# Patient Record
Sex: FEMALE | Race: WHITE | Employment: PART TIME | URBAN - METROPOLITAN AREA
[De-identification: names, ages, dates, MRNs, and addresses within clinical notes are randomized per-mention and may not be internally consistent; named-entity substitution may affect disease eponyms.]

---

## 2021-08-01 ENCOUNTER — ANESTHESIA EVENT (OUTPATIENT)
Dept: OPERATING ROOM | Age: 71
DRG: 908 | End: 2021-08-01
Payer: MEDICARE

## 2021-08-01 ENCOUNTER — HOSPITAL ENCOUNTER (INPATIENT)
Age: 71
LOS: 1 days | Discharge: HOME | DRG: 908 | End: 2021-08-02
Attending: EMERGENCY MEDICINE | Admitting: UROLOGY
Payer: MEDICARE

## 2021-08-01 ENCOUNTER — APPOINTMENT (OUTPATIENT)
Dept: CT IMAGING | Age: 71
DRG: 908 | End: 2021-08-01
Payer: MEDICARE

## 2021-08-01 ENCOUNTER — ANESTHESIA (OUTPATIENT)
Dept: OPERATING ROOM | Age: 71
DRG: 908 | End: 2021-08-01
Payer: MEDICARE

## 2021-08-01 VITALS
SYSTOLIC BLOOD PRESSURE: 117 MMHG | OXYGEN SATURATION: 100 % | DIASTOLIC BLOOD PRESSURE: 58 MMHG | RESPIRATION RATE: 31 BRPM

## 2021-08-01 DIAGNOSIS — R33.9 RETENTION OF URINE: Primary | ICD-10-CM

## 2021-08-01 DIAGNOSIS — R31.9 HEMATURIA, UNSPECIFIED TYPE: ICD-10-CM

## 2021-08-01 DIAGNOSIS — R10.30 LOWER ABDOMINAL PAIN: ICD-10-CM

## 2021-08-01 PROBLEM — R33.8 CLOT RETENTION OF URINE: Status: ACTIVE | Noted: 2021-08-01

## 2021-08-01 LAB
A/G RATIO: 1.6 (ref 1.1–2.2)
ABO/RH: NORMAL
ALBUMIN SERPL-MCNC: 4.1 G/DL (ref 3.4–5)
ALP BLD-CCNC: 89 U/L (ref 40–129)
ALT SERPL-CCNC: 40 U/L (ref 10–40)
ANION GAP SERPL CALCULATED.3IONS-SCNC: 15 MMOL/L (ref 3–16)
ANTIBODY SCREEN: NORMAL
AST SERPL-CCNC: 38 U/L (ref 15–37)
BASOPHILS ABSOLUTE: 0.1 K/UL (ref 0–0.2)
BASOPHILS RELATIVE PERCENT: 0.9 %
BILIRUB SERPL-MCNC: 0.4 MG/DL (ref 0–1)
BILIRUBIN URINE: ABNORMAL
BLOOD, URINE: ABNORMAL
BUN BLDV-MCNC: 8 MG/DL (ref 7–20)
CALCIUM SERPL-MCNC: 8.9 MG/DL (ref 8.3–10.6)
CHLORIDE BLD-SCNC: 95 MMOL/L (ref 99–110)
CLARITY: ABNORMAL
CO2: 24 MMOL/L (ref 21–32)
COLOR: ABNORMAL
COMMENT UA: ABNORMAL
CREAT SERPL-MCNC: <0.5 MG/DL (ref 0.6–1.2)
EOSINOPHILS ABSOLUTE: 0.1 K/UL (ref 0–0.6)
EOSINOPHILS RELATIVE PERCENT: 0.7 %
GFR AFRICAN AMERICAN: >60
GFR NON-AFRICAN AMERICAN: >60
GLOBULIN: 2.6 G/DL
GLUCOSE BLD-MCNC: 137 MG/DL (ref 70–99)
GLUCOSE URINE: ABNORMAL MG/DL
HCT VFR BLD CALC: 35.9 % (ref 36–48)
HEMOGLOBIN: 11.9 G/DL (ref 12–16)
KETONES, URINE: ABNORMAL MG/DL
LEUKOCYTE ESTERASE, URINE: ABNORMAL
LYMPHOCYTES ABSOLUTE: 1.7 K/UL (ref 1–5.1)
LYMPHOCYTES RELATIVE PERCENT: 20.3 %
MCH RBC QN AUTO: 30 PG (ref 26–34)
MCHC RBC AUTO-ENTMCNC: 33.1 G/DL (ref 31–36)
MCV RBC AUTO: 90.7 FL (ref 80–100)
MICROSCOPIC EXAMINATION: YES
MONOCYTES ABSOLUTE: 0.6 K/UL (ref 0–1.3)
MONOCYTES RELATIVE PERCENT: 7.6 %
NEUTROPHILS ABSOLUTE: 5.8 K/UL (ref 1.7–7.7)
NEUTROPHILS RELATIVE PERCENT: 70.5 %
NITRITE, URINE: ABNORMAL
PDW BLD-RTO: 13.4 % (ref 12.4–15.4)
PH UA: ABNORMAL (ref 5–8)
PLATELET # BLD: 253 K/UL (ref 135–450)
PMV BLD AUTO: 9 FL (ref 5–10.5)
POTASSIUM SERPL-SCNC: 4.2 MMOL/L (ref 3.5–5.1)
PROTEIN UA: ABNORMAL MG/DL
RBC # BLD: 3.96 M/UL (ref 4–5.2)
RBC UA: >100 /HPF (ref 0–4)
SARS-COV-2, NAAT: NOT DETECTED
SODIUM BLD-SCNC: 134 MMOL/L (ref 136–145)
SPECIFIC GRAVITY UA: 1.02 (ref 1–1.03)
TOTAL PROTEIN: 6.7 G/DL (ref 6.4–8.2)
URINE REFLEX TO CULTURE: YES
URINE TYPE: ABNORMAL
UROBILINOGEN, URINE: ABNORMAL E.U./DL
WBC # BLD: 8.3 K/UL (ref 4–11)

## 2021-08-01 PROCEDURE — 0W3R8ZZ CONTROL BLEEDING IN GENITOURINARY TRACT, VIA NATURAL OR ARTIFICIAL OPENING ENDOSCOPIC: ICD-10-PCS | Performed by: UROLOGY

## 2021-08-01 PROCEDURE — 6370000000 HC RX 637 (ALT 250 FOR IP): Performed by: PHYSICIAN ASSISTANT

## 2021-08-01 PROCEDURE — 2500000003 HC RX 250 WO HCPCS: Performed by: ANESTHESIOLOGY

## 2021-08-01 PROCEDURE — 7100000001 HC PACU RECOVERY - ADDTL 15 MIN: Performed by: UROLOGY

## 2021-08-01 PROCEDURE — 85025 COMPLETE CBC W/AUTO DIFF WBC: CPT

## 2021-08-01 PROCEDURE — G0378 HOSPITAL OBSERVATION PER HR: HCPCS

## 2021-08-01 PROCEDURE — 3700000001 HC ADD 15 MINUTES (ANESTHESIA): Performed by: UROLOGY

## 2021-08-01 PROCEDURE — 96374 THER/PROPH/DIAG INJ IV PUSH: CPT

## 2021-08-01 PROCEDURE — 3700000000 HC ANESTHESIA ATTENDED CARE: Performed by: UROLOGY

## 2021-08-01 PROCEDURE — 51702 INSERT TEMP BLADDER CATH: CPT

## 2021-08-01 PROCEDURE — 7100000000 HC PACU RECOVERY - FIRST 15 MIN: Performed by: UROLOGY

## 2021-08-01 PROCEDURE — 1200000000 HC SEMI PRIVATE

## 2021-08-01 PROCEDURE — 80053 COMPREHEN METABOLIC PANEL: CPT

## 2021-08-01 PROCEDURE — 2580000003 HC RX 258: Performed by: UROLOGY

## 2021-08-01 PROCEDURE — 86901 BLOOD TYPING SEROLOGIC RH(D): CPT

## 2021-08-01 PROCEDURE — 2709999900 HC NON-CHARGEABLE SUPPLY: Performed by: UROLOGY

## 2021-08-01 PROCEDURE — 36415 COLL VENOUS BLD VENIPUNCTURE: CPT

## 2021-08-01 PROCEDURE — 87635 SARS-COV-2 COVID-19 AMP PRB: CPT

## 2021-08-01 PROCEDURE — 6360000002 HC RX W HCPCS: Performed by: PHYSICIAN ASSISTANT

## 2021-08-01 PROCEDURE — 96375 TX/PRO/DX INJ NEW DRUG ADDON: CPT

## 2021-08-01 PROCEDURE — 87086 URINE CULTURE/COLONY COUNT: CPT

## 2021-08-01 PROCEDURE — 74177 CT ABD & PELVIS W/CONTRAST: CPT

## 2021-08-01 PROCEDURE — 3600000002 HC SURGERY LEVEL 2 BASE: Performed by: UROLOGY

## 2021-08-01 PROCEDURE — 99283 EMERGENCY DEPT VISIT LOW MDM: CPT

## 2021-08-01 PROCEDURE — 6360000002 HC RX W HCPCS: Performed by: ANESTHESIOLOGY

## 2021-08-01 PROCEDURE — 3600000012 HC SURGERY LEVEL 2 ADDTL 15MIN: Performed by: UROLOGY

## 2021-08-01 PROCEDURE — 86850 RBC ANTIBODY SCREEN: CPT

## 2021-08-01 PROCEDURE — 86900 BLOOD TYPING SEROLOGIC ABO: CPT

## 2021-08-01 PROCEDURE — 6360000004 HC RX CONTRAST MEDICATION: Performed by: PHYSICIAN ASSISTANT

## 2021-08-01 PROCEDURE — 0TCB8ZZ EXTIRPATION OF MATTER FROM BLADDER, VIA NATURAL OR ARTIFICIAL OPENING ENDOSCOPIC: ICD-10-PCS | Performed by: UROLOGY

## 2021-08-01 PROCEDURE — 81001 URINALYSIS AUTO W/SCOPE: CPT

## 2021-08-01 PROCEDURE — 6370000000 HC RX 637 (ALT 250 FOR IP): Performed by: UROLOGY

## 2021-08-01 RX ORDER — CEPHALEXIN 500 MG/1
500 CAPSULE ORAL 2 TIMES DAILY
Status: DISCONTINUED | OUTPATIENT
Start: 2021-08-01 | End: 2021-08-02 | Stop reason: ALTCHOICE

## 2021-08-01 RX ORDER — SODIUM CHLORIDE 0.9 % (FLUSH) 0.9 %
5-40 SYRINGE (ML) INJECTION PRN
Status: DISCONTINUED | OUTPATIENT
Start: 2021-08-01 | End: 2021-08-02 | Stop reason: HOSPADM

## 2021-08-01 RX ORDER — BUPROPION HYDROCHLORIDE 300 MG/1
300 TABLET ORAL EVERY MORNING
COMMUNITY

## 2021-08-01 RX ORDER — PROMETHAZINE HYDROCHLORIDE 25 MG/ML
6.25 INJECTION, SOLUTION INTRAMUSCULAR; INTRAVENOUS
Status: DISCONTINUED | OUTPATIENT
Start: 2021-08-01 | End: 2021-08-01 | Stop reason: HOSPADM

## 2021-08-01 RX ORDER — OXYCODONE HYDROCHLORIDE AND ACETAMINOPHEN 5; 325 MG/1; MG/1
1 TABLET ORAL ONCE
Status: COMPLETED | OUTPATIENT
Start: 2021-08-01 | End: 2021-08-01

## 2021-08-01 RX ORDER — ONDANSETRON 4 MG/1
4 TABLET, ORALLY DISINTEGRATING ORAL EVERY 8 HOURS PRN
Status: DISCONTINUED | OUTPATIENT
Start: 2021-08-01 | End: 2021-08-02 | Stop reason: HOSPADM

## 2021-08-01 RX ORDER — PROPOFOL 10 MG/ML
INJECTION, EMULSION INTRAVENOUS PRN
Status: DISCONTINUED | OUTPATIENT
Start: 2021-08-01 | End: 2021-08-01 | Stop reason: SDUPTHER

## 2021-08-01 RX ORDER — EPHEDRINE SULFATE/0.9% NACL/PF 50 MG/5 ML
SYRINGE (ML) INTRAVENOUS PRN
Status: DISCONTINUED | OUTPATIENT
Start: 2021-08-01 | End: 2021-08-01 | Stop reason: SDUPTHER

## 2021-08-01 RX ORDER — KRILL/OM-3/DHA/EPA/PHOSPHO/AST 500-110 MG
500 CAPSULE ORAL DAILY
Status: DISCONTINUED | OUTPATIENT
Start: 2021-08-02 | End: 2021-08-01 | Stop reason: RX

## 2021-08-01 RX ORDER — KRILL/OM-3/DHA/EPA/PHOSPHO/AST 500-110 MG
500 CAPSULE ORAL DAILY
COMMUNITY

## 2021-08-01 RX ORDER — TRAZODONE HYDROCHLORIDE 100 MG/1
100 TABLET ORAL NIGHTLY
COMMUNITY

## 2021-08-01 RX ORDER — CEPHALEXIN 500 MG/1
500 CAPSULE ORAL 2 TIMES DAILY
Status: ON HOLD | COMMUNITY
End: 2021-08-02 | Stop reason: HOSPADM

## 2021-08-01 RX ORDER — GLUCOSAMINE/D3/BOSWELLIA SERRA 1500MG-400
1 TABLET ORAL EVERY EVENING
COMMUNITY

## 2021-08-01 RX ORDER — ONDANSETRON 2 MG/ML
4 INJECTION INTRAMUSCULAR; INTRAVENOUS EVERY 6 HOURS PRN
Status: DISCONTINUED | OUTPATIENT
Start: 2021-08-01 | End: 2021-08-02 | Stop reason: HOSPADM

## 2021-08-01 RX ORDER — GLUCOSAMINE/D3/BOSWELLIA SERRA 1500MG-400
1 TABLET ORAL EVERY EVENING
Status: DISCONTINUED | OUTPATIENT
Start: 2021-08-01 | End: 2021-08-01 | Stop reason: RX

## 2021-08-01 RX ORDER — MAGNESIUM HYDROXIDE 1200 MG/15ML
LIQUID ORAL
Status: COMPLETED | OUTPATIENT
Start: 2021-08-01 | End: 2021-08-01

## 2021-08-01 RX ORDER — ATORVASTATIN CALCIUM 40 MG/1
40 TABLET, FILM COATED ORAL DAILY
COMMUNITY

## 2021-08-01 RX ORDER — ACETAMINOPHEN 160 MG
1 TABLET,DISINTEGRATING ORAL EVERY EVENING
COMMUNITY

## 2021-08-01 RX ORDER — LABETALOL HYDROCHLORIDE 5 MG/ML
5 INJECTION, SOLUTION INTRAVENOUS EVERY 10 MIN PRN
Status: DISCONTINUED | OUTPATIENT
Start: 2021-08-01 | End: 2021-08-01 | Stop reason: HOSPADM

## 2021-08-01 RX ORDER — MORPHINE SULFATE 4 MG/ML
4 INJECTION, SOLUTION INTRAMUSCULAR; INTRAVENOUS
Status: DISCONTINUED | OUTPATIENT
Start: 2021-08-01 | End: 2021-08-02 | Stop reason: HOSPADM

## 2021-08-01 RX ORDER — ARMODAFINIL 250 MG/1
250 TABLET ORAL DAILY
COMMUNITY

## 2021-08-01 RX ORDER — FENTANYL CITRATE 50 UG/ML
INJECTION, SOLUTION INTRAMUSCULAR; INTRAVENOUS PRN
Status: DISCONTINUED | OUTPATIENT
Start: 2021-08-01 | End: 2021-08-01 | Stop reason: SDUPTHER

## 2021-08-01 RX ORDER — DEXAMETHASONE SODIUM PHOSPHATE 4 MG/ML
INJECTION, SOLUTION INTRA-ARTICULAR; INTRALESIONAL; INTRAMUSCULAR; INTRAVENOUS; SOFT TISSUE PRN
Status: DISCONTINUED | OUTPATIENT
Start: 2021-08-01 | End: 2021-08-01 | Stop reason: SDUPTHER

## 2021-08-01 RX ORDER — ACETAMINOPHEN 325 MG/1
650 TABLET ORAL EVERY 4 HOURS PRN
Status: DISCONTINUED | OUTPATIENT
Start: 2021-08-01 | End: 2021-08-02 | Stop reason: HOSPADM

## 2021-08-01 RX ORDER — MORPHINE SULFATE 4 MG/ML
4 INJECTION, SOLUTION INTRAMUSCULAR; INTRAVENOUS ONCE
Status: DISCONTINUED | OUTPATIENT
Start: 2021-08-01 | End: 2021-08-01

## 2021-08-01 RX ORDER — OXYCODONE HYDROCHLORIDE 5 MG/1
5 TABLET ORAL EVERY 4 HOURS PRN
Status: DISCONTINUED | OUTPATIENT
Start: 2021-08-01 | End: 2021-08-02 | Stop reason: HOSPADM

## 2021-08-01 RX ORDER — ATORVASTATIN CALCIUM 40 MG/1
40 TABLET, FILM COATED ORAL NIGHTLY
Status: DISCONTINUED | OUTPATIENT
Start: 2021-08-01 | End: 2021-08-02 | Stop reason: HOSPADM

## 2021-08-01 RX ORDER — MULTIVIT WITH MINERALS/LUTEIN
500 TABLET ORAL DAILY
COMMUNITY
End: 2021-08-01

## 2021-08-01 RX ORDER — M-VIT,TX,IRON,MINS/CALC/FOLIC 27MG-0.4MG
1 TABLET ORAL DAILY
COMMUNITY

## 2021-08-01 RX ORDER — SODIUM CHLORIDE 9 MG/ML
25 INJECTION, SOLUTION INTRAVENOUS PRN
Status: DISCONTINUED | OUTPATIENT
Start: 2021-08-01 | End: 2021-08-02 | Stop reason: HOSPADM

## 2021-08-01 RX ORDER — SUCCINYLCHOLINE/SOD CL,ISO/PF 200MG/10ML
SYRINGE (ML) INTRAVENOUS PRN
Status: DISCONTINUED | OUTPATIENT
Start: 2021-08-01 | End: 2021-08-01 | Stop reason: SDUPTHER

## 2021-08-01 RX ORDER — SODIUM CHLORIDE 0.9 % (FLUSH) 0.9 %
5-40 SYRINGE (ML) INJECTION EVERY 12 HOURS SCHEDULED
Status: DISCONTINUED | OUTPATIENT
Start: 2021-08-01 | End: 2021-08-02 | Stop reason: HOSPADM

## 2021-08-01 RX ORDER — FENTANYL CITRATE 50 UG/ML
50 INJECTION, SOLUTION INTRAMUSCULAR; INTRAVENOUS ONCE
Status: COMPLETED | OUTPATIENT
Start: 2021-08-01 | End: 2021-08-01

## 2021-08-01 RX ORDER — MORPHINE SULFATE 2 MG/ML
2 INJECTION, SOLUTION INTRAMUSCULAR; INTRAVENOUS
Status: DISCONTINUED | OUTPATIENT
Start: 2021-08-01 | End: 2021-08-02 | Stop reason: HOSPADM

## 2021-08-01 RX ORDER — ONDANSETRON 2 MG/ML
INJECTION INTRAMUSCULAR; INTRAVENOUS PRN
Status: DISCONTINUED | OUTPATIENT
Start: 2021-08-01 | End: 2021-08-01 | Stop reason: SDUPTHER

## 2021-08-01 RX ORDER — OXYCODONE HYDROCHLORIDE 10 MG/1
10 TABLET ORAL EVERY 4 HOURS PRN
Status: DISCONTINUED | OUTPATIENT
Start: 2021-08-01 | End: 2021-08-02 | Stop reason: HOSPADM

## 2021-08-01 RX ORDER — POLYETHYLENE GLYCOL 3350 17 G/17G
17 POWDER, FOR SOLUTION ORAL DAILY
Status: DISCONTINUED | OUTPATIENT
Start: 2021-08-02 | End: 2021-08-02 | Stop reason: HOSPADM

## 2021-08-01 RX ORDER — FENTANYL CITRATE 50 UG/ML
25 INJECTION, SOLUTION INTRAMUSCULAR; INTRAVENOUS EVERY 5 MIN PRN
Status: DISCONTINUED | OUTPATIENT
Start: 2021-08-01 | End: 2021-08-01 | Stop reason: HOSPADM

## 2021-08-01 RX ORDER — CEFAZOLIN SODIUM 1 G/3ML
INJECTION, POWDER, FOR SOLUTION INTRAMUSCULAR; INTRAVENOUS PRN
Status: DISCONTINUED | OUTPATIENT
Start: 2021-08-01 | End: 2021-08-01 | Stop reason: SDUPTHER

## 2021-08-01 RX ORDER — SODIUM CHLORIDE 9 MG/ML
INJECTION, SOLUTION INTRAVENOUS CONTINUOUS
Status: DISCONTINUED | OUTPATIENT
Start: 2021-08-01 | End: 2021-08-02 | Stop reason: HOSPADM

## 2021-08-01 RX ORDER — LIDOCAINE HYDROCHLORIDE 20 MG/ML
INJECTION, SOLUTION EPIDURAL; INFILTRATION; INTRACAUDAL; PERINEURAL PRN
Status: DISCONTINUED | OUTPATIENT
Start: 2021-08-01 | End: 2021-08-01 | Stop reason: SDUPTHER

## 2021-08-01 RX ORDER — TRAZODONE HYDROCHLORIDE 100 MG/1
100 TABLET ORAL NIGHTLY
Status: DISCONTINUED | OUTPATIENT
Start: 2021-08-01 | End: 2021-08-02 | Stop reason: HOSPADM

## 2021-08-01 RX ORDER — BUPROPION HYDROCHLORIDE 150 MG/1
300 TABLET ORAL EVERY MORNING
Status: DISCONTINUED | OUTPATIENT
Start: 2021-08-02 | End: 2021-08-02 | Stop reason: HOSPADM

## 2021-08-01 RX ORDER — VITAMIN B COMPLEX
2000 TABLET ORAL EVERY EVENING
Status: DISCONTINUED | OUTPATIENT
Start: 2021-08-01 | End: 2021-08-02 | Stop reason: HOSPADM

## 2021-08-01 RX ORDER — M-VIT,TX,IRON,MINS/CALC/FOLIC 27MG-0.4MG
1 TABLET ORAL DAILY
Status: DISCONTINUED | OUTPATIENT
Start: 2021-08-02 | End: 2021-08-02 | Stop reason: HOSPADM

## 2021-08-01 RX ORDER — ARMODAFINIL 250 MG/1
250 TABLET ORAL DAILY
Status: DISCONTINUED | OUTPATIENT
Start: 2021-08-02 | End: 2021-08-02 | Stop reason: HOSPADM

## 2021-08-01 RX ORDER — ATROPA BELLADONNA AND OPIUM 16.2; 3 MG/1; MG/1
30 SUPPOSITORY RECTAL EVERY 8 HOURS PRN
Status: DISCONTINUED | OUTPATIENT
Start: 2021-08-01 | End: 2021-08-02 | Stop reason: HOSPADM

## 2021-08-01 RX ADMIN — ATORVASTATIN CALCIUM 40 MG: 40 TABLET, FILM COATED ORAL at 22:54

## 2021-08-01 RX ADMIN — IOPAMIDOL 75 ML: 755 INJECTION, SOLUTION INTRAVENOUS at 17:48

## 2021-08-01 RX ADMIN — Medication 10 MG: at 20:28

## 2021-08-01 RX ADMIN — Medication 10 MG: at 20:33

## 2021-08-01 RX ADMIN — Medication 120 MG: at 20:02

## 2021-08-01 RX ADMIN — HYDROMORPHONE HYDROCHLORIDE 0.5 MG: 1 INJECTION, SOLUTION INTRAMUSCULAR; INTRAVENOUS; SUBCUTANEOUS at 17:27

## 2021-08-01 RX ADMIN — CEFAZOLIN SODIUM 2000 MG: 1 INJECTION, POWDER, FOR SOLUTION INTRAMUSCULAR; INTRAVENOUS at 20:06

## 2021-08-01 RX ADMIN — Medication 10 MG: at 20:25

## 2021-08-01 RX ADMIN — FENTANYL CITRATE 50 MCG: 50 INJECTION, SOLUTION INTRAMUSCULAR; INTRAVENOUS at 18:31

## 2021-08-01 RX ADMIN — ATROPA BELLADONNA AND OPIUM 30 MG: 16.2; 3 SUPPOSITORY RECTAL at 22:00

## 2021-08-01 RX ADMIN — FENTANYL CITRATE 50 MCG: 50 INJECTION INTRAMUSCULAR; INTRAVENOUS at 20:38

## 2021-08-01 RX ADMIN — FENTANYL CITRATE 50 MCG: 50 INJECTION INTRAMUSCULAR; INTRAVENOUS at 20:02

## 2021-08-01 RX ADMIN — FENTANYL CITRATE 25 MCG: 0.05 INJECTION, SOLUTION INTRAMUSCULAR; INTRAVENOUS at 21:06

## 2021-08-01 RX ADMIN — Medication 20 MG: at 20:14

## 2021-08-01 RX ADMIN — TRAZODONE HYDROCHLORIDE 100 MG: 100 TABLET ORAL at 22:54

## 2021-08-01 RX ADMIN — LIDOCAINE HYDROCHLORIDE 100 MG: 20 INJECTION, SOLUTION EPIDURAL; INFILTRATION; INTRACAUDAL; PERINEURAL at 20:02

## 2021-08-01 RX ADMIN — SODIUM CHLORIDE: 9 INJECTION, SOLUTION INTRAVENOUS at 22:00

## 2021-08-01 RX ADMIN — OXYCODONE HYDROCHLORIDE AND ACETAMINOPHEN 1 TABLET: 5; 325 TABLET ORAL at 16:59

## 2021-08-01 RX ADMIN — FENTANYL CITRATE 25 MCG: 0.05 INJECTION, SOLUTION INTRAMUSCULAR; INTRAVENOUS at 21:11

## 2021-08-01 RX ADMIN — ONDANSETRON 4 MG: 2 INJECTION INTRAMUSCULAR; INTRAVENOUS at 20:02

## 2021-08-01 RX ADMIN — PROPOFOL 110 MG: 10 INJECTION, EMULSION INTRAVENOUS at 20:02

## 2021-08-01 RX ADMIN — DEXAMETHASONE SODIUM PHOSPHATE 10 MG: 4 INJECTION, SOLUTION INTRAMUSCULAR; INTRAVENOUS at 20:02

## 2021-08-01 RX ADMIN — OXYCODONE HYDROCHLORIDE 5 MG: 5 TABLET ORAL at 22:53

## 2021-08-01 RX ADMIN — CEPHALEXIN 500 MG: 500 CAPSULE ORAL at 22:53

## 2021-08-01 ASSESSMENT — PULMONARY FUNCTION TESTS
PIF_VALUE: 16
PIF_VALUE: 4
PIF_VALUE: 16
PIF_VALUE: 13
PIF_VALUE: 1
PIF_VALUE: 15
PIF_VALUE: 15
PIF_VALUE: 16
PIF_VALUE: 15
PIF_VALUE: 0
PIF_VALUE: 34
PIF_VALUE: 0
PIF_VALUE: 15
PIF_VALUE: 16
PIF_VALUE: 16
PIF_VALUE: 15
PIF_VALUE: 16
PIF_VALUE: 14
PIF_VALUE: 16
PIF_VALUE: 16
PIF_VALUE: 15
PIF_VALUE: 2
PIF_VALUE: 2
PIF_VALUE: 16
PIF_VALUE: 0
PIF_VALUE: 0
PIF_VALUE: 2
PIF_VALUE: 14
PIF_VALUE: 16
PIF_VALUE: 13
PIF_VALUE: 12
PIF_VALUE: 15
PIF_VALUE: 16
PIF_VALUE: 11
PIF_VALUE: 15
PIF_VALUE: 10
PIF_VALUE: 14
PIF_VALUE: 13
PIF_VALUE: 15
PIF_VALUE: 15
PIF_VALUE: 2
PIF_VALUE: 15

## 2021-08-01 ASSESSMENT — PAIN SCALES - GENERAL
PAINLEVEL_OUTOF10: 8
PAINLEVEL_OUTOF10: 5
PAINLEVEL_OUTOF10: 2
PAINLEVEL_OUTOF10: 10
PAINLEVEL_OUTOF10: 5
PAINLEVEL_OUTOF10: 5
PAINLEVEL_OUTOF10: 2
PAINLEVEL_OUTOF10: 5
PAINLEVEL_OUTOF10: 8

## 2021-08-01 ASSESSMENT — PAIN DESCRIPTION - PROGRESSION
CLINICAL_PROGRESSION: NOT CHANGED
CLINICAL_PROGRESSION: GRADUALLY WORSENING
CLINICAL_PROGRESSION: GRADUALLY WORSENING
CLINICAL_PROGRESSION: NOT CHANGED
CLINICAL_PROGRESSION: GRADUALLY IMPROVING

## 2021-08-01 ASSESSMENT — ENCOUNTER SYMPTOMS
ABDOMINAL PAIN: 1
NAUSEA: 0
VOMITING: 0

## 2021-08-01 ASSESSMENT — PAIN DESCRIPTION - ONSET
ONSET: ON-GOING
ONSET: GRADUAL
ONSET: ON-GOING

## 2021-08-01 ASSESSMENT — PAIN DESCRIPTION - FREQUENCY
FREQUENCY: CONTINUOUS
FREQUENCY: CONTINUOUS
FREQUENCY: INTERMITTENT
FREQUENCY: CONTINUOUS
FREQUENCY: INTERMITTENT
FREQUENCY: CONTINUOUS

## 2021-08-01 ASSESSMENT — PAIN DESCRIPTION - PAIN TYPE
TYPE: ACUTE PAIN
TYPE: SURGICAL PAIN
TYPE: ACUTE PAIN
TYPE: ACUTE PAIN

## 2021-08-01 ASSESSMENT — PAIN DESCRIPTION - LOCATION
LOCATION: PERINEUM
LOCATION: ABDOMEN
LOCATION: PERINEUM
LOCATION: ABDOMEN

## 2021-08-01 ASSESSMENT — PAIN DESCRIPTION - DESCRIPTORS
DESCRIPTORS: STABBING
DESCRIPTORS: STABBING
DESCRIPTORS: PRESSURE;CRAMPING
DESCRIPTORS: STABBING
DESCRIPTORS: SPASM

## 2021-08-01 ASSESSMENT — PAIN DESCRIPTION - ORIENTATION
ORIENTATION: LOWER
ORIENTATION: LOWER

## 2021-08-01 ASSESSMENT — PAIN - FUNCTIONAL ASSESSMENT
PAIN_FUNCTIONAL_ASSESSMENT: ACTIVITIES ARE NOT PREVENTED
PAIN_FUNCTIONAL_ASSESSMENT: PREVENTS OR INTERFERES SOME ACTIVE ACTIVITIES AND ADLS

## 2021-08-01 NOTE — ED PROVIDER NOTES
Date of evaluation: 8/1/2021    ED Attending Attestation Note     CHIEF COMPLAINT     I can't pee, only blood is coming out  HISTORY OF PRESENT ILLNESS  (Location/Symptom, Timing/Onset,Context/Setting, Quality, Duration, Modifying Factors, Severity). Note limiting factors. This patient was seen by the advance practice provider. I have seen and examined the patient, agree with the workup, evaluation, management and diagnosis. The care plan has been discussed. Chief Complaint   Patient presents with    Urinary Retention     having blood clots out of urethra and unable to pass urine         Brayan Kimball is a 72 y.o. female who presents to the emergency department secondary to concern for urinary retention. Had a cystoscopy 2 weeks ago with Carito for cancer (had two tumors removed). Has been urinating since then, only intermittently blood, was placed on keflex for UTI on Friday. This morning urinated at 8am without issues but has not been able to go since then. Feels miserable now. No past medical history noted below, significant as noted above. Social History     Socioeconomic History    Marital status:      Spouse name: None    Number of children: None    Years of education: None    Highest education level: None   Occupational History    None   Tobacco Use    Smoking status: None   Substance and Sexual Activity    Alcohol use: None    Drug use: None    Sexual activity: None   Other Topics Concern    None   Social History Narrative    None     Social Determinants of Health     Financial Resource Strain:     Difficulty of Paying Living Expenses:    Food Insecurity:     Worried About Running Out of Food in the Last Year:     Ran Out of Food in the Last Year:    Transportation Needs:     Lack of Transportation (Medical):      Lack of Transportation (Non-Medical):    Physical Activity:     Days of Exercise per Week:     Minutes of Exercise per Session:    Stress:     Feeling of Stress :    Social Connections:     Frequency of Communication with Friends and Family:     Frequency of Social Gatherings with Friends and Family:     Attends Jain Services:     Active Member of Clubs or Organizations:     Attends Club or Organization Meetings:     Marital Status:    Intimate Partner Violence:     Fear of Current or Ex-Partner:     Emotionally Abused:     Physically Abused:     Sexually Abused:      Aside from what is stated above denies any other symptoms or modifying factors. Nursing Notes reviewed. History reviewed. No pertinent surgical history. History reviewed. No pertinent family history. CURRENT MEDICATIONS       Previous Medications    ARMODAFINIL (NUVIGIL) 250 MG TABS    Take 250 mg by mouth daily. ATORVASTATIN (LIPITOR) 40 MG TABLET    Take 40 mg by mouth daily    BIOTIN 22775 MCG TABS    Take 1 tablet by mouth every evening    BUPROPION (WELLBUTRIN XL) 300 MG EXTENDED RELEASE TABLET    Take 300 mg by mouth every morning    CEPHALEXIN (KEFLEX) 500 MG CAPSULE    Take 500 mg by mouth 2 times daily    CHOLECALCIFEROL (VITAMIN D3) 50 MCG (2000 UT) CAPS    Take 1 capsule by mouth every evening     DICLOFENAC SODIUM (VOLTAREN) 1 % GEL    Apply 2 g topically 4 times daily as needed for Pain    KRILL OIL (OMEGA-3) 500 MG CAPS    Take 500 mg by mouth daily     MULTIPLE VITAMINS-MINERALS (THERAPEUTIC MULTIVITAMIN-MINERALS) TABLET    Take 1 tablet by mouth daily    TRAZODONE (DESYREL) 100 MG TABLET    Take 100 mg by mouth nightly      DIAGNOSTIC RESULTS     RADIOLOGY:   Interpretation per Radiologist below, if available at the time of this note:  CT ABDOMEN PELVIS W IV CONTRAST Additional Contrast? None   Final Result   Large amount of intravesicular clot with concern of a small right anterior   inferior focus of active extravasation.            Patient was given the following medications:  Orders Placed This Encounter   Medications    DISCONTD: morphine (PF) injection 4 mg    oxyCODONE-acetaminophen (PERCOCET) 5-325 MG per tablet 1 tablet    HYDROmorphone (DILAUDID) injection 0.5 mg    iopamidol (ISOVUE-370) 76 % injection 75 mL    fentaNYL (SUBLIMAZE) injection 50 mcg    fentaNYL (SUBLIMAZE) injection 25 mcg    HYDROmorphone (DILAUDID) injection 0.5 mg    promethazine (PHENERGAN) injection 6.25 mg    labetalol (NORMODYNE;TRANDATE) injection 5 mg       INITIAL VITALS: BP: (!) 162/85, Temp: 98.6 °F (37 °C), Pulse: 88, Resp: 18, SpO2: 95 %   RECENT VITALS:  BP: (!) 153/55,Temp: 98.6 °F (37 °C), Pulse: 88, Resp: 18, SpO2: 99 %     Three way bonilla placed. Started CBI. Clotted off and then re-irrigated. CT scan was fast tracked due to her discomfort. My assessment reveals a white female who is laying in bed who appears acutely distressed. She does not tolerate palpation of her abdomen. Her three-way Bonilla is again clogged. She had received Dilaudid at that time after receiving morphine which she stated did not help her pain. She was ordered fentanyl. Neurology, Dr. David Aguirre, was consulted. Plan to come in to take patient to the OR. He will also admit patient. Critical Care:  Due to the immediate potential for life-threatening deterioration due to hematuria with clots requiring specialist intervention in OR, I spent 11 minutes providing critical care. This time excludes time spent performing procedures but includes time spent on direct patient care, history retrieval, review of the chart, and discussions with patient, family, and consultant(s). FINAL IMPRESSION      1. Retention of urine    2. Lower abdominal pain    3.  Hematuria, unspecified type        DISPOSITION/PLAN   DISPOSITION  Admission         (Please note that portions of this note were completed with a voice recognition program. Efforts were made to edit the dictations but occasionally words are mis-transcribed.)    Bandar Pendleton MD (electronically signed)  Attending Emergency

## 2021-08-01 NOTE — H&P
Consulting Physician: ER     Reason for Consult: gross hematuria with clot retention    History of Present Illness: Kamila Gustafson is a 72 y.o. female who underwent TURBT with Dr. Jeneen Moritz 2 weeks ago. She was doing well until earlier this morning when she had gross hematuria. Since then she has been unable to void at all. CT done in ER which shows distended bladder and large clot burden within bladder. Possible active extravasation right anterior. She has been in pain due to retention/ bladder spasms. We are taking her emergently to OR for cysto, clot evacuation. Past Medical History:   Recent diagnosis of bladder cancer    Past Surgical History:  TURBT about 2 weeks ago    Social History:  Social History     Socioeconomic History    Marital status:      Spouse name: Not on file    Number of children: Not on file    Years of education: Not on file    Highest education level: Not on file   Occupational History    Not on file   Tobacco Use    Smoking status: Not on file   Substance and Sexual Activity    Alcohol use: Not on file    Drug use: Not on file    Sexual activity: Not on file   Other Topics Concern    Not on file   Social History Narrative    Not on file     Social Determinants of Health     Financial Resource Strain:     Difficulty of Paying Living Expenses:    Food Insecurity:     Worried About Running Out of Food in the Last Year:     [address] in the Last Year:    Transportation Needs:     Lack of Transportation (Medical):      Lack of Transportation (Non-Medical):    Physical Activity:     Days of Exercise per Week:     Minutes of Exercise per Session:    Stress:     Feeling of Stress :    Social Connections:     Frequency of Communication with Friends and Family:     Frequency of Social Gatherings with Friends and Family:     Attends Yazdanism Services:     Active Member of Clubs or Organizations:     Attends Club or Organization Meetings:     Marital Status:    Intimate Partner Violence:     Fear of Current or Ex-Partner:     Emotionally Abused:     Physically Abused:     Sexually Abused:        Family History:  History reviewed. No pertinent family history.     Meds:   Current Facility-Administered Medications: fentaNYL (SUBLIMAZE) injection 25 mcg, 25 mcg, Intravenous, Q5 Min PRN  HYDROmorphone (DILAUDID) injection 0.5 mg, 0.5 mg, Intravenous, Q5 Min PRN  promethazine (PHENERGAN) injection 6.25 mg, 6.25 mg, Intravenous, Once PRN  labetalol (NORMODYNE;TRANDATE) injection 5 mg, 5 mg, Intravenous, Q10 Min PRN    Vitals:  BP (!) 153/55   Pulse 88   Temp 98.6 °F (37 °C) (Temporal)   Resp 18   Ht 5' 5\" (1.651 m)   Wt 146 lb 2.6 oz (66.3 kg)   SpO2 99%   BMI 24.32 kg/m²   No intake or output data in the 24 hours ending 08/01/21 1942    Review of Systems:  [address] were reviewed and negative except as in HPI      Physical Exam:  General Appearance: Alert and oriented, cooperative, no distress, appears stated age  Head: Normocephalic, without obvious abnormality, atraumatic  Back: no CVA tenderness  Lungs: respirations unlabored, no wheezing  Heart: Regular rate and rhythm, no lower extremity edema noted  Abdomen: Soft except at SP area, tender, non-distended, no masses  Skin: Skin color, texture, turgor normal, no rashes or lesions  Neurologic: no gross deficits  Female :    Bladder is distended and palpable and tender to palpation   No CVA tenderness       Labs:  CBC   Lab Results   Component Value Date    WBC 8.3 08/01/2021    RBC 3.96 08/01/2021    HGB 11.9 08/01/2021    HCT 35.9 08/01/2021    MCV 90.7 08/01/2021    MCH 30.0 08/01/2021    MCHC 33.1 08/01/2021    RDW 13.4 08/01/2021     08/01/2021    MPV 9.0 08/01/2021     BMP   Lab Results   Component Value Date     08/01/2021    K 4.2 08/01/2021    CL 95 08/01/2021    CO2 24 08/01/2021    BUN 8 08/01/2021    CREATININE <0.5 08/01/2021    GLUCOSE 137 08/01/2021    CALCIUM 8.9 08/01/2021       Urinalysis:   Lab Results   Component Value Date    COLORU RED 08/01/2021    GLUCOSEU see below 08/01/2021    BLOODU LARGE 08/01/2021    Dulce Hugger see below 08/01/2021    LEUKOCYTESUR see below 08/01/2021       Imaging: Pertinent images and radiologist's report were reviewed independently  EXAMINATION:   CT OF THE ABDOMEN AND PELVIS WITH CONTRAST 8/1/2021 5:48 pm       TECHNIQUE:   CT of the abdomen and pelvis was performed with the administration of   intravenous contrast. Multiplanar reformatted images are provided for review. Dose modulation, iterative reconstruction, and/or weight based adjustment of   the mA/kV was utilized to reduce the radiation dose to as low as reasonably   achievable.       COMPARISON:   None.       HISTORY:   ORDERING SYSTEM PROVIDED HISTORY: hematuria   TECHNOLOGIST PROVIDED HISTORY:   Reason for exam:->hematuria   Additional Contrast?->None   Decision Support Exception - unselect if not a suspected or confirmed   emergency medical condition->Emergency Medical Condition (MA)   Reason for Exam: hematuria, cysto a few days ago with bx   Acuity: Acute   Type of Exam: Initial       FINDINGS:   Lower Chest: There is no consolidation or effusion.       Organs: The liver, pancreas, spleen, kidneys and adrenals are unremarkable.       GI/Bowel: There is no bowel dilatation, wall thickening or obstruction.  The   appendix is not visualized.       Pelvis:  There is moderate to severe distention of the urinary bladder which   contains a large amount of clot and a small amount of non dependent air.  The   clot approaches 10 cm in diameter.  There is a small round 8 mm hyperdense   focus along the rightward anterior inferior bladder wall which may reflect a   small focus of extravasation/active hemorrhage.  A Obrien catheter is in   place.  The uterus is surgically absent.       Peritoneum/Retroperitoneum: There is a small amount of fluid within the space   of Retzius. Rachel Sayres is no free air or intraperitoneal inflammatory change. There is no adenopathy.       Bones/Soft Tissues:  There is no acute fracture or aggressive osseous lesion.           Impression   Large amount of intravesicular clot with concern of a small right anterior   inferior focus of active extravasation.             Impression/Plan: 73 yo female with gross hematuria and clot retention  - to OR for emergent cysto, clot evacuation  - Will admit postop for CBI and observation and pain control    Teri Healy MD 3/5/03640:41 PM

## 2021-08-01 NOTE — ED NOTES
Report given to Meagan BRADLEY from OR. No further questions at this time.       David Boateng RN  08/01/21 1921

## 2021-08-01 NOTE — ANESTHESIA PRE PROCEDURE
[facility] Department of Anesthesiology  Pre-Anesthesia Evaluation/Consultation       Name:  Susannah Lovell  : 1950  Age:  72 y.o. MRN:  [id number]  Date: 2021           Surgeon: Surgeon(s):  Mahogany Cortes MD    Procedure: Procedure(s):  CYSTOSCOPY EVACUATION OF CLOTS     No Known Allergies  There is no problem list on file for this patient. History reviewed. No pertinent past medical history. History reviewed. No pertinent surgical history. Social History     Tobacco Use    Smoking status: Not on file   Substance Use Topics    Alcohol use: Not on file    Drug use: Not on file     Medications  No current facility-administered medications on file prior to encounter. Current Outpatient Medications on File Prior to Encounter   Medication Sig Dispense Refill    atorvastatin (LIPITOR) 40 MG tablet Take 40 mg by mouth daily      buPROPion (WELLBUTRIN XL) 300 MG extended release tablet Take 300 mg by mouth every morning      traZODone (DESYREL) 100 MG tablet Take 100 mg by mouth nightly      Armodafinil (NUVIGIL) 250 MG TABS Take 250 mg by mouth daily.  diclofenac sodium (VOLTAREN) 1 % GEL Apply 2 g topically 4 times daily as needed for Pain      Multiple Vitamins-Minerals (THERAPEUTIC MULTIVITAMIN-MINERALS) tablet Take 1 tablet by mouth daily      Cholecalciferol (VITAMIN D3) 50 MCG (2000 UT) CAPS Take 1 capsule by mouth every evening       Krill Oil (OMEGA-3) 500 MG CAPS Take 500 mg by mouth daily       Biotin 92992 MCG TABS Take 1 tablet by mouth every evening      cephALEXin (KEFLEX) 500 MG capsule Take 500 mg by mouth 2 times daily       No current facility-administered medications for this encounter.      Current Outpatient Medications   Medication Sig Dispense Refill    atorvastatin (LIPITOR) 40 MG tablet Take 40 mg by mouth daily      buPROPion (WELLBUTRIN XL) 300 MG extended release tablet Take 300 mg by mouth every morning      traZODone (DESYREL) 100 MG tablet Take 100 mg by mouth nightly      Armodafinil (NUVIGIL) 250 MG TABS Take 250 mg by mouth daily.  diclofenac sodium (VOLTAREN) 1 % GEL Apply 2 g topically 4 times daily as needed for Pain      Multiple Vitamins-Minerals (THERAPEUTIC MULTIVITAMIN-MINERALS) tablet Take 1 tablet by mouth daily      Cholecalciferol (VITAMIN D3) 50 MCG (2000 UT) CAPS Take 1 capsule by mouth every evening       Krill Oil (OMEGA-3) 500 MG CAPS Take 500 mg by mouth daily       Biotin 08676 MCG TABS Take 1 tablet by mouth every evening      cephALEXin (KEFLEX) 500 MG capsule Take 500 mg by mouth 2 times daily       Vital Signs (Current)   Vitals:    21 1832   BP: (!) 153/55   Pulse:    Resp:    Temp:    SpO2:      Vital Signs Statistics (for past 48 hrs)     Temp  Av.6 °F (37 °C)  Min: 98.6 °F (37 °C)   Min taken time: 21 155  Max: 98.6 °F (37 °C)   Max taken time: 21 155  Pulse  Av  Min: 88   Min taken time: 21 1552  Max: 88   Max taken time: 21 1552  Resp  Av  Min: 18   Min taken time: 21 1552  Max: 18   Max taken time: 21 1552  BP  Min: 127/80   Min taken time: 21 1652  Max: 163/70   Max taken time: 21 1701  MAP (mmHg)  Av  Min: 81   Min taken time: 21 1832  Max: 80   Max taken time: 21 170  SpO2  Av.5 %  Min: 95 %   Min taken time: 21 1552  Max: 100 %   Max taken time: 21 1701    BP Readings from Last 3 Encounters:   21 (!) 153/55     BMI  Body mass index is 24.32 kg/m². Estimated body mass index is 24.32 kg/m² as calculated from the following:    Height as of this encounter: 5' 5\" (1.651 m). Weight as of this encounter: 146 lb 2.6 oz (66.3 kg).     CBC   Lab Results   Component Value Date    WBC 8.3 2021    RBC 3.96 2021    HGB 11.9 2021    HCT 35.9 2021    MCV 90.7 2021    RDW 13.4 2021     2021     CMP    Lab Results   Component administered. Anesthetic plan and risks discussed with patient and spouse. This pre-anesthesia assessment may be used as a history and physical.    DOS STAFF ADDENDUM:    Pt seen and examined, chart reviewed (including anesthesia, drug and allergy history). No interval changes to history and physical examination. Anesthetic plan, risks, benefits, alternatives, and personnel involved discussed with patient. Questions and concerns addressed. Patient(family) verbalized an understanding and agrees to proceed.       Mesfin Banks MD  August 1, 2021  7:23 PM

## 2021-08-01 NOTE — PROGRESS NOTES
Pharmacy Medication Reconciliation Note     List of medications patient is currently taking is complete. Source of information:   1. Per conversation with patient at bedside   2. EMR    Notes regarding home medications:   1. Patient reports having all AM meds PTA in the ED   2. Patient reports starting Keflex 500 mg BID on 07/30/21--reports PCP said to take for 5 days, but RX was a 7 days supply.  Taking for urinary issues    Patient denies taking any other OTC or herbal medications other than those listed    Carri Caratgena, Pharmacy Intern  8/1/2021  7:11 PM

## 2021-08-01 NOTE — ED PROVIDER NOTES
[address] [facility]        Pt Name: Yamil Pedro  MRN: [id number]  [place] 1950  Date of evaluation: 8/1/2021  Provider: SKIP Bradley    This patient was seen and evaluated by the attending physician Dr. Carmen Jiménez  (Location/Symptom, Timing/Onset, Context/Setting, Quality, Duration,Modifying Factors, Severity.)   Yamil Pedro is a 72 y.o. female who presents to the emergencydepartment for urinary retention. Last urinated at 8am.  Since then has only passed blood from urethra. She is in distress due to pain and rocking back and forth in the chair. Has abdominal pain that became worse while she was driving here. She had a cystoscopy with 2 cancerous tumors removed about 2-1/2 weeks ago by Dr. Silvia Parsons. Had a Obrien afterwards but has been out for about the past 2 weeks. She has had intermittent hematuria since then but nothing like this. Has been on Keflex for UTI for past 2 days. Denies fever chills nausea vomiting. Has not take any medications for the pain yet. Denies aspirin anticoagulant use. Nursing Notes were reviewed and I agree. OF SYSTEMS    (2-9 systems for level 4, 10 or more for level 5)     Review of Systems   Constitutional: Negative for chills and fever. Gastrointestinal: Positive for abdominal pain. Negative for nausea and vomiting. Genitourinary: Positive for difficulty urinating. Urinary retention hematuria     Except as noted above the remainder of the review of systems was reviewed and negative. PAST MEDICAL HISTORY   No past medical history on file. SURGICAL HISTORY   No past surgical history on file. CURRENT MEDICATIONS       Previous Medications    ARMODAFINIL (NUVIGIL) 250 MG TABS    Take 250 mg by mouth daily.     ASCORBIC ACID (VITAMIN C) 250 MG TABLET    Take 500 mg by mouth daily    ATORVASTATIN (LIPITOR) 40 MG TABLET    Take 40 mg by mouth daily    BUPROPION (WELLBUTRIN XL) 300 MG EXTENDED RELEASE TABLET    Take 300 mg by mouth every morning    CHOLECALCIFEROL (VITAMIN D3) 50 MCG (2000 UT) CAPS    Take 1 capsule by mouth daily    DICLOFENAC SODIUM (VOLTAREN) 1 % GEL    Apply 2 g topically 4 times daily as needed for Pain    KRILL OIL (OMEGA-3) 500 MG CAPS    Take 500 mg by mouth 3 times daily    MULTIPLE VITAMINS-MINERALS (THERAPEUTIC MULTIVITAMIN-MINERALS) TABLET    Take 1 tablet by mouth daily    TRAZODONE (DESYREL) 100 MG TABLET    Take 100 mg by mouth nightly       ALLERGIES     Patient has no known allergies. FAMILY HISTORY     No family history on file. No family status information on file. SOCIAL HISTORY          PHYSICAL EXAM    (up to 7 for level 4, 8 or more for level 5)     ED Triage Vitals [08/01/21 1552]   BP Temp Temp Source Pulse Resp SpO2 Height Weight   (!) 162/85 98.6 °F (37 °C) Temporal 88 18 95 % 5' 5\" (1.651 m) 146 lb 2.6 oz (66.3 kg)       Physical Exam  Constitutional:       General: She is in acute distress (due to abdominal pain). Appearance: Normal appearance. She is well-developed. She is not ill-appearing, toxic-appearing or diaphoretic. HENT:      Head: Normocephalic and atraumatic. Cardiovascular:      Rate and Rhythm: Normal rate and regular rhythm. Heart sounds: Normal heart sounds. Pulmonary:      Effort: Pulmonary effort is normal. No respiratory distress. Breath sounds: Normal breath sounds. Abdominal:      General: There is distension (bladder feels significantly distended). Palpations: Abdomen is soft. Tenderness: There is abdominal tenderness. There is guarding. Comments: Rocking back and forth in chair in pain. I can barely examine her abdomen, but when I am able to the bladder feels significantly distended and is TTP   Musculoskeletal:         General: Normal range of motion.       Cervical back: Normal range of motion and neck supple. Neurological:      Mental Status: She is alert. Psychiatric:         Mood and Affect: Mood normal.         Behavior: Behavior normal.         Thought Content: Thought content normal.         Judgment: Judgment normal.         DIFFERENTIAL DIAGNOSIS   Clot retention, UTI, anemia, other    DIAGNOSTICRESULTS         RADIOLOGY:   Non-plain film images such as CT, Ultrasound and MRI are read by the radiologist. Plain radiographic images are visualized and preliminarily interpreted by SKIP Hartmann with the below findings:      Interpretation per the Radiologist below, if available at the time of this note:    CT ABDOMEN PELVIS W IV CONTRAST Additional Contrast? None   Final Result   Large amount of intravesicular clot with concern of a small right anterior   inferior focus of active extravasation.                LABS:  Labs Reviewed   CBC WITH AUTO DIFFERENTIAL - Abnormal; Notable for the following components:       Result Value    RBC 3.96 (*)     Hemoglobin 11.9 (*)     Hematocrit 35.9 (*)     All other components within normal limits    Narrative:     Performed at:  [address] [address]   Phone [phone]   COMPREHENSIVE METABOLIC PANEL - Abnormal; Notable for the following components:    Sodium 134 (*)     Chloride 95 (*)     Glucose 137 (*)     CREATININE <0.5 (*)     AST 38 (*)     All other components within normal limits    Narrative:     Performed at:  [facility]  [address] [facility] [address]   Phone [phone]   URINE RT REFLEX TO CULTURE - Abnormal; Notable for the following components:    Color, UA RED (*)     Clarity, UA TURBID (*)     Glucose, Ur see below (*)     Bilirubin Urine see below (*)     Ketones, Urine see below (*)     Blood, Urine LARGE (*)     pH, UA see below (*)     Protein, UA see below (*)     Urobilinogen, Urine see below (*)     Nitrite, Urine see below (*)     Leukocyte Esterase, Urine see below (*)     All other components within normal limits    Narrative:     Performed at:  [facility]  [address] [place] [address]   Phone [phone]   MICROSCOPIC URINALYSIS - Abnormal; Notable for the following components:    RBC, UA >100 (*)     All other components within normal limits    Narrative:     Performed at:  [facility]  [address] [place] [address]   Phone [phone]   CULTURE, URINE   COVID-19, RAPID   PROTIME-INR   APTT       All other labs were within normal range or not returned as of this dictation. EMERGENCY DEPARTMENT COURSE and DIFFERENTIALDIAGNOSIS/MDM:   Vitals:    Vitals:    08/01/21 1652 08/01/21 1701 08/01/21 1731 08/01/21 1832   BP: 127/80 (!) 163/70 (!) 160/65 (!) 153/55   Pulse:       Resp:       Temp:       TempSrc:       SpO2: 100% 100% 99%    Weight:       Height:           Patient is in distress due to pain. She is in clot retention. Three-way Obrien was placed. Drained a large amount of blood. Also some clots. Started on CBI. She is not having significant relief of pain. I then aspirated moderate amount of small clots from the bladder and Obrien drains better. Shortly thereafter her pain worsened and I had to again aspirate clots and returned a large amount of clots this time. Pain improved. Hemoglobin is 11.9. CT abdomen pelvis shows a large amount of intravesicular clot with concern of small right anterior inferior focus of active extravasation. I consulted urology and spoke with Dr. Klarissa Carrion about patient's clot retention. He will take her to the OR. Rapid Covid ordered. Upon reevaluation, patient appears more comfortable. She is in agreement with plan for admission. Discussed to be n.p.o. CRITICAL CARE TIME   Total Critical Care time was 35 minutes, excluding separately reportable procedures.   There was a high probability of clinically significant/life threatening deterioration in the patient's condition which required my urgent intervention. Time was spent managing patient's urinary clot retention and active bleeding with multiple reevaluation's, aspiration of clots multiple times, treatment of pain and multiple consultations      PROCEDURES:  None    FINAL IMPRESSION      1. Retention of urine    2. Lower abdominal pain        DISPOSITION/PLAN   DISPOSITION Decision To Admit 08/01/2021 06:36:08 PM      PATIENT REFERRED TO:  No follow-up provider specified.     DISCHARGE MEDICATIONS:  New Prescriptions    No medications on file       (Please note that portions ofthis note were completed with a voice recognition program.  Efforts were made to edit the dictations but occasionally words are mis-transcribed.)    Kleber Lunsford, [address] [place] [address]  08/01/21 5202

## 2021-08-02 VITALS
OXYGEN SATURATION: 96 % | TEMPERATURE: 98.6 F | WEIGHT: 146.16 LBS | HEART RATE: 82 BPM | HEIGHT: 65 IN | BODY MASS INDEX: 24.35 KG/M2 | SYSTOLIC BLOOD PRESSURE: 96 MMHG | RESPIRATION RATE: 17 BRPM | DIASTOLIC BLOOD PRESSURE: 48 MMHG

## 2021-08-02 LAB
ANION GAP SERPL CALCULATED.3IONS-SCNC: 10 MMOL/L (ref 3–16)
APTT: 28.1 SEC (ref 26.2–38.6)
BUN BLDV-MCNC: 8 MG/DL (ref 7–20)
CALCIUM SERPL-MCNC: 8.2 MG/DL (ref 8.3–10.6)
CHLORIDE BLD-SCNC: 96 MMOL/L (ref 99–110)
CO2: 25 MMOL/L (ref 21–32)
CREAT SERPL-MCNC: <0.5 MG/DL (ref 0.6–1.2)
GFR AFRICAN AMERICAN: >60
GFR NON-AFRICAN AMERICAN: >60
GLUCOSE BLD-MCNC: 135 MG/DL (ref 70–99)
HCT VFR BLD CALC: 24 % (ref 36–48)
HCT VFR BLD CALC: 24.8 % (ref 36–48)
HEMOGLOBIN: 8.2 G/DL (ref 12–16)
HEMOGLOBIN: 8.3 G/DL (ref 12–16)
INR BLD: 1.17 (ref 0.88–1.12)
MCH RBC QN AUTO: 30.3 PG (ref 26–34)
MCHC RBC AUTO-ENTMCNC: 33.6 G/DL (ref 31–36)
MCV RBC AUTO: 90.2 FL (ref 80–100)
PDW BLD-RTO: 13.4 % (ref 12.4–15.4)
PLATELET # BLD: 189 K/UL (ref 135–450)
PMV BLD AUTO: 8.9 FL (ref 5–10.5)
POTASSIUM SERPL-SCNC: 4.1 MMOL/L (ref 3.5–5.1)
PROTHROMBIN TIME: 13.3 SEC (ref 9.9–12.7)
RBC # BLD: 2.75 M/UL (ref 4–5.2)
SODIUM BLD-SCNC: 131 MMOL/L (ref 136–145)
URINE CULTURE, ROUTINE: NORMAL
WBC # BLD: 5.1 K/UL (ref 4–11)

## 2021-08-02 PROCEDURE — 6370000000 HC RX 637 (ALT 250 FOR IP): Performed by: UROLOGY

## 2021-08-02 PROCEDURE — 85027 COMPLETE CBC AUTOMATED: CPT

## 2021-08-02 PROCEDURE — 2580000003 HC RX 258: Performed by: UROLOGY

## 2021-08-02 PROCEDURE — 36415 COLL VENOUS BLD VENIPUNCTURE: CPT

## 2021-08-02 PROCEDURE — 94761 N-INVAS EAR/PLS OXIMETRY MLT: CPT

## 2021-08-02 PROCEDURE — 80048 BASIC METABOLIC PNL TOTAL CA: CPT

## 2021-08-02 PROCEDURE — 85014 HEMATOCRIT: CPT

## 2021-08-02 PROCEDURE — 85610 PROTHROMBIN TIME: CPT

## 2021-08-02 PROCEDURE — 85018 HEMOGLOBIN: CPT

## 2021-08-02 PROCEDURE — 85730 THROMBOPLASTIN TIME PARTIAL: CPT

## 2021-08-02 PROCEDURE — G0378 HOSPITAL OBSERVATION PER HR: HCPCS

## 2021-08-02 RX ADMIN — CEPHALEXIN 500 MG: 500 CAPSULE ORAL at 09:30

## 2021-08-02 RX ADMIN — POLYETHYLENE GLYCOL 3350 17 G: 17 POWDER, FOR SOLUTION ORAL at 09:30

## 2021-08-02 RX ADMIN — Medication 1 TABLET: at 09:30

## 2021-08-02 RX ADMIN — SODIUM CHLORIDE: 9 INJECTION, SOLUTION INTRAVENOUS at 11:57

## 2021-08-02 RX ADMIN — Medication 10 ML: at 09:31

## 2021-08-02 RX ADMIN — SODIUM CHLORIDE: 9 INJECTION, SOLUTION INTRAVENOUS at 03:07

## 2021-08-02 RX ADMIN — BUPROPION HYDROCHLORIDE 300 MG: 150 TABLET, EXTENDED RELEASE ORAL at 09:30

## 2021-08-02 ASSESSMENT — PAIN SCALES - GENERAL
PAINLEVEL_OUTOF10: 1
PAINLEVEL_OUTOF10: 0
PAINLEVEL_OUTOF10: 1

## 2021-08-02 NOTE — PLAN OF CARE
Problem: Safety:  Goal: Free from accidental physical injury  Description: Free from accidental physical injury  Outcome: Met This Shift     Problem: Daily Care:  Goal: Daily care needs are met  Description: Daily care needs are met  Outcome: Met This Shift     Problem: Pain:  Description: Pain management should include both nonpharmacologic and pharmacologic interventions.   Goal: Pain level will decrease  Description: Pain level will decrease  Outcome: Met This Shift  Goal: Control of acute pain  Description: Control of acute pain  Outcome: Met This Shift  Goal: Control of chronic pain  Description: Control of chronic pain  Outcome: Met This Shift     Problem: Discharge Planning:  Goal: Patients continuum of care needs are met  Description: Patients continuum of care needs are met  Outcome: Met This Shift

## 2021-08-02 NOTE — PROGRESS NOTES
This RN prepared pt for discharge to home at 1710. This RN provided discharge education regarding medication regimen, and home care. Pt. Verbalized understanding. Denies questions. No LDAs present at discharge. Discontinued IV without complications. Pt. tolerated well. Pt is being transported by .

## 2021-08-02 NOTE — BRIEF OP NOTE
Brief Postoperative Note      Patient: Rosalia Boykin  YOB: 1950  MRN: [id number]    Date of Procedure: 8/1/2021    Pre-Op Diagnosis: clot retention of urine    Post-Op Diagnosis: Same       Procedure(s):  CYSTOSCOPY EVACUATION OF CLOTS FULGURATION    Surgeon(s):  Bill Benoit MD    Assistant:  Surgical Assistant: Virgen Menendez    Anesthesia: General    Estimated Blood Loss (mL): less than 50     Complications: None    Specimens:   * No specimens in log *    Implants:  * No implants in log *      Drains:   Urethral Catheter Triple-lumen 22 fr (Active)   $ Urethral catheter insertion Inserted for procedure 08/01/21 2028   Catheter Indications Perioperative use for selected surgical procedures 08/01/21 2028   Urine Color Pink 08/01/21 2028   Urine Appearance Clear 08/01/21 2028       [REMOVED] Urethral Catheter Triple-lumen 22 fr (Removed)   Catheter Indications Urinary retention (acute or chronic), continuous bladder irrigation or bladder outlet obstruction 08/01/21 1701   Urine Color Bloody 08/01/21 1701       Findings: 500cc of clot in bladder.  Active arterial bleeder at right anterior bladder near bladder neck    Electronically signed by Valentin Rodriguez MD on 8/1/2021 at 8:49 PM

## 2021-08-02 NOTE — PROGRESS NOTES
Pt Name: Clay Aguirre  Medical Record Number: [id number]  Date of Birth 1950   Today's Date: 8/2/2021      Subjective:  Awake in bed, feeling well and tolerating breakfast. Bladder spasms resolved s/p B&O suppository last night. BP borderline low, Hgb 8.3 - denies dizziness or lightheadedness. CBI minimally running with clear urine. This was stopped - monitor. ROS: Constitutional: No fever    Vitals:  Vitals:    08/02/21 0642 08/02/21 0700 08/02/21 0858 08/02/21 0953   BP: (!) 88/51   (!) 88/45   Pulse: 73   73   Resp:    18   Temp:    97.8 °F (36.6 °C)   TempSrc:    Oral   SpO2:  97% 98% 95%   Weight:       Height:         I/O last 3 completed shifts:   In: 1653.9 [P.O.:480; I.V.:1173.9]  Out: 350 [Urine:300; Blood:50]    Exam:  General: Awake, oriented, no acute distress  Respiratory: Nonlabored breathing  Abdomen: Soft, non-tender, non-distended, no masses  : bonilla catheter intact with clear output  Skin: Skin color, texture, turgor normal, no rashes or lesions  Neurologic: no gross deficits    CURRENT MEDICATIONS   Scheduled Meds:   atorvastatin  40 mg Oral Nightly    buPROPion  300 mg Oral QAM    traZODone  100 mg Oral Nightly    Armodafinil  250 mg Oral Daily    therapeutic multivitamin-minerals  1 tablet Oral Daily    Vitamin D  2,000 Units Oral QPM    cephALEXin  500 mg Oral BID    sodium chloride flush  5-40 mL Intravenous 2 times per day    polyethylene glycol  17 g Oral Daily     Continuous Infusions:   sodium chloride      sodium chloride 125 mL/hr at 08/02/21 0307     PRN Meds:.sodium chloride flush, sodium chloride, ondansetron **OR** ondansetron, acetaminophen, oxyCODONE **OR** oxyCODONE, morphine **OR** morphine, opium-belladonna    LABS     Recent Labs     08/01/21  1650 08/02/21  0534 08/02/21  0535   WBC 8.3 5.1  --    HGB 11.9* 8.3*  --    HCT 35.9* 24.8*  --     189  --    *  --  131*   K 4.2  --  4.1   CL 95*  --  96*   CO2 24  --  25   BUN 8  --  8 CREATININE <0.5*  --  <0.5*   CALCIUM 8.9  --  8.2*   INR  --  1.17*  --    AST 38*  --   --    ALT 40  --   --    BILITOT 0.4  --   --        ASSESSMENT   1. Hospital day # 1  2. 70y. o. female who underwent TURBT 7/13/21 with Dr. Guzman Hence, developed gross hematuria with clots yesterday, then was unable to void. CT with large clot burden and distended bladder. 3. S/p cystoscopy and clot evacuation, noted to have active arterial bleed at anterior portion at one of the previous resected sites. This was with Dr. Feliz Oakley 8/1/21. PLAN   1. Stop CBI and monitor urine - if remains clear around noon, remove bonilla catheter for voiding trial  2. Acute blood loss anemia - Hgb 8.3 today. Monitor. 3. Monitor BP - IVF at 125ml/hr  4. Ok to get OOB to chair with assistance.      Barbara Otto, BESSY - CNP 8/2/2021 10:18 AM

## 2021-08-02 NOTE — DISCHARGE SUMMARY
Urology Discharge Summary      Patient Identification  Kelly Michaels is a 72 y.o. female. :  1950  Admit Date:  2021    Discharge date:  21                                  Disposition: home    Discharge Diagnoses:   Patient Active Problem List   Diagnosis    Clot retention of urine       Surgery: To OR for cystoscopy, evacuation of clots and fulguration of bleeding point with Dr. Cyr See 21    Activity:  As tolerated    Condition on discharge: Stable    Follow-up: With Dr. Elena Gilliland in 2 weeks    Hospital course: To Or for cystoscopy, evacuation of clots and fulguration of bleeding point with Dr. Cyr See 21. Post-op hypotension and anemia. H/H rechecked this afternoon and is stable, BP stable. Obrien catheter was removed and she is spontaneously voiding. Will discharge home in stable condition and she will f/u with Dr. Elena Gilliland in 2 weeks.      Author BESSY Douglas - CNP

## 2021-08-02 NOTE — OP NOTE
[address] [place] [address] Mo Kenny                                 OPERATIVE REPORT    PATIENT NAME: Zahida Brown                      :        1950  MED REC NO:   [id number]                          ROOM:       4111  ACCOUNT NO:   [de-identified]                           ADMIT DATE: 2021  PROVIDER:     Erick Guzman. Doron Currie MD      DATE OF PROCEDURE:  2021    PREOPERATIVE DIAGNOSES:  Gross hematuria with clot retention. POSTOPERATIVE DIAGNOSES:  Gross hematuria with clot retention. OPERATIONS PERFORMED:  Cystoscopy, clot evacuation, fulguration of  bleeding in bladder. SURGEON:  Erick Guzman. Doron Currie MD    ANESTHESIA:  General.    FINDINGS:  Arterial bleeder on anterior bladder just to the right of  midline near bladder neck. Approximately 500 mL of clot within the  bladder was removed. Clear after bleeding was identified and stopped. DRAINS:  A 22-T[place]ight three-way Obrien catheter with continuous bladder  irrigation. SPECIMENS:  None. EBL:  Less than 50 mL of active bleeding and 500 mL of clot. COMPLICATIONS:  None immediate. DISPOSITION:  Stable to recovery. Admit to floor for continuous bladder  irrigation, observation, possible void trial tomorrow morning. INDICATIONS:  The patient is a 71-year-old female who had been doing  relatively well after a TURBT approximately two weeks ago by Dr. Billy Marsh who this morning had clear urine and later in the day had  bloody urine and shortly thereafter was unable to void at all. She came  to the emergency room and CT was performed which shows large clot within  the bladder and distended bladder. She was taken emergently to the  operating room for cysto, clot evacuation, and fulguration. OPERATIVE PROCEDURE:  The patient was properly identified in the  preoperative area and taken to the operating room and placed on the  table in supine position.   General anesthesia was induced, and the  patient was placed in the dorsal lithotomy position. She was prepped  and draped in a standard fashion. Antibiotics were given, and a  time-out was performed. Rigid cystoscope was placed through the urethra and into the bladder. A  large amount of clot was immediately identified. I placed a 26-[place]  sheath and used a Yassine syringe to evacuate a large amount of clot. This was scooped out and measured approximately 500 mL. I then looked  in, there was a small amount of clot left and I saw an active arterial  bleeder at the anterior portion just to the right of midline at one of  the previous resection sites. I used the Bugbee to cauterized this area  and it was quickly controlled. I went over everything with the [place]  again to make sure that there was good control of all of the previously  resected area. I waited for a few minutes, filled and drained her  several times and no further bleeding was noted. I then removed the  scope, placed a 22-[place] three-way Obrien catheter and started  continuous bladder irrigation which was nice and clear at the end. The  patient tolerated the procedure well, was taken to the recovery room in  good condition. She will be admitted to the floor for routine  postoperative care which includes continuous bladder irrigation at this  point. We will reassess her tomorrow morning to see if she can be void  trialed and hopefully discharged to home.         Merline Som, MD    D: 08/01/2021 20:54:50       T: 08/01/2021 22:57:40     [email or web address]  Job#: [id number]     Doc#: [id number]    CC:

## 2021-08-02 NOTE — PROGRESS NOTES
4 Eyes Skin Assessment     NAME:  Concepcion Cruz  YOB: 1950  MEDICAL RECORD NUMBER:  [id number]    The patient is being assess for  Admission    I agree that 2 RN's have performed a thorough Head to Toe Skin Assessment on the patient. ALL assessment sites listed below have been assessed. Areas assessed by both nurses:    Head, Face, Ears, Shoulders, Back, Chest, Arms, Elbows, Hands, Sacrum. Buttock, Coccyx, Ischium and Legs. Feet and Heels      Redness buttock  Does the Patient have a Wound?  No noted wound(s)       Shantanu Prevention initiated:  No   Wound Care Orders initiated:  No    Pressure Injury (Stage 3,4, Unstageable, DTI, NWPT, and Complex wounds) if present place consult order under [de-identified] No    New and Established Ostomies if present place consult order under : No      Nurse 1 eSignature: Electronically signed by Luís Kinney RN on 8/2/2021 at 6:27 AM      **SHARE this note so that the co-signing nurse is able to place an eSignature**    Nurse 2 eSignature: Electronically signed by Elroy Deluna RN on 8/2/21 at 6:38 AM EDT

## 2021-08-02 NOTE — CARE COORDINATION
Received notice of dc order for today. Pt states her spouse is here and will transport her home. Pt denies any dc needs.   Electronically signed by TINY Juan on 8/2/2021 at 4:45 PM

## 2021-08-02 NOTE — PROGRESS NOTES
Pt encouraged to get out of bed with assistance, denies dizziness, weakness at this time last BP 88/45 MAP 60 IVF running, 125 ml/hr . Will make Urology team aware.

## 2021-08-02 NOTE — ANESTHESIA POSTPROCEDURE EVALUATION
[facility] Department of Anesthesiology  Post-Anesthesia Note       Name:  Machelle Stewart                                  Age:  72 y.o. MRN:  [id number]     Last Vitals & Oxygen Saturation: BP (!) 119/58   Pulse 87   Temp 98.2 °F (36.8 °C) (Temporal)   Resp 16   Ht 5' 5\" (1.651 m)   Wt 146 lb 2.6 oz (66.3 kg)   SpO2 100%   BMI 24.32 kg/m²   Patient Vitals for the past 4 hrs:   BP Temp Temp src Pulse Resp SpO2   08/01/21 2055 (!) 119/58 -- -- 87 16 100 %   08/01/21 2050 -- -- -- 84 13 100 %   08/01/21 2049 -- -- -- 87 16 100 %   08/01/21 2048 (!) 126/58 -- -- 86 -- 100 %   08/01/21 2045 -- 98.2 °F (36.8 °C) Temporal -- -- --   08/01/21 1832 (!) 153/55 -- -- -- -- --   08/01/21 1731 (!) 160/65 -- -- -- -- 99 %   08/01/21 1701 (!) 163/70 -- -- -- -- 100 %       Level of consciousness:  Awake, alert    Respiratory: Respirations easy, no distress. Stable. Cardiovascular: Hemodynamically stable. Hydration: Adequate. PONV: Adequately managed. Post-op pain: Adequately controlled. Post-op assessment: Tolerated anesthetic well without complication. Complications:  None.     Geovanni Larry MD  August 1, 2021   8:59 PM

## 2021-08-02 NOTE — DISCHARGE INSTR - COC
Continuity of Care Form    Patient Name: Vivi Cantu   :  1950  MRN:  [id number]    Admit date:  2021  Discharge date:  ***    Code Status Order: Full Code   Advance Directives:     Admitting Physician:  Ned Child MD  PCP: Chet Garcia MD    Discharging Nurse: [facility] Unit/Room#: O1P-4312/3507-04  Discharging Unit Phone Number: ***    Emergency Contact:   Extended Emergency Contact Information  Primary Emergency Contact: [address] [place] [address] Phone: [phone]  Relation: Spouse  Preferred language: English   needed?  No    Past Surgical History:  Past Surgical History:   Procedure Laterality Date    ABDOMEN SURGERY      gastri bypass     BREAST REDUCTION SURGERY      COLONOSCOPY      CYSTOSCOPY N/A 2021    CYSTOSCOPY EVACUATION OF CLOTS FULGURATION performed by Ned Child MD at [facility]         Immunization History:   Immunization History   Administered Date(s) Administered    COVID-19, [place], PF, 30mcg/0.3mL 2021, 03/10/2021       Active Problems:  Patient Active Problem List   Diagnosis Code    Clot retention of urine R33.8       Isolation/Infection:   Isolation          No Isolation        Patient Infection Status     Infection Onset Added Last Indicated Last Indicated By Review Planned Expiration Resolved Resolved By    None active    Resolved    COVID-19 Rule Out 21 COVID-19, Rapid (Ordered)   21 Rule-Out Test Resulted          Nurse Assessment:  Last Vital Signs: BP (!) 96/48 Comment: MD aware  Pulse 82   Temp 98.6 °F (37 °C) (Oral)   Resp 17   Ht 5' 5\" (1.651 m)   Wt 146 lb 2.6 oz (66.3 kg)   SpO2 96%   BMI 24.32 kg/m²     Last documented pain score (0-10 scale): Pain Level: 0  Last Weight:   Wt Readings from Last 1 Encounters:   21 146 lb 2.6 oz (66.3 kg)     Mental Status:  {IP PT MENTAL STATUS:}    IV Access:  [employer] IV ACCESS:192196985}    Nursing Mobility/ADLs:  Walking   {CHP DME GRBC:135326696}  Transfer  {CHP DME JHXX:986085094}  Bathing  {CHP DME PLSA:121338785}  Dressing  {CHP DME DQR}  Toileting  {CHP DME YIWO:498658895}  Feeding  {CHP DME LWEJ:608402762}  Med Admin  {CHP DME NOZC:227807458}  Med Delivery   {[facility] MED Delivery:456664176}    Wound Care Documentation and Therapy:        Elimination:  Continence:   · Bowel: {YES / FN:02226}  · Bladder: {YES / BP:61727}  Urinary Catheter: {Urinary Catheter:701194869}   Colostomy/Ileostomy/Ileal Conduit: {YES / RC:71115}       Date of Last BM: ***    Intake/Output Summary (Last 24 hours) at 2021 1642  Last data filed at 2021 1550  Gross per 24 hour   Intake 3.85 ml   Output 2900 ml   Net -876.15 ml     I/O last 3 completed shifts: In: 3.9 [P.O.:840;  I.V.:1183.9]  Out: 350 [Urine:300; Blood:50]    Safety Concerns:     [employer] Safety Concerns:800859472}    Impairments/Disabilities:      [employer] Impairments/Disabilities:184301949}    Nutrition Therapy:  Current Nutrition Therapy:   [employer] Diet List:515450925}    Routes of Feeding: {P DME Other Feedings:727994848}  Liquids: {Slp liquid thickness:36597}  Daily Fluid Restriction: {CHP DME Yes amt example:029312551}  Last Modified Barium Swallow with Video (Video Swallowing Test): {Done Not Done YVFZ:224775896}    Treatments at the Time of Hospital Discharge:   Respiratory Treatments: ***  Oxygen Therapy:  {Therapy; copd oxygen:54357}  Ventilator:    {[facility] Vent MVLF:693944952}    Rehab Therapies: {THERAPEUTIC INTERVENTION:9652110155}  Weight Bearing Status/Restrictions: [employer]  Weight Bearin}  Other Medical Equipment (for information only, NOT a DME order):  {EQUIPMENT:437639319}  Other Treatments: ***    Patient's personal belongings (please select all that are sent with patient):  {JANET DME Belongings:738567723}    RN SIGNATURE:  {Esignature:833678877}    CASE MANAGEMENT/SOCIAL WORK SECTION    Inpatient Status Date: ***    Readmission Risk Assessment Score:  Readmission Risk              Risk of Unplanned Readmission:  11           Discharging to Facility/ Agency   · Name:   · Address:  · Phone:  · Fax:    Dialysis Facility (if applicable)   · Name:  · Address:  · Dialysis Schedule:  · Phone:  · Fax:    / signature: {Esignature:779848182}    PHYSICIAN SECTION    Prognosis: {Prognosis:8585125375}    Condition at Discharge: [address] [place] [address] Patient Condition:085189870}    Rehab Potential (if transferring to Rehab): {Prognosis:5967313673}    Recommended Labs or Other Treatments After Discharge: ***    Physician Certification: I certify the above information and transfer of Pete May  is necessary for the continuing treatment of the diagnosis listed and that she requires {Admit to Appropriate Level of Care:13507} for {GREATER/LESS:413920029} 30 days.      Update Admission H&P: {CHP DME Changes in YPLXI:051203698}    PHYSICIAN SIGNATURE:  {Esignature:247108032}

## 2021-08-11 NOTE — PROGRESS NOTES
Physician Progress Note      Olivia Snyder  CSN #:                  [id number]  :                       1950  ADMIT DATE:       2021 3:58 PM  [address] [place] DATE:        2021 6:24 PM  RESPONDING  PROVIDER #:        Yobani Francis MD          QUERY TEXT:    Pt admitted with Gross hematuria with clot retention . Pt noted to have had   TURBT 2 weeks ago. If possible, please document in progress notes and   discharge summary:    The medical record reflects the following:  Risk Factors: had TURBT 2 weeks ago  Clinical Indicators: per OP Note \"POSTOPERATIVE DIAGNOSES:  Gross hematuria   with clot retention. FINDINGS:  Arterial bleeder on anterior bladder just to   the right of midline near bladder neck. Approximately 500 mL of clot within   the bladder was removed. \"  Treatment: Cystoscopy, clot evacuation, fulguration of bleeding in bladder and   CBI and monitoring  Options provided:  -- Active arterial bleeder at right anterior bladder with Gross hematuria with   clot retention   as a postoperative complication  -- Active arterial bleeder at right anterior bladder with Gross hematuria with   clot retention as an expected/inherent condition that occurred   postoperatively and not a complication  -- Active arterial bleeder at right anterior bladder with Gross hematuria with   clot retention related to other incidental risk factor (please specify)   occurring following surgery and not a complication, Please document incidental   risk factor. -- Other - I will add my own diagnosis  -- Disagree - Not applicable / Not valid  -- Disagree - Clinically unable to determine / Unknown  -- Refer to Clinical Documentation Reviewer    PROVIDER RESPONSE TEXT:    Patient has Active arterial bleeder at right anterior bladder with Gross   hematuria with clot retention as a postoperative complication.     Query created by: Alberto Ruby on 2021 7:47 AM      Electronically signed by:  Tam Simental Nelida Marroquin MD 8/11/2021 4:33 PM

## 2023-01-11 ENCOUNTER — HOSPITAL ENCOUNTER (INPATIENT)
Age: 73
DRG: 563 | End: 2023-01-11
Attending: PHYSICAL MEDICINE & REHABILITATION | Admitting: PHYSICAL MEDICINE & REHABILITATION
Payer: MEDICARE

## 2023-01-11 PROBLEM — T07.XXXA MULTIPLE FRACTURES: Status: ACTIVE | Noted: 2023-01-11

## 2023-01-11 PROCEDURE — 1280000000 HC REHAB R&B

## 2023-01-11 PROCEDURE — 2500000003 HC RX 250 WO HCPCS: Performed by: PHYSICAL MEDICINE & REHABILITATION

## 2023-01-11 PROCEDURE — 6370000000 HC RX 637 (ALT 250 FOR IP): Performed by: PHYSICAL MEDICINE & REHABILITATION

## 2023-01-11 RX ORDER — DOXYCYCLINE HYCLATE 50 MG/1
324 CAPSULE, GELATIN COATED ORAL
Status: DISCONTINUED | OUTPATIENT
Start: 2023-01-12 | End: 2023-01-19 | Stop reason: HOSPADM

## 2023-01-11 RX ORDER — GABAPENTIN 100 MG/1
200 CAPSULE ORAL 3 TIMES DAILY
Status: DISCONTINUED | OUTPATIENT
Start: 2023-01-11 | End: 2023-01-19 | Stop reason: HOSPADM

## 2023-01-11 RX ORDER — ESTRADIOL 0.1 MG/G
0.5 CREAM VAGINAL DAILY
Status: ON HOLD | COMMUNITY

## 2023-01-11 RX ORDER — CALCIUM ACETATE 667 MG/1
2 CAPSULE ORAL 2 TIMES DAILY WITH MEALS
Status: DISCONTINUED | OUTPATIENT
Start: 2023-01-11 | End: 2023-01-19 | Stop reason: HOSPADM

## 2023-01-11 RX ORDER — ONDANSETRON 4 MG/1
4 TABLET, ORALLY DISINTEGRATING ORAL EVERY 8 HOURS PRN
Status: DISCONTINUED | OUTPATIENT
Start: 2023-01-11 | End: 2023-01-19 | Stop reason: HOSPADM

## 2023-01-11 RX ORDER — HYDRALAZINE HYDROCHLORIDE 25 MG/1
50 TABLET, FILM COATED ORAL EVERY 8 HOURS PRN
Status: DISCONTINUED | OUTPATIENT
Start: 2023-01-11 | End: 2023-01-19 | Stop reason: HOSPADM

## 2023-01-11 RX ORDER — FERROUS SULFATE 325(65) MG
325 TABLET ORAL
Status: ON HOLD | COMMUNITY

## 2023-01-11 RX ORDER — BUPROPION HYDROCHLORIDE 150 MG/1
300 TABLET ORAL DAILY
Status: DISCONTINUED | OUTPATIENT
Start: 2023-01-12 | End: 2023-01-19 | Stop reason: HOSPADM

## 2023-01-11 RX ORDER — TRAZODONE HYDROCHLORIDE 50 MG/1
50 TABLET ORAL NIGHTLY PRN
Status: DISCONTINUED | OUTPATIENT
Start: 2023-01-11 | End: 2023-01-12

## 2023-01-11 RX ORDER — DIPHENHYDRAMINE HCL 25 MG
25 TABLET ORAL EVERY 6 HOURS PRN
Status: DISCONTINUED | OUTPATIENT
Start: 2023-01-11 | End: 2023-01-19 | Stop reason: HOSPADM

## 2023-01-11 RX ORDER — ATORVASTATIN CALCIUM 40 MG/1
40 TABLET, FILM COATED ORAL DAILY
Status: DISCONTINUED | OUTPATIENT
Start: 2023-01-12 | End: 2023-01-19 | Stop reason: HOSPADM

## 2023-01-11 RX ORDER — OXYCODONE HYDROCHLORIDE 5 MG/1
10 TABLET ORAL EVERY 4 HOURS PRN
Status: DISCONTINUED | OUTPATIENT
Start: 2023-01-11 | End: 2023-01-19 | Stop reason: HOSPADM

## 2023-01-11 RX ORDER — OXYCODONE HYDROCHLORIDE 5 MG/1
5 TABLET ORAL EVERY 4 HOURS PRN
Status: DISCONTINUED | OUTPATIENT
Start: 2023-01-11 | End: 2023-01-19 | Stop reason: HOSPADM

## 2023-01-11 RX ORDER — ESCITALOPRAM OXALATE 10 MG/1
5 TABLET ORAL DAILY
Status: DISCONTINUED | OUTPATIENT
Start: 2023-01-11 | End: 2023-01-13

## 2023-01-11 RX ORDER — TAMSULOSIN HYDROCHLORIDE 0.4 MG/1
0.4 CAPSULE ORAL DAILY
Status: DISCONTINUED | OUTPATIENT
Start: 2023-01-12 | End: 2023-01-19 | Stop reason: HOSPADM

## 2023-01-11 RX ORDER — POLYETHYLENE GLYCOL 3350 17 G/17G
17 POWDER, FOR SOLUTION ORAL DAILY
Status: DISCONTINUED | OUTPATIENT
Start: 2023-01-12 | End: 2023-01-12

## 2023-01-11 RX ORDER — LIDOCAINE 4 G/G
1 PATCH TOPICAL DAILY
Status: DISCONTINUED | OUTPATIENT
Start: 2023-01-11 | End: 2023-01-19 | Stop reason: HOSPADM

## 2023-01-11 RX ORDER — SENNA AND DOCUSATE SODIUM 50; 8.6 MG/1; MG/1
2 TABLET, FILM COATED ORAL 2 TIMES DAILY
Status: DISCONTINUED | OUTPATIENT
Start: 2023-01-11 | End: 2023-01-15

## 2023-01-11 RX ORDER — ESCITALOPRAM OXALATE 10 MG/1
10 TABLET ORAL DAILY
Status: ON HOLD | COMMUNITY

## 2023-01-11 RX ORDER — ASCORBIC ACID 500 MG
500 TABLET ORAL EVERY MORNING
Status: ON HOLD | COMMUNITY

## 2023-01-11 RX ORDER — ENOXAPARIN SODIUM 100 MG/ML
40 INJECTION SUBCUTANEOUS DAILY
Status: DISCONTINUED | OUTPATIENT
Start: 2023-01-12 | End: 2023-01-16

## 2023-01-11 RX ORDER — ASPIRIN 81 MG/1
81 TABLET, CHEWABLE ORAL DAILY
Status: DISCONTINUED | OUTPATIENT
Start: 2023-01-12 | End: 2023-01-19 | Stop reason: HOSPADM

## 2023-01-11 RX ORDER — PHENOL 1.4 %
2 AEROSOL, SPRAY (ML) MUCOUS MEMBRANE 2 TIMES DAILY
Status: ON HOLD | COMMUNITY

## 2023-01-11 RX ORDER — ACETAMINOPHEN 325 MG/1
650 TABLET ORAL EVERY 4 HOURS PRN
Status: DISCONTINUED | OUTPATIENT
Start: 2023-01-11 | End: 2023-01-19 | Stop reason: HOSPADM

## 2023-01-11 RX ORDER — METHOCARBAMOL 750 MG/1
750 TABLET, FILM COATED ORAL 4 TIMES DAILY
Status: DISCONTINUED | OUTPATIENT
Start: 2023-01-11 | End: 2023-01-19 | Stop reason: HOSPADM

## 2023-01-11 RX ORDER — ASPIRIN 81 MG/1
81 TABLET ORAL EVERY MORNING
Status: ON HOLD | COMMUNITY
End: 2023-01-20 | Stop reason: HOSPADM

## 2023-01-11 RX ADMIN — ESCITALOPRAM OXALATE 5 MG: 10 TABLET ORAL at 21:23

## 2023-01-11 RX ADMIN — METHOCARBAMOL TABLETS 750 MG: 750 TABLET, COATED ORAL at 21:09

## 2023-01-11 RX ADMIN — TRAZODONE HYDROCHLORIDE 50 MG: 50 TABLET ORAL at 21:23

## 2023-01-11 RX ADMIN — GABAPENTIN 200 MG: 100 CAPSULE ORAL at 21:09

## 2023-01-11 RX ADMIN — DICLOFENAC SODIUM 4 G: 10 GEL TOPICAL at 20:16

## 2023-01-11 RX ADMIN — CALCIUM ACETATE 1334 MG: 667 CAPSULE ORAL at 21:23

## 2023-01-11 RX ADMIN — OXYCODONE 10 MG: 5 TABLET ORAL at 21:09

## 2023-01-11 RX ADMIN — STANDARDIZED SENNA CONCENTRATE AND DOCUSATE SODIUM 2 TABLET: 8.6; 5 TABLET ORAL at 21:09

## 2023-01-11 ASSESSMENT — PAIN DESCRIPTION - LOCATION
LOCATION: LEG
LOCATION: LEG

## 2023-01-11 ASSESSMENT — PAIN DESCRIPTION - FREQUENCY
FREQUENCY: CONTINUOUS
FREQUENCY: CONTINUOUS

## 2023-01-11 ASSESSMENT — PAIN DESCRIPTION - ORIENTATION
ORIENTATION: RIGHT
ORIENTATION: RIGHT

## 2023-01-11 ASSESSMENT — PAIN DESCRIPTION - ONSET
ONSET: GRADUAL
ONSET: ON-GOING

## 2023-01-11 ASSESSMENT — PAIN - FUNCTIONAL ASSESSMENT
PAIN_FUNCTIONAL_ASSESSMENT: PREVENTS OR INTERFERES SOME ACTIVE ACTIVITIES AND ADLS
PAIN_FUNCTIONAL_ASSESSMENT: PREVENTS OR INTERFERES SOME ACTIVE ACTIVITIES AND ADLS

## 2023-01-11 ASSESSMENT — PAIN SCALES - GENERAL
PAINLEVEL_OUTOF10: 9
PAINLEVEL_OUTOF10: 7

## 2023-01-11 ASSESSMENT — PAIN DESCRIPTION - DESCRIPTORS
DESCRIPTORS: PENETRATING;PRESSURE;SHARP
DESCRIPTORS: PRESSURE

## 2023-01-11 ASSESSMENT — PAIN DESCRIPTION - PAIN TYPE
TYPE: ACUTE PAIN;SURGICAL PAIN
TYPE: ACUTE PAIN

## 2023-01-12 LAB
ANION GAP SERPL CALCULATED.3IONS-SCNC: 9 MMOL/L (ref 3–16)
BACTERIA: ABNORMAL /HPF
BILIRUBIN URINE: NEGATIVE
BLOOD, URINE: ABNORMAL
BUN BLDV-MCNC: 13 MG/DL (ref 7–20)
CALCIUM SERPL-MCNC: 9.1 MG/DL (ref 8.3–10.6)
CHLORIDE BLD-SCNC: 102 MMOL/L (ref 99–110)
CLARITY: ABNORMAL
CO2: 29 MMOL/L (ref 21–32)
COLOR: YELLOW
CREAT SERPL-MCNC: <0.5 MG/DL (ref 0.6–1.2)
CRYSTALS, UA: ABNORMAL /HPF
EPITHELIAL CELLS, UA: 1 /HPF (ref 0–5)
GFR SERPL CREATININE-BSD FRML MDRD: >60 ML/MIN/{1.73_M2}
GLUCOSE BLD-MCNC: 125 MG/DL (ref 70–99)
GLUCOSE URINE: NEGATIVE MG/DL
HCT VFR BLD CALC: 33.2 % (ref 36–48)
HEMOGLOBIN: 10.9 G/DL (ref 12–16)
HYALINE CASTS: 4 /LPF (ref 0–8)
KETONES, URINE: NEGATIVE MG/DL
LEUKOCYTE ESTERASE, URINE: ABNORMAL
MCH RBC QN AUTO: 30.3 PG (ref 26–34)
MCHC RBC AUTO-ENTMCNC: 32.7 G/DL (ref 31–36)
MCV RBC AUTO: 92.6 FL (ref 80–100)
MICROSCOPIC EXAMINATION: YES
NITRITE, URINE: POSITIVE
PDW BLD-RTO: 13.2 % (ref 12.4–15.4)
PH UA: 5.5 (ref 5–8)
PLATELET # BLD: 288 K/UL (ref 135–450)
PMV BLD AUTO: 8.2 FL (ref 5–10.5)
POTASSIUM SERPL-SCNC: 4.3 MMOL/L (ref 3.5–5.1)
PROTEIN UA: NEGATIVE MG/DL
RBC # BLD: 3.59 M/UL (ref 4–5.2)
RBC UA: ABNORMAL /HPF (ref 0–4)
SODIUM BLD-SCNC: 140 MMOL/L (ref 136–145)
SPECIFIC GRAVITY UA: 1.01 (ref 1–1.03)
URINE REFLEX TO CULTURE: YES
URINE TYPE: ABNORMAL
UROBILINOGEN, URINE: 1 E.U./DL
WBC # BLD: 7.1 K/UL (ref 4–11)
WBC UA: 16 /HPF (ref 0–5)

## 2023-01-12 PROCEDURE — 1280000000 HC REHAB R&B

## 2023-01-12 PROCEDURE — 85027 COMPLETE CBC AUTOMATED: CPT

## 2023-01-12 PROCEDURE — 97162 PT EVAL MOD COMPLEX 30 MIN: CPT

## 2023-01-12 PROCEDURE — 2500000003 HC RX 250 WO HCPCS: Performed by: PHYSICAL MEDICINE & REHABILITATION

## 2023-01-12 PROCEDURE — 80048 BASIC METABOLIC PNL TOTAL CA: CPT

## 2023-01-12 PROCEDURE — 97530 THERAPEUTIC ACTIVITIES: CPT

## 2023-01-12 PROCEDURE — 87186 SC STD MICRODIL/AGAR DIL: CPT

## 2023-01-12 PROCEDURE — 6370000000 HC RX 637 (ALT 250 FOR IP): Performed by: PHYSICAL MEDICINE & REHABILITATION

## 2023-01-12 PROCEDURE — 6360000002 HC RX W HCPCS: Performed by: PHYSICAL MEDICINE & REHABILITATION

## 2023-01-12 PROCEDURE — 36415 COLL VENOUS BLD VENIPUNCTURE: CPT

## 2023-01-12 PROCEDURE — 97166 OT EVAL MOD COMPLEX 45 MIN: CPT

## 2023-01-12 PROCEDURE — 87086 URINE CULTURE/COLONY COUNT: CPT

## 2023-01-12 PROCEDURE — 81001 URINALYSIS AUTO W/SCOPE: CPT

## 2023-01-12 PROCEDURE — 87088 URINE BACTERIA CULTURE: CPT

## 2023-01-12 PROCEDURE — 97535 SELF CARE MNGMENT TRAINING: CPT

## 2023-01-12 RX ORDER — TRAZODONE HYDROCHLORIDE 50 MG/1
100 TABLET ORAL NIGHTLY
Status: DISCONTINUED | OUTPATIENT
Start: 2023-01-12 | End: 2023-01-19 | Stop reason: HOSPADM

## 2023-01-12 RX ORDER — KRILL/OM-3/DHA/EPA/PHOSPHO/AST 500-110 MG
500 CAPSULE ORAL EVERY MORNING
Status: DISCONTINUED | OUTPATIENT
Start: 2023-01-13 | End: 2023-01-19 | Stop reason: HOSPADM

## 2023-01-12 RX ORDER — ARMODAFINIL 250 MG/1
250 TABLET ORAL DAILY
Status: DISCONTINUED | OUTPATIENT
Start: 2023-01-13 | End: 2023-01-19 | Stop reason: HOSPADM

## 2023-01-12 RX ORDER — DIPHENHYDRAMINE HYDROCHLORIDE 25 MG/1
5000 TABLET ORAL EVERY EVENING
Status: DISCONTINUED | OUTPATIENT
Start: 2023-01-12 | End: 2023-01-19 | Stop reason: HOSPADM

## 2023-01-12 RX ORDER — POLYETHYLENE GLYCOL 3350 17 G/17G
17 POWDER, FOR SOLUTION ORAL DAILY PRN
Status: DISCONTINUED | OUTPATIENT
Start: 2023-01-12 | End: 2023-01-19 | Stop reason: HOSPADM

## 2023-01-12 RX ORDER — CIPROFLOXACIN 500 MG/1
500 TABLET, FILM COATED ORAL EVERY 12 HOURS SCHEDULED
Status: COMPLETED | OUTPATIENT
Start: 2023-01-12 | End: 2023-01-18

## 2023-01-12 RX ADMIN — CIPROFLOXACIN 500 MG: 500 TABLET, FILM COATED ORAL at 09:54

## 2023-01-12 RX ADMIN — CIPROFLOXACIN 500 MG: 500 TABLET, FILM COATED ORAL at 21:53

## 2023-01-12 RX ADMIN — TRAZODONE HYDROCHLORIDE 100 MG: 50 TABLET ORAL at 21:53

## 2023-01-12 RX ADMIN — ESCITALOPRAM OXALATE 5 MG: 10 TABLET ORAL at 09:45

## 2023-01-12 RX ADMIN — GABAPENTIN 200 MG: 100 CAPSULE ORAL at 21:52

## 2023-01-12 RX ADMIN — DICLOFENAC SODIUM 4 G: 10 GEL TOPICAL at 10:01

## 2023-01-12 RX ADMIN — Medication 324 MG: at 10:05

## 2023-01-12 RX ADMIN — TAMSULOSIN HYDROCHLORIDE 0.4 MG: 0.4 CAPSULE ORAL at 09:45

## 2023-01-12 RX ADMIN — ENOXAPARIN SODIUM 40 MG: 100 INJECTION SUBCUTANEOUS at 09:45

## 2023-01-12 RX ADMIN — BUPROPION HYDROCHLORIDE 300 MG: 150 TABLET, EXTENDED RELEASE ORAL at 09:45

## 2023-01-12 RX ADMIN — METHOCARBAMOL TABLETS 750 MG: 750 TABLET, COATED ORAL at 13:52

## 2023-01-12 RX ADMIN — ATORVASTATIN CALCIUM 40 MG: 40 TABLET, FILM COATED ORAL at 09:54

## 2023-01-12 RX ADMIN — METHOCARBAMOL TABLETS 750 MG: 750 TABLET, COATED ORAL at 18:35

## 2023-01-12 RX ADMIN — OXYCODONE HYDROCHLORIDE 5 MG: 5 TABLET ORAL at 22:15

## 2023-01-12 RX ADMIN — DICLOFENAC SODIUM 4 G: 10 GEL TOPICAL at 22:51

## 2023-01-12 RX ADMIN — OXYCODONE 10 MG: 5 TABLET ORAL at 09:43

## 2023-01-12 RX ADMIN — STANDARDIZED SENNA CONCENTRATE AND DOCUSATE SODIUM 2 TABLET: 8.6; 5 TABLET ORAL at 21:52

## 2023-01-12 RX ADMIN — DIPHENHYDRAMINE HYDROCHLORIDE 5000 MCG: 25 TABLET ORAL at 18:36

## 2023-01-12 RX ADMIN — GABAPENTIN 200 MG: 100 CAPSULE ORAL at 13:52

## 2023-01-12 RX ADMIN — STANDARDIZED SENNA CONCENTRATE AND DOCUSATE SODIUM 2 TABLET: 8.6; 5 TABLET ORAL at 09:45

## 2023-01-12 RX ADMIN — ASPIRIN 81 MG: 81 TABLET, CHEWABLE ORAL at 09:45

## 2023-01-12 RX ADMIN — CALCIUM ACETATE 1334 MG: 667 CAPSULE ORAL at 18:39

## 2023-01-12 RX ADMIN — OXYCODONE 10 MG: 5 TABLET ORAL at 05:30

## 2023-01-12 RX ADMIN — METHOCARBAMOL TABLETS 750 MG: 750 TABLET, COATED ORAL at 09:54

## 2023-01-12 RX ADMIN — CALCIUM ACETATE 1334 MG: 667 CAPSULE ORAL at 12:04

## 2023-01-12 RX ADMIN — METHOCARBAMOL TABLETS 750 MG: 750 TABLET, COATED ORAL at 21:52

## 2023-01-12 RX ADMIN — GABAPENTIN 200 MG: 100 CAPSULE ORAL at 09:54

## 2023-01-12 RX ADMIN — OXYCODONE 10 MG: 5 TABLET ORAL at 18:18

## 2023-01-12 RX ADMIN — ACETAMINOPHEN 650 MG: 325 TABLET ORAL at 03:57

## 2023-01-12 RX ADMIN — OXYCODONE 10 MG: 5 TABLET ORAL at 01:18

## 2023-01-12 RX ADMIN — OXYCODONE 10 MG: 5 TABLET ORAL at 13:52

## 2023-01-12 ASSESSMENT — PAIN DESCRIPTION - FREQUENCY
FREQUENCY: CONTINUOUS

## 2023-01-12 ASSESSMENT — PAIN DESCRIPTION - DESCRIPTORS
DESCRIPTORS: SHARP
DESCRIPTORS: SHARP
DESCRIPTORS: ACHING
DESCRIPTORS: ACHING;SHARP
DESCRIPTORS: PRESSURE
DESCRIPTORS: SHARP
DESCRIPTORS: ACHING;SHARP

## 2023-01-12 ASSESSMENT — PAIN DESCRIPTION - LOCATION
LOCATION: LEG;HIP
LOCATION: LEG
LOCATION: KNEE
LOCATION: LEG;ARM
LOCATION: LEG
LOCATION: LEG;ARM
LOCATION: LEG
LOCATION: LEG
LOCATION: LEG;HIP
LOCATION: KNEE
LOCATION: LEG

## 2023-01-12 ASSESSMENT — PAIN DESCRIPTION - ONSET
ONSET: ON-GOING

## 2023-01-12 ASSESSMENT — PAIN - FUNCTIONAL ASSESSMENT
PAIN_FUNCTIONAL_ASSESSMENT: ACTIVITIES ARE NOT PREVENTED
PAIN_FUNCTIONAL_ASSESSMENT: PREVENTS OR INTERFERES SOME ACTIVE ACTIVITIES AND ADLS
PAIN_FUNCTIONAL_ASSESSMENT: ACTIVITIES ARE NOT PREVENTED

## 2023-01-12 ASSESSMENT — PAIN SCALES - GENERAL
PAINLEVEL_OUTOF10: 4
PAINLEVEL_OUTOF10: 4
PAINLEVEL_OUTOF10: 8
PAINLEVEL_OUTOF10: 5
PAINLEVEL_OUTOF10: 4
PAINLEVEL_OUTOF10: 5
PAINLEVEL_OUTOF10: 7
PAINLEVEL_OUTOF10: 5
PAINLEVEL_OUTOF10: 6
PAINLEVEL_OUTOF10: 7
PAINLEVEL_OUTOF10: 6

## 2023-01-12 ASSESSMENT — PAIN DESCRIPTION - ORIENTATION
ORIENTATION: RIGHT

## 2023-01-12 ASSESSMENT — PAIN SCALES - WONG BAKER
WONGBAKER_NUMERICALRESPONSE: 0
WONGBAKER_NUMERICALRESPONSE: 0

## 2023-01-12 ASSESSMENT — PAIN DESCRIPTION - PAIN TYPE
TYPE: ACUTE PAIN;SURGICAL PAIN

## 2023-01-12 NOTE — PROGRESS NOTES
Patient was admitted to room 4904 at 1907. Patient was oriented to the Call Light, Phone, TV, Thermostat, Bed Controls, Bathroom and Emergency Cord. Patient verbalized and demonstrated understanding of all. Patient was also given an over view of Unit Routines for Acute Rehab. Patient states that their normal bowel regime is daily. Meal times were explained, including how to order food. The white board, (which is posted on the wall by the door is used for communication) has the Therapy Scheduled that is posted each day along with the name of your doctor, nurse, and therapist for your convenience. We recommend any family that will be care givers or any care givers the patient has, take part in therapy. We have no set visiting hours, we suggest non-caregiver friends and family visitors come after therapy (at 4 PM or later) to allow patient to rest in between sessions.       In conjunction with the patient and patients family, this nurse worked to establish a tailored Fall TIPS plan to ensure patient safety and compliance:    Falls TIPS Completion    Patient identified as increased risk for harm if fall:  [x] Yes     Fall Risks  History of Falls:    [x] Yes   Medication Side Effects:   [] Yes   Walking Aid:    [] Yes   IV Pole or Equipment:   [] Yes   Unsteady Walk:     [] Yes   May Forget or Choose Not to Call: [] Yes     Fall Interventions   Communicate Recent Fall and/or Risk of Harm: [x] Yes   Walking Aids:  Crutches: [] Yes   Cane: [] Yes   Walker: [] Yes   IV Assistance When Walking: [] Yes   Toileting Schedule: Every 2 Hours  Bedpan:   [x] Yes   Assist to Commode: [] Yes   Assist to Bathroom: [] Yes   Bed Alarm On: [x] Yes   Assistance Out of Bed:  Bedrest: [x] Yes   1 Person: [] Yes   2 People: [] Yes

## 2023-01-12 NOTE — PLAN OF CARE
Assessment complete see flowsheets. A&Ox4. Meds given per eMAR. The care plan and education has been reviewed and mutually agreed upon with the patient. In bed, fall precautions in place, hourly rounding, call light and belongings in reach, bed in lowest position, wheels locked in place, side rails up x 2, walkways free of clutter. No further needs expressed at this time. 2109 PRN Roxicodone given per patient request. Patient does well with gel ice packs over right knee to decrease pain/swelling. Patient requests to keep up on PRN pain medication every 4 hours and to wake her if she is sleeping. Repositioned patient to assist with comfort. 0118 PRN Roxicodone given per patient request. Removed ice pack and repositioned patient. 2367 Urine analysis collected. 0530 PRN Roxicodone given per patient request. repositioned ice pack and patient who was feeling discomfort with the temperature in the room. Readjusted temperature to 69 from 67 per patient request.      Problem: Discharge Planning  Goal: Discharge to home or other facility with appropriate resources  Outcome: Progressing  Flowsheets  Taken 1/11/2023 1957  Discharge to home or other facility with appropriate resources: Identify barriers to discharge with patient and caregiver  Taken 1/11/2023 1933  Discharge to home or other facility with appropriate resources: Identify barriers to discharge with patient and caregiver     Problem: Pain  Goal: Verbalizes/displays adequate comfort level or baseline comfort level  Outcome: Progressing  Flowsheets (Taken 1/11/2023 1900)  Verbalizes/displays adequate comfort level or baseline comfort level: Encourage patient to monitor pain and request assistance     Problem: Skin/Tissue Integrity  Goal: Absence of new skin breakdown  Description: 1. Monitor for areas of redness and/or skin breakdown  2. Assess vascular access sites hourly  3. Every 4-6 hours minimum:  Change oxygen saturation probe site  4. Every 4-6 hours:  If on nasal continuous positive airway pressure, respiratory therapy assess nares and determine need for appliance change or resting period. Outcome: Progressing  Note: Patient has excoriation on bilateral buttocks due to scratching. Applying zinc ointment to alleviate itch and heal excoriation.      Problem: Safety - Adult  Goal: Free from fall injury  Outcome: Progressing

## 2023-01-12 NOTE — PROGRESS NOTES
4 Eyes Skin Assessment     NAME:  Lisa Scruggs  YOB: 1950  MEDICAL RECORD NUMBER:  [id number]    The patient is being assessed for  Admission    I agree that One RN have performed a thorough Head to Toe Skin Assessment on the patient. ALL assessment sites listed below have been assessed. Areas assessed by both nurses:    Head, Face, Ears, Shoulders, Back, Chest, Arms, Elbows, Hands, Sacrum. Buttock, Coccyx, Ischium, and Legs. Feet and Heels        Does the Patient have a Wound? Other Excoriation and scratches on bilateral buttocks.         Shantanu Prevention initiated by RN: No   Wound Care Orders initiated by RN: No    Pressure Injury (Stage 3,4, Unstageable, DTI, NWPT, and Complex wounds) if present place referral order by RN under : No    New and Established Ostomies, if present place, referral order under : No      Nurse 1 eSignature: Electronically signed by Sahra Barton RN on 1/11/23 at 7:31 PM EST    **SHARE this note so that the co-signing nurse is able to place an eSignature**    Nurse 2 eSignature: Electronically signed by Rubio Funk RN on 1/11/23 at 7:38 PM EST

## 2023-01-12 NOTE — H&P
Patient: Laura   [id number]  Date: 1/12/2023      Chief Complaint: Right arm and leg pain    History of Present Illness/Hospital Course:  78-year-old female with a history of hyperlipidemia and bladder cancer who was admitted to [facility] on 1/5 with right arm and leg pain after a fall from a ladder. She reports she was on the last step and thought she was on the ground and fell 1 step to the ground. Work-up revealed a right radius fracture and a right tibial fracture. CT of the knee revealed an intra-articular tibial fracture involving the spine. Ortho evaluated and suggested nonweightbearing nonoperative management with a knee immobilizer on the right lower extremity. Her hospital course was complicated by urinary retention requiring catheterization. Her catheter was removed prior to transfer to this facility. She was evaluated by therapy and suggested to continue in an inpatient setting prior to returning home. She reports her pain remains significant. She does remark about some dysuria this morning and a urinalysis was positive for UTI. Culture is pending. She also states that she is not a full code. She is a DNR CC. Prior Level of Function:  Independent    Current Level of Function:  Total assist     has a past medical history of Arthritis, Cancer ([address] and Hyperlipidemia. has a past surgical history that includes Abdomen surgery; Colonoscopy; fracture surgery; Hysterectomy; Tonsillectomy; Breast reduction surgery (2004); eye surgery (1990); and Cystoscopy (N/A, 8/1/2021). reports that she quit smoking about 34 years ago. Her smoking use included cigarettes. She has never used smokeless tobacco. She reports that she does not use drugs. family history includes Colon Cancer in her sister; Diabetes in her brother; Heart Disease in her father; High Cholesterol in her father and mother; Hypertension in her mother; Stroke in her brother and father.     Allergies: Erythromycin and Morphine    Current Facility-Administered Medications   Medication Dose Route Frequency Provider Last Rate Last Admin    traZODone (DESYREL) tablet 100 mg  100 mg Oral Nightly Anna De La Rosa MD        polyethylene glycol (GLYCOLAX) packet 17 g  17 g Oral Daily PRN Anna De La Rosa MD        ciprofloxacin (CIPRO) tablet 500 mg  500 mg Oral 2 times per day Anna De La Rosa MD        acetaminophen (TYLENOL) tablet 650 mg  650 mg Oral Q4H PRN Anna De La Rosa MD   650 mg at 01/12/23 0357    enoxaparin (LOVENOX) injection 40 mg  40 mg SubCUTAneous Daily Anna De La Rosa MD        diclofenac sodium (VOLTAREN) 1 % gel 4 g  4 g Topical BID Anna De La Rosa MD   4 g at 01/11/23 2016    gabapentin (NEURONTIN) capsule 200 mg  200 mg Oral TID Anna De La Rosa MD   200 mg at 01/11/23 2109    lidocaine 4 % external patch 1 patch  1 patch TransDERmal Daily Anna De La Rosa MD        methocarbamol (ROBAXIN) tablet 750 mg  750 mg Oral 4x Daily Anna De La Rosa MD   750 mg at 01/11/23 2109    tamsulosin (FLOMAX) capsule 0.4 mg  0.4 mg Oral Daily Anna De La Rosa MD        aspirin chewable tablet 81 mg  81 mg Oral Daily Anna De La Rosa MD        atorvastatin (LIPITOR) tablet 40 mg  40 mg Oral Daily Anna De La Rosa MD        buPROPion (WELLBUTRIN XL) extended release tablet 300 mg  300 mg Oral Daily Anna De La Rosa MD        calcium acetate (PHOSLO) capsule 1,334 mg  2 capsule Oral BID WC Anna De La Rosa MD   1,334 mg at 01/11/23 2123    escitalopram (LEXAPRO) tablet 5 mg  5 mg Oral Daily Anna De La Rosa MD   5 mg at 01/11/23 2123    ferrous gluconate (FERGON) tablet 324 mg  324 mg Oral Daily with breakfast Anna De La Rosa MD        diphenhydrAMINE (BENADRYL) tablet 25 mg  25 mg Oral Q6H PRN Anna De La Rosa MD        hydrALAZINE (APRESOLINE) tablet 50 mg  50 mg Oral Q8H PRN Anna De La Rosa MD        ondansetron (ZOFRAN-ODT) disintegrating tablet 4 mg  4 mg Oral Q8H PRN Anna De La Rosa MD        oxyCODONE (ROXICODONE) immediate release tablet 5 mg  5 mg Oral Q4H PRN Mary Bagley MD        Or    oxyCODONE (ROXICODONE) immediate release tablet 10 mg  10 mg Oral Q4H PRN Mary Bagley MD   10 mg at 01/12/23 0530    sennosides-docusate sodium (SENOKOT-S) 8.6-50 MG tablet 2 tablet  2 tablet Oral BID Mary Bagley MD   2 tablet at 01/11/23 2109       REVIEW OF SYSTEMS:   CONSTITUTIONAL: negative for fevers, chills, diaphoresis, appetite change, night sweats and unexpected weight change. EYES: negative for blurred vision, eye discharge, visual disturbance and icterus. HEENT: negative for hearing loss, tinnitus, ear drainage, sinus pressure, nasal congestion, and epistaxis. RESPIRATORY: Negative for hemoptysis, cough, sputum production. CARDIOVASCULAR: negative for chest pain, palpitations, exertional chest pressure/discomfort, edema, syncope. GASTROINTESTINAL: negative for nausea, vomiting, diarrhea, constipation, blood in stool and abdominal pain. GENITOURINARY: negative for frequency, dysuria, urinary incontinence, decreased urine volume, and hematuria. HEMATOLOGIC/LYMPHATIC: negative for easy bruising, bleeding and lymphadenopathy. ALLERGIC/IMMUNOLOGIC: negative for recurrent infections, angioedema, anaphylaxis and drug reactions. ENDOCRINE: negative for weight changes and diabetic symptoms including polyuria, polydipsia and polyphagia. MUSCULOSKELETAL: positive for pain, joint swelling, decreased range of motion and muscle weakness. NEUROLOGICAL: negative for headaches, slurred speech, unilateral weakness. PSYCHIATRIC/BEHAVIORAL: negative for hallucinations, behavioral problems, confusion and agitation. All pertinent positives are noted in the HPI.     Physical Examination:  Vitals: Patient Vitals for the past 24 hrs:   BP Temp Temp src Pulse Resp SpO2 Height Weight   01/12/23 0745 122/61 98.5 °F (36.9 °C) -- 75 18 95 % -- --   01/12/23 0530 -- -- -- -- 18 -- -- --   01/12/23 0118 -- -- -- -- 16 -- -- --   01/11/23 1907 127/70 98.5 °F (36.9 °C) Oral 84 16 96 % -- --   01/11/23 1900 -- -- -- -- -- -- 5' 5\" (1.651 m) 159 lb 4.8 oz (72.3 kg)     Psych: Stable mood, normal judgement, normal affect   Const: No distress  Eyes: Conjunctiva noninjected, no icterus noted; pupils equal, round. HENT: Atraumatic, normocephalic; Oral mucosa moist  Neck: Trachea midline, neck supple. No thyromegaly noted. CV: Regular rate and rhythm, no murmur rub or gallop noted  Resp: Lungs clear to auscultation bilaterally, no rales wheezes or ronchi, no retractions. Respirations unlabored. GI: Soft, nontender, nondistended. Normal bowel sounds. No palpable masses. Neuro: Alert, oriented, appropriate. No cranial nerve deficits appreciated. Motor examination reveals normal strength in LUE/LLE diffusely. RUE/RLE deferred. Skin: Normal temperature and turgor  MSK: RUE in a splint above the elbow. Fingers warm and able to wiggle. RLE in a knee immobilizer with significant edema remaining at the knee. Ext: mild distal RLE edema appreciated outside of above. No varicosities. Lab Results   Component Value Date    WBC 7.1 01/12/2023    HGB 10.9 (L) 01/12/2023    HCT 33.2 (L) 01/12/2023    MCV 92.6 01/12/2023     01/12/2023     Lab Results   Component Value Date    INR 1.17 (H) 08/02/2021    PROTIME 13.3 (H) 08/02/2021     Lab Results   Component Value Date    CREATININE <0.5 (L) 01/12/2023    BUN 13 01/12/2023     01/12/2023    K 4.3 01/12/2023     01/12/2023    CO2 29 01/12/2023     Lab Results   Component Value Date    ALT 40 08/01/2021    AST 38 (H) 08/01/2021    ALKPHOS 89 08/01/2021    BILITOT 0.4 08/01/2021       Most recent imaging studies revealed   CT of the right knee: Comminuted intra-articular proximal tibial fracture with mild depression of the posterior aspect of the lateral tibial plateau. No significant displacement. Moderate associated hemorrhagic joint effusion.     X-ray of the right wrist: Fracture of the distal radial metaphysis with dorsal angulation of the wrist    The above laboratory data have been reviewed. The above imaging data have been reviewed. The above medical testing have been reviewed. Body mass index is 26.51 kg/m². Barriers to Discharge: Nonweightbearing status, pain, decreased safety, ADLs    Disposition: Home    Prognosis: Good    Rehabilitation goals: To return patient to home setting at their prior level of function. POST ADMISSION PHYSICIAN EVALUATION  The patient has agreed to being admitted to our comprehensive inpatient  rehabilitation facility consisting of at least 180 minutes of therapy a day,  5 out of 7 days a week. The patient/family has a good understanding of our discharge process. The  patient has potential to make improvement and is in need of at least two of  the following multidisciplinary therapies including but not limited to  physical, occupational, respiratory, and speech, nutritional services, wound care, and prosthetics and orthotics. Given the patients complex condition  and risk of further medical complications, rehabilitation services cannot be  safely provided at a lower level of care such as a skilled nursing facility. I have compared the patients medical and functional status at the time of the  preadmission screening and the same on this date, and there are no significant changes except as documented below in the assessment and plan. By signing this document, I acknowledge that I have personally performed a  full physical examination on this patient within 24 hours of admission to  this inpatient rehabilitation facility and have determined the patient to be  able to tolerate the above course of treatment at an intensive level for a  reasonable period of time.  I will be completing a detailed individualized  Plan of Care for this patient by day four of the patients stay based upon the  Preadmission Screen, this Post-Admission Evaluation, and the therapy  evaluations. Assessment and Plan:  Multiple fractures: Nonweightbearing RUE/RLE. Pain control. PT/OT. Patient is requesting an orthopedist closer to home. UTI: -Start Cipro, Cx pending    HLD: Lipitor 40    Depression: Lexapro 5, Wellbutrin 300    Hx bladder cancer: Flomax    Bowels: Per protocol  Bladder: Per protocol   Sleep: Trazodone 100 scheduled  Pain: Tylenol as needed, gabapentin 200 3 times daily, Robaxin-750 4 times daily, [place] as needed  DVT PPx: Lovenox  ELOS: 10-14    Greater than 45 minutes spent on this admission due to patient evaluation and extensive chart review from prior facility. Heather Arce MD 1/12/2023, 8:57 AM     * This document was created using dictation software. While all precautions were taken to ensure accuracy, errors may have occurred. Please disregard any typographical errors.

## 2023-01-12 NOTE — PROGRESS NOTES
Admission Period/Goal QM Codes for Jordon Townsend. QM Admit Code Goal Code   Eating     Oral Hygiene     Toileting Hygiene     Shower/Bathing     UB Dressing     LB Dressing     Putting on/off Footwear     Rolling Left and Right     Sit To Lying     Lying to Sitting on Bedside     Sit to Stand     Chair/Bed to Chair Transfer     Toilet Transfers     Car Transfers     Walk 10 Feet     Walk 50 Feet with Two Turns     Walk 150 Feet     Walk 10 Feet on Uneven Surfaces     1 Step (Curb)     4 Steps     12 Steps     [facility] 50 Feet with 2 Turns     Type     Wheel 150 Feet     Type         The above codes were determined by the treatment team to be the patient's accurate admission assessment codes based on assessment performed soon after the patient's admission and prior to the benefit of services provided by staff, or if appropriate, the patient's usual performance at admission.     OT:       PT:       RN:       ST:       :

## 2023-01-12 NOTE — PROGRESS NOTES
Nutrition Note    RECOMMENDATIONS  PO Diet: Regular  ONS: Glucerna @ [facility] daily     NUTRITION ASSESSMENT   Pt receives a regular diet & reports eating well & has no appetite issues. States hx gastric bypass surgery in 2001, but has started gaining wt over past few years. Requests low sugar protein shake & agrees to drink Glucerna daily. Expresses no further nutrition concerns. Will monitor for further needs as identified. Nutrition Related Findings: LBM 1/11; nonpitting edema RUE, RLE. Gluc 125  Wounds:  (multiple fractures from falling from ladder)  Nutrition Education:  Education not indicated   Nutrition Goals: PO intake 75% or greater     MALNUTRITION ASSESSMENT      Malnutrition Status: No malnutrition    NUTRITION DIAGNOSIS   Increased nutrient needs related to increase demand for energy/nutrients as evidenced by other (comment) (increased protein needs for healing of fractures)      CURRENT NUTRITION THERAPIES  ADULT DIET; Regular     PO Intake: %   PO Supplement Intake:None Ordered    ANTHROPOMETRICS  Current Height: 5' 5\" (165.1 cm)  Current Weight: 159 lb 4.8 oz (72.3 kg)    Ideal Body Weight (IBW): 125 lbs  (57 kg)        BMI: 26.5    The patient will be monitored per nutrition standards of care. Consult dietitian if additional nutrition interventions are needed prior to RD reassessment.      Jane Pittman, MIGUEL, LD    Contact: [phone]

## 2023-01-12 NOTE — PROGRESS NOTES
[facility]   Phone: [phone]    Occupational Therapy    [x] Initial Evaluation            [] Daily Treatment Note         [] Discharge Summary      Patient: Jordon Townsend   : 1950   MRN: [id number]   Date of Service:  2023    Admitting Diagnosis:  Multiple fractures  Current Admission Summary: 45-year-old female with a history of hyperlipidemia and bladder cancer who was admitted to [facility] on  with right arm and leg pain after a fall from a ladder. She reports she was on the last step and thought she was on the ground and fell 1 step to the ground. Work-up revealed a right radius fracture and a right tibial fracture. CT of the knee revealed an intra-articular tibial fracture involving the spine. Ortho evaluated and suggested nonweightbearing nonoperative management with a knee immobilizer on the right lower extremity. Her hospital course was complicated by urinary retention requiring catheterization. Her catheter was removed prior to transfer to this facility. She was evaluated by therapy and suggested to continue in an inpatient setting prior to returning home. She reports her pain remains significant. She does remark about some dysuria this morning and a urinalysis was positive for UTI. Past Medical History:  has a past medical history of Arthritis, Cancer ([address] and Hyperlipidemia. Past Surgical History:  has a past surgical history that includes Abdomen surgery; Colonoscopy; fracture surgery; Hysterectomy; Tonsillectomy; Breast reduction surgery (); eye surgery (); and Cystoscopy (N/A, 2021).     Discharge Recommendations:  OT S3    DME Required For Discharge: DME to be determined pending patient progress    Precautions/Restrictions: high fall risk, weight bearing  Weight Bearing Restrictions: non weight bearing  [x] Right Upper Extremity  [] Left Upper Extremity [x] Right Lower Extremity  [] Left Lower Extremity     Required Braces/Orthotics: knee immobilizer, R UE cast/splint   [x] Right  [] Left  Positional Restrictions: R LE KI at all times      Pre-Admission Information   Lives With: spouse                  Type of Home: condo  Home Layout: one level  Home Access:  1 + 1 NORA with (R) grab bar  Bathroom Layout: walk in shower  Bathroom Equipment:  owns grab bars but not installed  Toilet Height: standard height  Home Equipment: no prior equipment  Transfer Assistance: Independent without use of device  Ambulation Assistance:Independent without use of device  ADL Assistance: independent with all ADL's  IADL Assistance: requires assistance with all homemaking tasks  Active :        [x] Yes                 [] No  Hand Dominance: [] Left                 [x] Right  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  Hobbies: [employer], [address]: 0 additional recent falls excluding reason for hospital admission          Examination   Vision:   Vision Gross Assessment: Impaired and Vision Corrective Device: wears glasses for distance  Hearing:   left hearing aid, right hearing aid- able to carry on casual conversation without hearing aids in place  Perception:   WFL  Observation:   General Observation:  KI in place to R LE; cast/splint on R UE; minor skin irritation/rash on buttocks noted   Posture:   Fair; slightly head forward   Sensation:   denies numbness and tingling  Tone:   Normotonic  Coordination Testing:   WFL  - based on observation of functional tasks; R hand limited d/t casting  ROM:   (B) UE AROM WFL- R UE limited assessment d/t casting, able to flex/extend DIPs/PIPs  Strength:    R UE not assessed; L UE appears WFL based on mobility/ADL completion     Therapist Clinical Decision Making (Complexity): medium complexity  Clinical Presentation: stable      Subjective  General: Pt in bed at entry, agreeable to cotx eval and shower   Pain: 7/10.   Location: R knee/arm; R hip pain present and persistent upon sitting EOB  Pain Interventions: pain medication in place prior to arrival and repositioned         Activities of Daily Living  Basic Activities of Daily Living  Feeding: setup assistance  Feeding Comments: assist to spread condiments  Grooming: setup assistance  Grooming Comments: combing hair- pt declined oral care needs  Upper Extremity Bathing: stand by assistance requires verbal cueing  Lower Extremity Bathing: dependent   Bathing Comments: pt requires assist for washing/drying R foot- 2nd person assist required for ryan hygiene in stance; R LE and R UE water-proofed prior to shower; Ashleigh Chicho slightly damp upon exiting shower; R UE cast remained dry and intact   Upper Extremity Dressing: stand by assistance  Lower Extremity Dressing: dependent  Dressing Comments: pt able to doff socks with increased time; assist to don socks and thread R LE into brief/pants; 2nd person assist required for clothing over hips in stance   Toileting Comments: pt denied need for toileting; roopa pad noted to be soiled with medium amount of urine, pt denied urine incontinence   General Comments: pt assisted to/from bathroom iv w/c- UB/LB bathing completed seated on TTB in shower, R LE elevated for comfort on stool throughout  Instrumental Activities of Daily Living  No IADL completed on this date.     Functional Mobility  Bed Mobility  Supine to Sit: minimal assistance  Sit to Supine: minimal assistance  Comments:  Transfers  Sit to stand transfer:2 person assistance with ModA of 2   Stand to sit transfer: 2 person assistance with ModA of 2   Stand pivot transfer: 2 person assistance with ModA of 2   Shower transfer: 2 person assistance with ModA of 2   Shower transfer equipment: tub transfer bench, transfers from w/c  Shower transfer comments: use of grab bar  Comments: VC for sequence and hand placement; Pt demo'd good adherence to WB status throughout all transfers   Functional Mobility:  Sitting Balance: stand by assistance. Standing Balance: dependent assistance. Other Therapeutic Interventions        Second Session:  Pt in bed at entry, agreeable to session, pt up to date on pain medications, reporting ongoing L hip pain throughout session. Supine> sit at [facility] (pt initially asking for assist for R LE however did not require), HOB elevated, exit to L side. All SPT and sit<>stands with ModA of 2. Pt completed oral care w/c level at sink at set-up. Pt propelled w/c in hallway 50+ft with L UE/LE,  pt able to complete turn- SBA and VC throughout. Pt's KI changed at EOS d/t discomfort with current- pt reported improvement in fit/pain in R LE. Pt left in recliner at EOS, chair alarm on, call light and needs in reach. Cognition  WFL  Orientation:    alert and oriented x 4  Command Following:   [facility]     Education  Barriers To Learning: none  Patient Education: patient educated on goals, OT role and benefits, plan of care, precautions, transfer training, discharge recommendations  Learning Assessment:  patient verbalizes understanding, would benefit from continued reinforcement    Assessment  Activity Tolerance: Limited by pain; fair tolerance overall, rest breaks as needed, fatigue reported at EOS  Impairments Requiring Therapeutic Intervention: decreased functional mobility, decreased ADL status, decreased ROM, decreased strength, decreased endurance, decreased balance, decreased IADL, increased pain  Prognosis: good  Clinical Assessment: Pt presents with above deficits impacting her occupational performance. Pt is below her IND baseline level of function secondary to R wrist fx and R tibial fx with NWB status. Pt now requiring extensive assist for LB ADL completion, transfers and mobility. Limited by pain. Skilled OT services indicated in inpatient setting in order to maximize her IND and safety with all occupational pursuits.    Safety Interventions: patient left in bed, bed alarm in place, call light within reach, gait belt, and patient at risk for falls    Plan  Frequency: 5 x/week, 90 min/day  Current Treatment Recommendations: strengthening, balance training, functional mobility training, transfer training, endurance training, patient/caregiver education, ADL/self-care training, IADL training, home management training, pain management, home exercise program, safety education, equipment evaluation/education, and positioning    Goals  Patient Goals: regain IND   Short Term Goals:  Time Frame: 2-3 weeks  Patient will complete lower body ADL at [facility]   Patient will complete toileting at modified independent   Patient will complete grooming at [facility]   Patient will complete functional transfers at [facility]   Patient will complete functional mobility at modified independent   Patient to gather and transport IADL items at [facility]     Therapy Session Time     Individual Group Co-treatment   Time In    0830   Time Out    0938   Minutes    68    Second Session Therapy Time:   Individual Concurrent Group Co-treatment   Time In       0931   Time Out       1320   Minutes       35     Timed Code Treatment Minutes:  35 + 68    Total Treatment Minutes:  103        Electronically Signed By: Diaz Schneider, [address]

## 2023-01-12 NOTE — PLAN OF CARE
[address], [address] [place] [address]  [phone]      Radha Adames    : 1950  Acct #: [de-identified]  MRN: [id number]  PHYSICIAN:  Hong Burt MD  Primary Problem    Patient Active Problem List   Diagnosis    Clot retention of urine    Multiple fractures       Rehabilitation Diagnosis:      Multiple fractures: Nonweightbearing RUE/RLE. Pain control. PT/OT. Patient is requesting an orthopedist closer to home. UTI: -Start Cipro, Cx pending     HLD: Lipitor 40     Depression: Lexapro 5, Wellbutrin 300     Hx bladder cancer: Flomax    ADMIT DATE:2023    Patient Goals: Regain independence and return home. Admitting Impairments: Orthopedic Disorders - 8.4 - Major Multiple Fractures  Activities: Impaired Eating, Hygiene, Toileting, Bathing, Dressing, Bed Mobility, Transfers, Ambulation, Stairs, and Endurance. Participation: Prior to admission, patient was living at home with spouse, was independent with all mobility and activities, was an active  and working part time.      CARE PLAN     NURSING:  Radha Adames while on this unit will:  [x] Be continent of bowel and bladder     [x] Have an adequate number of bowel movements  [x] Urinate with no urinary retention >300ml in bladder  [] Complete bladder protocol with bonilla removal  [x] Maintain O2 SATs at 95%  [x] Have pain managed while on ARU       [] Be pain free by discharge   [x] Have no skin breakdown while on ARU  [] Have improved skin integrity via wound measurements  [] Have no signs/symptoms of infection at the wound site  [] Be free from injury during hospitalization   [] Complete education with patient/family with understanding demonstrated for:  [] Adjustment   [] Other:     Nursing Interventions will include:  [x] bowel/bladder training   [x] education for medical assistive devices   [x] medication education   [x] O2 saturation management   [x] energy conservation [x] stress management techniques   [x] fall prevention   [x] alarms protocol   [x] seating and positioning   [x] skin/wound care   [x] pressure relief instruction   [] dressing changes     [] infection protection   [x] DVT prophylaxis  [x] assistance with in room safety with transfers to bed, toilet, wheelchair, shower   [x] bathroom activities and hygiene  [] Other:    Patient/Caregiver Education for:  [x] Disease/sustained injury/management     [x] Medication Use  [] Surgical intervention  [x] Safety  [x] Body mechanics and or joint protection  [x] Health maintenance     [] Other:     PHYSICAL THERAPY:  Goals:                   Patient Goals: To return home    Short Term Goals:  Time Frame: 3 weeks  Patient will complete bed mobility at modified independent   Patient will complete transfers at [facility]   Patient will ambulate 50 ft with use of platform walker (R) at modified independent  Patient will ascend/descend 1+1 stairs with (R) ascending handrail at stand by assistance  Patient will complete manual w/c propulsion 150 ft at [facility]               These goals were reviewed with this patient at the time of assessment and Tiffanie Hansen is in agreement.      Plan of Care: Pt to be seen 5 out of 7 days per week per ARU protocol ( 90 minutes with PT)                     OCCUPATIONAL THERAPY:  Goals:             Short Term Goals:  Time Frame: 2-3 weeks  Patient will complete lower body ADL at [facility]   Patient will complete toileting at modified independent   Patient will complete grooming at [facility]   Patient will complete functional transfers at [facility]   Patient will complete functional mobility at modified independent   Patient to gather and transport IADL items at modified independent     These goals were reviewed with this patient at the time of assessment and Tiffanie Hansen is in agreement    Plan of Care:  Pt to be seen 5 out of 7 days per week per ARU protocol ( 90 minutes with OT)     Therapy Treatments will include:  [x]  therapeutic exercises    [x]  gait training     [x]  neuromuscular re-ed                            [x]  transfer training             [] community reintegration    [x] bed mobility                          [x]  w/c mobility and training  [x]  self care    [x]home mgmt    []  cognitive training            [x]  energy conservation        []  dysphagia tx    []  speech/language/communication therapy   []  group therapy    [x]  patient/family education    [] Other:    CASE MANAGEMENT:  Goals:   Assist patient/family with discharge planning, patient/family counseling, and coordination with insurance during ARU stay. Kirit Hollins will be seen a minimum of 3 hours of therapy per day, a minimum of 5 out of 7 days per week  (please see above for specific treatment plan per PT/OT/SLP). [] In this rare instance due to the nature of this patient's medical involvement, this patient will be seen 15 hours per week (900 minutes within a 7 day period). In addition, dietician/nutritionist may monitor calorie count as well as intake and collaboratively work with SLP on dietary upgrades. Neuropsychology/Psychology may evaluate and provide necessary support.     Medical issues being managed closely and that require 24 hour availability of a physician:  [] Swallowing Precautions  [x] Bowel/Bladder Fx  [x] Weight bearing precautions  [x] Wound Care    [x] Pain Mgmt   [x] Infection Protection  [x] DVT Prophylaxis   [x] Fall Precautions  [x] Fluid/Electrolyte/Nutrition Balance  [] Voice Protection   [x] Respiratory  [] Other:    Medical Prognosis: [] Good  [x] Fair    [] Guarded   Total expected IRF days: 14  Anticipated discharge destination: Home  [] Home Independently   [x] Home with supervision - for stairs    []SNF     [] Other                                           Physician anticipated functional outcomes:  By discharge, patient will progress to being independent with all mobility and activities except requiring SBA for stairs. IPOC brief synthesis: 80-year-old female with a history of hyperlipidemia and bladder cancer who was admitted to [facility] on 1/5 with right arm and leg pain after a fall from a ladder. She reports she was on the last step and thought she was on the ground and fell 1 step to the ground. Work-up revealed a right radius fracture and a right tibial fracture. CT of the knee revealed an intra-articular tibial fracture involving the spine. Ortho evaluated and suggested nonweightbearing nonoperative management with a knee immobilizer on the right lower extremity. Her hospital course was complicated by urinary retention requiring catheterization. Her catheter was removed prior to transfer to this facility. She was evaluated by therapy and suggested to continue in an inpatient setting prior to returning home. I have reviewed this initial plan of care and agree with its contents:    Title   Name    Date    Time    Physician: Electronically signed by Oswald Packer MD on 1/13/2023 at 3:09 PM      Case Mgmt: Kathe Kirk MSW, LSW, 1/12/2023 @ 8:43.     OT: Marko Clay OTR/L #[id number], 1/12/2022, 1445    PT:  Franko Lainez PT, DPT - [id number], 1/12/2023 11:00 AM    RN: Estephania Hall RN 1/11/2023 22:00    :  Marin Salazar PT, DPT [id number]  1/13/23  1:07 PM

## 2023-01-12 NOTE — PLAN OF CARE
Problem: Discharge Planning  Goal: Discharge to home or other facility with appropriate resources  1/12/2023 0821 by Von Cruz RN  Outcome: Progressing     Problem: Pain  Goal: Verbalizes/displays adequate comfort level or baseline comfort level  1/12/2023 0821 by Von Cruz RN  Outcome: Progressing     Problem: Skin/Tissue Integrity  Goal: Absence of new skin breakdown  Description: 1. Monitor for areas of redness and/or skin breakdown  2. Assess vascular access sites hourly  3. Every 4-6 hours minimum:  Change oxygen saturation probe site  4. Every 4-6 hours:  If on nasal continuous positive airway pressure, respiratory therapy assess nares and determine need for appliance change or resting period.   1/12/2023 0821 by Von Cruz RN  Outcome: Progressing     Problem: Safety - Adult  Goal: Free from fall injury  1/12/2023 0821 by Von Cruz RN  Outcome: Progressing

## 2023-01-12 NOTE — PROGRESS NOTES
[facility] Admission Assessment    Ethnicity  \"Are you of , /a, or Russian origin? \"  Check all that apply:  [x] A. No, not of , /a, or [place] Origin  [] B.  Yes, [place], [place] American, Chicano/a  [] C.  Yes, [address] [place] [address]  [] D.  Yes, [place]  [] E.  Yes, another , , or Russian origin  [] X. Patient unable to respond  [] Y. Patient declines to respond    Race  \"What is your race? \"  Check all that apply:  [x] A. White  [] B. Black or   [] C. American [place] [facility] or [place] Native  [] D.  [place] [facility]  [] E. [place]  [] F. Nepalese  [] G. [place]  [] [place]  [] I. [place]  [] J.  Other   [] K.   [] L. [place] or [place]  [] M. Dutch  [] N. Other [place]  [] X. Patient unable to respond  [] Y. Patient declines to respond  [] Z. None of the above    Language  A. \"What is your preferred language? \"   English     B. \"Do you need or want an  to communicate with a doctor or health care staff? \"  Check only one:  [x] 0. No  [] 1. Yes  [] 9. Unable to determine    Transportation  \"Has lack of transportation kept you from medical appointments, meetings, work, or from getting things needed for daily living? \"Check all that apply:  [] A.  Yes, it has kept me from medical appointments or from getting my medications  [] B.  Yes, it has kept me from non-medical meetings, appointments, work, or from getting things that I need  [x] C.  No  [] X. Patient unable to respond  [] Y. Patient declines to respond    Hearing  Ability to hear (with hearing aid or hearing appliances if normally used)  []  0. Adequate - no difficulty in normal conversation, social interaction, listening to TV  [x]  1. Minimal difficulty - difficulty in some environments (e.g. when person speaks softly or setting is noisy)  []  2. Moderate difficulty - speaker has to increase volume and speak distinctly   []  3.   Highly impaired - absence of useful hearing    Vision  Ability to see in adequate light (with glasses or other visual appliances)  [x]  0. Adequate - sees fine detail, such as regular print in newspapers/books  []  1. Impaired - sees large print, but not regular print in newspapers/books  []  2. Moderately impaired - limited vision; not able to see newspaper headlines but can identify objects  []  3. Highly impaired - object identification in question, but eyes appear to follow objects  []  4. Severely impaired - no vision or sees only light, colors, or shapes; eyes do not appear to follow objects    Health Literacy  \"How often do you need to have someone help you when you read instructions, pamphlets, or other written material from your doctor or pharmacy? \"  [x]  0. Never  []  1. Rarely  []  2. Sometimes  []  3. Often  []  4. Always  []  8. Patient unable to respond    BIMS - **Must be completed in the flowsheet at admission prior to proceeding with Delirium Assessment**  [x] BIMS completed in flowsheet at admission    Signs and Symptoms of Delirium  A. Acute Onset Mental Status Change - Is there evidence of an acute change in mental status from the patient's baseline? [x] 0. No  [] 1. Yes    B. Inattention - Did the patient have difficulty focusing attention, for example being easily distractible or having difficulty keeping track of what was being said? [x]  0. Behavior not present  []  1. Behavior continuously present, does not fluctuate  []  2. Behavior present, fluctuates (comes and goes, changes in severity)    C. Disorganized thinking - Was the patient's thinking disorganized or incoherent (rambling or irrelevant conversation, unclear or illogical flow of ideas, or unpredictable switching from subject to subject)? [x]  0. Behavior not present  []  1. Behavior continuously present, does not fluctuate  []  2. Behavior present, fluctuates (comes and goes, changes in severity)    D.   Altered level of consciousness - Did the patient have altered level of consciousness as indicated by any of the following criteria? Vigilant - startled easily to any sound or touch  Lethargic - repeatedly dozed off while being asked questions, but responded to voice or touch  Stuporous - very difficulty to arouse and keep aroused for the interview  Comatose - could not be aroused  [x]  0. Behavior not present  []  1. Behavior continuously present, does not fluctuate  []  2. Behavior present, fluctuates (comes and goes, changes in severity)    Mood    \"Over the last 2 weeks, have you been bothered by any of the following problems?\" 1. Symptom Presence    0 = No  1 = Yes  9 = No Response 2. Symptom Frequency    0 = Never or 1 day  1 = 2-6 days (several days)  2 = 7-11 days (half or more of the days)  3 = 12-14 days (nearly every day)  **Leave blank if 'No Reponse'**      Enter scores in boxes    Column 1 Column 2   Little interest or pleasure in doing things   0 0   Feeling down, depressed, or hopeless   0 0   **If either A or B in column 2 is coded 2 or 3, CONTINUE asking the questions below. If not, END the interview. **     Trouble falling or staying asleep, or sleeping too much       Feeling tired or having little energy       Poor appetite or overeating       Feeling bad about yourself - or that you are a failure or have let yourself or your family down       Trouble concentrating on things, such as reading the newspaper or watching television       Moving or speaking so slowly that other people could have noticed. Or the opposite- being so fidgety or restless that you have been moving around a lot more than usual.       Thoughts that you would be better off dead, or of hurting yourself in some way. Total Severity: Add scores for all frequency responses in column 2 (possible score 0-27, or enter 99 if unable to complete (if symptom frequency (column 2) is blank for 3 or more items).    0     Social Isolation  \"How often do you feel lonely or isolated from those around you? \"  [x] 0. Never  [] 1. Rarely  [] 2. Sometimes  [] 3. Often  [] 4. Always  [] 7. Patient declines to respond  [] 8. Patient unable to respond    Pain Effect on Sleep  \"Over the past 5 days, how much of the time has pain made it hard for you to sleep at night? \"  []  0. Does not apply - I have not had any pain or hurting in the past 5 days  []  1. Rarely or not at all  []  2. Occasionally  [x]  3. Frequently  []  4. Almost constantly  []  8. Unable to answer    **If the patient answers \"0. Does not apply\" to this question, skip the next two \"Pain Effect. Jhonathan Ogren Jhonathan Ogren \" questions**    Pain Interference with Therapy Activities  \"Over the past 5 days, how often have you limited your participation in rehabilitation therapy sessions due to pain? \"  [x]  0. Does not apply - I have not received rehabilitation therapy in the past 5 days  []  1. Rarely or not at all  []  2. Occasionally  []  3. Frequently  []  4. Almost constantly  []  8. Unable to answer    Pain Interference with Day-to-Day Activities: \"Over the past 5 days, how often have you limited your day-to-day activities (excluding rehabilitation therapy session)? \"  []  1. Rarely or not at all  [x]  2. Occasionally  []  3. Frequently  []  4. Almost constantly  []  8. Unable to answer    Nutritional Approaches  Check all of the following nutritional approaches that apply on admission:  []  A. Parenteral/IV feeding (including IV fluids if needed for hydration, but not as part of dialysis/chemo)  []  B. Feeding tube (e.g., nasogastric or abdominal (PEG))  []  C. Mechanically altered diet - requires change in texture of food or liquids (e.g., pureed food, thickened liquids)  []  D. Therapeutic diet (e.g., low salt, diabetic, low cholesterol)  [x]  Z.   None of the above    High Risk Drug Classes:  Use and Indication    Is taking: Check if the pt is taking any medications by pharmacological classification, not how it is used, in the following classes  Indication noted: If column 1 is checked, check if there is an indication noted for all meds in the drug class Is taking  (check all that apply) Indication noted (check all that apply)   Antipsychotic [] []   Anticoagulant [x] [x]   Antibiotic [] []   Opioid [x] [x]   Antiplatelet [x] [x]   Hypoglycemic (including insulin) [] []   None of the above []     Special Treatments, Procedures, and Programs    Check all of the following treatments, procedures, and programs that apply on admission. On admission (check all that apply)   Cancer Treatments   A1. Chemotherapy []           A2. IV []           A3. Oral []           A10. Other []   B1. Radiation []   Respiratory Therapies   C1. Oxygen Therapy [x]           C2. Continuous (continuously for at least 14 hours per day) []           C3. Intermittent [x]           C4. High-concentration []   D1. Suctioning (Does not include oral suctioning) []           D2. Scheduled []           D3. As needed []   E1. Tracheostomy Care []   F1. Invasive Mechanical Ventilator (ventilator or respirator) []   G1. Non-invasive Mechanical Ventilator []           G2. BiPAP []           G3. CPAP []   Other   H1. IV Medications (Do not include sub Q pumps, flushes, Dextrose 50% or lactated ringers) []           H2. Vasoactive medications []           H3. Antibiotics []           H4. Anticoagulation []           H10. Other []   I1. Transfusions []   J1. Dialysis []           J2. Hemodialysis []           J3. Peritoneal dialysis []   O1. IV access (including a catheter in a vein) []           O2. Peripheral []           O3. Midline []           O4. Central (PICC, tunneled, port) []      None of the above (select if no Cancer, Respiratory, or Other boxes are checked) []     The above items have been reviewed and updated as necessary, and are accurate for the admission assessment period.     Assessing/Reviewing RN: Sowmya Martinez RN 1/11/2023 at 8:48 PM      Assessing/Reviewing RN:

## 2023-01-12 NOTE — PROGRESS NOTES
[facility]   Phone: [phone]    Physical Therapy    [x] Initial Evaluation            [] Daily Treatment Note         [] Discharge Summary      Patient: Vangie Dowling   : 1950   MRN: [id number]   Date of Service:  2023  Admitting Diagnosis: Multiple fractures  Current Admission Summary: 73-year-old female with a history of hyperlipidemia and bladder cancer who was admitted to [facility] on  with right arm and leg pain after a fall from a ladder. She reports she was on the last step and thought she was on the ground and fell 1 step to the ground. Work-up revealed a right radius fracture and a right tibial fracture. CT of the knee revealed an intra-articular tibial fracture involving the spine. Ortho evaluated and suggested nonweightbearing nonoperative management with a knee immobilizer on the right lower extremity. Her hospital course was complicated by urinary retention requiring catheterization. Her catheter was removed prior to transfer to this facility. She was evaluated by therapy and suggested to continue in an inpatient setting prior to returning home. She reports her pain remains significant. She does remark about some dysuria this morning and a urinalysis was positive for UTI. Culture is pending. She also states that she is not a full code. She is a DNR CC. Past Medical History:  has a past medical history of Arthritis, Cancer ([address] and Hyperlipidemia. Past Surgical History:  has a past surgical history that includes Abdomen surgery; Colonoscopy; fracture surgery; Hysterectomy; Tonsillectomy; Breast reduction surgery (); eye surgery (); and Cystoscopy (N/A, 2021).   Discharge Recommendations: Home with assist PRN, Home Health PT  DME Required For Discharge: DME to be determined pending patient progress  Precautions/Restrictions: weight bearing  Weight Bearing Restrictions: non weight bearing  [x] Right Upper Extremity  [] Left Upper Extremity [x] Right Lower Extremity  [] Left Lower Extremity  ** (R) UE cleared for platform walker use **     Required Braces/Orthotics: knee immobilizer, splint to (R) distal UE   [x] Right  [] Left  Positional Restrictions: knee immobilizer on at all times    Pre-Admission Information   Lives With: spouse    Type of Home: [facility]  Home Layout: one level  Home Access:  1 + 1 NORA with (R) grab bar  Bathroom Layout: walk in shower  Bathroom Equipment:  owns grab bars but not installed  Toilet Height: standard height  Home Equipment: no prior equipment  Transfer Assistance: Independent without use of device  Ambulation Assistance:Independent without use of device  ADL Assistance: independent with all ADL's  IADL Assistance: requires assistance with all homemaking tasks  Active :        [x] Yes  [] No  Hand Dominance: [] Left  [x] Right  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  Immobilizer repositioned to begin session. Sensation:   denies numbness and tingling  Tone:   Normotonic  ROM:   (L) Hip: WFL     (R) Hip: WFL  (L) Knee: WFL     (R) Knee: N/T secondary to restrictions  (L) Ankle: WFL     (R) Ankle: WFL  Strength:   Formal MMT held secondary to patient pain and discomfort in seated position. Tolerates good weight acceptance through (L) LE. (R) LE NWB.   Therapist Clinical Decision Making (Complexity): medium complexity  Clinical Presentation: evolving      Subjective  General: Patient supine in bed upon arrival, agreeable to therapy session. Pain: 7/10. Location: pain at rest in (R) knee/arm . Reporting significant (R) hip pain once mobility begins with inability to reduce pain in seated position despite repositioning attemtps. Pain Interventions: pain medication in place prior to arrival, RN notified at conclusion of session for request for next dose of medication. Functional Mobility  Bed Mobility  Supine to Sit: minimal assistance  Sit to Supine: minimal assistance  Rolling Left: minimal assistance  Rolling Right: minimal assistance  Scooting: contact guard assistance  Comments: All bed mobility completed on flat bed without use of HR. Transfers  Sit to stand transfer: 2 person assistance with mod (A) of 2   Stand to sit transfer: 2 person assistance with mod (A) of 2   Stand pivot transfer: 2 person assistance with mod (A) of 2 including completion from bed <=> w/c <=> shower seat surface. Comments: SPT completed without device. VC for sequence and hand placement during all transfers. Improved stability with fixed bar support in shower. Ambulation  Ambulation not tested on this date secondary to extensive assist to maintain standing and current multi-extremity NWB status. Distance: N/A  Gait Mechanics: N/A  Comments:    Stair Mobility  Stair mobility not completed on this date.   Comments:  Wheelchair Mobility (completed in second session):   Chair: manual  Surface: level surface  Method: (L) UE and (L) LE  Distance: 175 ft  Assistance: stand by assistance  Comments: VC for propulsion and maneuverability techniques  Balance  Static Sitting Balance: fair (+): maintains balance at SBA/supervision without use of UE support  Dynamic Sitting Balance: fair (+): maintains balance at SBA/supervision without use of UE support  Static Standing Balance: poor: requires mod (A) to maintain balance  Dynamic Standing Balance: poor (-): requires max (A) to maintain balance  Comments:  Unable to safely maintain standing to attempt object retrieval from floor. Other Therapeutic Interventions   1st Session:    Shower completed with OT team member to maximize patient performance and maintain patient safety. Pt requires mod (A) of 2 to complete SPT transfer from w/c <=> shower seat surface. Pt requires SBA to maintain dynamic sitting balance during shower completion in addition to OT assist with ADL task completion. Mod (A) of 1 with use of horizontal grab bar to maintain static stance once position achieved in shower to allow dependent management of LB clothing. Seated grooming completed in w/c at mirror led by OT member. 2nd Session:    Pt supine in bed upon arrival, reporting similar pain to first session including ongoing significant (R) hip pain and complaints of pain associated to (R) knee immobilizer pressure. Supine => sit transfer completed at min (A). Stand pivot transfers completed bed => w/c =>recliner within session at mod (A) of 2. Manual w/c propulsion completed as documented above. Seated oral hygiene completed at sink led by OT with balance maintained at SBA. Patient provided new (R) knee immobilizer secondary to ongoing complaint of pain and pressure with initial brace. Patient reports significant improvement with new immobilizer. RN notified of change and recommended transfer technique (2 person stand pivot to w/c). Pt remains up in recliner with chair alarm and call light within reach. No new complaints following session.      Functional Outcomes                 Cognition  WFL  Orientation:    alert and oriented x 4  Command Following:   [facility]    Education  Barriers To Learning: none  Patient Education: patient educated on goals, PT role and benefits, plan of care, precautions, weight-bearing education, functional mobility training, transfer training  Learning Assessment:  patient verbalizes and demonstrates understanding    Assessment  Activity Tolerance: Hip pain limits prolonged seated positioning within evaluation despite attempts to reposition  Impairments Requiring Therapeutic Intervention: decreased functional mobility, decreased ADL status, decreased ROM, decreased strength, decreased safety awareness, decreased endurance, decreased balance, increased pain  Prognosis: good  Clinical Assessment: Patient presenting with significant functional decline following a fall from ladder resulting in fractures and NWB status in both (R) UE and LE. Patient currently requires extensive assist of 2 for transfers and is currently non-ambulatory with significant pain in out of bed positioning. Patient was prior independent in home and community without use of assistive device. Safety Interventions: patient left in bed, bed alarm in place, call light within reach, gait belt, and nurse notified    Plan  Frequency: 5 x/week, 90 min/day  Current Treatment Recommendations: strengthening, balance training, functional mobility training, transfer training, gait training, stair training, endurance training, neuromuscular re-education, wheelchair mobility training, manual therapy - soft tissue massage, modalities, patient/caregiver education, ADL/self-care training, pain management, safety education, and equipment evaluation/education    Goals  Patient Goals:  To return home   Short Term Goals:  Time Frame: 3 weeks  Patient will complete bed mobility at modified independent   Patient will complete transfers at [facility]   Patient will ambulate 50 ft with use of platform walker (R) at modified independent  Patient will ascend/descend 1+1 stairs with (R) ascending handrail at stand by assistance  Patient will complete manual w/c propulsion 150 ft at [facility]    Therapy Session Time      Individual Group Co-treatment   Time In     0830   Time Out     0938   Minutes     68     Timed Code Treatment Minutes:  48 Minutes    Second Session Therapy Time:   Individual Concurrent Group Co-treatment   Time In 1245   Time Out       1320   Minutes       35     Timed Code Treatment Minutes:  88 minutes    Total Treatment Minutes:  103 Minutes       Electronically Signed By: Daniel Nguyen PT

## 2023-01-13 ENCOUNTER — APPOINTMENT (OUTPATIENT)
Dept: GENERAL RADIOLOGY | Age: 73
DRG: 563 | End: 2023-01-13
Attending: PHYSICAL MEDICINE & REHABILITATION
Payer: MEDICARE

## 2023-01-13 PROCEDURE — 97530 THERAPEUTIC ACTIVITIES: CPT

## 2023-01-13 PROCEDURE — 2500000003 HC RX 250 WO HCPCS: Performed by: PHYSICAL MEDICINE & REHABILITATION

## 2023-01-13 PROCEDURE — 97535 SELF CARE MNGMENT TRAINING: CPT

## 2023-01-13 PROCEDURE — 73502 X-RAY EXAM HIP UNI 2-3 VIEWS: CPT

## 2023-01-13 PROCEDURE — 97116 GAIT TRAINING THERAPY: CPT

## 2023-01-13 PROCEDURE — 6360000002 HC RX W HCPCS: Performed by: PHYSICAL MEDICINE & REHABILITATION

## 2023-01-13 PROCEDURE — 6370000000 HC RX 637 (ALT 250 FOR IP): Performed by: PHYSICAL MEDICINE & REHABILITATION

## 2023-01-13 PROCEDURE — 1280000000 HC REHAB R&B

## 2023-01-13 RX ORDER — ESCITALOPRAM OXALATE 10 MG/1
10 TABLET ORAL NIGHTLY
Status: DISCONTINUED | OUTPATIENT
Start: 2023-01-13 | End: 2023-01-19 | Stop reason: HOSPADM

## 2023-01-13 RX ADMIN — ESCITALOPRAM OXALATE 10 MG: 10 TABLET ORAL at 21:56

## 2023-01-13 RX ADMIN — OXYCODONE 10 MG: 5 TABLET ORAL at 21:55

## 2023-01-13 RX ADMIN — OXYCODONE 10 MG: 5 TABLET ORAL at 12:12

## 2023-01-13 RX ADMIN — DICLOFENAC SODIUM 4 G: 10 GEL TOPICAL at 22:03

## 2023-01-13 RX ADMIN — METHOCARBAMOL TABLETS 750 MG: 750 TABLET, COATED ORAL at 17:05

## 2023-01-13 RX ADMIN — CIPROFLOXACIN 500 MG: 500 TABLET, FILM COATED ORAL at 09:11

## 2023-01-13 RX ADMIN — METHOCARBAMOL TABLETS 750 MG: 750 TABLET, COATED ORAL at 09:11

## 2023-01-13 RX ADMIN — OXYCODONE HYDROCHLORIDE 5 MG: 5 TABLET ORAL at 02:55

## 2023-01-13 RX ADMIN — BUPROPION HYDROCHLORIDE 300 MG: 150 TABLET, EXTENDED RELEASE ORAL at 09:12

## 2023-01-13 RX ADMIN — Medication 500 MG: at 09:17

## 2023-01-13 RX ADMIN — GABAPENTIN 200 MG: 100 CAPSULE ORAL at 21:56

## 2023-01-13 RX ADMIN — METHOCARBAMOL TABLETS 750 MG: 750 TABLET, COATED ORAL at 12:12

## 2023-01-13 RX ADMIN — ACETAMINOPHEN 650 MG: 325 TABLET ORAL at 05:08

## 2023-01-13 RX ADMIN — GABAPENTIN 200 MG: 100 CAPSULE ORAL at 14:44

## 2023-01-13 RX ADMIN — ARMODAFINIL 250 MG: 250 TABLET ORAL at 09:30

## 2023-01-13 RX ADMIN — Medication 324 MG: at 09:13

## 2023-01-13 RX ADMIN — DIPHENHYDRAMINE HYDROCHLORIDE 5000 MCG: 25 TABLET ORAL at 17:09

## 2023-01-13 RX ADMIN — STANDARDIZED SENNA CONCENTRATE AND DOCUSATE SODIUM 2 TABLET: 8.6; 5 TABLET ORAL at 21:55

## 2023-01-13 RX ADMIN — OXYCODONE 10 MG: 5 TABLET ORAL at 17:05

## 2023-01-13 RX ADMIN — ENOXAPARIN SODIUM 40 MG: 100 INJECTION SUBCUTANEOUS at 09:11

## 2023-01-13 RX ADMIN — METHOCARBAMOL TABLETS 750 MG: 750 TABLET, COATED ORAL at 21:56

## 2023-01-13 RX ADMIN — CALCIUM ACETATE 1334 MG: 667 CAPSULE ORAL at 17:05

## 2023-01-13 RX ADMIN — CALCIUM ACETATE 1334 MG: 667 CAPSULE ORAL at 09:12

## 2023-01-13 RX ADMIN — OXYCODONE 10 MG: 5 TABLET ORAL at 07:12

## 2023-01-13 RX ADMIN — ASPIRIN 81 MG: 81 TABLET, CHEWABLE ORAL at 09:13

## 2023-01-13 RX ADMIN — ATORVASTATIN CALCIUM 40 MG: 40 TABLET, FILM COATED ORAL at 09:13

## 2023-01-13 RX ADMIN — TAMSULOSIN HYDROCHLORIDE 0.4 MG: 0.4 CAPSULE ORAL at 09:12

## 2023-01-13 RX ADMIN — CIPROFLOXACIN 500 MG: 500 TABLET, FILM COATED ORAL at 21:56

## 2023-01-13 RX ADMIN — STANDARDIZED SENNA CONCENTRATE AND DOCUSATE SODIUM 2 TABLET: 8.6; 5 TABLET ORAL at 09:12

## 2023-01-13 RX ADMIN — DICLOFENAC SODIUM 4 G: 10 GEL TOPICAL at 09:16

## 2023-01-13 RX ADMIN — TRAZODONE HYDROCHLORIDE 100 MG: 50 TABLET ORAL at 21:56

## 2023-01-13 RX ADMIN — GABAPENTIN 200 MG: 100 CAPSULE ORAL at 09:12

## 2023-01-13 ASSESSMENT — PAIN DESCRIPTION - ORIENTATION
ORIENTATION: RIGHT

## 2023-01-13 ASSESSMENT — PAIN DESCRIPTION - PAIN TYPE: TYPE: ACUTE PAIN

## 2023-01-13 ASSESSMENT — PAIN SCALES - GENERAL
PAINLEVEL_OUTOF10: 7
PAINLEVEL_OUTOF10: 5
PAINLEVEL_OUTOF10: 8
PAINLEVEL_OUTOF10: 4
PAINLEVEL_OUTOF10: 7
PAINLEVEL_OUTOF10: 8
PAINLEVEL_OUTOF10: 5
PAINLEVEL_OUTOF10: 0
PAINLEVEL_OUTOF10: 4
PAINLEVEL_OUTOF10: 8
PAINLEVEL_OUTOF10: 6

## 2023-01-13 ASSESSMENT — PAIN - FUNCTIONAL ASSESSMENT
PAIN_FUNCTIONAL_ASSESSMENT: ACTIVITIES ARE NOT PREVENTED

## 2023-01-13 ASSESSMENT — PAIN DESCRIPTION - LOCATION
LOCATION: LEG;HIP
LOCATION: LEG;HIP;KNEE
LOCATION: LEG;HIP
LOCATION: LEG
LOCATION: LEG
LOCATION: HIP;LEG
LOCATION: KNEE
LOCATION: LEG

## 2023-01-13 ASSESSMENT — PAIN DESCRIPTION - DESCRIPTORS
DESCRIPTORS: THROBBING
DESCRIPTORS: SHARP
DESCRIPTORS: ACHING
DESCRIPTORS: ACHING;SHARP
DESCRIPTORS: ACHING;SORE
DESCRIPTORS: ACHING
DESCRIPTORS: ACHING;THROBBING;SORE
DESCRIPTORS: THROBBING

## 2023-01-13 ASSESSMENT — PAIN SCALES - WONG BAKER
WONGBAKER_NUMERICALRESPONSE: 0

## 2023-01-13 ASSESSMENT — PAIN DESCRIPTION - ONSET: ONSET: ON-GOING

## 2023-01-13 ASSESSMENT — PAIN DESCRIPTION - FREQUENCY: FREQUENCY: CONTINUOUS

## 2023-01-13 NOTE — PROGRESS NOTES
Nick Georgia  1/13/2023  [id number]    Chief Complaint: Multiple fractures    Subjective:   No overnight events. Increased right hip pain. Worried she broke it. No imaging from prior facility on the hip. ROS: No CP, SOB, dyspnea    Objective:  Patient Vitals for the past 24 hrs:   BP Temp Temp src Pulse Resp SpO2   01/13/23 0900 125/65 98.1 °F (36.7 °C) Oral 84 18 94 %   01/12/23 2300 -- -- -- -- 16 --   01/12/23 2145 126/69 98.7 °F (37.1 °C) Oral 79 16 95 %     Gen: No distress, pleasant. Resting in bed  HEENT: Normocephalic, atraumatic. CV: Regular rate and rhythm. No MRG   Resp: No respiratory distress. CTAB   Abd: Soft, nontender nondistended  Ext: RLE edema, RUE in splint  Neuro: Alert, oriented, appropriately interactive. Laboratory data: Available via EMR. Therapy progress:    PT    Supine to Sit: Partial/moderate assistance  Sit to Supine: Partial/moderate assistance   Sit to Stand: Dependent  Chair/Bed to Chair Transfer: Dependent  Car Transfer:    Ambulation 10 ft:    Ambulation 50 ft:    Ambulation 150 ft:    Stairs - 1 Step:    Stairs - 4 Step:    Stairs - 12 Step:      OT    Eating: Setup or clean-up assistance  Oral Hygiene: Setup or clean-up assistance  Bathing: Dependent  Upper Body Dressing: Supervision or touching assistance  Lower Body Dressing: Dependent  Toilet Transfer: Dependent  Toilet Hygiene: Dependent    Speech Therapy         Body mass index is 26.51 kg/m². Assessment:  Patient Active Problem List   Diagnosis    Clot retention of urine    Multiple fractures       Plan:   Multiple fractures: Nonweightbearing RUE/RLE. Pain control. PT/OT. Patient is requesting an orthopedist closer to home. UTI: Started Cipro, Cx pending     HLD: Lipitor 40     Depression: Lexapro 5 -> 10 HS, Wellbutrin 300     Hx bladder cancer: Flomax    R hip pain: XR without fracture.       Bowels: Per protocol  Bladder: Per protocol   Sleep: Trazodone 100 scheduled  Pain: Tylenol as needed, gabapentin 200 3 times daily, Robaxin-750 4 times daily, [place] as needed  DVT PPx: Lovenox  LEE: PEDRO Newman MD 1/13/2023, 2:43 PM    * This document was created using dictation software. While all precautions were taken to ensure accuracy, errors may have occurred. Please disregard any typographical errors.

## 2023-01-13 NOTE — PROGRESS NOTES
[facility] Department   Phone: [phone]    Physical Therapy    [x] Initial Evaluation            [] Daily Treatment Note         [] Discharge Summary      Patient: Tiffanie Hansen   : 1950   MRN: [id number]   Date of Service:  2023  Admitting Diagnosis: Multiple fractures  Current Admission Summary: 78-year-old female with a history of hyperlipidemia and bladder cancer who was admitted to [facility] on  with right arm and leg pain after a fall from a ladder. She reports she was on the last step and thought she was on the ground and fell 1 step to the ground. Work-up revealed a right radius fracture and a right tibial fracture. CT of the knee revealed an intra-articular tibial fracture involving the spine. Ortho evaluated and suggested nonweightbearing nonoperative management with a knee immobilizer on the right lower extremity. Her hospital course was complicated by urinary retention requiring catheterization. Her catheter was removed prior to transfer to this facility. She was evaluated by therapy and suggested to continue in an inpatient setting prior to returning home. She reports her pain remains significant. She does remark about some dysuria this morning and a urinalysis was positive for UTI. Culture is pending. She also states that she is not a full code. She is a DNR CC. Past Medical History:  has a past medical history of Arthritis, Cancer ([address] and Hyperlipidemia. Past Surgical History:  has a past surgical history that includes Abdomen surgery; Colonoscopy; fracture surgery; Hysterectomy; Tonsillectomy; Breast reduction surgery (); eye surgery (); and Cystoscopy (N/A, 2021).   Discharge Recommendations: Home with assist PRN, Home Health PT  DME Required For Discharge: DME to be determined pending patient progress  Precautions/Restrictions: weight bearing  Weight Bearing Restrictions: non weight bearing  [x] Right Upper Extremity  [] Left Upper Extremity [x] Right Lower Extremity  [] Left Lower Extremity  ** (R) UE cleared for platform walker use **     Required Braces/Orthotics: knee immobilizer, splint to (R) distal UE   [x] Right  [] Left  Positional Restrictions: knee immobilizer on at all times    Pre-Admission Information   Lives With: spouse    Type of Home: [facility]  Home Layout: one level  Home Access:  1 + 1 NORA with (R) grab bar  Bathroom Layout: walk in shower  Bathroom Equipment:  owns grab bars but not installed  Toilet Height: standard height  Home Equipment: no prior equipment  Transfer Assistance: Independent without use of device  Ambulation Assistance:Independent without use of device  ADL Assistance: independent with all ADL's  IADL Assistance: requires assistance with all homemaking tasks  Active :        [x] Yes  [] No  Hand Dominance: [] Left  [x] Right  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 Pain: 5/10. Location: (R) UE/knee at beginning of session. Reporting mild (R) hip pain within session but improved compared to yesterday. Pain Interventions: pain medication in place prior to arrival       Functional Mobility  Bed Mobility  Supine to Sit: minimal assistance  Sit to Supine: minimal assistance  Comments:  HOB partially elevated with use of HR. Transfers  Sit to stand transfer: moderate assistance, (modA of 1 without device.   Requires modA of 2 for sit <=> stand to platform RW)  Stand to sit transfer: moderate assistance  Stand pivot transfer: moderate assistance, (completions from bed <=> w/c without device and at [place] bar for transfer training with horizontal fixed bar support)  Toilet transfer: 2 person assistance with mod (A) of 1 + min (A) of 1   Comments: VC for sequence and hand placement during all transfers. Improved stability with fixed bar support. Ambulation  Ambulation not tested on this date secondary to difficulty maintaining standing. Distance: N/A  Gait Mechanics: N/A  Comments:    Stair Mobility  Stair mobility not completed on this date. Comments:  Wheelchair Mobility   Chair: manual  Surface: level surface  Method: (L) UE and (L) LE  Distance: 75 ft  Assistance: stand by assistance  Comments: VC for doorway management  Balance  Static Sitting Balance: fair (+): maintains balance at SBA/supervision without use of UE support  Dynamic Sitting Balance: fair (+): maintains balance at SBA/supervision without use of UE support  Static Standing Balance: poor (+): requires min (A) to maintain balance  Dynamic Standing Balance: poor (-): requires max (A) to maintain balance  Comments:      Other Therapeutic Interventions   1st Session:    See above functional mobility. All surface to surface transfers completed without use of assistive device. In addition patient completes transfer training from w/c <=> (R) platform RW at mod (A) of 2.     2nd Session:    Pt supine in bed upon arrival, reporting 5/10 (R) LE/UE pain, agreeable to PT session. Patient had x-ray completed between therapy sessions, negative for acute pathology. Supine => sit transfer completed at min (A) with assist for LE management. Stand pivot transfer bed => w/c without device at [address]. Manual w/c propulsion completed with (L) LE/UE 60 ft x 2 completions at SBA. Sit <=> stand transfers progressing from mod(A) of 1 + CGA to mod (A) of 1 within session with 4 completions within session.   Patient ambulates 2 ft + 5 ft  + 5 ft with (R) platform RW at mod (A) of 1 + CGA and w/c folllow. Stand step transfer completed w/c => recliner with platform RW at mod (A) of 1 + CGA. Patient appropriately maintains restricted WB status throughout session. Upon return to room, pt remains up in recliner with chair alarm and call light within reach. No new complaints following session. Functional Outcomes                 Cognition  WFL  Orientation:    alert and oriented x 4  Command Following:   [facility]    Education  Barriers To Learning: none  Patient Education: patient educated on goals, PT role and benefits, plan of care, precautions, weight-bearing education, functional mobility training, transfer training  Learning Assessment:  patient verbalizes and demonstrates understanding    Assessment  Activity Tolerance: Ongoing  Impairments Requiring Therapeutic Intervention: decreased functional mobility, decreased ADL status, decreased ROM, decreased strength, decreased safety awareness, decreased endurance, decreased balance, increased pain  Prognosis: good  Clinical Assessment: Patient presenting with significant improvement in all areas of functional mobility including improved ability to achieve standing and complete surface to surface transfers. Standing with (R) platform rolling walker initiated on this date including use for transfers and short distance ambulation. Despite improvement patient continues to require extensive assist of 1-2 caregivers for completion of all household based mobility. Patient was prior independent in home and community without use of assistive device.   Safety Interventions: patient left in bed, bed alarm in place, call light within reach, gait belt, and nurse notified    Plan  Frequency: 5 x/week, 90 min/day  Current Treatment Recommendations: strengthening, balance training, functional mobility training, transfer training, gait training, stair training, endurance training, neuromuscular re-education, wheelchair mobility training, manual therapy - soft tissue massage, modalities, patient/caregiver education, ADL/self-care training, pain management, safety education, and equipment evaluation/education    Goals  Patient Goals:  To return home   Short Term Goals:  Time Frame: 3 weeks  Patient will complete bed mobility at modified independent   Patient will complete transfers at [facility]   Patient will ambulate 50 ft with use of platform walker (R) at modified independent  Patient will ascend/descend 1+1 stairs with (R) ascending handrail at stand by assistance  Patient will complete manual w/c propulsion 150 ft at [facility]    Therapy Session Time      Individual Group Co-treatment   Time In     0730   Time Out     0820   Minutes     50     Timed Code Treatment Minutes:  50 minutes    Second Session Therapy Time:   Individual Concurrent Group Co-treatment   Time In       1245   Time Out       1330   Minutes       45     Timed Code Treatment Minutes:  50 + 45 minutes    Total Treatment Minutes:  95  Minutes       Electronically Signed By: Kana Cline, PT

## 2023-01-13 NOTE — PROGRESS NOTES
[facility]   Phone: [phone]    Occupational Therapy    [] Initial Evaluation            [x] Daily Treatment Note         [] Discharge Summary      Patient: Radha Adames   : 1950   MRN: [id number]   Date of Service:  2023    Admitting Diagnosis:  Multiple fractures  Current Admission Summary: 78-year-old female with a history of hyperlipidemia and bladder cancer who was admitted to [facility] on  with right arm and leg pain after a fall from a ladder. She reports she was on the last step and thought she was on the ground and fell 1 step to the ground. Work-up revealed a right radius fracture and a right tibial fracture. CT of the knee revealed an intra-articular tibial fracture involving the spine. Ortho evaluated and suggested nonweightbearing nonoperative management with a knee immobilizer on the right lower extremity. Her hospital course was complicated by urinary retention requiring catheterization. Her catheter was removed prior to transfer to this facility. She was evaluated by therapy and suggested to continue in an inpatient setting prior to returning home. She reports her pain remains significant. She does remark about some dysuria this morning and a urinalysis was positive for UTI. Past Medical History:  has a past medical history of Arthritis, Cancer ([address] and Hyperlipidemia. Past Surgical History:  has a past surgical history that includes Abdomen surgery; Colonoscopy; fracture surgery; Hysterectomy; Tonsillectomy; Breast reduction surgery (); eye surgery (); and Cystoscopy (N/A, 2021).     Discharge Recommendations:  OT S3    DME Required For Discharge: DME to be determined pending patient progress    Precautions/Restrictions: high fall risk, weight bearing  Weight Bearing Restrictions: non weight bearing  [x] Right Upper Extremity  [] Left Upper Extremity [x] Right Lower Extremity  [] Left Lower Extremity     Required Braces/Orthotics: knee immobilizer, R UE cast/splint   [x] Right  [] Left  Positional Restrictions: R LE KI at all times      Pre-Admission Information   Lives With: spouse                  Type of Home: condo  Home Layout: one level  Home Access:  1 + 1 NORA with (R) grab bar  Bathroom Layout: walk in shower  Bathroom Equipment:  owns grab bars but not installed  Toilet Height: standard height  Home Equipment: no prior equipment  Transfer Assistance: Independent without use of device  Ambulation Assistance:Independent without use of device  ADL Assistance: independent with all ADL's  IADL Assistance: requires assistance with all homemaking tasks  Active :        [x] Yes                 [] No  Hand Dominance: [] Left                 [x] Right  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 Location: R LE, R hip pain initially but improving as session progressed  Pain Interventions: pain medication in place prior to arrival and repositioned         Activities of Daily Living  Basic Activities of Daily Living  Feeding: setup assistance  Grooming: setup assistance  Grooming Comments: oral care and combing hair seated in w/c  Lower Extremity Dressing: dependent Comment: donned shorts seated on commode, total assist to thread R LE, set-up to don L  Dressing Comments: assist of 2nd person required for donning clothing over hips with 1x for balance + 1x for clothing   Toileting: dependent.     Toileting Equipment: none  Toileting Comments: ryan care completed seated with set-up; TD for clothing over hips in stance with 2 person   General Comments: pt assisted to adjust KI at EOS; all other ADL needs declined   Instrumental Activities of Daily Living  No IADL completed on this date.    Functional Mobility  Bed Mobility  Supine to Sit: minimal assistance  Sit to Supine: minimal assistance  Comments:  Transfers  Sit to stand transfer:moderate assistance  Stand to sit transfer: moderate assistance  Stand pivot transfer: dependent assistance, ModA + [place] of 2  Toilet transfer: dependent assistance, ModA + [place] of 2  Toilet transfer equipment: standard toilet, grab bars, transfers from w/c  Toilet transfer comments: VC for hand placement  Comments: Pt demo'd good adherence to WB status throughout all transfers   Functional Mobility:  Sitting Balance: stand by assistance. Standing Balance: dependent assistance, variable- static stance ModA of 1, 2 person for dynamic for safety. Functional Mobility: .  stand by assistance  Functional Mobility Activity: room >> therapy gym  Functional Mobility Device Use: wheelchair  Functional Mobility Comment: pt propelled w/c with L UE and L LE, increased time, VC for technique when clearing doorway with leg rest in place     Other Therapeutic Interventions  Blocked sit<>stand and SPT training with anterior grab bar: ModA of 1 for all, VC for technique and sequence; Pt completed 1 stance to platform RW- ModA of 2 to achieve full stance. Second Session:  Pt in bed at entry, agreeable to cotx session, reporting 5/10 pain in R LE. Pt assisted to don L shoe bed level. Supine>sit with Lissett (assist to R LE). SPT EOB >> w/c with modA of 1. Pt completed w/c mobility ~60ft x2 at SBA. Mobility completed with platform RW: 2ft +5ft + 5ft- ModA + CGA of 2 + w/c follow for safety. Pt demo'd good WB adherence throughout. Stand step transfer w/c >> recliner with Mod + CGA of 2. 1 sit<>stand RW <> recliner at [address] [facility] of 1. Pt able to doffed shoe IND in recliner. Pt left in chair, chair alarm on, call light and needs in reach.          Cognition  WFL  Orientation:    alert and oriented x 4  Command Following:   [facility]     Education  Barriers To Learning: none  Patient Education: patient educated on goals, OT role and benefits, plan of care, precautions, transfer training, discharge recommendations  Learning Assessment:  patient verbalizes understanding, would benefit from continued reinforcement    Assessment  Activity Tolerance: Limited by pain; fair tolerance overall, rest breaks as needed, fatigue reported at EOS  Impairments Requiring Therapeutic Intervention: decreased functional mobility, decreased ADL status, decreased ROM, decreased strength, decreased endurance, decreased balance, decreased IADL, increased pain  Prognosis: good  Clinical Assessment: Pt presents with above deficits impacting her occupational performance. Pt is below her IND baseline level of function secondary to R wrist fx and R tibial fx with NWB status. Pt progressing well, able to complete complete SPT and sit<>stand with assist of 1. Able to complete short distance RW mobility with 2 person assist. Pt demo's good potential for BADL IND recommended introduction of AE for LB dressing within next few sessions. Ongoing cotx recommended in order to maximize pt safety during RW mobility. Pt participates well and demo's good motivation for recovery. Skilled OT services indicated to maximize her IND and safety with all occupational pursuits. Continue POC.       Safety Interventions: patient left in bed, bed alarm in place, call light within reach, gait belt, and patient at risk for falls    Plan  Frequency: 5 x/week, 90 min/day  Current Treatment Recommendations: strengthening, balance training, functional mobility training, transfer training, endurance training, patient/caregiver education, ADL/self-care training, IADL training, home management training, pain management, home exercise program, safety education, equipment evaluation/education, and positioning    Goals  Patient Goals: regain IND   Short Term Goals:  Time Frame: 2-3 weeks  Patient will complete lower body ADL at modified independent Patient will complete toileting at modified independent   Patient will complete grooming at modified independent   Patient will complete functional transfers at modified independent   Patient will complete functional mobility at modified independent   Patient to gather and transport IADL items at modified independent     Progressing towards goals: 1/13    Therapy Session Time     Individual Group Co-treatment   Time In   0730   Time Out   0820   Minutes   50    Second Session Therapy Time:   Individual Concurrent Group Co-treatment   Time In      1245   Time Out      1330   Minutes      45     Timed Code Treatment Minutes:  50 + 45    Total Treatment Minutes:  95        Electronically Signed By: Marko Clay, [address] [facility]

## 2023-01-13 NOTE — PLAN OF CARE
Problem: Discharge Planning  Goal: Discharge to home or other facility with appropriate resources  Outcome: Progressing  Flowsheets (Taken 1/12/2023 2145)  Discharge to home or other facility with appropriate resources: Identify barriers to discharge with patient and caregiver     Problem: Pain  Goal: Verbalizes/displays adequate comfort level or baseline comfort level  Outcome: Progressing  Flowsheets (Taken 1/12/2023 2145)  Verbalizes/displays adequate comfort level or baseline comfort level: Encourage patient to monitor pain and request assistance     Problem: Skin/Tissue Integrity  Goal: Absence of new skin breakdown  Description: 1. Monitor for areas of redness and/or skin breakdown  2. Assess vascular access sites hourly  3. Every 4-6 hours minimum:  Change oxygen saturation probe site  4. Every 4-6 hours:  If on nasal continuous positive airway pressure, respiratory therapy assess nares and determine need for appliance change or resting period.   Outcome: Progressing     Problem: Safety - Adult  Goal: Free from fall injury  Outcome: Progressing  Flowsheets (Taken 1/12/2023 2240)  Free From Fall Injury: Instruct family/caregiver on patient safety

## 2023-01-13 NOTE — PROGRESS NOTES
Shift assessment completed. See documentation. Patient reports pain in right hip and leg. She states she is concerned that she also may have fractured Right hip because any time she turns to side or gets up to chair with therapy she has intense sharp pain. Moderate pulses noted at right femoral, right pedal and right posttibial pulses. Toes have brisk blood return and extremity is warm to touch. Educated patient extensively on pain management. Including pharmacological and non pharmacological interventions. Assisted patient to reposition with Right leg elevated above heart. Right arm on pillow and ice to right hip. Patient need to be repositioned several times to find a comfortable position. Patient will need lots of encouragement and education for pain management. vss, schedule meds given, The care plan and education has been reviewed and mutually agreed upon with the patient. Pt remains free from falls. Fall precautions in place--bed in lowest position, call light within reach, bed alarm in use.  Pt aware to call for assistance before getting

## 2023-01-13 NOTE — CARE COORDINATION
Social Work Admission Assessment    Objective:  Met with pt to complete initial assessment and review role of  in rehab process. Pt oriented to unit. Pt states understanding of this. Current Home Situation:  Patient lives at home with her spouse. They reside in a [facility]. 1 NORA. Spouse cannot physically assist patinet. 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 Accessibility to community resources/transportation:  None./Family will provide transportation at discharge. Has pt experienced a recent loss or signigicant life event that would impact their care or ability to participate? _x_No  __Yes - Explain    Has pt ever been treated for emotional disorders? _x_No  __Yes--How does that affect current situation: Takes medication for anxiety. How does pt and family cope with stressful events and this hospitalization? Patient appears to be coping well with hospitalization. Special Problem Areas:  None    Discharge Plan: To Be determined with progress. Impression/Plan: Ron Valiente (patient )is a 71year old female that has been admitted to ARU. Provided patient with this SW's contact information to contact as needed. SW also informed patient about team conference to be held 1/17/2022 @ 11:00 AM. Will continue to follow for support and discharge planning.       Electronically signed by BECKY Curiel on 1/13/2023 at 5:12 PM

## 2023-01-14 LAB
ORGANISM: ABNORMAL
URINE CULTURE, ROUTINE: ABNORMAL

## 2023-01-14 PROCEDURE — 94760 N-INVAS EAR/PLS OXIMETRY 1: CPT

## 2023-01-14 PROCEDURE — 97530 THERAPEUTIC ACTIVITIES: CPT

## 2023-01-14 PROCEDURE — 6370000000 HC RX 637 (ALT 250 FOR IP): Performed by: PHYSICAL MEDICINE & REHABILITATION

## 2023-01-14 PROCEDURE — 97535 SELF CARE MNGMENT TRAINING: CPT

## 2023-01-14 PROCEDURE — 2500000003 HC RX 250 WO HCPCS: Performed by: PHYSICAL MEDICINE & REHABILITATION

## 2023-01-14 PROCEDURE — 6360000002 HC RX W HCPCS: Performed by: PHYSICAL MEDICINE & REHABILITATION

## 2023-01-14 PROCEDURE — 1280000000 HC REHAB R&B

## 2023-01-14 RX ADMIN — OXYCODONE 10 MG: 5 TABLET ORAL at 02:00

## 2023-01-14 RX ADMIN — CALCIUM ACETATE 1334 MG: 667 CAPSULE ORAL at 09:16

## 2023-01-14 RX ADMIN — METHOCARBAMOL TABLETS 750 MG: 750 TABLET, COATED ORAL at 23:27

## 2023-01-14 RX ADMIN — ENOXAPARIN SODIUM 40 MG: 100 INJECTION SUBCUTANEOUS at 09:16

## 2023-01-14 RX ADMIN — ESCITALOPRAM OXALATE 10 MG: 10 TABLET ORAL at 22:46

## 2023-01-14 RX ADMIN — ASPIRIN 81 MG: 81 TABLET, CHEWABLE ORAL at 09:16

## 2023-01-14 RX ADMIN — GABAPENTIN 200 MG: 100 CAPSULE ORAL at 15:16

## 2023-01-14 RX ADMIN — METHOCARBAMOL TABLETS 750 MG: 750 TABLET, COATED ORAL at 19:36

## 2023-01-14 RX ADMIN — OXYCODONE 10 MG: 5 TABLET ORAL at 15:19

## 2023-01-14 RX ADMIN — ATORVASTATIN CALCIUM 40 MG: 40 TABLET, FILM COATED ORAL at 09:17

## 2023-01-14 RX ADMIN — DICLOFENAC SODIUM 4 G: 10 GEL TOPICAL at 15:20

## 2023-01-14 RX ADMIN — METHOCARBAMOL TABLETS 750 MG: 750 TABLET, COATED ORAL at 15:17

## 2023-01-14 RX ADMIN — DICLOFENAC SODIUM 4 G: 10 GEL TOPICAL at 22:45

## 2023-01-14 RX ADMIN — TRAZODONE HYDROCHLORIDE 100 MG: 50 TABLET ORAL at 22:46

## 2023-01-14 RX ADMIN — CIPROFLOXACIN 500 MG: 500 TABLET, FILM COATED ORAL at 09:16

## 2023-01-14 RX ADMIN — OXYCODONE 10 MG: 5 TABLET ORAL at 19:21

## 2023-01-14 RX ADMIN — METHOCARBAMOL TABLETS 750 MG: 750 TABLET, COATED ORAL at 07:04

## 2023-01-14 RX ADMIN — BUPROPION HYDROCHLORIDE 300 MG: 150 TABLET, EXTENDED RELEASE ORAL at 09:16

## 2023-01-14 RX ADMIN — GABAPENTIN 200 MG: 100 CAPSULE ORAL at 22:45

## 2023-01-14 RX ADMIN — Medication 324 MG: at 10:55

## 2023-01-14 RX ADMIN — GABAPENTIN 200 MG: 100 CAPSULE ORAL at 09:16

## 2023-01-14 RX ADMIN — OXYCODONE 10 MG: 5 TABLET ORAL at 23:27

## 2023-01-14 RX ADMIN — Medication 500 MG: at 10:57

## 2023-01-14 RX ADMIN — ARMODAFINIL 250 MG: 250 TABLET ORAL at 10:54

## 2023-01-14 RX ADMIN — OXYCODONE 10 MG: 5 TABLET ORAL at 10:55

## 2023-01-14 RX ADMIN — STANDARDIZED SENNA CONCENTRATE AND DOCUSATE SODIUM 2 TABLET: 8.6; 5 TABLET ORAL at 22:46

## 2023-01-14 RX ADMIN — OXYCODONE 10 MG: 5 TABLET ORAL at 06:23

## 2023-01-14 RX ADMIN — TAMSULOSIN HYDROCHLORIDE 0.4 MG: 0.4 CAPSULE ORAL at 09:16

## 2023-01-14 RX ADMIN — CIPROFLOXACIN 500 MG: 500 TABLET, FILM COATED ORAL at 22:45

## 2023-01-14 ASSESSMENT — PAIN DESCRIPTION - ORIENTATION
ORIENTATION: RIGHT

## 2023-01-14 ASSESSMENT — PAIN - FUNCTIONAL ASSESSMENT
PAIN_FUNCTIONAL_ASSESSMENT: PREVENTS OR INTERFERES SOME ACTIVE ACTIVITIES AND ADLS
PAIN_FUNCTIONAL_ASSESSMENT: ACTIVITIES ARE NOT PREVENTED
PAIN_FUNCTIONAL_ASSESSMENT: ACTIVITIES ARE NOT PREVENTED

## 2023-01-14 ASSESSMENT — PAIN SCALES - GENERAL
PAINLEVEL_OUTOF10: 7
PAINLEVEL_OUTOF10: 6
PAINLEVEL_OUTOF10: 0
PAINLEVEL_OUTOF10: 6
PAINLEVEL_OUTOF10: 8

## 2023-01-14 ASSESSMENT — PAIN DESCRIPTION - LOCATION
LOCATION: LEG
LOCATION: KNEE
LOCATION: ARM;LEG
LOCATION: KNEE
LOCATION: KNEE

## 2023-01-14 ASSESSMENT — PAIN SCALES - WONG BAKER
WONGBAKER_NUMERICALRESPONSE: 0

## 2023-01-14 ASSESSMENT — PAIN DESCRIPTION - DESCRIPTORS
DESCRIPTORS: THROBBING;SORE;ACHING
DESCRIPTORS: ACHING;THROBBING;SORE
DESCRIPTORS: ACHING;THROBBING;SORE
DESCRIPTORS: THROBBING
DESCRIPTORS: ACHING

## 2023-01-14 NOTE — PROGRESS NOTES
Natalie [facility]aud[facility]ry [address]   Phone: [phone]    Occupational Therapy    [] Initial Evaluation            [x] Daily Treatment Note         [] Discharge Summary      Patient: Flavio Serrano   : 1950   MRN: [id number]   Date of Service:  2023    Admitting Diagnosis:  Multiple fractures  Current Admission Summary: 81-year-old female with a history of hyperlipidemia and bladder cancer who was admitted to [facility] on  with right arm and leg pain after a fall from a ladder. She reports she was on the last step and thought she was on the ground and fell 1 step to the ground. Work-up revealed a right radius fracture and a right tibial fracture. CT of the knee revealed an intra-articular tibial fracture involving the spine. Ortho evaluated and suggested nonweightbearing nonoperative management with a knee immobilizer on the right lower extremity. Her hospital course was complicated by urinary retention requiring catheterization. Her catheter was removed prior to transfer to this facility. She was evaluated by therapy and suggested to continue in an inpatient setting prior to returning home. She reports her pain remains significant. She does remark about some dysuria this morning and a urinalysis was positive for UTI. Past Medical History:  has a past medical history of Arthritis, Cancer ([address] and Hyperlipidemia. Past Surgical History:  has a past surgical history that includes Abdomen surgery; Colonoscopy; fracture surgery; Hysterectomy; Tonsillectomy; Breast reduction surgery (); eye surgery (); and Cystoscopy (N/A, 2021).     Discharge Recommendations:  OT S3    DME Required For Discharge: DME to be determined pending patient progress    Precautions/Restrictions: high fall risk, weight bearing  Weight Bearing Restrictions: non weight bearing  [x] Right Upper Extremity  [] Left Upper Extremity [x] Right Lower Extremity  [] Left Lower Extremity     Required Braces/Orthotics: knee immobilizer, R UE cast/splint   [x] Right  [] Left  Positional Restrictions: R LE KI at all times      Pre-Admission Information   Lives With: spouse                  Type of Home: condo  Home Layout: one level  Home Access:  1 + 1 NORA with (R) grab bar  Bathroom Layout: walk in shower  Bathroom Equipment:  owns grab bars but not installed  Toilet Height: standard height  Home Equipment: no prior equipment  Transfer Assistance: Independent without use of device  Ambulation Assistance:Independent without use of device  ADL Assistance: independent with all ADL's  IADL Assistance: requires assistance with all homemaking tasks  Active :        [x] Yes                 [] No  Hand Dominance: [] Left                 [x] Right  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 Pt very verbose throughout session, anxious with transitional movement, increased time required for emotional support.    Pain: Patient does not rate upon questioning   Pain Interventions: pain medication in place prior to arrival and repositioned         Activities of Daily Living  Basic Activities of Daily Living  Feeding: setup assistance  Grooming: setup assistance  Grooming Comments: facewash seated in room chair  Upper Extremity Bathing: minimal assistance Comment: assist for L underarm  Lower Extremity Bathing: minimal assistance Comment: assist for R foot    Bathing Comments: Pt declined full shower, request warm wipe sponge bath seated in room chair  Upper Extremity Dressing: setup assistance Comment: doff/mary T-shirt  Lower Extremity Dressing: minimal assistance maximum assistance Comment: donned pants seated in room chair, pt required assist to thread R LE only  Dressing Comments: assist of 2nd person required for donning clothing over hips with 1x for balance + 1x for clothing   General Comments: pt assisted to adjust KI at [place]; all other ADL needs declined     Instrumental Activities of Daily Living  No IADL completed on this date. Functional Mobility  Bed Mobility  Supine to Sit: minimal assistance  Sit to Supine: minimal assistance  Rolling Left: contact guard assistance, with VC for sequencing  Scootin person assistance with MaxA of 2 , to [facility]  Comments: Assist with R LE sup >< sit  Transfers  Sit to stand transfer:minimal assistance, moderate assistance, Mod2 from EOB, Min2 from room chair  Stand to sit transfer: minimal assistance, moderate assistance  Stand pivot transfer: moderate assistance, ModA of 2 with VC for hand placement and sequencing  Comments: Pt demo'd good adherence to WB status throughout all transfers   Functional Mobility:  Sitting Balance: stand by assistance. Standing Balance: moderate assistance. Functional Mobility Comment: pt stand pivot transfer EOB><Room chair only this date    Other Therapeutic Interventions   Pt requested to practice bed mobility; anticipating relief from pain and better sleep with side-lying position. Pt completed rolling with CGA with VC for sequencing and increased time, pillows provided for support and pt satisfied. Second Session    Pt semi-fowlers in bed upon arrival, denies pn, medication in place prior to arrival, requesting to use BR commode. Pt sup>sit with SBA, Sit>stand with platform walker Min+ ModA, stand-pivot to w/c and stand>sit with Min+ModA. Pt sit>stand with platform walker;  w/c >standard toilet with Min+ModA with use of grab bar, verbal and tactile cues for body positioning/safety. Pt Dep to doff brief/shorts in stance with platform walker secondary to requiring B UE support. Pt voided bladder with SUP and completed ryan-care with SBA.    Sit>stand Min + ModA without equipment, use of grab bar on (L) and therapist support of R UFRANCISCA. Dep to mary brief/shorts in stance, pt stand-pivot commode>w/c with Min+ModA. Pt completed oral hygiene and hair comb seated in w/c at BR sink at Setup, assist to open and place toothpaste. Pt transferred to room chair via w/c and squat-pivot to room chair with Min + CGA. Pt left in room chair, alarm on, call light and all needs met. RN notified of stand-pivot back to bed when ready. Cognition  WFL  Orientation:    alert and oriented x 4  Command Following:   [facility]     Education  Barriers To Learning: none  Patient Education: patient educated on goals, OT role and benefits, plan of care, precautions, transfer training, discharge recommendations  Learning Assessment:  patient verbalizes understanding, would benefit from continued reinforcement    Assessment  Activity Tolerance: Fair, limited by pain  Impairments Requiring Therapeutic Intervention: decreased functional mobility, decreased ADL status, decreased ROM, decreased strength, decreased endurance, decreased balance, decreased IADL, increased pain  Prognosis: good  Clinical Assessment: Pt presents with above deficits impacting her occupational performance. Pt is below her IND baseline level of function secondary to R wrist fx and R tibial fx with NWB status. Pt progressing well, able to complete complete SPT and sit<>stand with assist of 1. Able to complete short distance RW mobility with 2 person assist. Pt demo's good potential for BADL IND recommended introduction of AE for LB dressing within next few sessions. Ongoing cotx recommended in order to maximize pt safety during RW mobility. Pt participates well and demo's good motivation for recovery. Skilled OT services indicated to maximize her IND and safety with all occupational pursuits. Continue POC.       Safety Interventions: patient left in bed, bed alarm in place, call light within reach, gait belt, and patient at risk for falls    Plan  Frequency: 5 x/week, 90 min/day  Current Treatment Recommendations: strengthening, balance training, functional mobility training, transfer training, endurance training, patient/caregiver education, ADL/self-care training, IADL training, home management training, pain management, home exercise program, safety education, equipment evaluation/education, and positioning    Goals  Patient Goals: regain IND   Short Term Goals:  Time Frame: 2-3 weeks  Patient will complete lower body ADL at modified independent   Patient will complete toileting at modified independent   Patient will complete grooming at [facility] independent   Patient will complete functional transfers at [facility] independent   Patient will complete functional mobility at modified independent   Patient to gather and transport IADL items at modified independent     Progressing towards goals: 1/14    Therapy Session Time     Individual Group Co-treatment   Time In   0730   Time Out   0830   Minutes   60    Second Session Therapy Time:   Individual Concurrent Group Co-treatment   Time In      1245   Time Out      1320   Minutes      35     Timed Code Treatment Minutes:  60 + 35    Total Treatment Minutes:  95 Minutes        Electronically Signed By: Pincus Hammans, [id number]  I have reviewed and agree with the above treatment.  Thank you, KIMBERLY Baker, [facility]

## 2023-01-14 NOTE — PROGRESS NOTES
Shift assessment completed. See documentation. Patient reports pain poorly controlled today. States Right leg and hip throbbing pain 8/10. She c/o of something rubbing back of right upper leg she states \" it feels like there is a knot there. \"     Knee immobilizer removed and skin assessed. Noted patient has blister and redness to right upper posterior leg where knee immobilizer starts. Applied mepilex foam border and Kerlix to protect skin. Pain ointment applied per mar. Knee immobilizer readjusted to patient specifications. vss, schedule meds given, The care plan and education has been reviewed and mutually agreed upon with the patient. Pt remains free from falls. Fall precautions in place--bed in lowest position, call light within reach, bed alarm in use.  Pt aware to call for assistance before getting

## 2023-01-14 NOTE — PROGRESS NOTES
Natalie Chaud[facility]ry [address] Department   Phone: [phone]    Physical Therapy    [] Initial Evaluation            [x] Daily Treatment Note         [] Discharge Summary      Patient: Amanda Ibarra   : 1950   MRN: [id number]   Date of Service:  2023  Admitting Diagnosis: Multiple fractures  Current Admission Summary: 71-year-old female with a history of hyperlipidemia and bladder cancer who was admitted to [facility] on  with right arm and leg pain after a fall from a ladder. She reports she was on the last step and thought she was on the ground and fell 1 step to the ground. Work-up revealed a right radius fracture and a right tibial fracture. CT of the knee revealed an intra-articular tibial fracture involving the spine. Ortho evaluated and suggested nonweightbearing nonoperative management with a knee immobilizer on the right lower extremity. Her hospital course was complicated by urinary retention requiring catheterization. Her catheter was removed prior to transfer to this facility. She was evaluated by therapy and suggested to continue in an inpatient setting prior to returning home. She reports her pain remains significant. She does remark about some dysuria this morning and a urinalysis was positive for UTI. Culture is pending. She also states that she is not a full code. She is a DNR CC. Past Medical History:  has a past medical history of Arthritis, Cancer ([address] and Hyperlipidemia. Past Surgical History:  has a past surgical history that includes Abdomen surgery; Colonoscopy; fracture surgery; Hysterectomy; Tonsillectomy; Breast reduction surgery (); eye surgery (); and Cystoscopy (N/A, 2021).   Discharge Recommendations: Home with assist PRN, Home Health PT  DME Required For Discharge: DME to be determined pending patient progress  Precautions/Restrictions: weight bearing  Weight Bearing Restrictions: non weight bearing  [x] Right Upper Extremity  [] Left Upper Extremity [x] Right Lower Extremity  [] Left Lower Extremity  ** (R) UE cleared for platform walker use **     Required Braces/Orthotics: knee immobilizer, splint to (R) distal UE   [x] Right  [] Left  Positional Restrictions: knee immobilizer on at all times    Pre-Admission Information   Lives With: spouse    Type of Home: [facility]  Home Layout: one level  Home Access:  1 + 1 NORA with (R) grab bar  Bathroom Layout: walk in shower  Bathroom Equipment:  owns grab bars but not installed  Toilet Height: standard height  Home Equipment: no prior equipment  Transfer Assistance: Independent without use of device  Ambulation Assistance:Independent without use of device  ADL Assistance: independent with all ADL's  IADL Assistance: requires assistance with all homemaking tasks  Active :        [x] Yes  [] No  Hand Dominance: [] Left  [x] Right  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   Pain: Patient does not rate upon questioning pt does state that she has 9/10 pain this morning in R knee and had just received medication for pain relief; she stated that it was a stabbing pain  Pain Interventions: pain medication in place prior to arrival       Functional Mobility  Bed Mobility  Supine to Sit: minimal assistance  Sit to Supine: minimal assistance  Comments:  HOB partially elevated with use of HR; pt had been getting OOB towards her L side, but was encouraged to attempt R side for ease of getting into the recliner and working towards performing transfer safely with nursing staff as well    Transfers  Sit to stand transfer: moderate assistance, mod A x 2 with platform walker; assist needed to support R LE in [facility] and at trunk  Stand to sit transfer: moderate assistance, mod A x 2 with platform walker; pt able to reach back with L UE; continues to need assist at R LE in [address] pivot transfer: moderate assistance, (mod A x 2 with platform walker, slow pivot around to/from recliner to bed; initially pivoted on L LE and on 2nd attempt, pt able to hop)  Comments: VC for sequence and hand placement during all transfers Pt is anxious with transfer and requires max cueing/reassurance for safety with all mobility  Ambulation  Ambulation not tested on this date secondary to difficulty maintaining standing. Distance: N/A  Gait Mechanics: N/A  Comments:    Stair Mobility  Stair mobility not completed on this date. Comments:  Wheelchair Mobility   No w/c mobility completed on this date. Comments:   Balance  Static Sitting Balance: fair (+): maintains balance at SBA/supervision without use of UE support  Dynamic Sitting Balance: fair (+): maintains balance at SBA/supervision without use of UE support  Static Standing Balance: poor (+): requires min (A) to maintain balance  Dynamic Standing Balance: poor (-): requires max (A) to maintain balance  Comments:  pt requires mod A for standing balance; states that her R UE feels heavy and it causes her to lean towards her R, especially during transfers    Other Therapeutic Interventions   1st Session:    See above functional mobility. Pt c/o significant  knee pain upon arrival. Offered a shower to pt, but she declined this date. Pt also inquired about sleeping on her side. At end of session, pt was assisted in positioning in sidelying for sleep. She was able to initiate roll towards her L. Required min A for positioning of pillows.  Pt requested picture be taken on her phone to show to other staff members to assist.    2nd Session: Pt has to use the bathroom and is agreeable to going into the restroom. Performed sup<sit transfer with SBA. Performed stand pivot transfer with platform walker from bed < w/c with CGA + min A. Pt wheeled into restroom. Pt performed toilet transfer with use of bar and min A + mid A. Pt is fearful of falling but does not lose her balance. Requires min A x 2 for standing balance with platform walker. Pt then performs self care activities from w/c. Performs squat pivot transfer from w/c < recliner with cueing for UE placement. Pt then left in recliner with pillows used for optimal positioning, chair alarm on. Discussed transfer with nursing. Pt is left with all needs met. Functional Outcomes                 Cognition  WFL  Orientation:    alert and oriented x 4  Command Following:   [facility]    Education  Barriers To Learning: none  Patient Education: patient educated on goals, PT role and benefits, plan of care, precautions, weight-bearing education, functional mobility training, transfer training  Learning Assessment:  patient verbalizes and demonstrates understanding    Assessment  Activity Tolerance: Ongoing  Impairments Requiring Therapeutic Intervention: decreased functional mobility, decreased ADL status, decreased ROM, decreased strength, decreased safety awareness, decreased endurance, decreased balance, increased pain  Prognosis: good  Clinical Assessment:  Pt is pleased with progress and is confident using platform walker for transfers. Pt does continue to be limited by pain and anxiety about mobility efforts. Pt is showing improvement with transfers and mobility, but continues to require assist of 2 caregivers for completion of mobility tasks. Pt was previously independent in home and community without use of AD.   Safety Interventions: patient left in bed, bed alarm in place, call light within reach, gait belt, patient at risk for falls, and nurse notified    Plan  Frequency: 5 x/week, 90 min/day  Current Treatment Recommendations: strengthening, balance training, functional mobility training, transfer training, gait training, stair training, endurance training, neuromuscular re-education, wheelchair mobility training, manual therapy - soft tissue massage, modalities, patient/caregiver education, ADL/self-care training, pain management, safety education, and equipment evaluation/education    Goals  Patient Goals:  To return home   Short Term Goals:  Time Frame: 3 weeks  Patient will complete bed mobility at modified independent   Patient will complete transfers at [facility]   Patient will ambulate 50 ft with use of platform walker (R) at modified independent  Patient will ascend/descend 1+1 stairs with (R) ascending handrail at stand by assistance  Patient will complete manual w/c propulsion 150 ft at [facility]    Therapy Session Time      Individual Group Co-treatment   Time In     0730   Time Out     0830   Minutes     60     Timed Code Treatment Minutes:  60 minutes    Second Session Therapy Time:   Individual Concurrent Group Co-treatment   Time In       1245   Time Out       1315   Minutes       30     Timed Code Treatment Minutes:  60 + 30    Total Treatment Minutes:   90 Minutes       Electronically Signed By: Nilda Edouard, PT, DPT [id number]

## 2023-01-14 NOTE — PLAN OF CARE
Problem: Discharge Planning  Goal: Discharge to home or other facility with appropriate resources  Outcome: Progressing  Flowsheets (Taken 1/13/2023 2150 by So Melvin RN)  Discharge to home or other facility with appropriate resources: Identify barriers to discharge with patient and caregiver     Problem: Pain  Goal: Verbalizes/displays adequate comfort level or baseline comfort level  Outcome: Progressing  Flowsheets (Taken 1/13/2023 2150 by So Melvin RN)  Verbalizes/displays adequate comfort level or baseline comfort level: Encourage patient to monitor pain and request assistance     Problem: Skin/Tissue Integrity  Goal: Absence of new skin breakdown  Description: 1. Monitor for areas of redness and/or skin breakdown  2. Assess vascular access sites hourly  3. Every 4-6 hours minimum:  Change oxygen saturation probe site  4. Every 4-6 hours:  If on nasal continuous positive airway pressure, respiratory therapy assess nares and determine need for appliance change or resting period.   Outcome: Progressing     Problem: Safety - Adult  Goal: Free from fall injury  Outcome: Progressing

## 2023-01-15 VITALS
BODY MASS INDEX: 26.54 KG/M2 | HEART RATE: 74 BPM | DIASTOLIC BLOOD PRESSURE: 76 MMHG | RESPIRATION RATE: 16 BRPM | HEIGHT: 65 IN | TEMPERATURE: 98.2 F | SYSTOLIC BLOOD PRESSURE: 124 MMHG | OXYGEN SATURATION: 95 % | WEIGHT: 159.3 LBS

## 2023-01-15 LAB
EKG ATRIAL RATE: 84 BPM
EKG DIAGNOSIS: NORMAL
EKG P AXIS: 22 DEGREES
EKG P-R INTERVAL: 154 MS
EKG Q-T INTERVAL: 364 MS
EKG QRS DURATION: 78 MS
EKG QTC CALCULATION (BAZETT): 430 MS
EKG R AXIS: 10 DEGREES
EKG T AXIS: 26 DEGREES
EKG VENTRICULAR RATE: 84 BPM

## 2023-01-15 PROCEDURE — 1280000000 HC REHAB R&B

## 2023-01-15 PROCEDURE — 6360000002 HC RX W HCPCS: Performed by: PHYSICAL MEDICINE & REHABILITATION

## 2023-01-15 PROCEDURE — 94761 N-INVAS EAR/PLS OXIMETRY MLT: CPT

## 2023-01-15 PROCEDURE — 93005 ELECTROCARDIOGRAM TRACING: CPT | Performed by: PHYSICAL MEDICINE & REHABILITATION

## 2023-01-15 PROCEDURE — 6370000000 HC RX 637 (ALT 250 FOR IP): Performed by: PHYSICAL MEDICINE & REHABILITATION

## 2023-01-15 PROCEDURE — 2500000003 HC RX 250 WO HCPCS: Performed by: PHYSICAL MEDICINE & REHABILITATION

## 2023-01-15 RX ORDER — SENNA AND DOCUSATE SODIUM 50; 8.6 MG/1; MG/1
2 TABLET, FILM COATED ORAL NIGHTLY
Status: DISCONTINUED | OUTPATIENT
Start: 2023-01-16 | End: 2023-01-17

## 2023-01-15 RX ADMIN — METHOCARBAMOL TABLETS 750 MG: 750 TABLET, COATED ORAL at 22:11

## 2023-01-15 RX ADMIN — DICLOFENAC SODIUM 4 G: 10 GEL TOPICAL at 23:19

## 2023-01-15 RX ADMIN — OXYCODONE 10 MG: 5 TABLET ORAL at 10:09

## 2023-01-15 RX ADMIN — DIPHENHYDRAMINE HYDROCHLORIDE 5000 MCG: 25 TABLET ORAL at 17:52

## 2023-01-15 RX ADMIN — ENOXAPARIN SODIUM 40 MG: 100 INJECTION SUBCUTANEOUS at 10:26

## 2023-01-15 RX ADMIN — OXYCODONE 10 MG: 5 TABLET ORAL at 14:01

## 2023-01-15 RX ADMIN — CALCIUM ACETATE 1334 MG: 667 CAPSULE ORAL at 10:23

## 2023-01-15 RX ADMIN — Medication 324 MG: at 10:24

## 2023-01-15 RX ADMIN — OXYCODONE 10 MG: 5 TABLET ORAL at 17:47

## 2023-01-15 RX ADMIN — BUPROPION HYDROCHLORIDE 300 MG: 150 TABLET, EXTENDED RELEASE ORAL at 10:09

## 2023-01-15 RX ADMIN — ARMODAFINIL 250 MG: 250 TABLET ORAL at 11:50

## 2023-01-15 RX ADMIN — Medication 500 MG: at 10:24

## 2023-01-15 RX ADMIN — OXYCODONE 10 MG: 5 TABLET ORAL at 03:55

## 2023-01-15 RX ADMIN — ASPIRIN 81 MG: 81 TABLET, CHEWABLE ORAL at 10:10

## 2023-01-15 RX ADMIN — METHOCARBAMOL TABLETS 750 MG: 750 TABLET, COATED ORAL at 10:10

## 2023-01-15 RX ADMIN — DICLOFENAC SODIUM 4 G: 10 GEL TOPICAL at 10:26

## 2023-01-15 RX ADMIN — ATORVASTATIN CALCIUM 40 MG: 40 TABLET, FILM COATED ORAL at 10:10

## 2023-01-15 RX ADMIN — TRAZODONE HYDROCHLORIDE 100 MG: 50 TABLET ORAL at 22:12

## 2023-01-15 RX ADMIN — CIPROFLOXACIN 500 MG: 500 TABLET, FILM COATED ORAL at 10:10

## 2023-01-15 RX ADMIN — CIPROFLOXACIN 500 MG: 500 TABLET, FILM COATED ORAL at 22:12

## 2023-01-15 RX ADMIN — ESCITALOPRAM OXALATE 10 MG: 10 TABLET ORAL at 22:12

## 2023-01-15 RX ADMIN — TAMSULOSIN HYDROCHLORIDE 0.4 MG: 0.4 CAPSULE ORAL at 10:10

## 2023-01-15 RX ADMIN — OXYCODONE 10 MG: 5 TABLET ORAL at 22:12

## 2023-01-15 RX ADMIN — GABAPENTIN 200 MG: 100 CAPSULE ORAL at 14:10

## 2023-01-15 RX ADMIN — GABAPENTIN 200 MG: 100 CAPSULE ORAL at 22:12

## 2023-01-15 RX ADMIN — GABAPENTIN 200 MG: 100 CAPSULE ORAL at 10:10

## 2023-01-15 RX ADMIN — CALCIUM ACETATE 1334 MG: 667 CAPSULE ORAL at 17:47

## 2023-01-15 RX ADMIN — ACETAMINOPHEN 650 MG: 325 TABLET ORAL at 23:18

## 2023-01-15 RX ADMIN — METHOCARBAMOL TABLETS 750 MG: 750 TABLET, COATED ORAL at 14:00

## 2023-01-15 RX ADMIN — METHOCARBAMOL TABLETS 750 MG: 750 TABLET, COATED ORAL at 17:48

## 2023-01-15 ASSESSMENT — PAIN DESCRIPTION - ORIENTATION
ORIENTATION: RIGHT

## 2023-01-15 ASSESSMENT — PAIN DESCRIPTION - LOCATION
LOCATION: KNEE;LEG
LOCATION: LEG;KNEE
LOCATION: LEG;KNEE
LOCATION: LEG
LOCATION: KNEE
LOCATION: HIP;LEG
LOCATION: LEG
LOCATION: LEG

## 2023-01-15 ASSESSMENT — PAIN DESCRIPTION - DESCRIPTORS
DESCRIPTORS: ACHING
DESCRIPTORS: ACHING;SORE
DESCRIPTORS: ACHING
DESCRIPTORS: ACHING;SORE

## 2023-01-15 ASSESSMENT — PAIN SCALES - GENERAL
PAINLEVEL_OUTOF10: 6
PAINLEVEL_OUTOF10: 5
PAINLEVEL_OUTOF10: 3
PAINLEVEL_OUTOF10: 5
PAINLEVEL_OUTOF10: 0
PAINLEVEL_OUTOF10: 8
PAINLEVEL_OUTOF10: 5
PAINLEVEL_OUTOF10: 5
PAINLEVEL_OUTOF10: 7
PAINLEVEL_OUTOF10: 3

## 2023-01-15 ASSESSMENT — PAIN - FUNCTIONAL ASSESSMENT
PAIN_FUNCTIONAL_ASSESSMENT: ACTIVITIES ARE NOT PREVENTED

## 2023-01-15 NOTE — PROGRESS NOTES
The pt c/o right chest.  Stated had this episode before.   had schedule for a stress test but canceled it due to the hospitalization. The pt described the pain as \"pressure\"  EKG done. Showed normal sinus rhythm. Applied voltaren gell.

## 2023-01-15 NOTE — PLAN OF CARE
Problem: Discharge Planning  Goal: Discharge to home or other facility with appropriate resources  Outcome: Progressing  Flowsheets (Taken 1/14/2023 2230)  Discharge to home or other facility with appropriate resources: Identify barriers to discharge with patient and caregiver     Problem: Pain  Goal: Verbalizes/displays adequate comfort level or baseline comfort level  Outcome: Progressing     Problem: Skin/Tissue Integrity  Goal: Absence of new skin breakdown  Description: 1. Monitor for areas of redness and/or skin breakdown  2. Assess vascular access sites hourly  3. Every 4-6 hours minimum:  Change oxygen saturation probe site  4. Every 4-6 hours:  If on nasal continuous positive airway pressure, respiratory therapy assess nares and determine need for appliance change or resting period.   Outcome: Progressing     Problem: Safety - Adult  Goal: Free from fall injury  Outcome: Progressing  Flowsheets (Taken 1/15/2023 0141)  Free From Fall Injury: Instruct family/caregiver on patient safety

## 2023-01-16 ENCOUNTER — APPOINTMENT (OUTPATIENT)
Dept: GENERAL RADIOLOGY | Age: 73
DRG: 563 | End: 2023-01-16
Attending: PHYSICAL MEDICINE & REHABILITATION
Payer: MEDICARE

## 2023-01-16 LAB
ANION GAP SERPL CALCULATED.3IONS-SCNC: 8 MMOL/L (ref 3–16)
BUN BLDV-MCNC: 13 MG/DL (ref 7–20)
CALCIUM SERPL-MCNC: 9.2 MG/DL (ref 8.3–10.6)
CHLORIDE BLD-SCNC: 100 MMOL/L (ref 99–110)
CO2: 31 MMOL/L (ref 21–32)
CREAT SERPL-MCNC: <0.5 MG/DL (ref 0.6–1.2)
EKG ATRIAL RATE: 84 BPM
EKG DIAGNOSIS: NORMAL
EKG P AXIS: 22 DEGREES
EKG P-R INTERVAL: 154 MS
EKG Q-T INTERVAL: 364 MS
EKG QRS DURATION: 78 MS
EKG QTC CALCULATION (BAZETT): 430 MS
EKG R AXIS: 10 DEGREES
EKG T AXIS: 26 DEGREES
EKG VENTRICULAR RATE: 84 BPM
GFR SERPL CREATININE-BSD FRML MDRD: >60 ML/MIN/{1.73_M2}
GLUCOSE BLD-MCNC: 114 MG/DL (ref 70–99)
HCT VFR BLD CALC: 32.4 % (ref 36–48)
HEMOGLOBIN: 10.9 G/DL (ref 12–16)
MCH RBC QN AUTO: 31 PG (ref 26–34)
MCHC RBC AUTO-ENTMCNC: 33.5 G/DL (ref 31–36)
MCV RBC AUTO: 92.6 FL (ref 80–100)
PDW BLD-RTO: 12.8 % (ref 12.4–15.4)
PLATELET # BLD: 420 K/UL (ref 135–450)
PMV BLD AUTO: 8.2 FL (ref 5–10.5)
POTASSIUM SERPL-SCNC: 4.2 MMOL/L (ref 3.5–5.1)
RBC # BLD: 3.5 M/UL (ref 4–5.2)
SODIUM BLD-SCNC: 139 MMOL/L (ref 136–145)
WBC # BLD: 6.6 K/UL (ref 4–11)

## 2023-01-16 PROCEDURE — 97116 GAIT TRAINING THERAPY: CPT

## 2023-01-16 PROCEDURE — 93010 ELECTROCARDIOGRAM REPORT: CPT | Performed by: INTERNAL MEDICINE

## 2023-01-16 PROCEDURE — 73564 X-RAY EXAM KNEE 4 OR MORE: CPT

## 2023-01-16 PROCEDURE — 97535 SELF CARE MNGMENT TRAINING: CPT

## 2023-01-16 PROCEDURE — 1280000000 HC REHAB R&B

## 2023-01-16 PROCEDURE — 80048 BASIC METABOLIC PNL TOTAL CA: CPT

## 2023-01-16 PROCEDURE — 97530 THERAPEUTIC ACTIVITIES: CPT

## 2023-01-16 PROCEDURE — 85027 COMPLETE CBC AUTOMATED: CPT

## 2023-01-16 PROCEDURE — 36415 COLL VENOUS BLD VENIPUNCTURE: CPT

## 2023-01-16 PROCEDURE — 6370000000 HC RX 637 (ALT 250 FOR IP): Performed by: PHYSICAL MEDICINE & REHABILITATION

## 2023-01-16 PROCEDURE — 6360000002 HC RX W HCPCS: Performed by: PHYSICAL MEDICINE & REHABILITATION

## 2023-01-16 PROCEDURE — 2500000003 HC RX 250 WO HCPCS: Performed by: PHYSICAL MEDICINE & REHABILITATION

## 2023-01-16 PROCEDURE — 73110 X-RAY EXAM OF WRIST: CPT

## 2023-01-16 RX ORDER — HEPARIN SODIUM 5000 [USP'U]/ML
5000 INJECTION, SOLUTION INTRAVENOUS; SUBCUTANEOUS EVERY 8 HOURS SCHEDULED
Status: DISCONTINUED | OUTPATIENT
Start: 2023-01-16 | End: 2023-01-19 | Stop reason: HOSPADM

## 2023-01-16 RX ADMIN — METHOCARBAMOL TABLETS 750 MG: 750 TABLET, COATED ORAL at 14:00

## 2023-01-16 RX ADMIN — METHOCARBAMOL TABLETS 750 MG: 750 TABLET, COATED ORAL at 17:14

## 2023-01-16 RX ADMIN — TAMSULOSIN HYDROCHLORIDE 0.4 MG: 0.4 CAPSULE ORAL at 09:56

## 2023-01-16 RX ADMIN — METHOCARBAMOL TABLETS 750 MG: 750 TABLET, COATED ORAL at 09:56

## 2023-01-16 RX ADMIN — Medication 324 MG: at 09:57

## 2023-01-16 RX ADMIN — TRAZODONE HYDROCHLORIDE 100 MG: 50 TABLET ORAL at 21:55

## 2023-01-16 RX ADMIN — BUPROPION HYDROCHLORIDE 300 MG: 150 TABLET, EXTENDED RELEASE ORAL at 09:56

## 2023-01-16 RX ADMIN — GABAPENTIN 200 MG: 100 CAPSULE ORAL at 14:00

## 2023-01-16 RX ADMIN — CIPROFLOXACIN 500 MG: 500 TABLET, FILM COATED ORAL at 09:57

## 2023-01-16 RX ADMIN — GABAPENTIN 200 MG: 100 CAPSULE ORAL at 09:57

## 2023-01-16 RX ADMIN — ATORVASTATIN CALCIUM 40 MG: 40 TABLET, FILM COATED ORAL at 09:56

## 2023-01-16 RX ADMIN — DIPHENHYDRAMINE HYDROCHLORIDE 5000 MCG: 25 TABLET ORAL at 17:16

## 2023-01-16 RX ADMIN — DICLOFENAC SODIUM 4 G: 10 GEL TOPICAL at 10:08

## 2023-01-16 RX ADMIN — OXYCODONE 10 MG: 5 TABLET ORAL at 06:46

## 2023-01-16 RX ADMIN — CIPROFLOXACIN 500 MG: 500 TABLET, FILM COATED ORAL at 21:54

## 2023-01-16 RX ADMIN — OXYCODONE 10 MG: 5 TABLET ORAL at 02:34

## 2023-01-16 RX ADMIN — OXYCODONE 10 MG: 5 TABLET ORAL at 21:54

## 2023-01-16 RX ADMIN — OXYCODONE 10 MG: 5 TABLET ORAL at 10:36

## 2023-01-16 RX ADMIN — CALCIUM ACETATE 1334 MG: 667 CAPSULE ORAL at 17:16

## 2023-01-16 RX ADMIN — OXYCODONE 10 MG: 5 TABLET ORAL at 17:20

## 2023-01-16 RX ADMIN — GABAPENTIN 200 MG: 100 CAPSULE ORAL at 21:55

## 2023-01-16 RX ADMIN — HEPARIN SODIUM 5000 UNITS: 5000 INJECTION INTRAVENOUS; SUBCUTANEOUS at 21:55

## 2023-01-16 RX ADMIN — Medication 500 MG: at 10:35

## 2023-01-16 RX ADMIN — DICLOFENAC SODIUM 4 G: 10 GEL TOPICAL at 21:59

## 2023-01-16 RX ADMIN — ASPIRIN 81 MG: 81 TABLET, CHEWABLE ORAL at 09:56

## 2023-01-16 RX ADMIN — METHOCARBAMOL TABLETS 750 MG: 750 TABLET, COATED ORAL at 21:55

## 2023-01-16 RX ADMIN — ESCITALOPRAM OXALATE 10 MG: 10 TABLET ORAL at 21:54

## 2023-01-16 RX ADMIN — CALCIUM ACETATE 1334 MG: 667 CAPSULE ORAL at 09:56

## 2023-01-16 RX ADMIN — STANDARDIZED SENNA CONCENTRATE AND DOCUSATE SODIUM 2 TABLET: 8.6; 5 TABLET ORAL at 21:55

## 2023-01-16 RX ADMIN — ARMODAFINIL 250 MG: 250 TABLET ORAL at 10:02

## 2023-01-16 RX ADMIN — HEPARIN SODIUM 5000 UNITS: 5000 INJECTION INTRAVENOUS; SUBCUTANEOUS at 09:57

## 2023-01-16 ASSESSMENT — PAIN SCALES - GENERAL
PAINLEVEL_OUTOF10: 7
PAINLEVEL_OUTOF10: 6
PAINLEVEL_OUTOF10: 7
PAINLEVEL_OUTOF10: 4
PAINLEVEL_OUTOF10: 7
PAINLEVEL_OUTOF10: 7
PAINLEVEL_OUTOF10: 8
PAINLEVEL_OUTOF10: 0
PAINLEVEL_OUTOF10: 8
PAINLEVEL_OUTOF10: 3
PAINLEVEL_OUTOF10: 5
PAINLEVEL_OUTOF10: 4
PAINLEVEL_OUTOF10: 7

## 2023-01-16 ASSESSMENT — PAIN DESCRIPTION - ORIENTATION
ORIENTATION: RIGHT

## 2023-01-16 ASSESSMENT — PAIN - FUNCTIONAL ASSESSMENT
PAIN_FUNCTIONAL_ASSESSMENT: PREVENTS OR INTERFERES SOME ACTIVE ACTIVITIES AND ADLS
PAIN_FUNCTIONAL_ASSESSMENT: ACTIVITIES ARE NOT PREVENTED
PAIN_FUNCTIONAL_ASSESSMENT: PREVENTS OR INTERFERES SOME ACTIVE ACTIVITIES AND ADLS
PAIN_FUNCTIONAL_ASSESSMENT: ACTIVITIES ARE NOT PREVENTED
PAIN_FUNCTIONAL_ASSESSMENT: PREVENTS OR INTERFERES SOME ACTIVE ACTIVITIES AND ADLS
PAIN_FUNCTIONAL_ASSESSMENT: PREVENTS OR INTERFERES SOME ACTIVE ACTIVITIES AND ADLS
PAIN_FUNCTIONAL_ASSESSMENT: ACTIVITIES ARE NOT PREVENTED

## 2023-01-16 ASSESSMENT — PAIN DESCRIPTION - PAIN TYPE
TYPE: ACUTE PAIN
TYPE: ACUTE PAIN

## 2023-01-16 ASSESSMENT — PAIN DESCRIPTION - LOCATION
LOCATION: LEG
LOCATION: LEG
LOCATION: LEG;KNEE
LOCATION: WRIST;KNEE
LOCATION: SHOULDER;LEG
LOCATION: KNEE
LOCATION: CHEST;ARM

## 2023-01-16 ASSESSMENT — PAIN DESCRIPTION - DESCRIPTORS
DESCRIPTORS: THROBBING
DESCRIPTORS: ACHING;SORE
DESCRIPTORS: ACHING;SORE
DESCRIPTORS: ACHING

## 2023-01-16 ASSESSMENT — PAIN SCALES - WONG BAKER: WONGBAKER_NUMERICALRESPONSE: 0

## 2023-01-16 ASSESSMENT — PAIN DESCRIPTION - ONSET: ONSET: ON-GOING

## 2023-01-16 ASSESSMENT — PAIN DESCRIPTION - FREQUENCY
FREQUENCY: CONTINUOUS
FREQUENCY: CONTINUOUS

## 2023-01-16 NOTE — PROGRESS NOTES
Saurabh Herrera  1/16/2023  [id number]    Chief Complaint: Multiple fractures    Subjective:   Having some Right chest wall pain at the pectoralis. EKG WNL. Also requesting to transition from lovenox to heparin. Labs reviewed. ROS: No CP, SOB, dyspnea    Objective:  Patient Vitals for the past 24 hrs:   BP Temp Temp src Pulse Resp SpO2   01/15/23 2211 124/76 98.2 °F (36.8 °C) Oral 74 16 95 %       Gen: No distress, pleasant. Resting in bed  HEENT: Normocephalic, atraumatic. CV: Regular rate and rhythm. No MRG   Resp: No respiratory distress. CTAB. R Pec TTP diffusely  Abd: Soft, nontender nondistended  Ext: RLE edema, RUE in splint  Neuro: Alert, oriented, appropriately interactive. Laboratory data: Available via EMR. Therapy progress:    PT    Supine to Sit: Partial/moderate assistance  Sit to Supine: Partial/moderate assistance   Sit to Stand: Dependent  Chair/Bed to Chair Transfer: Dependent  Car Transfer:    Ambulation 10 ft:    Ambulation 50 ft:    Ambulation 150 ft:    Stairs - 1 Step:    Stairs - 4 Step:    Stairs - 12 Step:      OT    Eating: Setup or clean-up assistance  Oral Hygiene: Setup or clean-up assistance  Bathing: Dependent  Upper Body Dressing: Supervision or touching assistance  Lower Body Dressing: Dependent  Toilet Transfer: Dependent  Toilet Hygiene: Dependent    Speech Therapy         Body mass index is 26.51 kg/m². Assessment:  Patient Active Problem List   Diagnosis    Clot retention of urine    Multiple fractures       Plan:   Multiple fractures: Nonweightbearing RUE/RLE. Pain control. PT/OT. Patient is requesting an orthopedist closer to home. Ecoli UTI: Started Cipro, Cx sensitive     HLD: Lipitor 40     Depression: Lexapro 10 HS, Wellbutrin 300     Hx bladder cancer: Flomax    R hip pain: XR without fracture.       Bowels: Per protocol  Bladder: Per protocol   Sleep: Trazodone 100 scheduled  Pain: Tylenol as needed, gabapentin 200 3 times daily, Robaxin-750 4 times daily, Omriah as needed  DVT PPx: Lovenox -> heparin  LEE: TBD    Mena Larose MD 1/16/2023, 9:00 AM    * This document was created using dictation software. While all precautions were taken to ensure accuracy, errors may have occurred. Please disregard any typographical errors.

## 2023-01-16 NOTE — PROGRESS NOTES
[facility]atal[facility] [address]   Phone: [phone]    Occupational Therapy    [] Initial Evaluation            [x] Daily Treatment Note         [] Discharge Summary      Patient: John Kelly   : 1950   MRN: [id number]   Date of Service:  2023    Admitting Diagnosis:  Multiple fractures  Current Admission Summary: 71-year-old female with a history of hyperlipidemia and bladder cancer who was admitted to [facility] on  with right arm and leg pain after a fall from a ladder. She reports she was on the last step and thought she was on the ground and fell 1 step to the ground. Work-up revealed a right radius fracture and a right tibial fracture. CT of the knee revealed an intra-articular tibial fracture involving the spine. Ortho evaluated and suggested nonweightbearing nonoperative management with a knee immobilizer on the right lower extremity. Her hospital course was complicated by urinary retention requiring catheterization. Her catheter was removed prior to transfer to this facility. She was evaluated by therapy and suggested to continue in an inpatient setting prior to returning home. She reports her pain remains significant. She does remark about some dysuria this morning and a urinalysis was positive for UTI. Past Medical History:  has a past medical history of Arthritis, Cancer ([address] and Hyperlipidemia. Past Surgical History:  has a past surgical history that includes Abdomen surgery; Colonoscopy; fracture surgery; Hysterectomy; Tonsillectomy; Breast reduction surgery (); eye surgery (); and Cystoscopy (N/A, 2021).     Discharge Recommendations:  OT S3    DME Required For Discharge: DME to be determined pending patient progress    Precautions/Restrictions: high fall risk, weight bearing  Weight Bearing Restrictions: non weight bearing  [x] Right Upper Extremity  [] Left Upper Extremity [x] Right Lower Extremity  [] Left Lower Extremity     Required Braces/Orthotics: knee immobilizer, R UE cast/splint   [x] Right  [] Left  Positional Restrictions: R LE KI at all times      Pre-Admission Information   Lives With: spouse                  Type of Home: condo  Home Layout: one level  Home Access:  1 + 1 NORA with (R) grab bar  Bathroom Layout: walk in shower  Bathroom Equipment:  owns grab bars but not installed  Toilet Height: standard height  Home Equipment: no prior equipment  Transfer Assistance: Independent without use of device  Ambulation Assistance:Independent without use of device  ADL Assistance: independent with all ADL's  IADL Assistance: requires assistance with all homemaking tasks  Active :        [x] Yes                 [] No  Hand Dominance: [] Left                 [x] Right  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 Pain: Pt rates pain 3/10 in R hip  Pain Interventions: Pain medication administered before session        Activities of Daily Living  Basic Activities of Daily Living  Feeding: setup assistance  Grooming: setup assistance  Grooming Comments: Grooming tasks completed seated in w/c at sink  Upper Extremity Bathing: minimal assistance  Lower Extremity Bathing: minimal assistance   Bathing Comments: Pt required assistance to wash/dry back for thoroughness. Upper Extremity Dressing: minimal assistance  Lower Extremity Dressing: moderate assistance  Dressing Comments: Dressing completed at TTB. Pt required assistance with R LE dressing, assist for clothing over hips in stance. Pt required assistance w/ socks due time constraints. General Comments: Pt ambulated w/ FW to bathroom for ADL completion.  Pt transferred to w/c after dressing- RKI and R UE cast waterproofed prior to shower- remained dry and intact. Instrumental Activities of Daily Living  No IADL completed on this date. Functional Mobility  Bed Mobility  Supine to Sit: stand by assistance  Sit to Supine: stand by assistance  Rolling Left: contact guard assistance,    Scooting: stand by assistance  Comments: Assist with R LE sup >< sit  Transfers  Sit to stand transfer:minimal assistance, periods of ModA   Stand to sit transfer: minimal assistance  Stand pivot transfer: minimal assistance  Comments: Pt demo'd good adherence to WB status throughout all transfers. Pt required Min VC for hand placement during all transfers. Functional Mobility:  Sitting Balance: supervision. Standing Balance: minimal assistance. Functional Mobility: .  minimal assistance  Functional Mobility Activity: 30ft in hallway  Functional Mobility Device Use: platform walker (R)  Functional Mobility Comment: Pt required VC for good posture and demo'd good foot clearance. W/c follow for safety. Other Therapeutic Interventions      Second Session  Pt in bed at entry, agreeable to session, denied pain at rest, up to date on medications. Sit>supine w/ HOB elevated at SBA. Pt ambulated ~10ft RW >> hallway at [facility]. Sit<>stands varied Min to 100 Medical Reading of 1. Functional reaching and dynamic balance activity in stance to simulate LB dressing tasks: in stance to platform RW pt donned/doffed band knees<>hips x2; pt then removed 8 clothespins clipped on shorts- VC for controled movements and reduce risk of fall and increase balance/stability- pt verbalized understanding. Min to [address] for balance, w/c behind pt for safety      Pt completed stand>step w/c>> recliner with platform RW at [facility], increased time.  Pt left in chair, chair alarm on, call light and needs in reach         Cognition  [facility]  Orientation:    alert and oriented x 4  Command Following:   [facility]     Education  Barriers To Learning: none  Patient Education: patient educated on goals, OT role and benefits, plan of care, precautions, transfer training, discharge recommendations  Learning Assessment:  patient verbalizes understanding, would benefit from continued reinforcement    Assessment  Activity Tolerance: Fair, limited by pain  Impairments Requiring Therapeutic Intervention: decreased functional mobility, decreased ADL status, decreased ROM, decreased strength, decreased endurance, decreased balance, decreased IADL, increased pain  Prognosis: good  Clinical Assessment: Pt presents with above deficits impacting her occupational performance. Pt is below her IND baseline level of function secondary to R wrist fx and R tibial fx with NWB status. Pt progressing well, able to complete complete SPT and sit<>stand with assist of 1. Able to complete short distance RW mobility with 2 person assist. Pt demo's good potential for BADL IND recommended introduction of AE for LB dressing within next few sessions. Ongoing cotx recommended in order to maximize pt safety during RW mobility. Pt participates well and demo's good motivation for recovery. Skilled OT services indicated to maximize her IND and safety with all occupational pursuits. Continue POC.       Safety Interventions: patient left in bed, bed alarm in place, call light within reach, gait belt, and patient at risk for falls    Plan  Frequency: 5 x/week, 90 min/day  Current Treatment Recommendations: strengthening, balance training, functional mobility training, transfer training, endurance training, patient/caregiver education, ADL/self-care training, IADL training, home management training, pain management, home exercise program, safety education, equipment evaluation/education, and positioning    Goals  Patient Goals: regain IND   Short Term Goals:  Time Frame: 2-3 weeks  Patient will complete lower body ADL at modified independent   Patient will complete toileting at modified independent   Patient will complete grooming at modified independent   Patient will complete functional transfers at modified independent   Patient will complete functional mobility at modified independent   Patient to gather and transport IADL items at modified independent     Progressing towards goals: 1/16     Therapy Session Time     Individual Group Co-treatment   Time In   0730   Time Out   0830   Minutes   60    Second Session Therapy Time:   Individual Concurrent Group Co-treatment   Time In      1245   Time Out      1320   Minutes      35     Timed Code Treatment Minutes:  60 +35     Total Treatment Minutes:   95        Electronically Signed By: Erin Olivares OTR/L #[id number]

## 2023-01-16 NOTE — PROGRESS NOTES
Assessment completed. Patient sitting up in bed, alert and oriented x 4 and able to make all her needs known. Cast to RUE, able to wiggle fingers, immobilizer to RLE. C/o pain to right shoulder and right knee. Patient also c/o \"a few bumps\" to buttocks. No bumps noted but did observe that patient had very dry skin and had scratch marks to lower back and buttocks areas. Lotion applied and encouraged patient to keep skin moist to avoid the itchiness d/t dry skin. Medications administered as ordered. POC and education reviewed with patient and mutually agreed upon. Safety interventions in place. Call light in reach. Will continue to monitor.

## 2023-01-16 NOTE — PATIENT CARE CONFERENCE
[facility]  Inpatient Rehabilitation  Weekly Team Conference Note    Patient Name: Venancio Bae        MRN: [id number]    : 1950  (76 y.o.)  Gender: female           The team conference for this patient was held on 2023 at 11:00am and led by:  Bart Stewart MD    CASE MANAGEMENT:  Assessment:   Patient is a 71year old female who admitted to ARU on 2023 with the admitting diagnosis of Multiple fractures  Patient resides at home with her spouse. Patient would benefit from skilled ARU PT/OT to promote increased safety and independence in order to return to her prior level of functioning. Patient anticipates discharging to home with home health care services and recommended DME. PHYSICAL THERAPY:    Bed Mobility  Supine to Sit: contact guard assistance  Sit to Supine: contact guard assistance  Comments:  HOB partially elevated with use of HR. Able to reposition (R) LE without assistance. Transfers  Sit to stand transfer: moderate assistance, (min (A) to fixed bar support, min/mod (A) within session including to platform RW)  Stand to sit transfer: minimal assistance  Stand pivot transfer: minimal assistance, (w/c => bed without device, utilizes fixed bed HR)  Comments: Consistent VC for hand placement and sequence. Self maintains NWB status. Ambulation  Surface:level surface  Assistive Device: platform walker (R)  Assistance: minimal assistance  Distance: 15 ft + 30 ft   Gait Mechanics: Hop pattern while appropriately maintaining NWB status. Assistance for walker management. Adequate (but reduced (L) foot clearance during hop. Comments:    Stair Mobility  Unsafe to attempt at this time.   Comments:    QM:  Roll Left and Right  Assistance Needed: Partial/moderate assistance  Reason if not Attempted: Not attempted due to medical condition or safety concerns  CARE Score: 3  Discharge Goal: Independent  Sit to Lying  Assistance Needed: Partial/moderate assistance  CARE Score: 3  Discharge Goal: Independent  Lying to Sitting on Side of Bed  Assistance Needed: Partial/moderate assistance  Reason if not Attempted: Not attempted due to medical condition or safety concerns  CARE Score: 3  Discharge Goal: Independent  Sit to Stand  Assistance Needed: Dependent  CARE Score: 1  Discharge Goal: Independent  Chair/Bed-to-Chair Transfer  Assistance Needed: Dependent  CARE Score: 1  Discharge Goal: Independent  Car Transfer  Reason if not Attempted: Not attempted due to medical condition or safety concerns  CARE Score: 88  Discharge Goal: Independent  Walk 10 Feet  Reason if not Attempted: Not attempted due to medical condition or safety concerns  CARE Score: 88  Discharge Goal: Independent  Walk 50 Feet with Two Turns  Reason if not Attempted: Not attempted due to medical condition or safety concerns  CARE Score: 88  Discharge Goal: Independent  Walk 150 Feet  Reason if not Attempted: Not attempted due to medical condition or safety concerns  CARE Score: 88  Discharge Goal: Independent  Walking 10 Feet on Uneven Surfaces  Reason if not Attempted: Not attempted due to medical condition or safety concerns  CARE Score: 88  Discharge Goal: Independent  1 Step (Curb)  Reason if not Attempted: Not attempted due to medical condition or safety concerns  CARE Score: 88  Discharge Goal: Independent  4 Steps  Reason if not Attempted: Not attempted due to medical condition or safety concerns  CARE Score: 88  Discharge Goal: Independent  12 Steps  Reason if not Attempted: Not attempted due to medical condition or safety concerns  CARE Score: 88  Discharge Goal: Independent  Picking Up Object  Reason if not Attempted: Not attempted due to medical condition or safety concerns  CARE Score: 88  Discharge Goal: Independent  Wheelchair Ability  Uses a Wheelchair and/or Scooter?: Yes    Goals:                   Patient Goals:  To return home   Short Term Goals:  Time Frame: 3 weeks  Patient will complete bed mobility at modified independent   Patient will complete transfers at [facility]   Patient will ambulate 50 ft with use of platform walker (R) at modified independent  Patient will ascend/descend 1+1 stairs with (R) ascending handrail at stand by assistance  Patient will complete manual w/c propulsion 150 ft at [facility]               These goals were reviewed with this patient at the time of assessment and [facility] Service is in agreement. Plan of Care: Pt to be seen 5 out of 7 days per week per ARU protocol ( 90 minutes with PT)               OCCUPATIONAL THERAPY:    ADL:    Feeding: setup assistance  Grooming: setup assistance  Grooming Comments: Grooming tasks completed seated in w/c at sink  Upper Extremity Bathing: minimal assistance  Lower Extremity Bathing: minimal assistance   Bathing Comments: Pt required assistance to wash/dry back for thoroughness. Upper Extremity Dressing: minimal assistance  Lower Extremity Dressing: moderate assistance  Dressing Comments: Dressing completed at TTB. Pt required assistance with R LE dressing, assist for clothing over hips in stance. Pt required assistance w/ socks due time constraints. General Comments: Pt ambulated w/ FW to bathroom for ADL completion. Pt transferred to w/c after dressing- RKI and R UE cast waterproofed prior to shower- remained dry and intact.     Toilet Transfers:   [place]issett with RW to/from standard commode     Tub/ShowerTransfers:   [place]iss[place] with RW to/from TTB in shower; use of grab bar for SPTs to/from w/c     QM:  Eating  Assistance Needed: Setup or clean-up assistance  CARE Score: 5  Discharge Goal: Independent  Oral Hygiene  Assistance Needed: Setup or clean-up assistance  CARE Score: 5  Discharge Goal: [address] needed: Dependent  CARE Score: 1  Discharge Goal: Independent  Toilet Transfer  Assistance needed: Substantial/maximal assistance  CARE Score: 2  Discharge Goal: Independent  Shower/Bathe Self  Assistance Needed: Dependent  CARE Score: 1  Discharge Goal: Independent  Upper Body Dressing  Assistance Needed: Supervision or touching assistance  CARE Score: 4  Discharge Goal: Independent  Lower Body Dressing  Assistance Needed: Dependent  CARE Score: 1  Discharge Goal: Independent  Putting On/Taking Off Footwear  Assistance Needed: Dependent  CARE Score: 1  Discharge Goal: Independent    Goals:             Goals:  Time Frame: 2-3 weeks  Patient will complete lower body ADL at modified independent   Patient will complete toileting at modified independent   Patient will complete grooming at [facility] independent   Patient will complete functional transfers at [facility] independent   Patient will complete functional mobility at modified independent   Patient to gather and transport IADL items at modified independent     These goals were reviewed with this patient at the time of assessment and Gigi Keane is in agreement    Plan of Care:  Pt to be seen 5 out of 7 days per week per ARU protocol ( 90 minutes with OT)     NUTRITION:  Weight: 159 lb 4.8 oz (72.3 kg) / Body mass index is 26.51 kg/m². Diet Order: Regular    Supplements:Glucerna @ [facility] daily    Pt receives a regular diet & reports eating well & has no appetite issues. States hx gastric bypass surgery in 2001, but has started gaining wt over past few years. Requests low sugar protein shake & agrees to drink Glucerna daily. Expresses no further nutrition concerns. Please see nutrition note for details.     NURSING:    Risk for Readmission: 5%    Briones Fall Risk Score: High  Wounds/Incisions/Ulcers: blister RLE  Medication Review: Reviewed daily with patient  Pain: Managed with prescribed medication  Consultations/Labs/X-rays: Labs Monday and Thursday    Patient/Family Education provided by team:    Discharge Plan   Estimated Length of Stay:9 days  Destination: Home  Pass:No  Services at Discharge: TBD pending progress  Equipment at Discharge: TBD pending progress; shower chair   Factors facilitating achievement of predicted outcomes: PLOF, motivated  Barriers to the achievement of predicted outcomes: Pain, multi-limb WB restrictions    Patient Goals: To regain independence and return home. Interdisciplinary Individualized Plan of Care Review of Previous Week:    Medical and functional progress towards goals:  Medically doing well, ortho to see pt today, labs and vitals stable, UTI being treated with antibiotics. Pt progressing with transfers and ambulation, w/c mobility distance increasing, ADLs progressing. Pt with increased independence with LB ADLs with adaptive equipment. Barriers towards progress:  WBing status, impaired balance, decreased activity tolerance  Interventions to address Barriers:  Ortho to see pt regarding healing process, PT focus on balance training, PT/OT both work on increasing pt's activity tolerance. Goals still appropriate:  Yes  Modifications to goals: None  Continue Current Plan of Care:  Yes  Modifications to Plan of Care: None    Rehab Team Members in attendance for Team Conference:  Alisa Salazar, MSW, LSW    Nat Roberson, RD, LD    Darlyn Eric, OTR/L    Parag Potter, PT, DPT    KIRK Pritchard PT, DPT,     I approve the established interdisciplinary plan of care as documented within the medical record of Chela Marcelino.     Getachew Gamble MD   Electronically signed by Getachew Gamble MD on 1/17/2023 at 11:33 AM

## 2023-01-16 NOTE — PLAN OF CARE
Problem: Discharge Planning  Goal: Discharge to home or other facility with appropriate resources  1/16/2023 0252 by Yoselyn Glynn RN  Outcome: Progressing  Flowsheets (Taken 1/15/2023 2211)  Discharge to home or other facility with appropriate resources: Identify barriers to discharge with patient and caregiver  1/15/2023 1818 by Shanel Cruz RN  Outcome: Progressing     Problem: Pain  Goal: Verbalizes/displays adequate comfort level or baseline comfort level  1/16/2023 0252 by Yoselyn Glynn RN  Outcome: Progressing  Flowsheets (Taken 1/15/2023 2211)  Verbalizes/displays adequate comfort level or baseline comfort level: Encourage patient to monitor pain and request assistance  1/15/2023 1818 by Shanel Cruz RN  Outcome: Progressing     Problem: Skin/Tissue Integrity  Goal: Absence of new skin breakdown  Description: 1. Monitor for areas of redness and/or skin breakdown  2. Assess vascular access sites hourly  3. Every 4-6 hours minimum:  Change oxygen saturation probe site  4. Every 4-6 hours:  If on nasal continuous positive airway pressure, respiratory therapy assess nares and determine need for appliance change or resting period.   1/16/2023 0252 by Yoselyn Glynn RN  Outcome: Progressing  1/15/2023 1818 by Shanel Cruz RN  Outcome: Progressing     Problem: Safety - Adult  Goal: Free from fall injury  1/16/2023 0252 by Yoselyn Glynn RN  Outcome: Progressing  Flowsheets (Taken 1/16/2023 0251)  Free From Fall Injury: Instruct family/caregiver on patient safety  1/15/2023 1818 by Shanel Cruz RN  Outcome: Progressing

## 2023-01-16 NOTE — PLAN OF CARE
Problem: Discharge Planning  Goal: Discharge to home or other facility with appropriate resources  1/16/2023 1348 by Zenaida Nuñez RN  Outcome: Progressing  1/16/2023 0252 by Sudarshan Mosqueda RN  Outcome: Progressing  Flowsheets (Taken 1/15/2023 2211)  Discharge to home or other facility with appropriate resources: Identify barriers to discharge with patient and caregiver     Problem: Pain  Goal: Verbalizes/displays adequate comfort level or baseline comfort level  1/16/2023 1348 by Zenaida Nuñez RN  Outcome: Progressing  1/16/2023 0252 by Sudarshan Mosqueda RN  Outcome: Progressing  Flowsheets (Taken 1/15/2023 2211)  Verbalizes/displays adequate comfort level or baseline comfort level: Encourage patient to monitor pain and request assistance     Problem: Skin/Tissue Integrity  Goal: Absence of new skin breakdown  Description: 1. Monitor for areas of redness and/or skin breakdown  2. Assess vascular access sites hourly  3. Every 4-6 hours minimum:  Change oxygen saturation probe site  4. Every 4-6 hours:  If on nasal continuous positive airway pressure, respiratory therapy assess nares and determine need for appliance change or resting period.   1/16/2023 1348 by Zenaida Nuñez RN  Outcome: Progressing  1/16/2023 0252 by Sudarshan Mosqueda RN  Outcome: Progressing     Problem: Safety - Adult  Goal: Free from fall injury  1/16/2023 1348 by Zenaida Nuñez RN  Outcome: Progressing  1/16/2023 0252 by Sudarshan Mosqueda RN  Outcome: Progressing  Flowsheets (Taken 1/16/2023 0251)  Free From Fall Injury: Instruct family/caregiver on patient safety

## 2023-01-16 NOTE — PROGRESS NOTES
[facility]atalie [facility] Department   Phone: [phone]    Physical Therapy    [] Initial Evaluation            [x] Daily Treatment Note         [] Discharge Summary      Patient: Saray Nguyen   : 1950   MRN: [id number]   Date of Service:  2023  Admitting Diagnosis: Multiple fractures  Current Admission Summary: 25-year-old female with a history of hyperlipidemia and bladder cancer who was admitted to [facility] on  with right arm and leg pain after a fall from a ladder. She reports she was on the last step and thought she was on the ground and fell 1 step to the ground. Work-up revealed a right radius fracture and a right tibial fracture. CT of the knee revealed an intra-articular tibial fracture involving the spine. Ortho evaluated and suggested nonweightbearing nonoperative management with a knee immobilizer on the right lower extremity. Her hospital course was complicated by urinary retention requiring catheterization. Her catheter was removed prior to transfer to this facility. She was evaluated by therapy and suggested to continue in an inpatient setting prior to returning home. She reports her pain remains significant. She does remark about some dysuria this morning and a urinalysis was positive for UTI. Culture is pending. She also states that she is not a full code. She is a DNR CC. Past Medical History:  has a past medical history of Arthritis, Cancer ([address] and Hyperlipidemia. Past Surgical History:  has a past surgical history that includes Abdomen surgery; Colonoscopy; fracture surgery; Hysterectomy; Tonsillectomy; Breast reduction surgery (); eye surgery (); and Cystoscopy (N/A, 2021).   Discharge Recommendations: Home with assist PRN, Home Health PT  DME Required For Discharge: wheelchair, platform walker, ramp  Precautions/Restrictions: weight bearing  Weight Bearing Restrictions: non weight bearing  [x] Right Upper Extremity  [] Left Upper Extremity [x] Right Lower Extremity  [] Left Lower Extremity  ** (R) UE cleared for platform walker use **     Required Braces/Orthotics: knee immobilizer, splint to (R) distal UE. Cleared by ortho to open KI at rest with knee in extended position for skin integrity. [x] Right  [] Left  Positional Restrictions: knee immobilizer on at all times    Pre-Admission Information   Lives With: spouse    Type of Home: condo  Home Layout: one level  Home Access:  1 + 1 NORA with (R) grab bar  Bathroom Layout: walk in shower  Bathroom Equipment:  owns grab bars but not installed  Toilet Height: standard height  Home Equipment: no prior equipment  Transfer Assistance: Independent without use of device  Ambulation Assistance:Independent without use of device  ADL Assistance: independent with all ADL's  IADL Assistance: requires assistance with all homemaking tasks  Active :        [x] Yes  [] No  Hand Dominance: [] Left  [x] Right  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 Pt has mepilex in place x 2 locations secondary to friction from brace use. Pain: 3/10. Location: (R) UE/knee/hip  Pain Interventions: pain medication in place prior to arrival       Functional Mobility  Bed Mobility  Supine to Sit: contact guard assistance  Sit to Supine: contact guard assistance  Comments:  HOB partially elevated with use of HR. Able to reposition (R) LE without assistance.   Transfers  Sit to stand transfer: moderate assistance, (min (A) to fixed bar support, min/mod (A) within session including to platform RW)  Stand to sit transfer: minimal assistance  Stand pivot transfer: minimal assistance, (w/c => bed without device, utilizes fixed bed HR)  Comments: Consistent VC for hand placement and sequence. Self maintains NWB status. Ambulation  Surface:level surface  Assistive Device: platform walker (R)  Assistance: minimal assistance  Distance: 15 ft + 30 ft   Gait Mechanics: Hop pattern while appropriately maintaining NWB status. Assistance for walker management. Adequate (but reduced (L) foot clearance during hop. Comments:    Stair Mobility  Stair mobility not completed on this date. Comments:  Wheelchair Mobility   No w/c mobility completed on this date. Comments:   Balance  Static Sitting Balance: good: independent with functional balance in unsupported position  Dynamic Sitting Balance: fair (+): maintains balance at SBA/supervision without use of UE support  Static Standing Balance: poor (+): requires min (A) to maintain balance  Dynamic Standing Balance: poor: requires mod (A) to maintain balance  Comments:      Other Therapeutic Interventions   1st Session:    See above functional mobility. In addition shower completed with OT team member to maximize patient performance and maintain patient safety. Pt ambulates bed => shower seat surface with use of (R) platform RW as documented above. Pt requires SBA to maintain dynamic sitting balance during shower completion in addition to OT assist with ADL task completion. Patient maintains static stance at CGA/min (A) with fixed bar support during assisted clothing management. Stand pivot transfer from shower => w/c with fixed bar support at min (A).   Seated oral hygiene and grooming completed at sink at set up assist.      2nd Session:    Pt supine in bed upon arrival, reports discussion with orthopedic group regarding future plans and reports ability to open knee immobilizer while resting for improved skin integrity (confirmed in note). Supine => sit transfer completed at [facility] with HOB elevated and use of HR. Sit <=> stand transfers completed throughout session at min/mod (A). Pt ambulates 10 ft (max distance not attempted) using RW at min (A) with same mechanics as first session. Static LIUDMILA standing balance task completed with use of platform RW including t-band elevation/lowering and clothes pin targeted grasp for simulated ADL completion. Patient maintains standing balance at CGA/min (A) with RW use during reaching activity with increased difficulty completing cross body (R) reaching. Stand step transfer completed w/c => recliner with platform RW at min (A) of 1. Patient appropriately maintains restricted WB status throughout session. Upon return to room, pt remains up in recliner with chair alarm and call light within reach. No new complaints following session. Functional Outcomes                 Cognition  WFL  Orientation:    alert and oriented x 4  Command Following:   [facility]    Education  Barriers To Learning: none  Patient Education: patient educated on goals, PT role and benefits, plan of care, precautions, weight-bearing education, functional mobility training, transfer training, discharge planning including use of ramp for entry to home  Learning Assessment:  patient verbalizes and demonstrates understanding    Assessment  Activity Tolerance: Ongoing  Impairments Requiring Therapeutic Intervention: decreased functional mobility, decreased ADL status, decreased ROM, decreased strength, decreased safety awareness, decreased endurance, decreased balance, increased pain  Prognosis: good  Clinical Assessment: Patient presenting with significant functional improvement on this date with all transfers and ambulation completed with one caregiver assist. Ambulation distance and stability both improved on this date however continues to utilize w/c as primary means of locomotion.   Pain remains in (R) UE/LE but decreased compared to prior session. Plan to begin transition to individual therapy sessions as a result of improved mobility status. Patient was prior independent in home and community without use of assistive device. Safety Interventions: patient left in bed, bed alarm in place, call light within reach, gait belt, and nurse notified    Plan  Frequency: 5 x/week, 90 min/day  Current Treatment Recommendations: strengthening, balance training, functional mobility training, transfer training, gait training, stair training, endurance training, neuromuscular re-education, wheelchair mobility training, manual therapy - soft tissue massage, modalities, patient/caregiver education, ADL/self-care training, pain management, safety education, and equipment evaluation/education    Goals  Patient Goals:  To return home   Short Term Goals:  Time Frame: 3 weeks  Patient will complete bed mobility at modified independent   Patient will complete transfers at [facility]   Patient will ambulate 50 ft with use of platform walker (R) at modified independent  Patient will ascend/descend 1+1 stairs with (R) ascending handrail at stand by assistance  Patient will complete manual w/c propulsion 150 ft at [facility]    Therapy Session Time      Individual Group Co-treatment   Time In     0730   Time Out     0830   Minutes     60     Timed Code Treatment Minutes:  60 minutes    Second Session Therapy Time:   Individual Concurrent Group Co-treatment   Time In       1245   Time Out       1320   Minutes       35     Timed Code Treatment Minutes:  60 + 35 minutes    Total Treatment Minutes:  95  Minutes       Electronically Signed By: Christianne Segovia PT

## 2023-01-16 NOTE — PLAN OF CARE
[address] [facility] [address] Surgery  Plan of Care Note        Orthopedic Consult received, full note to follow. Jojo Sizer 71 y.o. missed the last step coming down off a ladder taking down [employer] decorations on 1/4/2023 at her home. She injured her right knee and right wrist.  She was admitted to [facility] and seen by Dr. Nirav Benjamin. She was placed in a sugar-tong right wrist/forearm splint for a angulated distal radius fracture and was placed in a knee immobilizer for a nondisplaced tibial spine/medial plateau fracture. She wishes to transiton to a new orthopedic provider who has an office more convenient to her home in [address] so we were asked to see her. Currently transition from Lovenox to heparin for DVT prophylaxis. She is nonweightbearing with no range of motion of the right knee and nonweightbearing through the right wrist but okay for platform hemiwalker. She lives in a ranch style home with 2 steps to enter and lives with her  who is not able to help her functionally at all. Currently taking Robaxin and oxycodone for pain    Xray/CT scans reviewed, and showed angulated right distal radius fracture and nondisplaced tibial spine/medial plateau fracture on the right. Plan:  -New 3 views of the right wrist and 4 views of the right knee including AP/lateral, and both obliques were ordered today  - NWB right wrist and right leg; okay for [place] platform walker  -Please open/remove the knee immobilizer for 10 to 15 minutes 3 times daily to relieve pressure  -Dr. Sarahi Matson or myself will follow-up with her tomorrow after new x-rays obtained.     BESSY Diamond - CNP 1/16/2023 12:54 PM

## 2023-01-17 PROBLEM — S52.501A CLOSED FRACTURE OF RIGHT DISTAL RADIUS: Status: ACTIVE | Noted: 2023-01-17

## 2023-01-17 PROBLEM — S82.141A CLOSED FRACTURE OF RIGHT TIBIAL PLATEAU: Status: ACTIVE | Noted: 2023-01-17

## 2023-01-17 PROCEDURE — 2500000003 HC RX 250 WO HCPCS: Performed by: PHYSICAL MEDICINE & REHABILITATION

## 2023-01-17 PROCEDURE — 1280000000 HC REHAB R&B

## 2023-01-17 PROCEDURE — 97116 GAIT TRAINING THERAPY: CPT

## 2023-01-17 PROCEDURE — 97530 THERAPEUTIC ACTIVITIES: CPT

## 2023-01-17 PROCEDURE — 6360000002 HC RX W HCPCS: Performed by: PHYSICAL MEDICINE & REHABILITATION

## 2023-01-17 PROCEDURE — 99222 1ST HOSP IP/OBS MODERATE 55: CPT | Performed by: ORTHOPAEDIC SURGERY

## 2023-01-17 PROCEDURE — 6370000000 HC RX 637 (ALT 250 FOR IP): Performed by: PHYSICAL MEDICINE & REHABILITATION

## 2023-01-17 PROCEDURE — 97535 SELF CARE MNGMENT TRAINING: CPT

## 2023-01-17 RX ORDER — SENNA AND DOCUSATE SODIUM 50; 8.6 MG/1; MG/1
2 TABLET, FILM COATED ORAL 2 TIMES DAILY PRN
Status: DISCONTINUED | OUTPATIENT
Start: 2023-01-17 | End: 2023-01-19 | Stop reason: HOSPADM

## 2023-01-17 RX ADMIN — HEPARIN SODIUM 5000 UNITS: 5000 INJECTION INTRAVENOUS; SUBCUTANEOUS at 14:22

## 2023-01-17 RX ADMIN — ESCITALOPRAM OXALATE 10 MG: 10 TABLET ORAL at 21:04

## 2023-01-17 RX ADMIN — CIPROFLOXACIN 500 MG: 500 TABLET, FILM COATED ORAL at 21:05

## 2023-01-17 RX ADMIN — TAMSULOSIN HYDROCHLORIDE 0.4 MG: 0.4 CAPSULE ORAL at 09:39

## 2023-01-17 RX ADMIN — METHOCARBAMOL TABLETS 750 MG: 750 TABLET, COATED ORAL at 18:14

## 2023-01-17 RX ADMIN — Medication 500 MG: at 09:54

## 2023-01-17 RX ADMIN — CALCIUM ACETATE 1334 MG: 667 CAPSULE ORAL at 18:14

## 2023-01-17 RX ADMIN — HEPARIN SODIUM 5000 UNITS: 5000 INJECTION INTRAVENOUS; SUBCUTANEOUS at 21:07

## 2023-01-17 RX ADMIN — OXYCODONE 10 MG: 5 TABLET ORAL at 13:27

## 2023-01-17 RX ADMIN — OXYCODONE 10 MG: 5 TABLET ORAL at 06:29

## 2023-01-17 RX ADMIN — GABAPENTIN 200 MG: 100 CAPSULE ORAL at 13:28

## 2023-01-17 RX ADMIN — OXYCODONE HYDROCHLORIDE 5 MG: 5 TABLET ORAL at 18:09

## 2023-01-17 RX ADMIN — DICLOFENAC SODIUM 4 G: 10 GEL TOPICAL at 09:40

## 2023-01-17 RX ADMIN — BUPROPION HYDROCHLORIDE 300 MG: 150 TABLET, EXTENDED RELEASE ORAL at 09:38

## 2023-01-17 RX ADMIN — ATORVASTATIN CALCIUM 40 MG: 40 TABLET, FILM COATED ORAL at 09:39

## 2023-01-17 RX ADMIN — TRAZODONE HYDROCHLORIDE 100 MG: 50 TABLET ORAL at 21:05

## 2023-01-17 RX ADMIN — GABAPENTIN 200 MG: 100 CAPSULE ORAL at 09:39

## 2023-01-17 RX ADMIN — METHOCARBAMOL TABLETS 750 MG: 750 TABLET, COATED ORAL at 21:05

## 2023-01-17 RX ADMIN — ARMODAFINIL 250 MG: 250 TABLET ORAL at 06:45

## 2023-01-17 RX ADMIN — DIPHENHYDRAMINE HYDROCHLORIDE 5000 MCG: 25 TABLET ORAL at 18:08

## 2023-01-17 RX ADMIN — OXYCODONE 10 MG: 5 TABLET ORAL at 22:09

## 2023-01-17 RX ADMIN — Medication 324 MG: at 09:48

## 2023-01-17 RX ADMIN — HEPARIN SODIUM 5000 UNITS: 5000 INJECTION INTRAVENOUS; SUBCUTANEOUS at 06:29

## 2023-01-17 RX ADMIN — METHOCARBAMOL TABLETS 750 MG: 750 TABLET, COATED ORAL at 09:38

## 2023-01-17 RX ADMIN — METHOCARBAMOL TABLETS 750 MG: 750 TABLET, COATED ORAL at 13:28

## 2023-01-17 RX ADMIN — GABAPENTIN 200 MG: 100 CAPSULE ORAL at 21:05

## 2023-01-17 RX ADMIN — ASPIRIN 81 MG: 81 TABLET, CHEWABLE ORAL at 09:38

## 2023-01-17 RX ADMIN — CIPROFLOXACIN 500 MG: 500 TABLET, FILM COATED ORAL at 09:39

## 2023-01-17 RX ADMIN — CALCIUM ACETATE 1334 MG: 667 CAPSULE ORAL at 09:44

## 2023-01-17 RX ADMIN — DICLOFENAC SODIUM 4 G: 10 GEL TOPICAL at 21:56

## 2023-01-17 ASSESSMENT — PAIN SCALES - WONG BAKER
WONGBAKER_NUMERICALRESPONSE: 2

## 2023-01-17 ASSESSMENT — PAIN SCALES - GENERAL
PAINLEVEL_OUTOF10: 6
PAINLEVEL_OUTOF10: 6
PAINLEVEL_OUTOF10: 7
PAINLEVEL_OUTOF10: 6
PAINLEVEL_OUTOF10: 6
PAINLEVEL_OUTOF10: 0
PAINLEVEL_OUTOF10: 6

## 2023-01-17 ASSESSMENT — PAIN - FUNCTIONAL ASSESSMENT
PAIN_FUNCTIONAL_ASSESSMENT: ACTIVITIES ARE NOT PREVENTED

## 2023-01-17 ASSESSMENT — PAIN DESCRIPTION - ORIENTATION
ORIENTATION: RIGHT

## 2023-01-17 ASSESSMENT — PAIN DESCRIPTION - DESCRIPTORS
DESCRIPTORS: ACHING
DESCRIPTORS: OTHER (COMMENT)
DESCRIPTORS: ACHING

## 2023-01-17 ASSESSMENT — PAIN DESCRIPTION - LOCATION
LOCATION: LEG
LOCATION: LEG
LOCATION: WRIST
LOCATION: WRIST
LOCATION: KNEE
LOCATION: LEG
LOCATION: LEG

## 2023-01-17 ASSESSMENT — PAIN DESCRIPTION - ONSET
ONSET: ON-GOING
ONSET: ON-GOING

## 2023-01-17 ASSESSMENT — PAIN DESCRIPTION - PAIN TYPE
TYPE: ACUTE PAIN

## 2023-01-17 ASSESSMENT — PAIN DESCRIPTION - FREQUENCY
FREQUENCY: CONTINUOUS
FREQUENCY: CONTINUOUS

## 2023-01-17 NOTE — CARE COORDINATION
Team conference held today. Spoke with patient and her spouse to discuss progress with therapy, barriers to discharge, and plans to return home. Estimated discharge date set for 1/26/2023. Patient's discharge plan to be determined with patient's progress. Will continue to follow for support and discharge planning.     Electronically signed by BECKY RUTH on 1/17/2023 at 4:34 PM

## 2023-01-17 NOTE — PROGRESS NOTES
Joe Womack  1/17/2023  [id number]    Chief Complaint: Multiple fractures    Subjective:   No overnight events. No current complaints. Ortho following and considering surgical intervention. ROS: No CP, SOB, dyspnea    Objective:  Patient Vitals for the past 24 hrs:   BP Temp Temp src Pulse Resp SpO2   01/17/23 0629 -- -- -- -- 18 --   01/16/23 2145 111/60 98.4 °F (36.9 °C) Oral 88 18 95 %   01/16/23 1036 -- -- -- -- 16 --   01/16/23 0945 117/66 98.1 °F (36.7 °C) Oral 75 16 95 %       Gen: No distress, pleasant. Resting in bed  HEENT: Normocephalic, atraumatic. CV: Regular rate and rhythm. No MRG   Resp: No respiratory distress. CTAB. Abd: Soft, nontender nondistended  Ext: RLE edema, RUE in splint  Neuro: Alert, oriented, appropriately interactive. Laboratory data: Available via EMR. Therapy progress:    PT    Supine to Sit: Supervision or touching assistance  Sit to Supine: Supervision or touching assistance   Sit to Stand: Partial/moderate assistance  Chair/Bed to Chair Transfer: Partial/moderate assistance  Car Transfer:    Ambulation 10 ft: Partial/moderate assistance  Ambulation 50 ft:    Ambulation 150 ft:    Stairs - 1 Step:    Stairs - 4 Step:    Stairs - 12 Step:      OT    Eating: Setup or clean-up assistance  Oral Hygiene: Setup or clean-up assistance  Bathing: Dependent  Upper Body Dressing: Supervision or touching assistance  Lower Body Dressing: Dependent  Toilet Transfer: Dependent  Toilet Hygiene: Dependent    Speech Therapy         Body mass index is 26.51 kg/m². Assessment:  Patient Active Problem List   Diagnosis    Clot retention of urine    Multiple fractures       Plan:   Multiple fractures: Nonweightbearing RUE/RLE. Pain control. PT/OT. Ortho following. Ecoli UTI: Started Cipro, Cx sensitive     HLD: Lipitor 40     Depression: Lexapro 10 HS, Wellbutrin 300     Hx bladder cancer: Flomax    R hip pain: XR without fracture.       Bowels: Per protocol  Bladder: Per protocol   Sleep: Trazodone 100 scheduled  Pain: Tylenol as needed, gabapentin 200 3 times daily, Robaxin-750 4 times daily, [place] as needed  DVT PPx: heparin (per patient request)  LEE: 1/26    Team conference was held today on the patient and discussed directly with the patient utilizing their entire treatment team. Please see separate team note for details. Total treatment time for today's care >35 min. Rekha Rico MD 1/17/2023, 8:54 AM    * This document was created using dictation software. While all precautions were taken to ensure accuracy, errors may have occurred. Please disregard any typographical errors.

## 2023-01-17 NOTE — PROGRESS NOTES
[facility] [facility]   Phone: [phone]    Physical Therapy    [] Initial Evaluation            [x] Daily Treatment Note         [] Discharge Summary      Patient: Jacobo Avendano   : 1950   MRN: [id number]   Date of Service:  2023  Admitting Diagnosis: Multiple fractures  Current Admission Summary: 78-year-old female with a history of hyperlipidemia and bladder cancer who was admitted to [facility] on  with right arm and leg pain after a fall from a ladder. She reports she was on the last step and thought she was on the ground and fell 1 step to the ground. Work-up revealed a right radius fracture and a right tibial fracture. CT of the knee revealed an intra-articular tibial fracture involving the spine. Ortho evaluated and suggested nonweightbearing nonoperative management with a knee immobilizer on the right lower extremity. Her hospital course was complicated by urinary retention requiring catheterization. Her catheter was removed prior to transfer to this facility. She was evaluated by therapy and suggested to continue in an inpatient setting prior to returning home. She reports her pain remains significant. She does remark about some dysuria this morning and a urinalysis was positive for UTI. Culture is pending. She also states that she is not a full code. She is a DNR CC. Past Medical History:  has a past medical history of Arthritis, Cancer ([address] and Hyperlipidemia. Past Surgical History:  has a past surgical history that includes Abdomen surgery; Colonoscopy; fracture surgery; Hysterectomy; Tonsillectomy; Breast reduction surgery (); eye surgery (); and Cystoscopy (N/A, 2021).   Discharge Recommendations: Home with assist PRN, Home Health PT  DME Required For Discharge: wheelchair, platform walker, ramp  Precautions/Restrictions: weight bearing  Weight Bearing Restrictions: non weight bearing  [x] Right Upper Extremity  [] Left Upper Extremity [x] Right Lower Extremity  [] Left Lower Extremity  ** (R) UE cleared for platform walker use **     Required Braces/Orthotics: knee immobilizer, splint to (R) distal UE. Cleared by ortho to open KI at rest with knee in extended position for skin integrity. [x] Right  [] Left  Positional Restrictions: knee immobilizer on at all times    Pre-Admission Information   Lives With: spouse    Type of Home: condo  Home Layout: one level  Home Access:  1 + 1 NORA with (R) grab bar  Bathroom Layout: walk in shower  Bathroom Equipment:  owns grab bars but not installed  Toilet Height: standard height  Home Equipment: no prior equipment  Transfer Assistance: Independent without use of device  Ambulation Assistance:Independent without use of device  ADL Assistance: independent with all ADL's  IADL Assistance: requires assistance with all homemaking tasks  Active :        [x] Yes  [] No  Hand Dominance: [] Left  [x] Right  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 Care transferred to PT at this time. Agreeable to therapy. Pain: 4/10. Location: (R) UE/knee  Pain Interventions: pain medication in place prior to arrival       Functional Mobility  Bed Mobility  Bed mobility not completed on this date.   Comments:    Transfers  Sit to stand transfer: moderate assistance, (ranges from min to mod as patient fatigues she requires more assistance)  Stand to sit transfer: minimal assistance  Stand pivot transfer: minimal assistance, (w/c => bed without device, utilizes fixed bed HR)  Car transfer: moderate assistance, (Patient required mod A to stand from car but was able to manage (R) LE independently with VC)  Comments: Consistent VC for hand placement and sequence. Self maintains NWB status. Ambulation  Surface:level surface  Assistive Device: platform walker (R)  Assistance: minimal assistance  Distance: 25 ft x 2  Gait Mechanics: Hop pattern while appropriately maintaining NWB status. Assistance for walker management. Adequate (but reduced (L) foot clearance during hop. Step length improving on the (L) LE this date. Comments:    Stair Mobility  Stair mobility not completed on this date. Comments:  Wheelchair Mobility   Chair: manual  Surface: level surface  Method: (L) UE and (L) LE  Distance: 210 + multiple short bouts throughout session ft  Assistance: modified independent  Comments:   Balance  Static Sitting Balance: good: independent with functional balance in unsupported position  Dynamic Sitting Balance: fair (+): maintains balance at SBA/supervision without use of UE support  Static Standing Balance: poor (+): requires min (A) to maintain balance  Dynamic Standing Balance: poor: requires mod (A) to maintain balance  Comments:      Other Therapeutic Interventions   1st Session:    See above functional mobility. 2nd Session:    Patient seated in recliner upon entry, agreeable to therapy. Per patient report following ortho visit, patient will be having surgery on (R) UE and LE on 1/19. Patient performed all transfers this session at min A with use of platform walker. Additionally, patient ambulated 25 ft at min A with platform walker. W/c propulsion performed at modified independent 79' + multiple short distances. Backwards ascent curb navigation performed at mod A of 1 and CGA of 1 with use of RW: 2 x 2 in step, 2 x 4 in step. No significant LOB occurred.  In addition, patient performed cone reaching across and outside LIUDMILA with (R) UE resting on platform walker at [facility]. Patient performed stand step transfer from w/c => bed at end of session at min A. Sit to supine performed at SBA. Patient left in bed with chair alarm in place and call light within reach. Functional Outcomes                 Cognition  WFL  Orientation:    alert and oriented x 4  Command Following:   [facility]    Education  Barriers To Learning: none  Patient Education: patient educated on goals, PT role and benefits, plan of care, precautions, weight-bearing education, functional mobility training, transfer training, discharge planning including use of ramp for entry to home, education on home evaluation  Learning Assessment:  patient verbalizes and demonstrates understanding    Assessment  Activity Tolerance: Ongoing  Impairments Requiring Therapeutic Intervention: decreased functional mobility, decreased ADL status, decreased ROM, decreased strength, decreased safety awareness, decreased endurance, decreased balance, increased pain  Prognosis: good  Clinical Assessment: Patient continues to demonstrate improvements in transfers and ambulation this date as evidenced by ability to perform all with min to mod A of 1. As patient fatigues, she requires increased assistance to stand. Ambulation distance and stability both continued to improve on this date however continues to utilize w/c as primary means of locomotion. Patient able to manage strap adjustment on walker in standing without LOB demonstrating improvements in standing balance. Per patient report patient anticipating surgery on (R) UE and LE on 1/19. As a result, she will be d/c on 1/18. Patient would continue to benefit from skilled therapy services to promote functional independence and reduce caregiver burden.    Safety Interventions: patient left in bed, bed alarm in place, call light within reach, gait belt, and nurse notified    Plan  Frequency: 5 x/week, 90 min/day  Current Treatment Recommendations: strengthening, balance training, functional mobility training, transfer training, gait training, stair training, endurance training, neuromuscular re-education, wheelchair mobility training, manual therapy - soft tissue massage, modalities, patient/caregiver education, ADL/self-care training, pain management, safety education, and equipment evaluation/education    Goals  Patient Goals: To return home   Short Term Goals:  Time Frame: 3 weeks  Patient will complete bed mobility at modified independent   Patient will complete transfers at [facility]   Patient will ambulate 50 ft with use of platform walker (R) at modified independent  Patient will ascend/descend 1+1 stairs with (R) ascending handrail at stand by assistance  Patient will complete manual w/c propulsion 150 ft at [facility]    Therapy Session Time      Individual Group Co-treatment   Time In 1015       Time Out 1100       Minutes 45         Timed Code Treatment Minutes:  45 minutes    Second Session Therapy Time:   Individual Concurrent Group Co-treatment   Time In       1312   Time Out       1358   Minutes       46     Timed Code Treatment Minutes:  45 + 46 minutes    Total Treatment Minutes:  91 Minutes       Electronically Signed By:   SHERLYN Woods  Therapist observed and directed the patient's plan of care.   Co-signed and supervised by: Patrick Saleh PT, DPT - [id number]

## 2023-01-17 NOTE — PLAN OF CARE
Problem: Discharge Planning  Goal: Discharge to home or other facility with appropriate resources  1/17/2023 1641 by Elisha Rebolledo RN  Outcome: Progressing  Flowsheets (Taken 1/17/2023 0940)  Discharge to home or other facility with appropriate resources: Identify barriers to discharge with patient and caregiver  1/17/2023 0855 by Smith Nicole RN  Outcome: Progressing     Problem: Pain  Goal: Verbalizes/displays adequate comfort level or baseline comfort level  1/17/2023 1641 by Elisha Rebolledo RN  Outcome: Progressing  1/17/2023 0855 by Smith Nicole RN  Outcome: Progressing     Problem: Skin/Tissue Integrity  Goal: Absence of new skin breakdown  Description: 1. Monitor for areas of redness and/or skin breakdown  2. Assess vascular access sites hourly  3. Every 4-6 hours minimum:  Change oxygen saturation probe site  4. Every 4-6 hours:  If on nasal continuous positive airway pressure, respiratory therapy assess nares and determine need for appliance change or resting period.   1/17/2023 1641 by Elisha Rebolledo RN  Outcome: Progressing  1/17/2023 0855 by Smith Nicole RN  Outcome: Progressing     Problem: Safety - Adult  Goal: Free from fall injury  1/17/2023 1641 by Elisha Rebolledo RN  Outcome: Progressing  1/17/2023 0855 by Smith Nicole RN  Outcome: Progressing

## 2023-01-17 NOTE — CONSULTS
[facility] Orthopedic Surgery  Consult Note         This patient is seen in consultation at the request of Dr Emil Falk MD    Reason for Consult:  Right wrist and knee pain/ moderately displaced distal radius fracture and tibial plateau fracture. CHIEF COMPLAINT:  Right wrist and knee pain/ fall. History Obtained From:  patient, spouse, electronic medical record    HISTORY OF PRESENT ILLNESS:    Ms. Ashleigh Centeno is a 71 y.o.  female right handed who seen today at [facility] unit for consultation and evaluation of a right wrist and knee injury. The patient reports that this injury occurred when she fell coming down a ladder as she was removing [email or web address] decoration on 1/4/2023. She was first seen and evaluated in [address] [place] [address], when she was x-rayed and admitted, splinted, and seen by Orthopedic and recommended non operative treatment and she then came to [facility] and I was consulted. The patient denies any other injuries. Rates pain a 8/10 VAS moderate, sharp, constant and show no change. Movement makes the pain worse, the splint and resting makes the pain better. Alleviating factors rest. No numbness or tingling sensation. Past Medical History:        Diagnosis Date    Arthritis     Cancer ([facility] [address] 07/20/2021    Malignant neoplasm urinary bladder    Hyperlipidemia        Past Surgical History:        Procedure Laterality Date    ABDOMEN SURGERY      gastri bypass 2001    BREAST REDUCTION SURGERY  2004    COLONOSCOPY      CYSTOSCOPY N/A 8/1/2021    CYSTOSCOPY EVACUATION OF CLOTS FULGURATION performed by Francois Parmar MD at [address] [facility] [address] (CERVIX STATUS UNKNOWN)      TONSILLECTOMY         Medications prior to admission:   Prior to Admission medications    Medication Sig Start Date End Date Taking?  Authorizing Provider   calcium carbonate 600 MG TABS tablet Take 2 tablets by mouth 2 times daily   Yes Historical Provider, MD   vitamin C (ASCORBIC ACID) 500 MG tablet Take 500 mg by mouth every morning   Yes Historical Provider, MD   aspirin 81 MG EC tablet Take 81 mg by mouth every morning   Yes Historical Provider, MD   escitalopram (LEXAPRO) 10 MG tablet Take 10 mg by mouth daily   Yes Historical Provider, MD   estradiol (ESTRACE) 0.1 MG/GM vaginal cream Place 0.5 g vaginally daily Pea size (0.5gram) amount to vaginal opening 2 times per week. Yes Historical Provider, MD   ferrous sulfate (IRON 325) 325 (65 Fe) MG tablet Take 325 mg by mouth daily (with breakfast)   Yes Historical Provider, MD   atorvastatin (LIPITOR) 40 MG tablet Take 40 mg by mouth daily    Historical Provider, MD   buPROPion (WELLBUTRIN XL) 300 MG extended release tablet Take 300 mg by mouth every morning    Historical Provider, MD   traZODone (DESYREL) 100 MG tablet Take 100 mg by mouth nightly    Historical Provider, MD   Armodafinil 250 MG TABS Take 250 mg by mouth daily.     Historical Provider, MD   diclofenac sodium (VOLTAREN) 1 % GEL Apply 2 g topically 4 times daily as needed for Pain    Historical Provider, MD   Multiple Vitamins-Minerals (THERAPEUTIC MULTIVITAMIN-MINERALS) tablet Take 1 tablet by mouth every morning    Historical Provider, MD   Cholecalciferol (VITAMIN D3) 50 MCG (2000 UT) CAPS Take 1 capsule by mouth every evening     Historical Provider, MD   Roospaul Ip Oil (OMEGA-3) 500 MG CAPS Take 500 mg by mouth every morning    Historical Provider, MD   Biotin 5000 MCG CAPS Take 1 tablet by mouth every evening    Historical Provider, MD       Current Medications:   Current Facility-Administered Medications: sennosides-docusate sodium (SENOKOT-S) 8.6-50 MG tablet 2 tablet, 2 tablet, Oral, BID PRN  heparin (porcine) injection 5,000 Units, 5,000 Units, SubCUTAneous, 3 times per day  escitalopram (LEXAPRO) tablet 10 mg, 10 mg, Oral, Nightly  traZODone (DESYREL) tablet 100 mg, 100 mg, Oral, Nightly  polyethylene glycol (GLYCOLAX) packet 17 g, 17 g, Oral, Daily PRN  ciprofloxacin (CIPRO) tablet 500 mg, 500 mg, Oral, 2 times per day  Biotin CAPS 5,000 mcg PATIENT SUPPLIED (Patient Supplied), 5,000 mcg, Oral, QPM  Omega-3 CAPS 500 mg PATIENT SUPPLIED (Patient Supplied), 500 mg, Oral, QAM  vitamin D3 (CHOLECALCIFEROL) tablet 5,000 Units PATIENT SUPPLIED, 5,000 Units, Oral, Daily  Armodafinil TABS 250 mg PATIENT SUPPLIED (Patient Supplied), 250 mg, Oral, Daily  acetaminophen (TYLENOL) tablet 650 mg, 650 mg, Oral, Q4H PRN  diclofenac sodium (VOLTAREN) 1 % gel 4 g, 4 g, Topical, BID  gabapentin (NEURONTIN) capsule 200 mg, 200 mg, Oral, TID  lidocaine 4 % external patch 1 patch, 1 patch, TransDERmal, Daily  methocarbamol (ROBAXIN) tablet 750 mg, 750 mg, Oral, 4x Daily  tamsulosin (FLOMAX) capsule 0.4 mg, 0.4 mg, Oral, Daily  aspirin chewable tablet 81 mg, 81 mg, Oral, Daily  atorvastatin (LIPITOR) tablet 40 mg, 40 mg, Oral, Daily  buPROPion (WELLBUTRIN XL) extended release tablet 300 mg, 300 mg, Oral, Daily  calcium acetate (PHOSLO) capsule 1,334 mg, 2 capsule, Oral, BID WC  ferrous gluconate (FERGON) tablet 324 mg, 324 mg, Oral, Daily with breakfast  diphenhydrAMINE (BENADRYL) tablet 25 mg, 25 mg, Oral, Q6H PRN  hydrALAZINE (APRESOLINE) tablet 50 mg, 50 mg, Oral, Q8H PRN  ondansetron (ZOFRAN-ODT) disintegrating tablet 4 mg, 4 mg, Oral, Q8H PRN  oxyCODONE (ROXICODONE) immediate release tablet 5 mg, 5 mg, Oral, Q4H PRN **OR** oxyCODONE (ROXICODONE) immediate release tablet 10 mg, 10 mg, Oral, Q4H PRN    Allergies:  Erythromycin and Morphine    Social History     Socioeconomic History    Marital status:      Spouse name: Nancy Moralez    Number of children: Not on file    Years of education: 12    Highest education level: Not on file   Occupational History    Not on file   Tobacco Use    Smoking status: Former     Years: 34.00     Types: Cigarettes     Quit date: 1989     Years since quittin.0    Smokeless tobacco: Never   Vaping Use    Vaping Use: Never used   Substance and Sexual Activity    Alcohol use: Not on file    Drug use: Never    Sexual activity: Not on file   Other Topics Concern    Not on file   Social History Narrative    Not on file     Social Determinants of Health     Financial Resource Strain: Not on file   Food Insecurity: Not on file   Transportation Needs: Not on file   Physical Activity: Not on file   Stress: Not on file   Social Connections: Not on file   Intimate Partner Violence: Not on file   Housing Stability: Not on file       Family History:  Family History   Problem Relation Age of Onset    Hypertension Mother     High Cholesterol Mother     Stroke Father     Heart Disease Father     High Cholesterol Father     Colon Cancer Sister     Diabetes Brother     Stroke Brother        REVIEW OF SYSTEMS:   CONSTITUTIONAL: Denies unexplained weight loss, fevers, chills or fatigue  NEUROLOGICAL: Denies unsteady gait or progressive weakness    PSYCHOLOGICAL: Denies anxiety, depression   SKIN: Denies skin changes, delayed healing, rash, itching   HEMATOLOGIC: Denies easy bleeding or bruising  ENDOCRINE: Denies excessive thirst, urination, heat/cold  RESPIRATORY: Denies current dyspnea, cough  CARDIOVASCULAR: Negative for chest pain at this time. EYES: Negative for photophobia and visual disturbance. ENT:  Negative for rhinorrhea, epistaxis, sore throat, or hearing loss. GI: Denies nausea, vomiting, diarrhea   : Denies bowel or bladder issues   MUSCULOSKELETAL: Right wrist and knee pain. All other ROS reviewed in chart or with patient or family and are grossly negative. PHYSICAL EXAMINATION:  Ms. Peter Summers is a very pleasant 71 y.o. female who seen today in no acute distress, awake, alert, and oriented. She is well nourished and groomed. Patient with normal affect. Body mass index is 26.51 kg/m². . Skin warm and dry. Resting respiratory rate is 16. Resp deep and easy.  Pulse is with regular rate and rhythm    /67   Pulse 81   Temp 97.9 °F (36.6 °C) (Oral) Resp 18   Ht 5' 5\" (1.651 m)   Wt 159 lb 4.8 oz (72.3 kg)   SpO2 96%   BMI 26.51 kg/m²        Airway is intact  Chest: chest clear, no wheezing, rales, normal symmetric air entry, no tachypnea, retractions or cyanosis  Heart: regular rate and rhythm ; heart sounds normal   Hearing intact, pupil equal and reactive bilateral  Lymphatics; No groin or axillary enlarged lymph nodes. Neck; No swelling  Abdomen; soft, non distended. MUSCULOSKELETAL:   Examination of both knees showing a decreased range of motion of the right knee compare to the other side. There is moderate swelling that can be seen, as well as ecchymosis. She has intact sensation and good pedal pulses. She has significant tenderness on deep palpation over the right knee. On evaluation of her right upper extremity, there is minimal deformity. There is moderate swelling and moderate ecchymosis. She is tender to palpation over the distal radius, and otherwise nontender over the remainder of the extremity. Range of motion is decreased secondary to pain over the right wrist, but no mechanical block. The skin overlying the right wrist is intact without evidence of lesion, laceration or abrasion. Distal pulses are 2+ and symmetric bilaterally. Sensation is grossly intact to light touch and symmetric bilaterally.     NEUROLOGIC:   Sensory:    Touch:                     Right Upper Extremity:  normal                   Left Upper Extremity:  normal                  Right Lower Extremity:  normal                  Left Lower Extremity:  normal        DATA:    CBC:   Lab Results   Component Value Date/Time    WBC 6.6 01/16/2023 05:29 AM    RBC 3.50 01/16/2023 05:29 AM    HGB 10.9 01/16/2023 05:29 AM    HCT 32.4 01/16/2023 05:29 AM    MCV 92.6 01/16/2023 05:29 AM    MCH 31.0 01/16/2023 05:29 AM    MCHC 33.5 01/16/2023 05:29 AM    RDW 12.8 01/16/2023 05:29 AM     01/16/2023 05:29 AM    MPV 8.2 01/16/2023 05:29 AM     WBC:    Lab Results Component Value Date/Time    WBC 6.6 01/16/2023 05:29 AM     PT/INR:    Lab Results   Component Value Date/Time    PROTIME 13.3 08/02/2021 05:34 AM    INR 1.17 08/02/2021 05:34 AM     PTT:    Lab Results   Component Value Date/Time    APTT 28.1 08/02/2021 05:34 AM   [APTT    IMAGING: Xrays, 3 views of the right knee dated 1/16/2023 from [address] [facility],  were reviewed, and showed a moderately displaced medial and lateral tibial plateau bicondylar fracture. Xrays dated 1/16/2023, 3 views of right wrist were reviewed, and showed moderately displaced distal radius fracture. IMPRESSION:  1- Right wrist pain/ moderately displaced distal radius fracture. 2- Right knee pain / Medial, Lateral tibial plateau fracture. PLAN:  I discussed with Fawn Pleitezvick the overall alignment of the 2 fractures and treatment options including both surgical and non-surgical treatment, and that my recommendation is an open reduction and internal fixation given the amount of displacement and comminution of both fracture. I discussed the risks and benefits of surgery with the patient, including but not limited to infection, bleeding, pain, injury to nerves or blood vessels failure of the surgery and need for additional surgery. All the patient's questions were answered. We discussed an expected post-operative course. She  is understanding of this and wishes to proceed. Surgery on Thursday. Thank you very much for the kind consultation and allowing me to participate in this patient's care. I will continue to keep you apprised of her progress.         Galina Meraz MD   1/17/2023  1:28 PM

## 2023-01-17 NOTE — PROGRESS NOTES
[facility]   Phone: [phone]    Occupational Therapy    [] Initial Evaluation            [x] Daily Treatment Note         [] Discharge Summary      Patient: Paola Jimenez   : 1950   MRN: [id number]   Date of Service:  2023    Admitting Diagnosis:  Multiple fractures  Current Admission Summary: 78-year-old female with a history of hyperlipidemia and bladder cancer who was admitted to [facility] on  with right arm and leg pain after a fall from a ladder. She reports she was on the last step and thought she was on the ground and fell 1 step to the ground. Work-up revealed a right radius fracture and a right tibial fracture. CT of the knee revealed an intra-articular tibial fracture involving the spine. Ortho evaluated and suggested nonweightbearing nonoperative management with a knee immobilizer on the right lower extremity. Her hospital course was complicated by urinary retention requiring catheterization. Her catheter was removed prior to transfer to this facility. She was evaluated by therapy and suggested to continue in an inpatient setting prior to returning home. She reports her pain remains significant. She does remark about some dysuria this morning and a urinalysis was positive for UTI. Past Medical History:  has a past medical history of Arthritis, Cancer ([address] and Hyperlipidemia. Past Surgical History:  has a past surgical history that includes Abdomen surgery; Colonoscopy; fracture surgery; Hysterectomy; Tonsillectomy; Breast reduction surgery (); eye surgery (); and Cystoscopy (N/A, 2021).     Discharge Recommendations: [place] [facility] S[facility]    DME Required For Discharge: continue to further assess pending progress; reacher, shower chair with back     Precautions/Restrictions: high fall risk, weight bearing  Weight Bearing Restrictions: non weight bearing  [x] Right Upper Extremity  [] Left Upper Extremity [x] Right Lower Extremity  [] Left Lower Extremity     Required Braces/Orthotics: knee immobilizer, R UE cast/splint   [x] Right  [] Left  Positional Restrictions: R LE KI at all times      Pre-Admission Information   Lives With: spouse                  Type of Home: condo  Home Layout: one level  Home Access:  1 + 1 NORA with (R) grab bar  Bathroom Layout: walk in shower  Bathroom Equipment:  owns grab bars but not installed  Toilet Height: standard height  Home Equipment: no prior equipment  Transfer Assistance: Independent without use of device  Ambulation Assistance:Independent without use of device  ADL Assistance: independent with all ADL's  IADL Assistance: requires assistance with all homemaking tasks  Active :        [x] Yes                 [] No  Hand Dominance: [] Left                 [x] Right  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  Hobbies: Document [employer], [address]: 0 additional recent falls excluding reason for hospital admission        Subjective  General: Pt supine in bed upon entry-agreeable to therapy session   Pain: Pt reported skin irritation near KI  Pain Interventions: Re-adjusted  R KI positioning        Activities of Daily Living  Basic Activities of Daily Living  Feeding: setup assistance  Grooming: setup assistance  Grooming Comments: Grooming tasks completed seated in w/c at sink  Upper Extremity Dressing: minimal assistance  Lower Extremity Dressing: minimal assistance requires verbal cueing  Dressing Comments: Pt used reacher to complete LB dressing at EOB. Min VC/TC to sequence LB dressing. Stance to platform RW for clothing over hips   General Comments: Instrumental Activities of Daily Living  Light Housekeeping: stand by assistance.     - Functional reaching activity in kitchen for functional w/c mobility: item retrieval w/c level from lower cabinets, pt transported items along counter top and to/from table- increased time to problem solve w/c positioning at times                  Functional Mobility  Bed Mobility  Supine to Sit: stand by assistance  Sit to Supine: stand by assistance  Rolling Right: stand by assistance  Scooting: stand by assistance       Transfers  Sit to stand transfer:minimal assistance, periods of ModA   Stand to sit transfer: minimal assistance,  Comments: Pt demo'd good adherence to WB status throughout all transfers. Pt required Min VC for hand placement during all transfers. Functional Mobility:  Sitting Balance: supervision. Standing Balance: minimal assistance. Functional Mobility: .  stand by assistance  Functional Mobility Device Use: wheelchair  Functional Mobility Comment: pt propelled w/c room >> therapy room + within kitchenette area for above IADL activity. Short distance for stand-step transfers with R platform walker in pt room at [facility]     Other Therapeutic Interventions      Second Session  Pt in recliner at entry, agreeable to session, reporting 6/10 pain in R UE/LE- RN administered medications at start of session. Pt completed all sit<>stands and short distance RW mobility at Min to [address]. Pt propelled w/c ~50ft at SBA. Curb steps completed- 2 inch x3 and 4 inch x2- ModA >> [place] for both trials with CGA of 2nd person for safety. Long rest break required between step height reps. Dynamic reaching in stance to increase balance and functional reach- pt grasped and stacked 8 cones held across multiple planes, 2 reps of activity w/o seated break, CGA for balance. Pt propelled w/c back to room at EOS- stand step to EOB with RW at [facility]. Sit>supine at SBA.  Pt left in bed, bed alarm on, call light and needs in reach        Cognition  [facility]  Orientation:    alert and oriented x 4  Command Following:   [facility]     Education  Barriers To Learning: none  Patient Education: patient educated on goals, OT role and benefits, plan of care, precautions, transfer training, discharge recommendations  Learning Assessment: patient verbalizes understanding, would benefit from continued reinforcement    Assessment  Activity Tolerance: Fair, limited by pain  Impairments Requiring Therapeutic Intervention: decreased functional mobility, decreased ADL status, decreased ROM, decreased strength, decreased endurance, decreased balance, decreased IADL, increased pain  Prognosis: good  Clinical Assessment: Pt presents with above deficits impacting her occupational performance. Pt is below her IND baseline level of function secondary to R wrist fx and R tibial fx with NWB status. Pt progressing well, able to complete complete SPT and sit<>stand with assist of 1. Demo's improved w/c mobility/maneuverability for IADL task. Pt introduced to AE (reacher) for LB dressing. Pt demo's carry over skills for sit to stand/SPT and functional reaching in stance for LB ADLs. Pt participates well and demo's good motivation for recovery. Skilled OT services indicated to maximize her IND and safety with all occupational pursuits. Continue POC.       Safety Interventions: patient left in bed, bed alarm in place, call light within reach, gait belt, and patient at risk for falls    Plan  Frequency: 5 x/week, 90 min/day  Current Treatment Recommendations: strengthening, balance training, functional mobility training, transfer training, endurance training, patient/caregiver education, ADL/self-care training, IADL training, home management training, pain management, home exercise program, safety education, equipment evaluation/education, and positioning    Goals  Patient Goals: regain IND   Short Term Goals:  Time Frame: 2-3 weeks  Patient will complete lower body ADL at modified independent   Patient will complete toileting at modified independent   Patient will complete grooming at modified independent   Patient will complete functional transfers at modified independent   Patient will complete functional mobility at modified independent   Patient to gather and transport IADL items at modified independent     Progressing towards goals: 1/17    Therapy Session Time     Individual Group Co-treatment   Time In 0730     Time Out 0819     Minutes 49      Second Session Therapy Time:   Individual Concurrent Group Co-treatment   Time In     1312   Time Out     1358   Minutes     46     Timed Code Treatment Minutes: 49 + 46    Total Treatment Minutes:  95        Electronically Signed By:   1st session: YAA Fraser/ALYSSIA  Directed and supervised by Corine Yuen OTR/L #[id number]      2nd session: JOSE Anguiano/ANNEL #[id number]

## 2023-01-18 ENCOUNTER — TELEPHONE (OUTPATIENT)
Dept: ORTHOPEDIC SURGERY | Age: 73
End: 2023-01-18

## 2023-01-18 PROCEDURE — 6370000000 HC RX 637 (ALT 250 FOR IP): Performed by: PHYSICAL MEDICINE & REHABILITATION

## 2023-01-18 PROCEDURE — 1280000000 HC REHAB R&B

## 2023-01-18 PROCEDURE — 6360000002 HC RX W HCPCS: Performed by: PHYSICAL MEDICINE & REHABILITATION

## 2023-01-18 PROCEDURE — 97116 GAIT TRAINING THERAPY: CPT

## 2023-01-18 PROCEDURE — 97530 THERAPEUTIC ACTIVITIES: CPT

## 2023-01-18 PROCEDURE — 97535 SELF CARE MNGMENT TRAINING: CPT

## 2023-01-18 PROCEDURE — 2500000003 HC RX 250 WO HCPCS: Performed by: PHYSICAL MEDICINE & REHABILITATION

## 2023-01-18 PROCEDURE — 97110 THERAPEUTIC EXERCISES: CPT

## 2023-01-18 RX ORDER — HYDROMORPHONE HYDROCHLORIDE 1 MG/ML
0.25 INJECTION, SOLUTION INTRAMUSCULAR; INTRAVENOUS; SUBCUTANEOUS EVERY 4 HOURS PRN
Status: DISCONTINUED | OUTPATIENT
Start: 2023-01-18 | End: 2023-01-19 | Stop reason: HOSPADM

## 2023-01-18 RX ORDER — SODIUM CHLORIDE 9 MG/ML
INJECTION, SOLUTION INTRAVENOUS CONTINUOUS
Status: DISCONTINUED | OUTPATIENT
Start: 2023-01-18 | End: 2023-01-19 | Stop reason: HOSPADM

## 2023-01-18 RX ADMIN — DIPHENHYDRAMINE HYDROCHLORIDE 5000 MCG: 25 TABLET ORAL at 17:26

## 2023-01-18 RX ADMIN — CALCIUM ACETATE 1334 MG: 667 CAPSULE ORAL at 17:22

## 2023-01-18 RX ADMIN — GABAPENTIN 200 MG: 100 CAPSULE ORAL at 08:58

## 2023-01-18 RX ADMIN — OXYCODONE 10 MG: 5 TABLET ORAL at 06:47

## 2023-01-18 RX ADMIN — OXYCODONE 10 MG: 5 TABLET ORAL at 12:18

## 2023-01-18 RX ADMIN — HEPARIN SODIUM 5000 UNITS: 5000 INJECTION INTRAVENOUS; SUBCUTANEOUS at 13:10

## 2023-01-18 RX ADMIN — METHOCARBAMOL TABLETS 750 MG: 750 TABLET, COATED ORAL at 12:18

## 2023-01-18 RX ADMIN — DICLOFENAC SODIUM 4 G: 10 GEL TOPICAL at 09:02

## 2023-01-18 RX ADMIN — ARMODAFINIL 250 MG: 250 TABLET ORAL at 07:34

## 2023-01-18 RX ADMIN — OXYCODONE 10 MG: 5 TABLET ORAL at 22:14

## 2023-01-18 RX ADMIN — ESCITALOPRAM OXALATE 10 MG: 10 TABLET ORAL at 22:16

## 2023-01-18 RX ADMIN — TRAZODONE HYDROCHLORIDE 100 MG: 50 TABLET ORAL at 22:22

## 2023-01-18 RX ADMIN — CALCIUM ACETATE 1334 MG: 667 CAPSULE ORAL at 08:58

## 2023-01-18 RX ADMIN — OXYCODONE 10 MG: 5 TABLET ORAL at 03:03

## 2023-01-18 RX ADMIN — METHOCARBAMOL TABLETS 750 MG: 750 TABLET, COATED ORAL at 22:15

## 2023-01-18 RX ADMIN — OXYCODONE 10 MG: 5 TABLET ORAL at 17:22

## 2023-01-18 RX ADMIN — Medication 324 MG: at 08:59

## 2023-01-18 RX ADMIN — CIPROFLOXACIN 500 MG: 500 TABLET, FILM COATED ORAL at 22:15

## 2023-01-18 RX ADMIN — GABAPENTIN 200 MG: 100 CAPSULE ORAL at 13:11

## 2023-01-18 RX ADMIN — BUPROPION HYDROCHLORIDE 300 MG: 150 TABLET, EXTENDED RELEASE ORAL at 08:58

## 2023-01-18 RX ADMIN — HEPARIN SODIUM 5000 UNITS: 5000 INJECTION INTRAVENOUS; SUBCUTANEOUS at 22:16

## 2023-01-18 RX ADMIN — METHOCARBAMOL TABLETS 750 MG: 750 TABLET, COATED ORAL at 08:58

## 2023-01-18 RX ADMIN — GABAPENTIN 200 MG: 100 CAPSULE ORAL at 22:14

## 2023-01-18 RX ADMIN — TAMSULOSIN HYDROCHLORIDE 0.4 MG: 0.4 CAPSULE ORAL at 08:58

## 2023-01-18 RX ADMIN — ATORVASTATIN CALCIUM 40 MG: 40 TABLET, FILM COATED ORAL at 08:59

## 2023-01-18 RX ADMIN — ASPIRIN 81 MG: 81 TABLET, CHEWABLE ORAL at 08:58

## 2023-01-18 RX ADMIN — CIPROFLOXACIN 500 MG: 500 TABLET, FILM COATED ORAL at 08:58

## 2023-01-18 RX ADMIN — HEPARIN SODIUM 5000 UNITS: 5000 INJECTION INTRAVENOUS; SUBCUTANEOUS at 06:48

## 2023-01-18 RX ADMIN — METHOCARBAMOL TABLETS 750 MG: 750 TABLET, COATED ORAL at 17:23

## 2023-01-18 ASSESSMENT — PAIN DESCRIPTION - FREQUENCY
FREQUENCY: CONTINUOUS
FREQUENCY: CONTINUOUS

## 2023-01-18 ASSESSMENT — PAIN DESCRIPTION - PAIN TYPE
TYPE: ACUTE PAIN
TYPE: ACUTE PAIN

## 2023-01-18 ASSESSMENT — PAIN SCALES - GENERAL
PAINLEVEL_OUTOF10: 6
PAINLEVEL_OUTOF10: 5
PAINLEVEL_OUTOF10: 6
PAINLEVEL_OUTOF10: 5
PAINLEVEL_OUTOF10: 6
PAINLEVEL_OUTOF10: 1
PAINLEVEL_OUTOF10: 6

## 2023-01-18 ASSESSMENT — PAIN DESCRIPTION - DESCRIPTORS
DESCRIPTORS: ACHING
DESCRIPTORS: THROBBING

## 2023-01-18 ASSESSMENT — PAIN DESCRIPTION - LOCATION
LOCATION: LEG;KNEE
LOCATION: KNEE
LOCATION: LEG;KNEE
LOCATION: KNEE
LOCATION: KNEE
LOCATION: LEG
LOCATION: LEG
LOCATION: KNEE
LOCATION: LEG

## 2023-01-18 ASSESSMENT — PAIN DESCRIPTION - ONSET
ONSET: ON-GOING
ONSET: ON-GOING

## 2023-01-18 ASSESSMENT — PAIN SCALES - WONG BAKER
WONGBAKER_NUMERICALRESPONSE: 0
WONGBAKER_NUMERICALRESPONSE: 0

## 2023-01-18 ASSESSMENT — PAIN DESCRIPTION - ORIENTATION
ORIENTATION: RIGHT

## 2023-01-18 ASSESSMENT — PAIN - FUNCTIONAL ASSESSMENT
PAIN_FUNCTIONAL_ASSESSMENT: ACTIVITIES ARE NOT PREVENTED
PAIN_FUNCTIONAL_ASSESSMENT: ACTIVITIES ARE NOT PREVENTED

## 2023-01-18 NOTE — PLAN OF CARE
[facility]  Plan of Care Note      H/H: 10.9/32.4    Plan:  - Scheduled for ORIF right distal radius and right tibial plateau with Dr. Min Mendez tomorrow 10:30AM.  - Hold heparin after 2pm dose today  - NPO after midnight  - NWB Right wrist and Right leg  - Verify informed consent  - Plan is to d/c from ARU tomorrow morning directly into the pre-op area of [address] [place] [address]  - Plan to admit to hospitalist team post-operatively tomorrow.      BESSY Pratt - CNP 1/18/2023 11:46 AM

## 2023-01-18 NOTE — PROGRESS NOTES
Natalie Chaudhry [address]   Phone: [phone]    Occupational Therapy    [] Initial Evaluation            [x] Daily Treatment Note         [] Discharge Summary      Patient: Jacobo Avendano   : 1950   MRN: [id number]   Date of Service:  2023    Admitting Diagnosis:  Multiple fractures  Current Admission Summary: 71-year-old female with a history of hyperlipidemia and bladder cancer who was admitted to [facility] on  with right arm and leg pain after a fall from a ladder. She reports she was on the last step and thought she was on the ground and fell 1 step to the ground. Work-up revealed a right radius fracture and a right tibial fracture. CT of the knee revealed an intra-articular tibial fracture involving the spine. Ortho evaluated and suggested nonweightbearing nonoperative management with a knee immobilizer on the right lower extremity. Her hospital course was complicated by urinary retention requiring catheterization. Her catheter was removed prior to transfer to this facility. She was evaluated by therapy and suggested to continue in an inpatient setting prior to returning home. She reports her pain remains significant. She does remark about some dysuria this morning and a urinalysis was positive for UTI. Past Medical History:  has a past medical history of Arthritis, Cancer ([address] and Hyperlipidemia. Past Surgical History:  has a past surgical history that includes Abdomen surgery; Colonoscopy; fracture surgery; Hysterectomy; Tonsillectomy; Breast reduction surgery (); eye surgery (); and Cystoscopy (N/A, 2021).     Discharge Recommendations: [facility] OT S3    DME Required For Discharge: continue to further assess pending progress; reacher, shower chair with back     Precautions/Restrictions: high fall risk, weight bearing  Weight Bearing Restrictions: non weight bearing  [x] Right Upper Extremity  [] Left Upper Extremity [x] Right Lower Extremity  [] Left Lower Extremity     Required Braces/Orthotics: knee immobilizer, R UE cast/splint   [x] Right  [] Left  Positional Restrictions: R LE KI at all times      Pre-Admission Information   Lives With: spouse                  Type of Home: condo  Home Layout: one level  Home Access:  1 + 1 NORA with (R) grab bar  Bathroom Layout: walk in shower  Bathroom Equipment:  owns grab bars but not installed  Toilet Height: standard height  Home Equipment: no prior equipment  Transfer Assistance: Independent without use of device  Ambulation Assistance:Independent without use of device  ADL Assistance: independent with all ADL's  IADL Assistance: requires assistance with all homemaking tasks  Active :        [x] Yes                 [] No  Hand Dominance: [] Left                 [x] Right  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  Hobbies: [employer], [address]: 0 additional recent falls excluding reason for hospital admission        Subjective  General: Pt supine in bed upon entry-agreeable to therapy session and shower  Pain: Pt reported no pain  Pain Interventions: NA        Activities of Daily Living  Basic Activities of Daily Living  Feeding: setup assistance  Grooming: setup assistance  Grooming Comments: Grooming tasks completed seated in w/c at sink  Upper Extremity Bathing: stand by assistance  Lower Extremity Bathing: stand by assistance minimal assistance   Bathing Comments:  Pt sat on TTB for bathing-Assit for washing back, buttocks. Upper Extremity Dressing: stand by assistance  Lower Extremity Dressing: minimal assistance requires verbal cueing  Dressing Equipment: reacher  Dressing Comments: Pt used reacher for LB dressing on TTB-required VC to sequence reacher-pt in stance w/ platform walker to pull up briefs/shorts. Toileting: CGA   General Comments: Instrumental Activities of Daily Living  No IADL completed on this date. Functional Mobility  Bed Mobility  Supine to Sit: modified independent  Sit to Supine: Independent  Rolling Right: modified independent  Scooting: Independent     Bed Mobility Comment: HOB elevated during exiting; HOB flat for sit>supine- Pt scooted upward in bed w/ left leg   Transfers  Sit to stand transfer:initial stance from EOB modA- all others Adam  Stand to sit transfer: minimal assistance  Stand pivot transfer: minimal assistance, w/c<>bed  Toilet transfer: minimal assistance  Toilet transfer equipment: raised toilet seat with rails, walker  Shower transfer: minimal assistance  Shower transfer equipment: tub transfer bench, grab bars, walker,  Comments: Pt demo'd good adherence to WB status throughout all transfers. Pt required Min VC for hand placement during all transfers. Pt required VC for unfastening platform band on platform walker before sitting. Functional Mobility:  Sitting Balance: supervision. Standing Balance: minimal assistance. Functional Mobility: .  contact guard assistance  Functional Mobility Device Use: platform walker (R)  Functional Mobility Comment: EOB >> commode >> shower    Other Therapeutic Interventions        Second Session  Pt in ed at entry, agreeable to OT session, reporting positional pain only, pt request from RN for pain meds at EOS. Pt declined ADL needs. EOM TE for UB/core strengthening and activity tolerance to support mobility and BADL performance: AROM of R UE and 2# free weight for L UE: shoulder abductions 2 reps of 15, rows 2 reps of 15, L elbow flexion 2 reps of 15, and modified sit-up 2 reps of 10. In stance to platform RW- pt completed 15 reps unilaterally of shoulder flex/abductions- CGA for balance. Rest breaks taken as needed throughout. Pt completed EOB >> recliner ~4ft with platform RW at min (assist for RW management during turn). Sit> stand to RW at [facility], stand>sit to recliner at [facility].     Pt left in chair, chair alarm on, call light and needs in reach. Cognition  WFL  Orientation:    alert and oriented x 4  Command Following:   [facility]     Education  Barriers To Learning: none  Patient Education: patient educated on goals, OT role and benefits, plan of care, precautions, transfer training, discharge recommendations  Learning Assessment:  patient verbalizes understanding, would benefit from continued reinforcement    Assessment  Activity Tolerance: Fair, limited by pain  Impairments Requiring Therapeutic Intervention: decreased functional mobility, decreased ADL status, decreased ROM, decreased strength, decreased endurance, decreased balance, decreased IADL, increased pain  Prognosis: good  Clinical Assessment: Pt presents with above deficits impacting her occupational performance. Pt is below her IND baseline level of function secondary to R wrist fx and R tibial fx with NWB status. Pt progressing with LB ADL completion with on going education/training with reacher recommended. Pt tolerated session well overall. Continues to require Min to CGA for mobility and transfers with variable carryover of hand placement and platform strap management. Pt demo's carry over skills for sit to stand/SPT and functional reaching in stance for LB ADLs. Pt participates well and demo's good motivation for recovery. Skilled OT services indicated to maximize her IND and safety with all occupational pursuits. Continue POC.       Safety Interventions: patient left in bed, bed alarm in place, call light within reach, gait belt, and patient at risk for falls    Plan  Frequency: 5 x/week, 90 min/day  Current Treatment Recommendations: strengthening, balance training, functional mobility training, transfer training, endurance training, patient/caregiver education, ADL/self-care training, IADL training, home management training, pain management, home exercise program, safety education, equipment evaluation/education, and positioning    Goals  Patient Goals: regain IND   Short Term Goals:  Time Frame: 2-3 weeks  Patient will complete lower body ADL at modified independent   Patient will complete toileting at modified independent   Patient will complete grooming at modified independent   Patient will complete functional transfers at modified independent   Patient will complete functional mobility at modified independent   Patient to gather and transport IADL items at modified independent     Progressing towards goals: 1/18    Therapy Session Time     Individual Group Co-treatment   Time In 0730     Time Out 0830     Minutes 60      Second Session Therapy Time:   Individual Concurrent Group Co-treatment   Time In 1115       Time Out 1145       Minutes 30         Timed Code Treatment Minutes: 60+30    Total Treatment Minutes:  90        Electronically Signed By:   1st session: CHE [place]unaway  Directed and supervised by JOSE Cohen/L #[id number]      2nd session: JOSE Cohen/ANNEL #[id number]

## 2023-01-18 NOTE — PROGRESS NOTES
Natalie Chaud[facility]ry [address]   Phone: [phone]    Physical Therapy    [] Initial Evaluation            [x] Daily Treatment Note         [x] Discharge Summary      Patient: David Oneal   : 1950   MRN: [id number]   Date of Service:  2023  Admitting Diagnosis: Multiple fractures  Current Admission Summary: 80-year-old female with a history of hyperlipidemia and bladder cancer who was admitted to [facility] on  with right arm and leg pain after a fall from a ladder. She reports she was on the last step and thought she was on the ground and fell 1 step to the ground. Work-up revealed a right radius fracture and a right tibial fracture. CT of the knee revealed an intra-articular tibial fracture involving the spine. Ortho evaluated and suggested nonweightbearing nonoperative management with a knee immobilizer on the right lower extremity. Her hospital course was complicated by urinary retention requiring catheterization. Her catheter was removed prior to transfer to this facility. She was evaluated by therapy and suggested to continue in an inpatient setting prior to returning home. She reports her pain remains significant. She does remark about some dysuria this morning and a urinalysis was positive for UTI. Culture is pending. She also states that she is not a full code. She is a DNR CC. Past Medical History:  has a past medical history of Arthritis, Cancer ([address] and Hyperlipidemia. Past Surgical History:  has a past surgical history that includes Abdomen surgery; Colonoscopy; fracture surgery; Hysterectomy; Tonsillectomy; Breast reduction surgery (); eye surgery (); and Cystoscopy (N/A, 2021).   Discharge Recommendations: Home with assist PRN, Home Health PT  DME Required For Discharge: wheelchair, platform walker, ramp  Precautions/Restrictions: weight bearing  Weight Bearing Restrictions: non weight bearing  [x] Right Upper Extremity  [] Left Upper Extremity [x] Right Lower Extremity  [] Left Lower Extremity  ** (R) UE cleared for platform walker use **     Required Braces/Orthotics: knee immobilizer, splint to (R) distal UE. Cleared by ortho to open KI at rest with knee in extended position for skin integrity. [x] Right  [] Left  Positional Restrictions: knee immobilizer on at all times    Pre-Admission Information   Lives With: spouse    Type of Home: condo  Home Layout: one level  Home Access:  1 + 1 NORA with (R) grab bar  Bathroom Layout: walk in shower  Bathroom Equipment:  owns grab bars but not installed  Toilet Height: standard height  Home Equipment: no prior equipment  Transfer Assistance: Independent without use of device  Ambulation Assistance:Independent without use of device  ADL Assistance: independent with all ADL's  IADL Assistance: requires assistance with all homemaking tasks  Active :        [x] Yes  [] No  Hand Dominance: [] Left  [x] Right  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 Pain: Patient does not rate upon questioning  Pain Interventions: not applicable       Functional Mobility  Bed Mobility  Supine to Sit: Independent  Sit to Supine: Independent  Rolling Left: Independent  Rolling Right: modified independent, requires UE support for rolling onto (R)  Scooting: Independent  Bridging: Independent  Comments: All performed on traditional flat bed without use of HR. Transfers  Sit to stand transfer: minimal assistance, (ranges from CGA to min A.  Min A provided to rise from lower surfaces. Requires increased time and occasionally several attempts.)  Stand to sit transfer: contact guard assistance  Stand step transfer: minimal assistance, (ranges from CGA to min A. Min A provided to rise from lower surfaces. CGA while stepping to turn.)  Car transfer: minimal assistance, (Patient required min A to stand from car but was able to manage (R) LE independently)  Comments: All performed with platform RW. Self maintains NWB status and able to sequence hand placement independently. Ambulation  Surface:level surface  Assistive Device: platform walker (R)  Assistance: contact guard assistance  Distance: 29 ft   Gait Mechanics: Hop pattern while appropriately maintaining NWB status. Adequate (L) foot clearance during hop. Step length improving on the (L) LE this date. Comments:    Ambulation Trial 2  Surface:carpet  Assistive Device: platform walker (R)  Assistance: contact guard assistance  Distance: 20 ft  Gait Mechanics: Remains unchanged from level surface. Comments:    Stair Mobility  Stair mobility not completed on this date. Comments:  Wheelchair Mobility   Chair: manual  Surface: level surface  Method: (L) UE and (L) LE  Distance: 210 + 154 + multiple short bouts throughout session ft  Assistance: modified independent  Comments:   Balance  Static Sitting Balance: good: independent with functional balance in unsupported position  Dynamic Sitting Balance: good(+): independent with high level dynamic balance in unsupported position  Static Standing Balance: fair (-): maintains balance at CGA with use of UE support  Dynamic Standing Balance: fair (-): maintains balance at CGA with use of UE support  Patient completes object retrieval from floor in standing position at contact guard assistance, with use of reacher and (R) UE on platform walker. Comments:      Other Therapeutic Interventions   1st Session:    See above functional mobility.       2nd Session: Patient seated in recliner upon entry, agreeable to therapy. Requesting to use the restroom prior to starting session. Patient ambulated from recliner to restroom with platform walker at [facility]. Toilet transfer performed at min A with use of platform walker. LE clothing management performed with max A. Patient ambulated 20' from toilet to w/c at [facility] with platform walker. W/c mobility performed at modified independent 64 ft x 2. Backwards curb navigation performed 4 in step x 3 with use of platform walker at mod A x 1. In addition to this, standing cone reach across and outside [place] as well as behind the back performed with (R) UE support on walker at CGA x 10. No LOB occurred. Patient returned to recliner with chair alarm in place and call light within reach. Functional Outcomes                 Cognition  WFL  Orientation:    alert and oriented x 4  Command Following:   [employer]    Education  Barriers To Learning: none  Patient Education: patient educated on goals, PT role and benefits, plan of care, precautions, weight-bearing education, functional mobility training, transfer training, discharge planning  Learning Assessment:  patient verbalizes and demonstrates understanding    Assessment  Activity Tolerance: Ongoing  Impairments Requiring Therapeutic Intervention: decreased functional mobility, decreased ADL status, decreased ROM, decreased strength, decreased safety awareness, decreased endurance, decreased balance, increased pain  Prognosis: good  Clinical Assessment: Patient has met wheelchair mobility and bed mobility goal this date. She continues to demonstrate improvements towards all other mobility goals as evidenced by less assistance provided for transfers and ambulation. However, patient continues to require occasional min A to rise from lower surfaces as she fatigues. Ambulation distance and stability both continued to improve on this date however continues to utilize w/c as primary means of locomotion. Patient therapy POC was on going however patient to be discharged tomorrow from ARU for (R) UE and LE surgical procedures. Safety Interventions: patient left in bed, bed alarm in place, call light within reach, gait belt, and nurse notified    Plan  Frequency: 5 x/week, 90 min/day  Current Treatment Recommendations: strengthening, balance training, functional mobility training, transfer training, gait training, stair training, endurance training, neuromuscular re-education, wheelchair mobility training, manual therapy - soft tissue massage, modalities, patient/caregiver education, ADL/self-care training, pain management, safety education, and equipment evaluation/education    Goals  Patient Goals: To return home   Short Term Goals:  Time Frame: 3 weeks  Patient will complete bed mobility at modified independent - goal met 1/18/2023  Patient will complete transfers at modified independent - goal not met 1/18/2023  Patient will ambulate 50 ft with use of platform walker (R) at modified independent - goal not met 1/18/2023  Patient will ascend/descend 1+1 stairs with (R) ascending handrail at stand by assistance - goal not met 1/18/2023  Patient will complete manual w/c propulsion 150 ft at modified independent - goal met 1/18/2023    Therapy Session Time      Individual Group Co-treatment   Time In 0930       Time Out 1030       Minutes 60         Timed Code Treatment Minutes:  60 minutes    Second Session Therapy Time:   Individual Concurrent Group Co-treatment   Time In 1334         Time Out 1413         Minutes 39           Timed Code Treatment Minutes:  60 + 39 minutes    Total Treatment Minutes:  99 Minutes       Electronically Signed By:   Alon Key, SPT  Therapist observed and directed the patient's plan of care.   Co-signed and supervised by: Barbara Hernandez PT, DPT - [id number]

## 2023-01-18 NOTE — PROGRESS NOTES
Nila Saldana  1/18/2023  [id number]    Chief Complaint: Multiple fractures    Subjective:   No overnight events. No current complaints. Ortho suggesting intervention. Planned for tomorrow. ROS: No CP, SOB, dyspnea    Objective:  Patient Vitals for the past 24 hrs:   BP Temp Temp src Pulse Resp SpO2   01/18/23 0129 -- -- -- -- 20 --   01/17/23 2045 117/67 98.6 °F (37 °C) Oral 78 18 97 %   01/17/23 1327 -- -- -- -- 16 --   01/17/23 0930 123/67 97.9 °F (36.6 °C) Oral 81 18 96 %   01/17/23 0856 -- -- -- -- 18 --       Gen: No distress, pleasant. Resting in bed  HEENT: Normocephalic, atraumatic. CV: Regular rate and rhythm. No MRG   Resp: No respiratory distress. CTAB. Abd: Soft, nontender nondistended  Ext: RLE edema, RUE in splint, RLE in immobilizer  Neuro: Alert, oriented, appropriately interactive. Laboratory data: Available via EMR. Therapy progress:    PT    Supine to Sit: Supervision or touching assistance  Sit to Supine: Supervision or touching assistance   Sit to Stand: Partial/moderate assistance  Chair/Bed to Chair Transfer: Partial/moderate assistance  Car Transfer: Partial/moderate assistance  Ambulation 10 ft: Partial/moderate assistance  Ambulation 50 ft:    Ambulation 150 ft:    Stairs - 1 Step: Dependent  Stairs - 4 Step:    Stairs - 12 Step:      OT    Eating: Setup or clean-up assistance  Oral Hygiene: Setup or clean-up assistance  Bathing: Dependent  Upper Body Dressing: Supervision or touching assistance  Lower Body Dressing: Dependent  Toilet Transfer: Dependent  Toilet Hygiene: Dependent    Speech Therapy         Body mass index is 26.51 kg/m². Assessment:  Patient Active Problem List   Diagnosis    Clot retention of urine    Multiple fractures    Closed fracture of right distal radius    Closed fracture of right tibial plateau       Plan:   Multiple fractures: Nonweightbearing RUE/RLE. Pain control. PT/OT. Ortho following.      Ecoli UTI: Started Cipro, Cx sensitive     HLD: Lipitor 40     Depression: Lexapro 10 HS, Wellbutrin 300     Hx bladder cancer: Flomax    R hip pain: XR without fracture. Bowels: Per protocol  Bladder: Per protocol   Sleep: Trazodone 100 scheduled  Pain: Tylenol as needed, gabapentin 200 3 times daily, Robaxin-750 4 times daily, [place] as needed  DVT PPx: heparin (per patient request)  LEE: 1/26    Jacob Trinh MD 1/18/2023, 8:12 AM    * This document was created using dictation software. While all precautions were taken to ensure accuracy, errors may have occurred. Please disregard any typographical errors.

## 2023-01-19 ENCOUNTER — APPOINTMENT (OUTPATIENT)
Dept: GENERAL RADIOLOGY | Age: 73
DRG: 563 | End: 2023-01-19
Attending: PHYSICAL MEDICINE & REHABILITATION
Payer: MEDICARE

## 2023-01-19 ENCOUNTER — ANESTHESIA (OUTPATIENT)
Dept: OPERATING ROOM | Age: 73
End: 2023-01-19
Payer: MEDICARE

## 2023-01-19 ENCOUNTER — HOSPITAL ENCOUNTER (INPATIENT)
Age: 73
LOS: 7 days | Discharge: NURSING, REHAB OR LONG-TERM CARE | DRG: 493 | End: 2023-01-26
Attending: ORTHOPAEDIC SURGERY | Admitting: INTERNAL MEDICINE
Payer: MEDICARE

## 2023-01-19 ENCOUNTER — ANESTHESIA EVENT (OUTPATIENT)
Dept: OPERATING ROOM | Age: 73
End: 2023-01-19
Payer: MEDICARE

## 2023-01-19 VITALS
RESPIRATION RATE: 18 BRPM | HEART RATE: 78 BPM | BODY MASS INDEX: 26.54 KG/M2 | OXYGEN SATURATION: 97 % | SYSTOLIC BLOOD PRESSURE: 128 MMHG | TEMPERATURE: 98.2 F | DIASTOLIC BLOOD PRESSURE: 72 MMHG | WEIGHT: 159.3 LBS | HEIGHT: 65 IN

## 2023-01-19 DIAGNOSIS — Z87.81 S/P ORIF (OPEN REDUCTION INTERNAL FIXATION) FRACTURE: Primary | ICD-10-CM

## 2023-01-19 DIAGNOSIS — Z98.890 S/P ORIF (OPEN REDUCTION INTERNAL FIXATION) FRACTURE: Primary | ICD-10-CM

## 2023-01-19 PROBLEM — T14.8XXA FRACTURE: Status: ACTIVE | Noted: 2023-01-19

## 2023-01-19 LAB
ABO/RH: NORMAL
ANION GAP SERPL CALCULATED.3IONS-SCNC: 9 MMOL/L (ref 3–16)
ANTIBODY SCREEN: NORMAL
BUN BLDV-MCNC: 12 MG/DL (ref 7–20)
CALCIUM SERPL-MCNC: 8.2 MG/DL (ref 8.3–10.6)
CHLORIDE BLD-SCNC: 106 MMOL/L (ref 99–110)
CO2: 26 MMOL/L (ref 21–32)
CREAT SERPL-MCNC: <0.5 MG/DL (ref 0.6–1.2)
GFR SERPL CREATININE-BSD FRML MDRD: >60 ML/MIN/{1.73_M2}
GLUCOSE BLD-MCNC: 96 MG/DL (ref 70–99)
HCT VFR BLD CALC: 30 % (ref 36–48)
HEMOGLOBIN: 9.8 G/DL (ref 12–16)
MCH RBC QN AUTO: 30.3 PG (ref 26–34)
MCHC RBC AUTO-ENTMCNC: 32.7 G/DL (ref 31–36)
MCV RBC AUTO: 92.8 FL (ref 80–100)
PDW BLD-RTO: 12.9 % (ref 12.4–15.4)
PLATELET # BLD: 413 K/UL (ref 135–450)
PMV BLD AUTO: 7.5 FL (ref 5–10.5)
POTASSIUM SERPL-SCNC: 3.6 MMOL/L (ref 3.5–5.1)
RBC # BLD: 3.23 M/UL (ref 4–5.2)
SODIUM BLD-SCNC: 141 MMOL/L (ref 136–145)
WBC # BLD: 5.8 K/UL (ref 4–11)

## 2023-01-19 PROCEDURE — 36415 COLL VENOUS BLD VENIPUNCTURE: CPT

## 2023-01-19 PROCEDURE — 86900 BLOOD TYPING SEROLOGIC ABO: CPT

## 2023-01-19 PROCEDURE — 2500000003 HC RX 250 WO HCPCS: Performed by: NURSE ANESTHETIST, CERTIFIED REGISTERED

## 2023-01-19 PROCEDURE — 27536 TREAT KNEE FRACTURE: CPT | Performed by: ORTHOPAEDIC SURGERY

## 2023-01-19 PROCEDURE — 73100 X-RAY EXAM OF WRIST: CPT

## 2023-01-19 PROCEDURE — C1769 GUIDE WIRE: HCPCS | Performed by: ORTHOPAEDIC SURGERY

## 2023-01-19 PROCEDURE — 2720000010 HC SURG SUPPLY STERILE: Performed by: ORTHOPAEDIC SURGERY

## 2023-01-19 PROCEDURE — 25608 OPTX DST RD XART FX/EPI SEP2: CPT | Performed by: NURSE PRACTITIONER

## 2023-01-19 PROCEDURE — 3700000000 HC ANESTHESIA ATTENDED CARE: Performed by: ORTHOPAEDIC SURGERY

## 2023-01-19 PROCEDURE — 2500000003 HC RX 250 WO HCPCS: Performed by: ORTHOPAEDIC SURGERY

## 2023-01-19 PROCEDURE — 0QSG04Z REPOSITION RIGHT TIBIA WITH INTERNAL FIXATION DEVICE, OPEN APPROACH: ICD-10-PCS | Performed by: ORTHOPAEDIC SURGERY

## 2023-01-19 PROCEDURE — 2580000003 HC RX 258: Performed by: NURSE PRACTITIONER

## 2023-01-19 PROCEDURE — 3700000001 HC ADD 15 MINUTES (ANESTHESIA): Performed by: ORTHOPAEDIC SURGERY

## 2023-01-19 PROCEDURE — C1713 ANCHOR/SCREW BN/BN,TIS/BN: HCPCS | Performed by: ORTHOPAEDIC SURGERY

## 2023-01-19 PROCEDURE — 6360000002 HC RX W HCPCS: Performed by: ORTHOPAEDIC SURGERY

## 2023-01-19 PROCEDURE — 1200000000 HC SEMI PRIVATE

## 2023-01-19 PROCEDURE — 86901 BLOOD TYPING SEROLOGIC RH(D): CPT

## 2023-01-19 PROCEDURE — 2500000003 HC RX 250 WO HCPCS: Performed by: ANESTHESIOLOGY

## 2023-01-19 PROCEDURE — 85027 COMPLETE CBC AUTOMATED: CPT

## 2023-01-19 PROCEDURE — A4217 STERILE WATER/SALINE, 500 ML: HCPCS | Performed by: ORTHOPAEDIC SURGERY

## 2023-01-19 PROCEDURE — 64415 NJX AA&/STRD BRCH PLXS IMG: CPT | Performed by: ANESTHESIOLOGY

## 2023-01-19 PROCEDURE — 27536 TREAT KNEE FRACTURE: CPT | Performed by: NURSE PRACTITIONER

## 2023-01-19 PROCEDURE — 0PSH04Z REPOSITION RIGHT RADIUS WITH INTERNAL FIXATION DEVICE, OPEN APPROACH: ICD-10-PCS | Performed by: ORTHOPAEDIC SURGERY

## 2023-01-19 PROCEDURE — 3E0T3BZ INTRODUCTION OF ANESTHETIC AGENT INTO PERIPHERAL NERVES AND PLEXI, PERCUTANEOUS APPROACH: ICD-10-PCS | Performed by: ORTHOPAEDIC SURGERY

## 2023-01-19 PROCEDURE — 73560 X-RAY EXAM OF KNEE 1 OR 2: CPT

## 2023-01-19 PROCEDURE — 6360000002 HC RX W HCPCS: Performed by: NURSE PRACTITIONER

## 2023-01-19 PROCEDURE — 6370000000 HC RX 637 (ALT 250 FOR IP): Performed by: PHYSICAL MEDICINE & REHABILITATION

## 2023-01-19 PROCEDURE — 86850 RBC ANTIBODY SCREEN: CPT

## 2023-01-19 PROCEDURE — 80048 BASIC METABOLIC PNL TOTAL CA: CPT

## 2023-01-19 PROCEDURE — 2709999900 HC NON-CHARGEABLE SUPPLY: Performed by: ORTHOPAEDIC SURGERY

## 2023-01-19 PROCEDURE — 3600000004 HC SURGERY LEVEL 4 BASE: Performed by: ORTHOPAEDIC SURGERY

## 2023-01-19 PROCEDURE — 64447 NJX AA&/STRD FEMORAL NRV IMG: CPT | Performed by: ANESTHESIOLOGY

## 2023-01-19 PROCEDURE — 2580000003 HC RX 258: Performed by: ORTHOPAEDIC SURGERY

## 2023-01-19 PROCEDURE — 3600000014 HC SURGERY LEVEL 4 ADDTL 15MIN: Performed by: ORTHOPAEDIC SURGERY

## 2023-01-19 PROCEDURE — 6360000002 HC RX W HCPCS: Performed by: NURSE ANESTHETIST, CERTIFIED REGISTERED

## 2023-01-19 PROCEDURE — 25608 OPTX DST RD XART FX/EPI SEP2: CPT | Performed by: ORTHOPAEDIC SURGERY

## 2023-01-19 PROCEDURE — 7100000000 HC PACU RECOVERY - FIRST 15 MIN: Performed by: ORTHOPAEDIC SURGERY

## 2023-01-19 PROCEDURE — 7100000001 HC PACU RECOVERY - ADDTL 15 MIN: Performed by: ORTHOPAEDIC SURGERY

## 2023-01-19 PROCEDURE — 6360000002 HC RX W HCPCS: Performed by: ANESTHESIOLOGY

## 2023-01-19 PROCEDURE — 6370000000 HC RX 637 (ALT 250 FOR IP): Performed by: NURSE PRACTITIONER

## 2023-01-19 DEVICE — BONE SCREW
Type: IMPLANTABLE DEVICE | Site: KNEE | Status: FUNCTIONAL
Brand: VARIAX

## 2023-01-19 DEVICE — LOCKING SCREW, FULLY THREADED,T8
Type: IMPLANTABLE DEVICE | Site: WRIST | Status: FUNCTIONAL
Brand: VARIAX

## 2023-01-19 DEVICE — BONE SCREW, FULLY THREADED, T8
Type: IMPLANTABLE DEVICE | Site: WRIST | Status: FUNCTIONAL
Brand: VARIAX

## 2023-01-19 DEVICE — LOCKING SCREW
Type: IMPLANTABLE DEVICE | Site: KNEE | Status: FUNCTIONAL
Brand: VARIAX

## 2023-01-19 DEVICE — COMPRESSION PLATE BROAD STRAIGHT
Type: IMPLANTABLE DEVICE | Site: KNEE | Status: FUNCTIONAL
Brand: VARIAX

## 2023-01-19 DEVICE — VOLAR PLATE INTERMEDIATE RIGHT, X-SHORT
Type: IMPLANTABLE DEVICE | Site: WRIST | Status: FUNCTIONAL
Brand: VARIAX

## 2023-01-19 RX ORDER — HYDROMORPHONE HCL 110MG/55ML
0.5 PATIENT CONTROLLED ANALGESIA SYRINGE INTRAVENOUS EVERY 5 MIN PRN
Status: COMPLETED | OUTPATIENT
Start: 2023-01-19 | End: 2023-01-19

## 2023-01-19 RX ORDER — SODIUM CHLORIDE 9 MG/ML
INJECTION, SOLUTION INTRAVENOUS PRN
Status: CANCELLED | OUTPATIENT
Start: 2023-01-19

## 2023-01-19 RX ORDER — SODIUM CHLORIDE 9 MG/ML
INJECTION, SOLUTION INTRAVENOUS PRN
Status: DISCONTINUED | OUTPATIENT
Start: 2023-01-19 | End: 2023-01-26 | Stop reason: HOSPADM

## 2023-01-19 RX ORDER — HYDROMORPHONE HCL 110MG/55ML
0.25 PATIENT CONTROLLED ANALGESIA SYRINGE INTRAVENOUS EVERY 5 MIN PRN
Status: CANCELLED | OUTPATIENT
Start: 2023-01-19

## 2023-01-19 RX ORDER — LIDOCAINE HYDROCHLORIDE 10 MG/ML
1 INJECTION, SOLUTION EPIDURAL; INFILTRATION; INTRACAUDAL; PERINEURAL
Status: CANCELLED | OUTPATIENT
Start: 2023-01-19 | End: 2023-01-20

## 2023-01-19 RX ORDER — KRILL/OM-3/DHA/EPA/PHOSPHO/AST 500-110 MG
500 CAPSULE ORAL EVERY MORNING
Status: DISCONTINUED | OUTPATIENT
Start: 2023-01-19 | End: 2023-01-19

## 2023-01-19 RX ORDER — SODIUM CHLORIDE 9 MG/ML
INJECTION, SOLUTION INTRAVENOUS CONTINUOUS
Status: DISCONTINUED | OUTPATIENT
Start: 2023-01-19 | End: 2023-01-19 | Stop reason: HOSPADM

## 2023-01-19 RX ORDER — BUPIVACAINE HYDROCHLORIDE 5 MG/ML
INJECTION, SOLUTION EPIDURAL; INTRACAUDAL
Status: DISCONTINUED | OUTPATIENT
Start: 2023-01-19 | End: 2023-01-19 | Stop reason: SDUPTHER

## 2023-01-19 RX ORDER — FERROUS SULFATE 325(65) MG
325 TABLET ORAL
Status: DISCONTINUED | OUTPATIENT
Start: 2023-01-20 | End: 2023-01-26 | Stop reason: HOSPADM

## 2023-01-19 RX ORDER — DIPHENHYDRAMINE HYDROCHLORIDE 50 MG/ML
25 INJECTION INTRAMUSCULAR; INTRAVENOUS EVERY 6 HOURS PRN
Status: DISCONTINUED | OUTPATIENT
Start: 2023-01-19 | End: 2023-01-26 | Stop reason: HOSPADM

## 2023-01-19 RX ORDER — ESTRADIOL 0.1 MG/G
0.5 CREAM VAGINAL DAILY
Status: DISCONTINUED | OUTPATIENT
Start: 2023-01-19 | End: 2023-01-19 | Stop reason: CLARIF

## 2023-01-19 RX ORDER — SENNA AND DOCUSATE SODIUM 50; 8.6 MG/1; MG/1
1 TABLET, FILM COATED ORAL 2 TIMES DAILY
Status: DISCONTINUED | OUTPATIENT
Start: 2023-01-19 | End: 2023-01-26 | Stop reason: HOSPADM

## 2023-01-19 RX ORDER — SODIUM CHLORIDE 9 MG/ML
INJECTION, SOLUTION INTRAVENOUS CONTINUOUS
Status: CANCELLED | OUTPATIENT
Start: 2023-01-19

## 2023-01-19 RX ORDER — ACETAMINOPHEN 500 MG
1000 TABLET ORAL 3 TIMES DAILY
Status: DISCONTINUED | OUTPATIENT
Start: 2023-01-19 | End: 2023-01-26 | Stop reason: HOSPADM

## 2023-01-19 RX ORDER — ARMODAFINIL 250 MG/1
250 TABLET ORAL DAILY
Status: DISCONTINUED | OUTPATIENT
Start: 2023-01-19 | End: 2023-01-26 | Stop reason: HOSPADM

## 2023-01-19 RX ORDER — SODIUM CHLORIDE 0.9 % (FLUSH) 0.9 %
5-40 SYRINGE (ML) INJECTION EVERY 12 HOURS SCHEDULED
Status: DISCONTINUED | OUTPATIENT
Start: 2023-01-19 | End: 2023-01-26 | Stop reason: HOSPADM

## 2023-01-19 RX ORDER — ONDANSETRON 2 MG/ML
4 INJECTION INTRAMUSCULAR; INTRAVENOUS EVERY 6 HOURS PRN
Status: DISCONTINUED | OUTPATIENT
Start: 2023-01-19 | End: 2023-01-26 | Stop reason: HOSPADM

## 2023-01-19 RX ORDER — SODIUM CHLORIDE 0.9 % (FLUSH) 0.9 %
5-40 SYRINGE (ML) INJECTION PRN
Status: CANCELLED | OUTPATIENT
Start: 2023-01-19

## 2023-01-19 RX ORDER — PROPOFOL 10 MG/ML
INJECTION, EMULSION INTRAVENOUS PRN
Status: DISCONTINUED | OUTPATIENT
Start: 2023-01-19 | End: 2023-01-19 | Stop reason: SDUPTHER

## 2023-01-19 RX ORDER — DEXAMETHASONE SODIUM PHOSPHATE 4 MG/ML
INJECTION, SOLUTION INTRA-ARTICULAR; INTRALESIONAL; INTRAMUSCULAR; INTRAVENOUS; SOFT TISSUE PRN
Status: DISCONTINUED | OUTPATIENT
Start: 2023-01-19 | End: 2023-01-19 | Stop reason: SDUPTHER

## 2023-01-19 RX ORDER — SODIUM CHLORIDE 0.9 % (FLUSH) 0.9 %
5-40 SYRINGE (ML) INJECTION PRN
Status: DISCONTINUED | OUTPATIENT
Start: 2023-01-19 | End: 2023-01-26 | Stop reason: HOSPADM

## 2023-01-19 RX ORDER — KETOROLAC TROMETHAMINE 30 MG/ML
15 INJECTION, SOLUTION INTRAMUSCULAR; INTRAVENOUS ONCE
Status: COMPLETED | OUTPATIENT
Start: 2023-01-19 | End: 2023-01-19

## 2023-01-19 RX ORDER — SODIUM CHLORIDE 0.9 % (FLUSH) 0.9 %
5-40 SYRINGE (ML) INJECTION PRN
Status: DISCONTINUED | OUTPATIENT
Start: 2023-01-19 | End: 2023-01-19

## 2023-01-19 RX ORDER — ONDANSETRON 2 MG/ML
INJECTION INTRAMUSCULAR; INTRAVENOUS PRN
Status: DISCONTINUED | OUTPATIENT
Start: 2023-01-19 | End: 2023-01-19 | Stop reason: SDUPTHER

## 2023-01-19 RX ORDER — ATORVASTATIN CALCIUM 40 MG/1
40 TABLET, FILM COATED ORAL NIGHTLY
Status: DISCONTINUED | OUTPATIENT
Start: 2023-01-19 | End: 2023-01-26 | Stop reason: HOSPADM

## 2023-01-19 RX ORDER — FENTANYL CITRATE 50 UG/ML
50 INJECTION, SOLUTION INTRAMUSCULAR; INTRAVENOUS ONCE
Status: DISCONTINUED | OUTPATIENT
Start: 2023-01-19 | End: 2023-01-19

## 2023-01-19 RX ORDER — LABETALOL HYDROCHLORIDE 5 MG/ML
5 INJECTION, SOLUTION INTRAVENOUS
Status: CANCELLED | OUTPATIENT
Start: 2023-01-19

## 2023-01-19 RX ORDER — KETOROLAC TROMETHAMINE 30 MG/ML
15 INJECTION, SOLUTION INTRAMUSCULAR; INTRAVENOUS EVERY 6 HOURS PRN
Status: DISCONTINUED | OUTPATIENT
Start: 2023-01-19 | End: 2023-01-24

## 2023-01-19 RX ORDER — SODIUM CHLORIDE 9 MG/ML
INJECTION, SOLUTION INTRAVENOUS PRN
Status: DISCONTINUED | OUTPATIENT
Start: 2023-01-19 | End: 2023-01-19

## 2023-01-19 RX ORDER — SUCCINYLCHOLINE/SOD CL,ISO/PF 200MG/10ML
SYRINGE (ML) INTRAVENOUS PRN
Status: DISCONTINUED | OUTPATIENT
Start: 2023-01-19 | End: 2023-01-19 | Stop reason: SDUPTHER

## 2023-01-19 RX ORDER — OXYCODONE HYDROCHLORIDE 5 MG/1
5 TABLET ORAL EVERY 4 HOURS PRN
Status: DISCONTINUED | OUTPATIENT
Start: 2023-01-19 | End: 2023-01-20

## 2023-01-19 RX ORDER — BUPIVACAINE HYDROCHLORIDE 5 MG/ML
INJECTION, SOLUTION EPIDURAL; INTRACAUDAL
Status: COMPLETED | OUTPATIENT
Start: 2023-01-19 | End: 2023-01-19

## 2023-01-19 RX ORDER — SODIUM CHLORIDE 0.9 % (FLUSH) 0.9 %
5-40 SYRINGE (ML) INJECTION EVERY 12 HOURS SCHEDULED
Status: CANCELLED | OUTPATIENT
Start: 2023-01-19

## 2023-01-19 RX ORDER — DIPHENHYDRAMINE HCL 25 MG
25 TABLET ORAL EVERY 6 HOURS PRN
Status: DISCONTINUED | OUTPATIENT
Start: 2023-01-19 | End: 2023-01-26 | Stop reason: HOSPADM

## 2023-01-19 RX ORDER — VITAMIN B COMPLEX
2000 TABLET ORAL EVERY EVENING
Status: DISCONTINUED | OUTPATIENT
Start: 2023-01-19 | End: 2023-01-26 | Stop reason: HOSPADM

## 2023-01-19 RX ORDER — SODIUM CHLORIDE 9 MG/ML
INJECTION, SOLUTION INTRAVENOUS CONTINUOUS
Status: DISCONTINUED | OUTPATIENT
Start: 2023-01-19 | End: 2023-01-19

## 2023-01-19 RX ORDER — ROCURONIUM BROMIDE 10 MG/ML
INJECTION, SOLUTION INTRAVENOUS PRN
Status: DISCONTINUED | OUTPATIENT
Start: 2023-01-19 | End: 2023-01-19 | Stop reason: SDUPTHER

## 2023-01-19 RX ORDER — HYDROMORPHONE HCL 110MG/55ML
0.5 PATIENT CONTROLLED ANALGESIA SYRINGE INTRAVENOUS EVERY 5 MIN PRN
Status: CANCELLED | OUTPATIENT
Start: 2023-01-19

## 2023-01-19 RX ORDER — FENTANYL CITRATE 50 UG/ML
INJECTION, SOLUTION INTRAMUSCULAR; INTRAVENOUS PRN
Status: DISCONTINUED | OUTPATIENT
Start: 2023-01-19 | End: 2023-01-19 | Stop reason: SDUPTHER

## 2023-01-19 RX ORDER — SODIUM CHLORIDE 9 MG/ML
INJECTION, SOLUTION INTRAVENOUS CONTINUOUS
Status: DISCONTINUED | OUTPATIENT
Start: 2023-01-19 | End: 2023-01-23

## 2023-01-19 RX ORDER — LIDOCAINE HYDROCHLORIDE 20 MG/ML
INJECTION, SOLUTION INFILTRATION; PERINEURAL PRN
Status: DISCONTINUED | OUTPATIENT
Start: 2023-01-19 | End: 2023-01-19 | Stop reason: SDUPTHER

## 2023-01-19 RX ORDER — ASPIRIN 81 MG/1
81 TABLET ORAL EVERY MORNING
Status: DISCONTINUED | OUTPATIENT
Start: 2023-01-19 | End: 2023-01-20

## 2023-01-19 RX ORDER — CALCIUM CARBONATE 500(1250)
2 TABLET ORAL 2 TIMES DAILY
Status: DISCONTINUED | OUTPATIENT
Start: 2023-01-19 | End: 2023-01-26 | Stop reason: HOSPADM

## 2023-01-19 RX ORDER — TRAZODONE HYDROCHLORIDE 50 MG/1
100 TABLET ORAL NIGHTLY
Status: DISCONTINUED | OUTPATIENT
Start: 2023-01-19 | End: 2023-01-26 | Stop reason: HOSPADM

## 2023-01-19 RX ORDER — ONDANSETRON 2 MG/ML
4 INJECTION INTRAMUSCULAR; INTRAVENOUS
Status: DISCONTINUED | OUTPATIENT
Start: 2023-01-19 | End: 2023-01-19

## 2023-01-19 RX ORDER — HYDROMORPHONE HCL 110MG/55ML
0.25 PATIENT CONTROLLED ANALGESIA SYRINGE INTRAVENOUS EVERY 5 MIN PRN
Status: DISCONTINUED | OUTPATIENT
Start: 2023-01-19 | End: 2023-01-19

## 2023-01-19 RX ORDER — ENOXAPARIN SODIUM 100 MG/ML
40 INJECTION SUBCUTANEOUS DAILY
Status: DISCONTINUED | OUTPATIENT
Start: 2023-01-19 | End: 2023-01-20

## 2023-01-19 RX ORDER — ONDANSETRON 2 MG/ML
4 INJECTION INTRAMUSCULAR; INTRAVENOUS
Status: CANCELLED | OUTPATIENT
Start: 2023-01-19 | End: 2023-01-20

## 2023-01-19 RX ORDER — LABETALOL HYDROCHLORIDE 5 MG/ML
5 INJECTION, SOLUTION INTRAVENOUS
Status: DISCONTINUED | OUTPATIENT
Start: 2023-01-19 | End: 2023-01-19

## 2023-01-19 RX ORDER — SODIUM CHLORIDE 0.9 % (FLUSH) 0.9 %
5-40 SYRINGE (ML) INJECTION EVERY 12 HOURS SCHEDULED
Status: DISCONTINUED | OUTPATIENT
Start: 2023-01-19 | End: 2023-01-19

## 2023-01-19 RX ORDER — ONDANSETRON 4 MG/1
4 TABLET, ORALLY DISINTEGRATING ORAL EVERY 8 HOURS PRN
Status: DISCONTINUED | OUTPATIENT
Start: 2023-01-19 | End: 2023-01-26 | Stop reason: HOSPADM

## 2023-01-19 RX ORDER — ESCITALOPRAM OXALATE 10 MG/1
10 TABLET ORAL DAILY
Status: DISCONTINUED | OUTPATIENT
Start: 2023-01-19 | End: 2023-01-26 | Stop reason: HOSPADM

## 2023-01-19 RX ORDER — MAGNESIUM SULFATE HEPTAHYDRATE 500 MG/ML
INJECTION, SOLUTION INTRAMUSCULAR; INTRAVENOUS PRN
Status: DISCONTINUED | OUTPATIENT
Start: 2023-01-19 | End: 2023-01-19 | Stop reason: SDUPTHER

## 2023-01-19 RX ORDER — PHENYLEPHRINE HCL IN 0.9% NACL 1 MG/10 ML
SYRINGE (ML) INTRAVENOUS PRN
Status: DISCONTINUED | OUTPATIENT
Start: 2023-01-19 | End: 2023-01-19 | Stop reason: SDUPTHER

## 2023-01-19 RX ORDER — BUPROPION HYDROCHLORIDE 300 MG/1
300 TABLET ORAL EVERY MORNING
Status: DISCONTINUED | OUTPATIENT
Start: 2023-01-19 | End: 2023-01-26 | Stop reason: HOSPADM

## 2023-01-19 RX ADMIN — Medication 100 MCG: at 09:46

## 2023-01-19 RX ADMIN — HYDROMORPHONE HYDROCHLORIDE 0.5 MG: 2 INJECTION, SOLUTION INTRAMUSCULAR; INTRAVENOUS; SUBCUTANEOUS at 12:07

## 2023-01-19 RX ADMIN — BUPIVACAINE HYDROCHLORIDE 15 ML: 5 INJECTION EPIDURAL; INTRACAUDAL; PERINEURAL at 12:28

## 2023-01-19 RX ADMIN — PROPOFOL 200 MG: 10 INJECTION, EMULSION INTRAVENOUS at 09:28

## 2023-01-19 RX ADMIN — STANDARDIZED SENNA CONCENTRATE AND DOCUSATE SODIUM 1 TABLET: 8.6; 5 TABLET ORAL at 21:00

## 2023-01-19 RX ADMIN — SODIUM CHLORIDE: 9 INJECTION, SOLUTION INTRAVENOUS at 22:53

## 2023-01-19 RX ADMIN — SODIUM CHLORIDE: 9 INJECTION, SOLUTION INTRAVENOUS at 09:24

## 2023-01-19 RX ADMIN — ATORVASTATIN CALCIUM 40 MG: 40 TABLET, FILM COATED ORAL at 21:01

## 2023-01-19 RX ADMIN — ROCURONIUM BROMIDE 30 MG: 10 INJECTION, SOLUTION INTRAVENOUS at 10:38

## 2023-01-19 RX ADMIN — STANDARDIZED SENNA CONCENTRATE AND DOCUSATE SODIUM 1 TABLET: 8.6; 5 TABLET ORAL at 14:35

## 2023-01-19 RX ADMIN — FENTANYL CITRATE 50 MCG: 50 INJECTION, SOLUTION INTRAMUSCULAR; INTRAVENOUS at 10:27

## 2023-01-19 RX ADMIN — OXYCODONE HYDROCHLORIDE 5 MG: 5 TABLET ORAL at 14:34

## 2023-01-19 RX ADMIN — HYDROMORPHONE HYDROCHLORIDE 0.5 MG: 2 INJECTION, SOLUTION INTRAMUSCULAR; INTRAVENOUS; SUBCUTANEOUS at 11:43

## 2023-01-19 RX ADMIN — FENTANYL CITRATE 50 MCG: 50 INJECTION, SOLUTION INTRAMUSCULAR; INTRAVENOUS at 09:26

## 2023-01-19 RX ADMIN — SODIUM CHLORIDE: 9 INJECTION, SOLUTION INTRAVENOUS at 10:30

## 2023-01-19 RX ADMIN — CEFAZOLIN 2000 MG: 2 INJECTION, POWDER, FOR SOLUTION INTRAMUSCULAR; INTRAVENOUS at 22:54

## 2023-01-19 RX ADMIN — Medication 10 ML: at 21:01

## 2023-01-19 RX ADMIN — SUGAMMADEX 200 MG: 100 INJECTION, SOLUTION INTRAVENOUS at 11:16

## 2023-01-19 RX ADMIN — KETOROLAC TROMETHAMINE 15 MG: 30 INJECTION, SOLUTION INTRAMUSCULAR; INTRAVENOUS at 12:51

## 2023-01-19 RX ADMIN — Medication 2000 UNITS: at 16:42

## 2023-01-19 RX ADMIN — TRAZODONE HYDROCHLORIDE 100 MG: 50 TABLET ORAL at 21:00

## 2023-01-19 RX ADMIN — BUPROPION HYDROCHLORIDE 300 MG: 300 TABLET, EXTENDED RELEASE ORAL at 16:42

## 2023-01-19 RX ADMIN — LIDOCAINE HYDROCHLORIDE 60 MG: 20 INJECTION, SOLUTION INFILTRATION; PERINEURAL at 09:28

## 2023-01-19 RX ADMIN — SODIUM CHLORIDE: 9 INJECTION, SOLUTION INTRAVENOUS at 08:35

## 2023-01-19 RX ADMIN — SODIUM CHLORIDE: 9 INJECTION, SOLUTION INTRAVENOUS at 00:17

## 2023-01-19 RX ADMIN — ESCITALOPRAM OXALATE 10 MG: 10 TABLET ORAL at 14:34

## 2023-01-19 RX ADMIN — ASPIRIN 81 MG: 81 TABLET, COATED ORAL at 14:34

## 2023-01-19 RX ADMIN — DIPHENHYDRAMINE HYDROCHLORIDE 25 MG: 25 TABLET ORAL at 00:08

## 2023-01-19 RX ADMIN — HYDROMORPHONE HYDROCHLORIDE 0.5 MG: 2 INJECTION, SOLUTION INTRAMUSCULAR; INTRAVENOUS; SUBCUTANEOUS at 12:21

## 2023-01-19 RX ADMIN — ONDANSETRON 4 MG: 2 INJECTION INTRAMUSCULAR; INTRAVENOUS at 09:54

## 2023-01-19 RX ADMIN — MAGNESIUM SULFATE HEPTAHYDRATE 1 G: 500 INJECTION, SOLUTION INTRAMUSCULAR; INTRAVENOUS at 10:40

## 2023-01-19 RX ADMIN — OXYCODONE 10 MG: 5 TABLET ORAL at 03:12

## 2023-01-19 RX ADMIN — BUPIVACAINE HYDROCHLORIDE 5 ML: 5 INJECTION, SOLUTION EPIDURAL; INTRACAUDAL at 12:33

## 2023-01-19 RX ADMIN — SODIUM CHLORIDE: 9 INJECTION, SOLUTION INTRAVENOUS at 14:41

## 2023-01-19 RX ADMIN — ROCURONIUM BROMIDE 30 MG: 10 INJECTION, SOLUTION INTRAVENOUS at 09:55

## 2023-01-19 RX ADMIN — Medication 140 MG: at 09:29

## 2023-01-19 RX ADMIN — ACETAMINOPHEN 1000 MG: 500 TABLET ORAL at 14:34

## 2023-01-19 RX ADMIN — OXYCODONE HYDROCHLORIDE 5 MG: 5 TABLET ORAL at 22:58

## 2023-01-19 RX ADMIN — DEXAMETHASONE SODIUM PHOSPHATE 8 MG: 4 INJECTION, SOLUTION INTRAMUSCULAR; INTRAVENOUS at 09:54

## 2023-01-19 RX ADMIN — HYDROMORPHONE HYDROCHLORIDE 0.5 MG: 2 INJECTION, SOLUTION INTRAMUSCULAR; INTRAVENOUS; SUBCUTANEOUS at 11:53

## 2023-01-19 RX ADMIN — CEFAZOLIN 2000 MG: 2 INJECTION, POWDER, FOR SOLUTION INTRAMUSCULAR; INTRAVENOUS at 14:44

## 2023-01-19 RX ADMIN — ACETAMINOPHEN 1000 MG: 500 TABLET ORAL at 21:01

## 2023-01-19 RX ADMIN — Medication 1000 MG: at 22:54

## 2023-01-19 ASSESSMENT — PAIN DESCRIPTION - PAIN TYPE
TYPE: ACUTE PAIN
TYPE: SURGICAL PAIN

## 2023-01-19 ASSESSMENT — PAIN DESCRIPTION - ORIENTATION
ORIENTATION: RIGHT

## 2023-01-19 ASSESSMENT — PAIN - FUNCTIONAL ASSESSMENT: PAIN_FUNCTIONAL_ASSESSMENT: PREVENTS OR INTERFERES SOME ACTIVE ACTIVITIES AND ADLS

## 2023-01-19 ASSESSMENT — PAIN DESCRIPTION - LOCATION
LOCATION: ARM;LEG
LOCATION: ARM;KNEE
LOCATION: KNEE;ARM
LOCATION: KNEE
LOCATION: KNEE;ARM
LOCATION: LEG
LOCATION: KNEE;LEG

## 2023-01-19 ASSESSMENT — PAIN DESCRIPTION - DESCRIPTORS
DESCRIPTORS: DISCOMFORT;SHARP
DESCRIPTORS: ACHING

## 2023-01-19 ASSESSMENT — PAIN SCALES - GENERAL
PAINLEVEL_OUTOF10: 7
PAINLEVEL_OUTOF10: 10
PAINLEVEL_OUTOF10: 4
PAINLEVEL_OUTOF10: 5
PAINLEVEL_OUTOF10: 6
PAINLEVEL_OUTOF10: 4
PAINLEVEL_OUTOF10: 10
PAINLEVEL_OUTOF10: 5
PAINLEVEL_OUTOF10: 10
PAINLEVEL_OUTOF10: 10

## 2023-01-19 ASSESSMENT — PAIN SCALES - WONG BAKER: WONGBAKER_NUMERICALRESPONSE: 0

## 2023-01-19 ASSESSMENT — PAIN DESCRIPTION - FREQUENCY: FREQUENCY: CONTINUOUS

## 2023-01-19 NOTE — ANESTHESIA PROCEDURE NOTES
Peripheral Block    Patient location during procedure: PACU  Reason for block: post-op pain management and at surgeon's request  Start time: 1/19/2023 12:28 PM  End time: 1/19/2023 12:30 PM  Staffing  Performed: anesthesiologist   Anesthesiologist: Marlin Ghosh MD  Preanesthetic Checklist  Completed: patient identified, IV checked, site marked, risks and benefits discussed, surgical/procedural consents, equipment checked, pre-op evaluation, timeout performed, anesthesia consent given, oxygen available and monitors applied/VS acknowledged  Peripheral Block   Patient position: sitting  Prep: ChloraPrep  Provider prep: mask and sterile gloves  Patient monitoring: cardiac monitor, continuous pulse ox, frequent blood pressure checks, IV access, oxygen and responsive to questions  Block type: Brachial plexus  Supraclavicular  Laterality: right  Injection technique: single-shot  Guidance: ultrasound guided  Local infiltration: lidocaine  Infiltration strength: 1 %  Local infiltration: lidocaine  Dose: 1 mL    Needle   Needle type: insulated echogenic nerve stimulator needle   Needle gauge: 20 G  Needle localization: ultrasound guidance and anatomical landmarks  Needle length: 10 cm  Assessment   Injection assessment: negative aspiration for heme, no paresthesia on injection, local visualized surrounding nerve on ultrasound and no intravascular symptoms  Paresthesia pain: none  Slow fractionated injection: yes  Hemodynamics: stable  Real-time US image taken/store: yes  Outcomes: uncomplicated and patient tolerated procedure well    Additional Notes  U/S 50393. (1) Under ultrasound guidance, a 20 gauge needle was inserted and placed in close proximity to the scbp nerve. (2) Ultrasound was also used to visualize the spread of the anesthetic in close proximity to the nerve being blocked.  (3) The nerve appeared anatomically normal, and (4 there were no apparent abnormal pathological findings on the image that were readily visible and related to the nerve being blocked. (5) A permanent ultrasound image was saved in the patient's record.             Medications Administered  bupivacaine (PF) 0.5 % - Perineural   15 mL - 1/19/2023 12:28:00 PM

## 2023-01-19 NOTE — H&P
HOSPITALISTS HISTORY AND PHYSICAL    1/19/2023 2:14 PM    Patient Information:  Kel Coffey is a 71 y.o. female [id number]  PCP:  Cody Yuen MD (Tel: [phone] )    Chief complaint:  No chief complaint on file. History of Present Illness:  Kelly Michaels is a 71 y.o. female  with PMHx of bladder cancer, hyperlipidemia, depression, recent E. coli UTI and moderately displaced distal radius fracture and tibial plateau fracture who was in ARU for rehab after fall on 1/4/2023 resulting in right wrist and right knee injury. Patient was initially evaluated by [facility] orthopedic and recommended nonoperative treatment. Patient was evaluated by  on 1/17/23 in t ARU and admitted for ORIF right distal radius and right tibial plateau. Rose Anthony REVIEW OF SYSTEMS:   Detailed 12 point ROS obtained which were negative except what mentioned above in HPI    Past Medical History:   has a past medical history of Arthritis, Cancer ([address] and Hyperlipidemia. Past Surgical History:   has a past surgical history that includes Abdomen surgery; Colonoscopy; fracture surgery; Hysterectomy; Tonsillectomy; Breast reduction surgery (2004); eye surgery (1990); and Cystoscopy (N/A, 8/1/2021). Medications:  No current facility-administered medications on file prior to encounter.      Current Outpatient Medications on File Prior to Encounter   Medication Sig Dispense Refill    calcium carbonate 600 MG TABS tablet Take 2 tablets by mouth 2 times daily      vitamin C (ASCORBIC ACID) 500 MG tablet Take 500 mg by mouth every morning (Patient not taking: Reported on 1/19/2023)      aspirin 81 MG EC tablet Take 81 mg by mouth every morning      escitalopram (LEXAPRO) 10 MG tablet Take 10 mg by mouth daily      estradiol (ESTRACE) 0.1 MG/GM vaginal cream Place 0.5 g vaginally daily Pea size (0.5gram) amount to vaginal opening 2 times per week. ferrous sulfate (IRON 325) 325 (65 Fe) MG tablet Take 325 mg by mouth daily (with breakfast)      atorvastatin (LIPITOR) 40 MG tablet Take 40 mg by mouth daily      buPROPion (WELLBUTRIN XL) 300 MG extended release tablet Take 300 mg by mouth every morning      traZODone (DESYREL) 100 MG tablet Take 100 mg by mouth nightly      Armodafinil 250 MG TABS Take 250 mg by mouth daily. diclofenac sodium (VOLTAREN) 1 % GEL Apply 2 g topically 4 times daily as needed for Pain      Multiple Vitamins-Minerals (THERAPEUTIC MULTIVITAMIN-MINERALS) tablet Take 1 tablet by mouth every morning (Patient not taking: Reported on 1/19/2023)      Cholecalciferol (VITAMIN D3) 50 MCG (2000 UT) CAPS Take 1 capsule by mouth every evening       Krill Oil (OMEGA-3) 500 MG CAPS Take 500 mg by mouth every morning      Biotin 5000 MCG CAPS Take 1 tablet by mouth every evening         Allergies: Allergies   Allergen Reactions    Erythromycin Nausea Only    Morphine Itching        Social History:   reports that she quit smoking about 34 years ago. Her smoking use included cigarettes. She has never used smokeless tobacco. She reports that she does not use drugs. Family History:  family history includes Colon Cancer in her sister; Diabetes in her brother; Heart Disease in her father; High Cholesterol in her father and mother; Hypertension in her mother; Stroke in her brother and father. Physical Exam:  BP (!) 146/70   Pulse 81   Temp 97.7 °F (36.5 °C) (Oral)   Resp 16   Ht 5' 5\" (1.651 m)   Wt 159 lb (72.1 kg)   SpO2 97%   BMI 26.46 kg/m²     General appearance: No apparent distress appears stated age and cooperative. HEENT Normal cephalic, atraumatic without obvious deformity. PERRLA  Neck: Supple, No jugular venous distention/bruits.   Trachea midline without thyromegaly   Lungs: Clear to auscultation, bilaterally without Rales/Wheezes/Rhonchi   Heart: Regular rate and rhythm with Normal S1/S2 without murmurs  Abdomen: Soft, non-tender, non-distended. Positive bowel sounds all four quadrants. Extremities: No clubbing, cyanosis, or edema bilaterally. Neurologic: CN 2-12 grossly intact. No speech or motor deficits  Psychiatry: Appropriate affect. Not agitated  Skin: Warm, dry, normal turgor, no rash  Brisk capillary refill, peripheral pulses palpable     Labs:  CBC:   Lab Results   Component Value Date/Time    WBC 5.8 01/19/2023 05:58 AM    RBC 3.23 01/19/2023 05:58 AM    HGB 9.8 01/19/2023 05:58 AM    HCT 30.0 01/19/2023 05:58 AM    MCV 92.8 01/19/2023 05:58 AM    MCH 30.3 01/19/2023 05:58 AM    MCHC 32.7 01/19/2023 05:58 AM    RDW 12.9 01/19/2023 05:58 AM     01/19/2023 05:58 AM    MPV 7.5 01/19/2023 05:58 AM     BMP:    Lab Results   Component Value Date/Time     01/19/2023 05:57 AM    K 3.6 01/19/2023 05:57 AM     01/19/2023 05:57 AM    CO2 26 01/19/2023 05:57 AM    BUN 12 01/19/2023 05:57 AM    CREATININE <0.5 01/19/2023 05:57 AM    CALCIUM 8.2 01/19/2023 05:57 AM    GFRAA >60 08/02/2021 05:35 AM    LABGLOM >60 01/19/2023 05:57 AM    GLUCOSE 96 01/19/2023 05:57 AM     No orders to display       Discussed case  with ED physician    Problem List  Principal Problem:    S/P ORIF (open reduction internal fixation) fracture  Resolved Problems:    * No resolved hospital problems. *        Assessment/Plan:     Right wrist moderately displaced distal radius fracture & 2- Right knee Medial, Lateral tibial plateau fracture. Orthopedic surgery on board; S/P ORIF right distal radius and right knee tibial plateau  NWB Right wrist and Right leg. Continue as needed pain medication  Continue IV fluid until patient able to take p.o.   Monitor electrolytes closely    Hx of recent Ecoli UTI: Completed course of Cipro     Hyperlipidemia: Continue statins    Lexapro: Continue Wellbutrin and Lexapro      Hx bladder cancer: Continue Flomax       DVT prophylaxis: Lovenox  Code status: Full    Admit as inpatient I anticipate hospitalization spanning more than two midnights for investigation and treatment of the above medically necessary diagnoses. Please note that some part of this chart was generated using Dragon dictation software. Although every effort was made to ensure the accuracy of this automated transcription, some errors in transcription may have occurred inadvertently. If you may need any clarification, please do not hesitate to contact me through [facility].        Babs Dash MD    1/19/2023 2:14 PM

## 2023-01-19 NOTE — PROGRESS NOTES
Patient discussed nerve blocks and consented for postop supraclavicular and femoral (cannot access sciatic given dressing), low dose given 2/2 local intraop.

## 2023-01-19 NOTE — PROGRESS NOTES
Pt resting, responds to voice. Pt on 2 L NC.  in the waiting room. Pt ready for transfer to room 484. Room is currently being cleaned. Pt will be put on a hold waiting for ready room.

## 2023-01-19 NOTE — PROGRESS NOTES
Dr. Doretha Fink at the bedside. Pt has verbally consented to have a block done for her right leg and right arm. Pt time out done at the bedside with this RN and Dr. Doretha Fink. Pt placed on 2 L O2.

## 2023-01-19 NOTE — BRIEF OP NOTE
Brief Postoperative Note      Patient: Elmer Ohara  YOB: 1950  MRN: [id number]    Date of Procedure: 1/19/2023    Pre-Op Diagnosis: Closed fracture of distal end of right radius, unspecified fracture morphology, initial encounter [S52.501A]  Closed fracture of right tibial plateau, initial encounter [S82.141A]    Post-Op Diagnosis: Same       Procedure(s):  OPEN REDUCTION INTERNAL FIXATION RIGHT DISTAL RADIUS - MADISYN  RIGHT KNEE OPEN REDUCTION INTERNAL FIXATION TIBIAL PLATEAU    Surgeon(s):  Theresa Chandler MD    Assistant: Betsy Vargas CNP. Surgical Assistant: Rd Milner    Anesthesia: General    Estimated Blood Loss (mL): Minimal    Complications: None    Specimens:   * No specimens in log *    Implants:  Implant Name Type Inv.  Item Serial No.  Lot No. LRB No. Used Action   VOLAR DR PLATE INTERMEDIATE RIGHT 10 HOLES EXTRASHORT - [id number]  VOLAR DR PLATE INTERMEDIATE RIGHT 10 HOLES EXTRASHORT  [employer] [facility]  Right 1 Implanted   SCREW BNE L14MM OD27MM ST LAMAR FULL THRD T8 [facility] - [id number]  SCREW BNE L14MM OD27MM ST LAMAR FULL THRD T8 [facility]  [employer] [facility]  Right 1 Implanted   SCREW BNE L16MM OD27MM ST LAMAR FULL THRD T8 [facility] - [id number]  SCREW BNE L16MM OD27MM ST LAMAR FULL THRD T8 [facility]  [employer] [facility]  Right 1 Implanted   SCREW BNE L18MM OD27MM ST LAMAR FULL THRD T8 [facility] - [id number]  SCREW BNE L18MM OD27MM ST LAMAR FULL THRD T8 [facility]  [employer] [facility]  Right 4 Implanted   SCREW BNE L20MM OD27MM ST LAMAR FULL THRD T8 [facility] - [id number]  SCREW BNE L20MM OD27MM ST LAMAR FULL THRD T8 [facility]  [employer] [facility]  Right 2 Implanted   SCREW BNE L14MM OD27MM ST FULL THRD T8 [facility] - [id number]  SCREW BNE L14MM OD27MM ST FULL THRD T8 [facility]  [employer] [facility]-  Right 1 Implanted   SCREW BONE T10 FTHRD 3.5X34MM - [id number]  SCREW BONE T10 FTHRD 3.5X34MM  [employer]-  Right 1 Implanted   SCREW NONLK T10 FLTHRD 3.5X44MM - [id number]  SCREW NONLK T10 FLTHRD 3.5X44MM  [employer] HOW- Right 1 Implanted   SCREW LK T10 FLTHRD 3.5X36MM - [id number]  SCREW LK T10 FLTHRD 3.5X36MM  [employer] [facility]  Right 1 Implanted   SCREW LK T10 FTHRD 3.5X40MM - [id number]  SCREW LK T10 FTHRD 3.5X40MM  [employer] [facility]  Right 1 Implanted   SCREW BNE L65MM OD35MM NONSTERILE LAMAR FULL THRD T10 DRV - [id number]  SCREW BNE L65MM OD35MM NONSTERILE LAMAR FULL THRD T10 DRV  [employer] [facility]  Right 1 Implanted   SCREW LK VARIAX T10 FTHRD 3.5X70MM - [id number]  SCREW LK VARIAX T10 FTHRD 3.5X70MM  [employer] [facility]  Right 1 Implanted   PLATE BNE [id number] 7 H 4 LAMAR H COMPR STR ST [employer] - [id number]  PLATE BNE [id number] 7 H 4 LAMAR H COMPR STR ST [employer]  MADISYN [facility]-  Right 1 Implanted         Drains: * No LDAs found *    Findings: Same.     Electronically signed by Rodney Beard MD on 1/19/2023 at 1:56 PM

## 2023-01-19 NOTE — PROGRESS NOTES
Patient arrived from OR to PACU. On 6 L simple mask. NSR on the monitor. VSS drsg to right arm CDI. Drsg to right leg CDI. Immobilizer in place. Ice applied to surgical sites.

## 2023-01-19 NOTE — PROCEDURES
ARU Discharge Assessment    Transportation  \"Has lack of transportation kept you from medical appointments, meetings, work, or from getting things needed for daily living? \"Check all that apply:  [] A.  Yes, it has kept me from medical appointments or from getting my medications  [] B.  Yes, it has kept me from non-medical meetings, appointments, work, or from getting things that I need  [x] C.  No  [] X. Patient unable to respond  [] Y. Patient declines to respond    Provision of Current Reconciled Medication List to Subsequent Provider at Discharge  [] No, current reconciled medication list not provided to the subsequent provider. [x] Yes, current reconciled medication list provided to the subsequent provider. (**Select route of transmission below**)   [x] [address]   [] [employer]  [] Verbal (e.g. in person, telephone, video conferencing)  [] Paper-based (e.g. fax, copies, printouts)   [] Other Methods (e.g. texting, email, CDs)    Provision of Current Reconciled Medication List to Patient at Discharge  [x] No, current reconciled medication list not provided to the patient, family and/or caregiver.   [] Yes, current reconciled medication list provided to the patient, family and/or caregiver.  (**Select route of transmission below**)   [] [address] (e.g., electronic access to patient portal)   [] [employer]  [] Verbal (e.g. in person, telephone, video conferencing)  [] Paper-based (e.g. fax, copies, printouts)   [] Other Methods (e.g. texting, email, CDs)    Health Literacy  \"How often do you need to have someone help you when you read instructions, pamphlets, or other written material from your doctor or pharmacy? \"  [x]  0. Never  []  1. Rarely  []  2. Sometimes  []  3. Often  []  4. Always  []  8.   Patient unable to respond    BIMS - **Must be completed in the flowsheet at discharge prior to proceeding with Delirium Assessment**  [x] BIMS completed in flowsheet at discharge    Signs and Symptoms of Delirium  A. Acute Onset Mental Status Change - Is there evidence of an acute change in mental status from the patient's baseline? [x] 0. No  [] 1. Yes    B. Inattention - Did the patient have difficulty focusing attention, for example being easily distractible or having difficulty keeping track of what was being said? [x]  0. Behavior not present  []  1. Behavior continuously present, does not fluctuate  []  2. Behavior present, fluctuates (comes and goes, changes in severity)    C. Disorganized thinking - Was the patient's thinking disorganized or incoherent (rambling or irrelevant conversation, unclear or illogical flow of ideas, or unpredictable switching from subject to subject)? [x]  0. Behavior not present  []  1. Behavior continuously present, does not fluctuate  []  2. Behavior present, fluctuates (comes and goes, changes in severity)    D. Altered level of consciousness - Did the patient have altered level of consciousness as indicated by any of the following criteria? Vigilant - startled easily to any sound or touch  Lethargic - repeatedly dozed off while being asked questions, but responded to voice or touch  Stuporous - very difficulty to arouse and keep aroused for the interview  Comatose - could not be aroused  [x]  0. Behavior not present  []  1. Behavior continuously present, does not fluctuate  []  2. Behavior present, fluctuates (comes and goes, changes in severity)    Mood    \"Over the last 2 weeks, have you been bothered by any of the following problems?\" 1. Symptom Presence    0 = No  1 = Yes  9 = No Response 2.  Symptom Frequency    0 = Never or 1 day  1 = 2-6 days (several days)  2 = 7-11 days (half or more of the days)  3 = 12-14 days (nearly every day)  **Leave blank if 'No Reponse'**      Enter scores in boxes    Column 1 Column 2   Little interest or pleasure in doing things   0 0   Feeling down, depressed, or hopeless   0 0   **If either A or B in column 2 is coded 2 or 3, CONTINUE asking the questions below. If not, END the interview. **     Trouble falling or staying asleep, or sleeping too much       Feeling tired or having little energy       Poor appetite or overeating       Feeling bad about yourself - or that you are a failure or have let yourself or your family down       Trouble concentrating on things, such as reading the newspaper or watching television       Moving or speaking so slowly that other people could have noticed. Or the opposite- being so fidgety or restless that you have been moving around a lot more than usual.       Thoughts that you would be better off dead, or of hurting yourself in some way. Total Severity: Add scores for all frequency responses in column 2 (possible score 0-27, or enter 99 if unable to complete (if symptom frequency (column 2) is blank for 3 or more items). 0     Social Isolation  \"How often do you feel lonely or isolated from those around you? \"  [x] 0. Never  [] 1. Rarely  [] 2. Sometimes  [] 3. Often  [] 4. Always  [] 7. Patient declines to respond  [] 8. Patient unable to respond    Pain Effect on Sleep  \"Over the past 5 days, how much of the time has pain made it hard for you to sleep at night? \"  [x]  0. Does not apply - I have not had any pain or hurting in the past 5 days  []  1. Rarely or not at all  []  2. Occasionally  []  3. Frequently  []  4. Almost constantly  []  8. Unable to answer    **If the patient answers \"0. Does not apply\" to this question, skip the next two \"Pain Effect. [place] \" questions**    Pain Interference with Therapy Activities  \"Over the past 5 days, how often have you limited your participation in rehabilitation therapy sessions due to pain? \"  []  0. Does not apply - I have not received rehabilitation therapy in the past 5 days  []  1. Rarely or not at all  []  2. Occasionally  []  3. Frequently  []  4. Almost constantly  []  8. Unable to answer    Pain Interference with Day-to-Day Activities: \"Over the past 5 days, how often have you limited your day-to-day activities (excluding rehabilitation therapy session)? \"  []  1. Rarely or not at all  []  2. Occasionally  []  3. Frequently  []  4. Almost constantly  []  8. Unable to answer    Nutritional Approaches  Last 7 Days - Check all of the following nutritional approaches that were received within the last 7 days:  []  A. Parenteral/IV feeding  []  B. Feeding tube (e.g., nasogastric or abdominal (PEG))  []  C. Mechanically altered diet - requires change in texture of food or liquids (e.g., pureed food, thickened liquids)  []  D. Therapeutic diet (e.g., low salt, diabetic, low cholesterol)  [x]  Z. None of the above    At time of Discharge - Check all of the following nutritional approaches that were being received at discharge:  [x]  A. Parenteral/IV feeding (including IV fluids if needed for hydration, but not as part of dialysis/chemo)  []  B. Feeding tube (e.g., nasogastric or abdominal (PEG))  []  C. Mechanically altered diet - requires change in texture of food or liquids (e.g., pureed food, thickened liquids)  []  D. Therapeutic diet (e.g., low salt, diabetic, low cholesterol  []  Z. None of the above    High Risk Drug Classes:  Use and Indication    Is taking: Check if the pt is taking any medications by pharmacological classification, not how it is used, in the following classes  Indication noted:  If column 1 is checked, check if there is an indication noted for all meds in the drug class Is taking  (check all that apply) Indication noted (check all that apply)   Antipsychotic [] []   Anticoagulant [x] [x]   Antibiotic [] []   Opioid [x] [x]   Antiplatelet [] []   Hypoglycemic (including insulin) [] []   None of the above []     Special Treatments, Procedures, and Programs    Check all of the following treatments, procedures, and programs that apply at discharge. At Discharge (check all that apply)   Cancer Treatments   A1. Chemotherapy []           A2. IV []           A3. Oral []           A10. Other []   B1. Radiation []   Respiratory Therapies   C1. Oxygen Therapy [x]           C2. Continuous (continuously for at least 14 hours per day) []           C3. Intermittent [x]           C4. High-concentration []   D1. Suctioning (Does not include oral suctioning) []           D2. Scheduled []           D3. As needed []   E1. Tracheostomy Care []   F1. Invasive Mechanical Ventilator (ventilator or respirator) []   G1. Non-invasive Mechanical Ventilator []           G2. BiPAP []           G3. CPAP []   Other   H1. IV Medications (Do not include sub Q pumps, flushes, Dextrose 50% or lactated ringers) []           H2. Vasoactive medications []           H3. Antibiotics []           H4. Anticoagulation []           H10. Other []   I1. Transfusions []   J1. Dialysis []           J2. Hemodialysis []           J3. Peritoneal dialysis []   O1. IV access (including a catheter in a vein) []           O2. Peripheral [x]           O3. Midline []           O4.  Central (PICC, tunneled, port) []      None of the above (select if no Cancer, Respiratory, or Other boxes are checked) []

## 2023-01-19 NOTE — DISCHARGE SUMMARY
Physical Medicine & Rehabilitation  Discharge Summary     Patient Identification:  Jojo Sands  : 1950  Admit date: 2023  Discharge date: 2023  Attending provider: Rekha Rico MD      Primary care provider: Loyd Ortega MD     Discharge Diagnoses:   Patient Active Problem List   Diagnosis    Clot retention of urine    Multiple fractures    Closed fracture of right distal radius    Closed fracture of right tibial plateau       Discharge Functional Status:      Physical therapy:  Supine to Sit: Independent  Sit to Supine: Independent      Sit to Stand: Partial/moderate assistance  Chair/Bed to Chair Transfer: Partial/moderate assistance  Car Transfer: Partial/moderate assistance     Ambulation 10 ft: Supervision or touching assistance  Ambulation 50 ft:    Ambulation 150 ft:    Stairs - 1 Step: Partial/moderate assistance  Stairs - 4 Step:    Stairs - 12 Step:      Occupational therapy:  Eating: Setup or clean-up assistance  Oral Hygiene: Setup or clean-up assistance  Bathing: Partial/moderate assistance  Upper Body Dressing: Setup or clean-up assistance  Lower Body Dressing: Partial/moderate assistance     Toilet Transfer: Supervision or touching assistance  Toilet Hygiene: Supervision or touching assistance    Speech therapy:         Inpatient Rehabilitation Course:   Jojo Sands is a 71 y.o. female admitted to inpatient rehabilitation on 2023 s/p Multiple fractures. The patient participated in an aggressive multidisciplinary inpatient rehabilitation program involving 3 hours of therapy per day, at least 5 days per week. She was progressing well in therapy when she requested to transition orthopedic services. Ortho evaluated and suggested surgical intervention. Patient was in agreement with this plan. She was transferred to surgical preop for scheduled ORIF of the RLE and RUE.   The remainder of her medical rehab course was significant for below:    Multiple fractures: Nonweightbearing RUE/RLE. Pain control. PT/OT. Ortho following. To OR for ORIF. Ecoli UTI: Started Cipro, Cx sensitive     HLD: Lipitor 40     Depression: Lexapro 10 HS, Wellbutrin 300     Hx bladder cancer: Flomax     R hip pain: XR without fracture. Discharge Exam:  Patient Vitals for the past 24 hrs:   BP Temp Temp src Pulse Resp SpO2   01/19/23 0100 -- -- -- -- 18 --   01/18/23 2215 128/72 98.2 °F (36.8 °C) Oral 78 18 97 %     Gen: No distress, pleasant. Resting in bed  HEENT: Normocephalic, atraumatic. CV: Regular rate and rhythm. No MRG   Resp: No respiratory distress. CTAB. Abd: Soft, nontender nondistended  Ext: RLE edema, RUE in splint, RLE in immobilizer  Neuro: Alert, oriented, appropriately interactive.      Significant Diagnostics:   Lab Results   Component Value Date    CREATININE <0.5 (L) 01/19/2023    BUN 12 01/19/2023     01/19/2023    K 3.6 01/19/2023     01/19/2023    CO2 26 01/19/2023       Lab Results   Component Value Date    WBC 5.8 01/19/2023    HGB 9.8 (L) 01/19/2023    HCT 30.0 (L) 01/19/2023    MCV 92.8 01/19/2023     01/19/2023       Disposition:  Acute care in stable condition    Follow-up:  See after visit summary from hospitalization    Discharge Medications:     Medication List        ASK your doctor about these medications      Armodafinil 250 MG Tabs     aspirin 81 MG EC tablet     atorvastatin 40 MG tablet  Commonly known as: LIPITOR     Biotin 5000 MCG Caps     buPROPion 300 MG extended release tablet  Commonly known as: WELLBUTRIN XL     calcium carbonate 600 MG Tabs tablet     diclofenac sodium 1 % Gel  Commonly known as: VOLTAREN     escitalopram 10 MG tablet  Commonly known as: LEXAPRO     estradiol 0.1 MG/GM vaginal cream  Commonly known as: ESTRACE     ferrous sulfate 325 (65 Fe) MG tablet  Commonly known as: IRON 325     Omega-3 500 MG Caps     therapeutic multivitamin-minerals tablet     traZODone 100 MG tablet  Commonly known as: DESYREL     vitamin C 500 MG tablet  Commonly known as: ASCORBIC ACID     Vitamin D3 50 MCG (2000 UT) Caps              Amaury Alatorre MD    * This document was created using dictation software.  While all precautions were taken to ensure accuracy, errors may have occurred.  Please disregard any typographical errors.

## 2023-01-19 NOTE — PROGRESS NOTES
Pt's room is clean and pt ready for transfer to room 484. This RN called and gave telephone report to John Barrera. Pt being transferred up to floor by Yolanda Ribeiro RN. Pt's belongings taken up with the pt. no clubbing/no cyanosis/no pedal edema

## 2023-01-19 NOTE — ANESTHESIA PRE PROCEDURE
Name:  Jeevan Holguin  : 1950  Age:  71 y.o.                                            MRN:  [id number]  Date: 2023           Surgeon: Surgeon(s):  John Velázquez MD    Procedure: Procedure(s):  OPEN REDUCTION INTERNAL FIXATION RIGHT DISTAL RADIUS - MADISYN  RIGHT KNEE OPEN REDUCTION INTERNAL FIXATION TIBIAL PLATEAU     Allergies   Allergen Reactions    Erythromycin Nausea Only    Morphine Itching     Patient Active Problem List   Diagnosis    Clot retention of urine    Multiple fractures    Closed fracture of right distal radius    Closed fracture of right tibial plateau     Past Medical History:   Diagnosis Date    Arthritis     Cancer ([address] 2021    Malignant neoplasm urinary bladder    Hyperlipidemia      Past Surgical History:   Procedure Laterality Date    ABDOMEN SURGERY      gastri bypass     BREAST REDUCTION SURGERY      COLONOSCOPY      CYSTOSCOPY N/A 2021    CYSTOSCOPY EVACUATION OF CLOTS FULGURATION performed by María Elena Tejada MD at [address] [facility] (CERVIX STATUS UNKNOWN)      TONSILLECTOMY       Social History     Tobacco Use    Smoking status: Former     Years: 34.00     Types: Cigarettes     Quit date: 1989     Years since quittin.0    Smokeless tobacco: Never   Vaping Use    Vaping Use: Never used   Substance Use Topics    Drug use: Never     Medications  Current Facility-Administered Medications on File Prior to Visit   Medication Dose Route Frequency Provider Last Rate Last Admin    0.9 % sodium chloride infusion   IntraVENous Continuous John Velázquez MD         Current Outpatient Medications on File Prior to Visit   Medication Sig Dispense Refill    calcium carbonate 600 MG TABS tablet Take 2 tablets by mouth 2 times daily      vitamin C (ASCORBIC ACID) 500 MG tablet Take 500 mg by mouth every morning      aspirin 81 MG EC tablet Take 81 mg by mouth every morning      escitalopram (LEXAPRO) 10 MG tablet Take 10 mg by mouth daily      estradiol (ESTRACE) 0.1 MG/GM vaginal cream Place 0.5 g vaginally daily Pea size (0.5gram) amount to vaginal opening 2 times per week.  ferrous sulfate (IRON 325) 325 (65 Fe) MG tablet Take 325 mg by mouth daily (with breakfast)      atorvastatin (LIPITOR) 40 MG tablet Take 40 mg by mouth daily      buPROPion (WELLBUTRIN XL) 300 MG extended release tablet Take 300 mg by mouth every morning      traZODone (DESYREL) 100 MG tablet Take 100 mg by mouth nightly      Armodafinil 250 MG TABS Take 250 mg by mouth daily.  diclofenac sodium (VOLTAREN) 1 % GEL Apply 2 g topically 4 times daily as needed for Pain      Multiple Vitamins-Minerals (THERAPEUTIC MULTIVITAMIN-MINERALS) tablet Take 1 tablet by mouth every morning      Cholecalciferol (VITAMIN D3) 50 MCG (2000 UT) CAPS Take 1 capsule by mouth every evening       Krill Oil (OMEGA-3) 500 MG CAPS Take 500 mg by mouth every morning      Biotin 5000 MCG CAPS Take 1 tablet by mouth every evening       No current facility-administered medications for this visit. No current outpatient medications on file. Facility-Administered Medications Ordered in Other Visits   Medication Dose Route Frequency Provider Last Rate Last Admin    0.9 % sodium chloride infusion   IntraVENous Continuous Nahed Roe MD         Vital Signs (Current)   There were no vitals filed for this visit.   Vital Signs Statistics (for past 48 hrs)     Temp  Av.3 °F (36.8 °C)  Min: 97.9 °F (36.6 °C)   Min taken time: 23 0930  Max: 98.6 °F (37 °C)   Max taken time: 23  Pulse  Av.5  Min: 66   Min taken time: 23  Max: 81   Max taken time: 23 0851  Resp  Av  Min: 16   Min taken time: 23 1327  Max: 20   Max taken time: 23 0129  BP  Min: 117/67   Min taken time: 23  Max: 128/72   Max taken time: 23  SpO2  Av.3 %  Min: 95 %   Min taken time: 01/18/23 0851  Max: 97 %   Max taken time: 01/18/23 2215    BP Readings from Last 3 Encounters:   01/18/23 128/72   08/02/21 (!) 96/48   08/01/21 (!) 117/58     BMI  There is no height or weight on file to calculate BMI. Estimated body mass index is 26.51 kg/m² as calculated from the following:    Height as of 1/12/23: 5' 5\" (1.651 m). Weight as of 1/11/23: 159 lb 4.8 oz (72.3 kg).     CBC   Lab Results   Component Value Date/Time    WBC 5.8 01/19/2023 05:58 AM    RBC 3.23 01/19/2023 05:58 AM    HGB 9.8 01/19/2023 05:58 AM    HCT 30.0 01/19/2023 05:58 AM    MCV 92.8 01/19/2023 05:58 AM    RDW 12.9 01/19/2023 05:58 AM     01/19/2023 05:58 AM     CMP    Lab Results   Component Value Date/Time     01/19/2023 05:57 AM    K 3.6 01/19/2023 05:57 AM     01/19/2023 05:57 AM    CO2 26 01/19/2023 05:57 AM    BUN 12 01/19/2023 05:57 AM    CREATININE <0.5 01/19/2023 05:57 AM    GFRAA >60 08/02/2021 05:35 AM    AGRATIO 1.6 08/01/2021 04:50 PM    LABGLOM >60 01/19/2023 05:57 AM    GLUCOSE 96 01/19/2023 05:57 AM    PROT 6.7 08/01/2021 04:50 PM    CALCIUM 8.2 01/19/2023 05:57 AM    BILITOT 0.4 08/01/2021 04:50 PM    ALKPHOS 89 08/01/2021 04:50 PM    AST 38 08/01/2021 04:50 PM    ALT 40 08/01/2021 04:50 PM     BMP    Lab Results   Component Value Date/Time     01/19/2023 05:57 AM    K 3.6 01/19/2023 05:57 AM     01/19/2023 05:57 AM    CO2 26 01/19/2023 05:57 AM    BUN 12 01/19/2023 05:57 AM    CREATININE <0.5 01/19/2023 05:57 AM    CALCIUM 8.2 01/19/2023 05:57 AM    GFRAA >60 08/02/2021 05:35 AM    LABGLOM >60 01/19/2023 05:57 AM    GLUCOSE 96 01/19/2023 05:57 AM     POCGlucose  Recent Labs     01/19/23  0557   GLUCOSE 96      Coags    Lab Results   Component Value Date/Time    PROTIME 13.3 08/02/2021 05:34 AM    INR 1.17 08/02/2021 05:34 AM    APTT 28.1 08/02/2021 05:34 AM     HCG (If Applicable) No results found for: PREGTESTUR, PREGSERUM, HCG, HCGQUANT   ABGs No results found for: PHART, PO2ART, XBV9YUT, ZLM2SON, BEART, T4NNEAEM   Type & Screen (If Applicable)  No results found for: LABABO, LABRH                         BMI: Wt Readings from Last 3 Encounters:       NPO Status: 8 hours                          Anesthesia Evaluation  Patient summary reviewed no history of anesthetic complications:   Airway: Mallampati: II  TM distance: >3 FB   Neck ROM: full     Dental: normal exam     Comment: Small chip front tooth    Pulmonary:normal exam    (+) sleep apnea: on CPAP,                            ROS comment: Former smoker   Cardiovascular:  Exercise tolerance: good (>4 METS),   (+) hyperlipidemia    (-) CABG/stent, dysrhythmias and  angina      Rhythm: regular  Rate: normal           Beta Blocker:  Not on Beta Blocker         Neuro/Psych:      (-) TIA and CVA           GI/Hepatic/Renal:        (-) GERD      ROS comment: Denies gerd sx or n/v today  anemia. Endo/Other: Negative Endo/Other ROS   (+) malignancy/cancer (Bladder Ca). Abdominal:             Vascular: negative vascular ROS. Other Findings:             Anesthesia Plan      general     ASA 3     (I have discussed with the patient the rationale for blood component transfusion; its benefits in treating or preventing fatigue, organ damage, or death; and its risk which includes mild transfusion reactions, rare risk of blood borne infection, or more serious but rare reactions. I have discussed the alternatives to transfusion, including the risk and consequences of not receiving transfusion. The patient had an opportunity to ask questions and had agreed to proceed with transfusion of blood components.)  Induction: intravenous. MIPS: Postoperative opioids intended and Prophylactic antiemetics administered. Anesthetic plan and risks discussed with patient. Use of blood products discussed with patient whom consented to blood products. Plan discussed with CRNA.                 This pre-anesthesia assessment may be used as a history and physical.    DOS STAFF ADDENDUM:    Pt seen and examined, chart reviewed (including anesthesia, drug and allergy history). No interval changes to history and physical examination. Anesthetic plan, risks, benefits, alternatives, and personnel involved discussed with patient. Questions and concerns addressed. Patient(family) verbalized an understanding and agrees to proceed.       Teressa Feng MD  January 19, 2023  8:39 AM

## 2023-01-19 NOTE — PROGRESS NOTES
Teaching / education initiated regarding perioperative experience, expectations, and pain management during stay. Patient verbalized understanding.

## 2023-01-19 NOTE — ANESTHESIA POSTPROCEDURE EVALUATION
Department of Anesthesiology  Postprocedure Note    Patient: Fernanda Daniels  MRN: [id number]  YOB: 1950  Date of evaluation: 1/19/2023      Procedure Summary     Date: 01/19/23 Room / Location: [address]    Anesthesia Start: 5530 Anesthesia Stop: 1134    Procedures:       OPEN REDUCTION INTERNAL FIXATION RIGHT DISTAL RADIUS - MADISYN (Right: Wrist)      RIGHT KNEE OPEN REDUCTION INTERNAL FIXATION TIBIAL PLATEAU (Right: Knee) Diagnosis:       Closed fracture of distal end of right radius, unspecified fracture morphology, initial encounter      Closed fracture of right tibial plateau, initial encounter      (Closed fracture of distal end of right radius, unspecified fracture morphology, initial encounter [S52.501A])      (Closed fracture of right tibial plateau, initial encounter [G40.074U])    Surgeons: Katherine Villarreal MD Responsible Provider: Rayne Lacy MD    Anesthesia Type: general ASA Status: 3          Anesthesia Type: No value filed.     Jimena Phase I: Jimena Score: 8    Jimena Phase II:        Anesthesia Post Evaluation    Patient location during evaluation: PACU  Patient participation: complete - patient participated  Level of consciousness: awake  Airway patency: patent  Nausea & Vomiting: no vomiting  Complications: no  Cardiovascular status: hemodynamically stable  Respiratory status: acceptable  Hydration status: euvolemic  Multimodal analgesia pain management approach

## 2023-01-19 NOTE — ANESTHESIA PROCEDURE NOTES
Peripheral Block    Patient location during procedure: pre-op  Reason for block: post-op pain management and at surgeon's request  Start time: 1/19/2023 12:33 PM  End time: 1/19/2023 12:35 PM  Staffing  Performed: anesthesiologist   Anesthesiologist: Maggie Fernandez MD  Preanesthetic Checklist  Completed: patient identified, IV checked, site marked, risks and benefits discussed, surgical/procedural consents, equipment checked, pre-op evaluation, timeout performed, anesthesia consent given, oxygen available and monitors applied/VS acknowledged  Peripheral Block   Patient position: supine  Prep: ChloraPrep  Provider prep: mask and sterile gloves  Patient monitoring: cardiac monitor, continuous pulse ox, frequent blood pressure checks, IV access, responsive to questions and oxygen  Block type: Femoral  Approach to block: Low Femoral.  Laterality: right  Injection technique: single-shot  Guidance: ultrasound guided  Local infiltration: lidocaine  Infiltration strength: 1 %  Local infiltration: lidocaine  Dose: 1 mL    Needle   Needle type: insulated echogenic nerve stimulator needle   Needle gauge: 20 G  Needle localization: ultrasound guidance and anatomical landmarks  Needle length: 10 cm  Assessment   Injection assessment: negative aspiration for heme, no paresthesia on injection, local visualized surrounding nerve on ultrasound and no intravascular symptoms  Paresthesia pain: none  Slow fractionated injection: yes  Hemodynamics: stable  Real-time US image taken/store: yes  Outcomes: uncomplicated and patient tolerated procedure well    Additional Notes  U/S 21299. (1) Under ultrasound guidance, a 20 gauge needle was inserted and placed in close proximity to the femoral nerve. (2) Ultrasound was also used to visualize the spread of the anesthetic in close proximity to the nerve being blocked.  (3) The nerve appeared anatomically normal, and (4 there were no apparent abnormal pathological findings on the image that were readily visible and related to the nerve being blocked. (5) A permanent ultrasound image was saved in the patient's record.             Medications Administered  bupivacaine (PF) 0.5 % - Perineural   5 mL - 1/19/2023 12:33:00 PM

## 2023-01-19 NOTE — CARE COORDINATION
01/19/23 1736   Readmission Assessment   Number of Days since last admission? 1-7 days   Previous Disposition Acute Rehab   Who is being Interviewed Patient   What was the patient's/caregiver's perception as to why they think they needed to return back to the hospital?   (Had to have surgical procedure.)   Did you visit your Primary Care Physician after you left the hospital, before you returned this time? No   Why weren't you able to visit your PCP? Did not have an appointment   Did you see a specialist, such as Cardiac, Pulmonary, Orthopedic Physician, etc. after you left the hospital? Yes  (Orthopedic physician for surgery.)   Who advised the patient to return to the hospital? Physician   Does the patient report anything that got in the way of taking their medications? No   In our efforts to provide the best possible care to you and others like you, can you think of anything that we could have done to help you after you left the hospital the first time, so that you might not have needed to return so soon?  Other (Comment)  (Was scheduled for surgery.)

## 2023-01-19 NOTE — PROGRESS NOTES
The pt signed consent form. Has a working PIV. The pt is aware of the plan of care. The pt's  is not at the bedside at this time. The pt's belonings will be kept in the pt's room 4493 until new bed available after the surgery. The pt left the room via bed to preop.

## 2023-01-19 NOTE — PROGRESS NOTES
[address] [facility],[address]   Herbal and Nutritional Product Restrictions      The following herbal, alternative, and/or nutritional/dietary supplement product(s) has been discontinued per [employer]/[facility] approved policy:      Omega 3 capsules (krill oil)  Biotin capsules    Please reorder upon discharge if appropriate.     Thank you,  Demarco Subramanian, [facility]  1/19/2023 2:08 PM

## 2023-01-20 LAB
ANION GAP SERPL CALCULATED.3IONS-SCNC: 7 MMOL/L (ref 3–16)
BUN BLDV-MCNC: 11 MG/DL (ref 7–20)
CALCIUM SERPL-MCNC: 8.1 MG/DL (ref 8.3–10.6)
CHLORIDE BLD-SCNC: 108 MMOL/L (ref 99–110)
CO2: 27 MMOL/L (ref 21–32)
CREAT SERPL-MCNC: <0.5 MG/DL (ref 0.6–1.2)
GFR SERPL CREATININE-BSD FRML MDRD: >60 ML/MIN/{1.73_M2}
GLUCOSE BLD-MCNC: 106 MG/DL (ref 70–99)
HCT VFR BLD CALC: 31 % (ref 36–48)
HEMOGLOBIN: 10.5 G/DL (ref 12–16)
MCH RBC QN AUTO: 31.7 PG (ref 26–34)
MCHC RBC AUTO-ENTMCNC: 33.9 G/DL (ref 31–36)
MCV RBC AUTO: 93.4 FL (ref 80–100)
PDW BLD-RTO: 13.4 % (ref 12.4–15.4)
PLATELET # BLD: 459 K/UL (ref 135–450)
PMV BLD AUTO: 7.9 FL (ref 5–10.5)
POTASSIUM SERPL-SCNC: 3.7 MMOL/L (ref 3.5–5.1)
RBC # BLD: 3.31 M/UL (ref 4–5.2)
SODIUM BLD-SCNC: 142 MMOL/L (ref 136–145)
VITAMIN D 25-HYDROXY: 32.8 NG/ML
WBC # BLD: 9.3 K/UL (ref 4–11)

## 2023-01-20 PROCEDURE — 97535 SELF CARE MNGMENT TRAINING: CPT

## 2023-01-20 PROCEDURE — 97530 THERAPEUTIC ACTIVITIES: CPT

## 2023-01-20 PROCEDURE — 1200000000 HC SEMI PRIVATE

## 2023-01-20 PROCEDURE — 36415 COLL VENOUS BLD VENIPUNCTURE: CPT

## 2023-01-20 PROCEDURE — 97167 OT EVAL HIGH COMPLEX 60 MIN: CPT

## 2023-01-20 PROCEDURE — 97162 PT EVAL MOD COMPLEX 30 MIN: CPT

## 2023-01-20 PROCEDURE — 82306 VITAMIN D 25 HYDROXY: CPT

## 2023-01-20 PROCEDURE — 6360000002 HC RX W HCPCS: Performed by: NURSE PRACTITIONER

## 2023-01-20 PROCEDURE — 99024 POSTOP FOLLOW-UP VISIT: CPT | Performed by: NURSE PRACTITIONER

## 2023-01-20 PROCEDURE — 80048 BASIC METABOLIC PNL TOTAL CA: CPT

## 2023-01-20 PROCEDURE — 6370000000 HC RX 637 (ALT 250 FOR IP): Performed by: NURSE PRACTITIONER

## 2023-01-20 PROCEDURE — APPNB45 APP NON BILLABLE 31-45 MINUTES: Performed by: NURSE PRACTITIONER

## 2023-01-20 PROCEDURE — 85027 COMPLETE CBC AUTOMATED: CPT

## 2023-01-20 PROCEDURE — 2580000003 HC RX 258: Performed by: NURSE PRACTITIONER

## 2023-01-20 RX ORDER — ASPIRIN 81 MG/1
81 TABLET ORAL 2 TIMES DAILY
Status: DISCONTINUED | OUTPATIENT
Start: 2023-01-20 | End: 2023-01-26 | Stop reason: HOSPADM

## 2023-01-20 RX ORDER — OXYCODONE HYDROCHLORIDE 5 MG/1
5 TABLET ORAL EVERY 4 HOURS PRN
Qty: 30 TABLET | Refills: 0 | Status: SHIPPED | OUTPATIENT
Start: 2023-01-20 | End: 2023-01-25

## 2023-01-20 RX ORDER — OXYCODONE HYDROCHLORIDE 5 MG/1
10 TABLET ORAL EVERY 4 HOURS PRN
Status: DISCONTINUED | OUTPATIENT
Start: 2023-01-20 | End: 2023-01-23

## 2023-01-20 RX ORDER — ASPIRIN 81 MG/1
81 TABLET ORAL 2 TIMES DAILY
Qty: 60 TABLET | Refills: 0 | Status: ON HOLD | OUTPATIENT
Start: 2023-01-20 | End: 2023-02-19

## 2023-01-20 RX ORDER — HYDROMORPHONE HYDROCHLORIDE 1 MG/ML
0.5 INJECTION, SOLUTION INTRAMUSCULAR; INTRAVENOUS; SUBCUTANEOUS EVERY 4 HOURS PRN
Status: DISCONTINUED | OUTPATIENT
Start: 2023-01-20 | End: 2023-01-22

## 2023-01-20 RX ORDER — CYCLOBENZAPRINE HCL 10 MG
10 TABLET ORAL 3 TIMES DAILY
Status: DISCONTINUED | OUTPATIENT
Start: 2023-01-20 | End: 2023-01-26 | Stop reason: HOSPADM

## 2023-01-20 RX ORDER — OXYCODONE HYDROCHLORIDE 5 MG/1
5 TABLET ORAL EVERY 4 HOURS PRN
Status: DISCONTINUED | OUTPATIENT
Start: 2023-01-20 | End: 2023-01-23

## 2023-01-20 RX ADMIN — KETOROLAC TROMETHAMINE 15 MG: 30 INJECTION, SOLUTION INTRAMUSCULAR; INTRAVENOUS at 09:51

## 2023-01-20 RX ADMIN — Medication 1000 MG: at 23:45

## 2023-01-20 RX ADMIN — Medication 10 ML: at 09:50

## 2023-01-20 RX ADMIN — OXYCODONE HYDROCHLORIDE 5 MG: 5 TABLET ORAL at 06:46

## 2023-01-20 RX ADMIN — ACETAMINOPHEN 1000 MG: 500 TABLET ORAL at 09:51

## 2023-01-20 RX ADMIN — HYDROMORPHONE HYDROCHLORIDE 0.5 MG: 1 INJECTION, SOLUTION INTRAMUSCULAR; INTRAVENOUS; SUBCUTANEOUS at 16:24

## 2023-01-20 RX ADMIN — CYCLOBENZAPRINE 10 MG: 10 TABLET, FILM COATED ORAL at 20:42

## 2023-01-20 RX ADMIN — ATORVASTATIN CALCIUM 40 MG: 40 TABLET, FILM COATED ORAL at 20:42

## 2023-01-20 RX ADMIN — Medication 1000 MG: at 09:50

## 2023-01-20 RX ADMIN — BUPROPION HYDROCHLORIDE 300 MG: 300 TABLET, EXTENDED RELEASE ORAL at 09:51

## 2023-01-20 RX ADMIN — STANDARDIZED SENNA CONCENTRATE AND DOCUSATE SODIUM 1 TABLET: 8.6; 5 TABLET ORAL at 09:51

## 2023-01-20 RX ADMIN — Medication 10 ML: at 23:45

## 2023-01-20 RX ADMIN — CYCLOBENZAPRINE 10 MG: 10 TABLET, FILM COATED ORAL at 16:23

## 2023-01-20 RX ADMIN — ACETAMINOPHEN 1000 MG: 500 TABLET ORAL at 16:23

## 2023-01-20 RX ADMIN — Medication 2000 UNITS: at 16:23

## 2023-01-20 RX ADMIN — FERROUS SULFATE TAB 325 MG (65 MG ELEMENTAL FE) 325 MG: 325 (65 FE) TAB at 09:51

## 2023-01-20 RX ADMIN — KETOROLAC TROMETHAMINE 15 MG: 30 INJECTION, SOLUTION INTRAMUSCULAR; INTRAVENOUS at 03:24

## 2023-01-20 RX ADMIN — ASPIRIN 81 MG: 81 TABLET, COATED ORAL at 20:42

## 2023-01-20 RX ADMIN — ENOXAPARIN SODIUM 40 MG: 100 INJECTION SUBCUTANEOUS at 09:51

## 2023-01-20 RX ADMIN — ESCITALOPRAM OXALATE 10 MG: 10 TABLET ORAL at 09:51

## 2023-01-20 RX ADMIN — TRAZODONE HYDROCHLORIDE 100 MG: 50 TABLET ORAL at 20:42

## 2023-01-20 RX ADMIN — STANDARDIZED SENNA CONCENTRATE AND DOCUSATE SODIUM 1 TABLET: 8.6; 5 TABLET ORAL at 20:42

## 2023-01-20 RX ADMIN — ACETAMINOPHEN 1000 MG: 500 TABLET ORAL at 20:42

## 2023-01-20 RX ADMIN — HYDROMORPHONE HYDROCHLORIDE 0.5 MG: 1 INJECTION, SOLUTION INTRAMUSCULAR; INTRAVENOUS; SUBCUTANEOUS at 21:39

## 2023-01-20 RX ADMIN — ASPIRIN 81 MG: 81 TABLET, COATED ORAL at 09:51

## 2023-01-20 ASSESSMENT — PAIN DESCRIPTION - DESCRIPTORS
DESCRIPTORS: ACHING

## 2023-01-20 ASSESSMENT — PAIN SCALES - GENERAL
PAINLEVEL_OUTOF10: 8
PAINLEVEL_OUTOF10: 6
PAINLEVEL_OUTOF10: 8
PAINLEVEL_OUTOF10: 6

## 2023-01-20 ASSESSMENT — PAIN DESCRIPTION - ORIENTATION
ORIENTATION: RIGHT

## 2023-01-20 ASSESSMENT — PAIN DESCRIPTION - LOCATION
LOCATION: WRIST
LOCATION: WRIST
LOCATION: HAND;WRIST
LOCATION: ARM;WRIST
LOCATION: ABDOMEN
LOCATION: WRIST

## 2023-01-20 ASSESSMENT — PAIN SCALES - WONG BAKER
WONGBAKER_NUMERICALRESPONSE: 0

## 2023-01-20 ASSESSMENT — PAIN DESCRIPTION - PAIN TYPE: TYPE: ACUTE PAIN;SURGICAL PAIN

## 2023-01-20 NOTE — PROGRESS NOTES
[facility] Orthopedic Surgery   Progress Note    CHIEF COMPLAINT/DIAGNOSIS: S/p right tibial plateau ORIF     SUBJECTIVE: The patient is sitting up in the bed working with therapy. Described severe right wrist pain and mild right knee pain. No paraesthesias reported. Wants to increase her pain meds. Also have some lumbar spasms - was on regular muscle relaxors while in ARU. Open to returning to ARU. OBJECTIVE  Physical    VITALS:  /67   Pulse 72   Temp 98.4 °F (36.9 °C) (Oral)   Resp 16   Ht 5' 5\" (1.651 m)   Wt 159 lb (72.1 kg)   SpO2 97%   BMI 26.46 kg/m²     GENERAL: Alert and oriented x3, in no acute distress. MUSCULOSKELETAL: Intact DF/PF in operative leg. INCISION:  Covered with post-op dressing; clean, dry and intact. Swelling as expected; compartments remain easily compressible and reasonably supple. KI removed and new waterproof dressing placed. No active drainage-incision intact right knee. ROM: To operative knee deferred. Sensory:  Intact to light touch in tibial and peroneal distributions. Vascular:   2+ DP pulse with brisk cap refill. Splint loosened right wrist.  Swelling as expected. Able to wiggle all digits of the right hand. Brisk cap refill. Splint now well fitted. Able to flex and extend elbow. Data    ALL MEDICATIONS HAVE BEEN REVIEWED    CBC:   Recent Labs     01/19/23  0558 01/20/23  0448   WBC 5.8 9.3   HGB 9.8* 10.5*   HCT 30.0* 31.0*    459*     BMP:   Recent Labs     01/19/23  0557 01/20/23  0448    142   K 3.6 3.7    108   CO2 26 27   BUN 12 11   CREATININE <0.5* <0.5*     INR: No results for input(s): INR in the last 72 hours. ASSESSMENT:  S/p ORIF right tibial plateau and right distal radius ORIF (119/23), POD#1  HLD  Hx bladder ca    PLAN:   - WB status:  TTWB weight bearing through operative leg; No ROM;   NO WB through right wrist, but ok for hemiplatform walker.   Reviewed post op precautions.  - DVT prophylaxis: ASA 81mg BID x 30 days  - PT/OT  - Pain Control: Added dilaudid and increased oxy to 5-10 mg prn. Due to orthopaedic surgical procedure/condition, patient may require pain medication for up to 6-8 weeks. - Expected acute blood loss anemia: H/H today: 10.5/31   - ID:  Ancef x 2 post-op  - Dispo: await ARU eval; ok to d/c from our end once medically stable and dispo needs met. Follow-up with Dr. Tony Arreaga in 2 weeks. Office # [phone]  No future appointments.     BESSY Shields - CNP  1/20/2023  10:15 AM

## 2023-01-20 NOTE — CARE COORDINATION
PreAuthorization request, [id number], for ARU submitted to payor source. ARU will continue to follow progress and update discharge plan as needed.     ISRAEL StreetN, .[phone]

## 2023-01-20 NOTE — PROGRESS NOTES
Jose Raul Manual  1/20/2023  [id number]    Chief Complaint: S/P ORIF (open reduction internal fixation) fracture    Subjective:   No overnight events. Increased pain today following surgery yesterday. She also reports increased muscle spasms in her back. She is strongly interested in returning to the ARU.    ROS: No CP, SOB, dyspnea    Objective:  Patient Vitals for the past 24 hrs:   BP Temp Temp src Pulse Resp SpO2   01/20/23 0950 (!) 122/56 98.4 °F (36.9 °C) Oral 78 18 94 %   01/20/23 0646 -- -- -- -- 16 --   01/20/23 0324 134/67 98.4 °F (36.9 °C) Oral 72 18 97 %   01/19/23 2258 -- -- -- -- 16 --   01/19/23 2045 124/66 97.9 °F (36.6 °C) Oral 72 16 98 %     Gen: No distress, pleasant. HEENT: Normocephalic, atraumatic. CV: No audible murmurs, well perfused extremities  Resp: No respiratory distress. No increased WOB  Abd: Soft, nontender nondistended  Ext: No edema. RUE in postoperative dressing, RLE in postoperative dressing  Neuro: Alert, oriented, appropriately interactive. Laboratory data: Available via EMR. Therapy progress:    PT    Rolling: Level of difficulty - A Lot   Sit to Stand from a Chair: Level of difficulty - Unable  Supine to Sit: Level of difficulty - A Lot     Bed to Chair: Physical Assistance Required - Total  Ambulate Across Room: Physical Assistance Required - Total  Ascend 3-5 Steps With HR: Physical Assistance Required - Total    OT    Eating: Physical Assistance Required - A Little  Grooming: Physical Assistance Required - A Little  LB Dressing: Physical Assistance Required - Total  UB Dressing: Physical Assistance Required - A Lot  Bathing: Physical Assistance Required - Total  Toileting: Physical Assistance Required - Total    SLP         Body mass index is 26.46 kg/m².     Assessment:  Patient Active Problem List   Diagnosis    Clot retention of urine    Multiple fractures    Closed fracture of right distal radius    Closed fracture of right tibial plateau    S/P ORIF (open reduction internal fixation) fracture    Fracture       Plan:   Multiple fractures: s/p ORIF of R radius and tibia. Nonweightbearing RUE/RLE. Pain control. PT/OT. Ortho following. Ecoli UTI: Started Cipro, Cx sensitive  HLD: Lipitor 40  Depression: Lexapro 10 HS, Wellbutrin 300  Hx bladder cancer: Flomax    Dispo: Patient is an appropriate ARU candidate. She was doing well in ARU prior to surgically required transfer. Pre-CERT to be initiated today. We will continue to follow. Jacobo Reyna MD 1/20/2023, 3:22 PM    * This document was created using dictation software. While all precautions were taken to ensure accuracy, errors may have occurred. Please disregard any typographical errors.

## 2023-01-20 NOTE — PROGRESS NOTES
[address] [facility],6[address] Msb Department   Phone: [phone]    Occupational Therapy    [x] Initial Evaluation            [] Daily Treatment Note         [] Discharge Summary      Patient: Jose Raul Howe   : 1950   MRN: [id number]   Date of Service:  2023    Admitting Diagnosis:  Date of Procedure: 2023     Pre-Op Diagnosis: Closed fracture of distal end of right radius, unspecified fracture morphology, initial encounter [S52.580A]  Closed fracture of right tibial plateau, initial encounter [X11.392A]   Post-Op Diagnosis: Same   Procedure(s):  OPEN REDUCTION INTERNAL FIXATION RIGHT DISTAL RADIUS - MADISYN  RIGHT KNEE OPEN REDUCTION INTERNAL FIXATION TIBIAL PLATEAU   Surgeon(s):  Tabitha Mcelroy MD    _____________________________________________________________________________________________________________________________  Current Admission Summary:   Inpatient Rehabilitation Course:   Jose Raul Howe is a 71 y.o. female admitted to inpatient rehabilitation on 2023 s/p Multiple fractures. The patient participated in an aggressive multidisciplinary inpatient rehabilitation program involving 3 hours of therapy per day, at least 5 days per week. She was progressing well in therapy when she requested to transition orthopedic services. Ortho evaluated and suggested surgical intervention. Patient was in agreement with this plan. She was transferred to surgical preop for scheduled ORIF of the RLE and RUE. The remainder of her medical rehab course was significant for below:     Multiple fractures: Nonweightbearing RUE/RLE. Pain control. PT/OT. Ortho following. To OR for ORIF. Initial H & P--71year-old female with a history of hyperlipidemia and bladder cancer who was admitted to [facility] on  with right arm and leg pain after a fall from a ladder.   She reports she was on the last step and thought she was on the ground and fell 1 step to the ground. Work-up revealed a right radius fracture and a right tibial fracture. CT of the knee revealed an intra-articular tibial fracture involving the spine. Ortho evaluated and suggested nonweightbearing nonoperative management with a knee immobilizer on the right lower extremity. Her hospital course was complicated by urinary retention requiring catheterization. Her catheter was removed prior to transfer to this facility. She was evaluated by therapy and suggested to continue in an inpatient setting prior to returning home. She reports her pain remains significant. She does remark about some dysuria this morning and a urinalysis was positive for UTI    Past Medical History:  has a past medical history of Arthritis, Cancer ([facility] [address] and Hyperlipidemia. Past Surgical History:  has a past surgical history that includes Abdomen surgery; Colonoscopy; fracture surgery; Hysterectomy; Tonsillectomy; Breast reduction surgery (2004); eye surgery (1990); Cystoscopy (N/A, 8/1/2021); Wrist fracture surgery (Right, 1/19/2023); and Leg Surgery (Right, 1/19/2023). Discharge Recommendations: Chivo Glover scored a 11/24 on the AM-PAC ADL Inpatient form. Current research shows that an AM-PAC score of 17 or less is typically not associated with a discharge to the patient's home setting. Based on the patient's AM-PAC score and their current ADL deficits, it is recommended that the patient have 5-7 sessions per week of Occupational Therapy at d/c to increase the patient's independence. At this time, this patient demonstrates complex nursing, medical, and rehabilitative needs, and would benefit from intensive rehabilitation services upon discharge from the Inpatient setting.   This patient demonstrates the ability to participate in and benefit from an intensive therapy program with a coordinated interdisciplinary team approach to foster frequent, structured, and documented communication among disciplines, who will work together to establish, prioritize, and achieve treatment goals. Please see assessment section for further patient specific details.    If patient discharges prior to next session this note will serve as a discharge summary.  Please see below for the latest assessment towards goals.    Recommend patient return to the ARU unit once medically stable.       DME Required For Discharge: DME to be determined at next level of care, DME to be determined pending patient progress    Precautions/Restrictions: high fall risk, weight bearing, surgical protocols  Weight Bearing Restrictions: toe touch weight bearing right LE, NWB  right UE-- allowed to use platform walker        Required Braces/Orthotics: knee immobilizer   [x] Right  [] Left  Positional Restrictions:no positional restrictions           Pre-Admission Information   Lives With: spouse                  Type of Home: condo  Home Layout: one level  Home Access:  1 + 1 NORA with (R) grab bar  Bathroom Layout: walk in shower  Bathroom Equipment:  owns grab bars but not installed  Toilet Height: standard height  Home Equipment: no prior equipment  Transfer Assistance: Independent without use of device  Ambulation Assistance:Independent without use of device  ADL Assistance: independent with all ADL's  IADL Assistance: requires assistance with all homemaking tasks  Active :        [x] Yes                 [] No  Hand Dominance: [] Left                 [x] Right  Current Employment: part time employment.  Occupation: [employer]  Hobbies: quilt, [employer]  Recent Falls: 0 additional recent falls excluding reason for hospital admission           Examination   Vision:   Vision Corrective Device: wears glasses for distance while driving   Hearing:   hard of hearing, left hearing aid, right hearing aid  Perception:   WFL  Observation:   General Observation:  right arm wrapped in ace wrap from distal of elbow to fingers. Able to move fingers and thumb @ 1/2 range but limited  due to pain. Edema in fingers-- gently readjusted ace wrap  Posture:   Limited sitting EOB due to back spasms-- sat static sitting @ 5-7 minutes with CGA  Sensation:   denies numbness and tingling  Proprioception:    WFL  Tone:   Normotonic  Coordination Testing:   Unable to formally test secondary to pain and ace wrap. ROM:   Left UE AROM WNL, not formally ranged right arm due to pain from recent surgery. Strength:   Formal MMT held secondary to pain in right UE. Left appears [facility]     Therapist Clinical Decision Making (Complexity): high complexity  Clinical Presentation: evolving      Subjective  General: Upon entering the room in early AM, patient crying in pain due to pain in right arm and back spasms all down the back. Returned about 1 hour later after patient had pain medication. Patient is very motivated to work with therapy this date however pain and back spasms limited mobility   Pain: 6/10. Location: right arm and severe back spasms 8/10 at beginning of session, decreased to 6 at end of session  and 8/10. Location: right arm   Pain Interventions: RN notified, RN notified of patient request for pain medication, heat applied, and repositioned  (heating pad placed by PT onto back), warm blanket, elevation of right arm on pillows, ortho NP present during session and aware         Activities of Daily Living  Basic Activities of Daily Living  Feeding: setup assistance  Grooming: setup assistance  Lower Extremity Dressing: dependent  Toileting: dependent. Toileting Comments: changed brief and purewick supine in bed and rolling. General Comments: due to severe pain and back spasms-- performed ADLs supine in bed only. Declined need for bathing and dressing, Dep socks, dep toileting  Instrumental Activities of Daily Living  No IADL completed on this date.     Functional Mobility  Bed Mobility  Supine to Sit: moderate assistance  Sit to Supine: moderate assistance  Rolling Left: moderate assistance  Rolling Right: moderate assistance  Comments: rolled several times to change brief, apply heat pad and sit EOB. Increased time needed for all mobility. Right knee immobilizer on. Transfers  No transfers completed on this date secondary to patient unable to tolerate due to severe c/o pain and back spasms. Sat EOB only . Comments:  Functional Mobility:  Sitting Balance Comment: SBA/CGA static sitting EOB x 5-7 minutes. Other Therapeutic Interventions    Functional Outcomes  AM-PAC Inpatient Daily Activity Raw Score: 11    Cognition  WFL  Orientation:    alert and oriented x 4  Command Following:   [facility]     Education  Barriers To Learning: physical  Patient Education: patient educated on goals, OT role and benefits, weight-bearing education, discharge recommendations  Learning Assessment:  patient verbalizes and demonstrates understanding    Assessment  Activity Tolerance: Limited tolerance due to pain and back spasms. Patient tearful upon entering room but stated felt much better at end of session  Impairments Requiring Therapeutic Intervention: decreased functional mobility, decreased ADL status, decreased ROM, decreased endurance, decreased balance, decreased coordination, increased pain  Prognosis: good  Clinical Assessment: Patient very motivated to work with therapy but limited due to pain and back spasms. Patient tearful at beginning of session but emotional state improved as therapy session progressed.  Recommend return to ARU once medically stable   Safety Interventions: patient left in bed, bed alarm in place, call light within reach, and nurse notified    Plan  Frequency: 7 x/week  Current Treatment Recommendations: strengthening, ROM, balance training, functional mobility training, transfer training, patient/caregiver education, pain management, and positioning    Goals  Patient Goals: Go to ARU    Short Term Goals:  Time Frame: until discharge  Patient will complete lower body ADL at moderate assistance   Patient will complete toileting at moderate assistance   Patient will complete functional transfers at moderate assistance   Patient will increase [facility] ADL score = to or > than 14/24    Therapy Session Time     Individual Group Co-treatment   Time In    0914, 10:17   Time Out    0929; 11:13   Minutes    15 + 56        Timed Code Treatment Minutes:  56   Total Treatment Minutes:  71       Electronically Signed By: JOSE Shabazz/ANNEL [phone]

## 2023-01-20 NOTE — PLAN OF CARE
Problem: Safety - Adult  Goal: Free from fall injury  1/19/2023 2305 by Isela Woodson RN  Outcome: Progressing  1/19/2023 1347 by Frederic Stark RN  Outcome: Progressing     Problem: Pain  Goal: Verbalizes/displays adequate comfort level or baseline comfort level  1/19/2023 2305 by Isela Woodson RN  Outcome: Progressing  1/19/2023 1347 by Frederic Stark RN  Outcome: Progressing     Problem: Discharge Planning  Goal: Discharge to home or other facility with appropriate resources  Outcome: Progressing     Problem: Skin/Tissue Integrity  Goal: Absence of new skin breakdown  Description: 1. Monitor for areas of redness and/or skin breakdown  2. Assess vascular access sites hourly  3. Every 4-6 hours minimum:  Change oxygen saturation probe site  4. Every 4-6 hours:  If on nasal continuous positive airway pressure, respiratory therapy assess nares and determine need for appliance change or resting period.   1/19/2023 2305 by Isela Woodson RN  Outcome: Progressing  1/19/2023 1347 by Frederic Stark RN  Outcome: Progressing

## 2023-01-20 NOTE — PROGRESS NOTES
[facility] [address]   Phone: [phone]    Physical Therapy    [x] Initial Evaluation            [] Daily Treatment Note         [] Discharge Summary      Patient: Katherine Pedroza   : 1950   MRN: [id number]   Date of Service:  2023  Admitting Diagnosis: S/P ORIF (open reduction internal fixation) fracture  Current Admission Summary: 71-year-old female with a history of hyperlipidemia and bladder cancer who was admitted to [facility] on  with right arm and leg pain after a fall from a ladder. She reports she was on the last step and thought she was on the ground and fell 1 step to the ground. Work-up revealed a right radius fracture and a right tibial fracture. CT of the knee revealed an intra-articular tibial fracture involving the spine. Ortho evaluated and suggested nonweightbearing nonoperative management with a knee immobilizer on the right lower extremity. Her hospital course was complicated by urinary retention requiring catheterization. Discharged to ARU. S/p ORIF right tibial plateau and right distal radius ORIF (23),   Past Medical History:  has a past medical history of Arthritis, Cancer ([facility] [address] and Hyperlipidemia. Past Surgical History:  has a past surgical history that includes Abdomen surgery; Colonoscopy; fracture surgery; Hysterectomy; Tonsillectomy; Breast reduction surgery (); eye surgery (); Cystoscopy (N/A, 2021); Wrist fracture surgery (Right, 2023); and Leg Surgery (Right, 2023). Discharge Recommendations: Katherine Pedroza scored a  on the AM-PAC short mobility form. Current research shows that an AM-PAC score of 17 or less is typically not associated with a discharge to the patient's home setting.  Based on the patient's AM-PAC score and their current functional mobility deficits, it is recommended that the patient have 5-7 sessions per week of Physical Therapy at d/c to increase the patient's independence. At this time, this patient demonstrates complex nursing, medical, and rehabilitative needs, and would benefit from intensive rehabilitation services upon discharge from the Inpatient setting. This patient demonstrates the ability to participate in and benefit from an intensive therapy program with a coordinated interdisciplinary team approach to foster frequent, structured, and documented communication among disciplines, who will work together to establish, prioritize, and achieve treatment goals. Please see assessment section for further patient specific details. If patient discharges prior to next session this note will serve as a discharge summary. Please see below for the latest assessment towards goals. DME Required For Discharge: DME to be determined at next level of care  Precautions/Restrictions: high fall risk, weight bearing  Weight Bearing Restrictions:   NWBing R wrist - ok for platform walker  TTWB RLE  Required Braces/Orthotics: knee immobilizer - when OOB   [x] Right  [] Left  Positional Restrictions:no positional restrictions    Pre-Admission Information   Lives With: spouse                  Type of Home: [facility]  Home Layout: one level  Home Access:  1 + 1 NORA with (R) grab bar  Bathroom Layout: walk in shower  Bathroom Equipment:  owns grab bars but not installed  Toilet Height: standard height  Home Equipment: no prior equipment  Transfer Assistance: Independent without use of device  Ambulation Assistance:Independent without use of device  ADL Assistance: independent with all ADL's  IADL Assistance: requires assistance with all homemaking tasks  Active :        [x] Yes                 [] No  Hand Dominance: [] Left                 [x] Right  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      Examination   Vision:   Vision Gross Assessment: Impaired and Vision Corrective Device: wears glasses for distance while driving  Hearing:   left hearing aid, right hearing aid - able to carry on casual conversation without use  Observation:   Splint R wrist - significant edema noted in hand and fingers - ortho alerted and ace wrap adjusted  Ace wrap on RLE with KI - ace wrap removed by ortho during session. Sensation:   WFL    ROM:   LLE WFL  RLE - ankle and hip WFL   Strength:   LLE WFL, RLE not formally assessed due to recent surgery    Therapist Clinical Decision Making (Complexity): medium complexity  Clinical Presentation: evolving      Subjective  General: Patient reporting significant R wrist pain and minimal RLE pain. Reports back and neck spasms. Reported too much pain initially to participate in mobility. Returned following pain medication administration. Pain: 8/10. Location: R wrist - decreased to 6 during session  Pain Interventions: pain medication in place prior to arrival, ice applied, and heat applied  Ice applied to R wrist, K-pad applied to back/neck       Functional Mobility  Bed Mobility  Supine to Sit: moderate assistance  Sit to Supine: moderate assistance  Rolling Left: moderate assistance  Rolling Right: moderate assistance  Scooting: moderate assistance  Comments: Rolling bilaterally x 2-3    Transfers  No transfers completed on this date secondary to increased pain with mobility. Comments:  Ambulation  Ambulation not tested on this date secondary to increased pain with mobility. Distance:   Gait Mechanics:   Comments:    Stair Mobility  Stair mobility not completed on this date. Comments:  Wheelchair Mobility:  No w/c mobility completed on this date. Comments:  Balance  Static Sitting Balance: fair (+): maintains balance at SBA/supervision without use of UE support  Dynamic Sitting Balance: fair (+): maintains balance at SBA/supervision without use of UE support  Comments: sat EOB 8-10 minutes with SBA.  Needed to return to supine due to increased pain. Other Therapeutic Interventions  See OT note for ADL task  Functional Outcomes  AM-PAC Inpatient Mobility Raw Score : 8              Cognition  WFL  Orientation:    alert and oriented x 4  Command Following:   [facility]  Barriers To Learning: none  Patient Education: patient educated on goals, PT role and benefits, plan of care, precautions, weight-bearing education, HEP, general safety, transfer training, discharge recommendations  Learning Assessment:  patient verbalizes and demonstrates understanding    Assessment  Activity Tolerance: Limited significantly by pain this date. Impairments Requiring Therapeutic Intervention: decreased functional mobility, decreased ROM, decreased strength, decreased endurance, decreased balance  Prognosis: good  Clinical Assessment: Patient not at baseline function and would benefit from skilled PT to address above deficits and facilitate return to baseline function. Significantly limited by pain this date. Prior to surgery was ambulating short distances with platform walker.      Safety Interventions: patient left in bed, bed alarm in place, call light within reach, patient at risk for falls, and nurse notified    Plan  Frequency: 7 x/week  Current Treatment Recommendations: strengthening, ROM, balance training, functional mobility training, transfer training, gait training, endurance training, home exercise program, and safety education    Goals  Patient Goals: to improve mobility  Short Term Goals:  Time Frame: discharge  Patient will complete bed mobility at minimal assistance   Patient will complete transfers at moderate assistance   Patient will ambulate 10 ft with use of platform walker (R) at minimal assistance    Therapy Session Time      Individual Group Co-treatment   Time In     0914   Time Out     0929   Minutes     15     Timed Code Treatment Minutes:   0  Total Treatment Minutes:  15     Second Session Therapy Time:   Individual Concurrent Group Co-treatment   Time In       1017   Time Out       1113   Minutes       56     Timed Code Treatment Minutes:  56    Total Treatment Minutes:  71    Electronically Signed By: Darl Opitz, PT      Thanks, Darl Opitz, PT, DPT [id number]

## 2023-01-20 NOTE — PROGRESS NOTES
HOSPITALISTS PROGRESS NOTE    1/20/2023 9:11 AM        Name: Adia Fuchs Admitted: 1/19/2023  Primary Care Provider: Callum Orta MD (Tel: [phone])      Brief Course: 71 y.o. female  with PMHx of bladder cancer, hyperlipidemia, depression, recent E. coli UTI and moderately displaced distal radius fracture and tibial plateau fracture who was in ARU for rehab after fall on 1/4/2023 resulting in right wrist and right knee injury. Patient was initially evaluated by [facility] orthopedic and recommended nonoperative treatment. Patient was evaluated by  on 1/17/23 in t ARU and admitted for ORIF right distal radius and right tibial plateau. .    Interval history:   Pt seen and examined today   Overnight events noted and interval ancillary notes reviewed. On 2 L nasal cannula satting around 97%; wean as tolerated sats above 92%  Pt endorsed severe right hand and mild knee pain but denied any fever, chills, bowel or bladder dysfunction, any focal sensory or motor deficits. Assessment & Plan:     Right wrist moderately displaced distal radius fracture & 2- Right knee Medial, Lateral tibial plateau fracture. Orthopedic surgery on board; S/P ORIF right distal radius and right knee tibial plateau  Continue as needed pain medication. PT OT consulted  TTWB weight bearing through operative leg; No ROM; NO WB through right wrist, but ok for hemiplatform walker   Monitor electrolytes closely     Hx of recent Ecoli UTI: Completed course of Cipro      Hyperlipidemia: Continue statins     Lexapro: Continue Wellbutrin and Lexapro         Hx bladder cancer: Continue Flomax        DVT prophylaxis: Refusing Lovenox.   Switch to aspirin twice daily  Code:Full Code    Disposition: Once acute medical issues have resolved    Current Medications  Armodafinil TABS 250 mg , Daily  traZODone (DESYREL) tablet 100 mg, Nightly  ferrous sulfate (IRON 325) tablet 325 mg, Daily with breakfast  escitalopram (LEXAPRO) tablet 10 mg, Daily  Vitamin D (CHOLECALCIFEROL) tablet 2,000 Units, QPM  calcium elemental (OSCAL) tablet 1,000 mg, BID  buPROPion (WELLBUTRIN XL) extended release tablet 300 mg, QAM  atorvastatin (LIPITOR) tablet 40 mg, Nightly  aspirin EC tablet 81 mg, QAM  sodium chloride flush 0.9 % injection 5-40 mL, 2 times per day  sodium chloride flush 0.9 % injection 5-40 mL, PRN  0.9 % sodium chloride infusion, PRN  0.9 % sodium chloride infusion, Continuous  acetaminophen (TYLENOL) tablet 1,000 mg, TID  oxyCODONE (ROXICODONE) immediate release tablet 5 mg, Q4H PRN  ketorolac (TORADOL) injection 15 mg, Q6H PRN  sennosides-docusate sodium (SENOKOT-S) 8.6-50 MG tablet 1 tablet, BID  enoxaparin (LOVENOX) injection 40 mg, Daily  ondansetron (ZOFRAN-ODT) disintegrating tablet 4 mg, Q8H PRN   Or  ondansetron (ZOFRAN) injection 4 mg, Q6H PRN  diphenhydrAMINE (BENADRYL) tablet 25 mg, Q6H PRN   Or  diphenhydrAMINE (BENADRYL) injection 25 mg, Q6H PRN        Objective:  /67   Pulse 72   Temp 98.4 °F (36.9 °C) (Oral)   Resp 16   Ht 5' 5\" (1.651 m)   Wt 159 lb (72.1 kg)   SpO2 97%   BMI 26.46 kg/m²     Intake/Output Summary (Last 24 hours) at 1/20/2023 0911  Last data filed at 1/20/2023 0329  Gross per 24 hour   Intake 1820 ml   Output 950 ml   Net 870 ml      Wt Readings from Last 3 Encounters:   01/19/23 159 lb (72.1 kg)   01/11/23 159 lb 4.8 oz (72.3 kg)   08/01/21 146 lb 2.6 oz (66.3 kg)       Physical Examination:   General appearance:  No apparent distress, appears stated age and cooperative. HEENT: Normocephalic, sclera clear., PERRLA. Trachea midline, no adenopathy. Cardiovascular: Regular rate and rhythm, normal S1, S2. No murmur. Respiratory:Clear to auscultation bilaterally, no wheeze or crackles. GI: Abdomen soft, no tenderness, not distended, normal bowel sounds  Musculoskeletal: No cyanosis in digits.   No BLE edema present  Neurology: CN 2-12 grossly intact. No speech or motor deficits  Psych: Not agitated, appropriate affect  Skin: Warm, dry, normal turgor    Labs and Tests:  CBC:   Recent Labs     01/19/23  0558 01/20/23  0448   WBC 5.8 9.3   HGB 9.8* 10.5*    459*     BMP:    Recent Labs     01/19/23  0557 01/20/23  0448    142   K 3.6 3.7    108   CO2 26 27   BUN 12 11   CREATININE <0.5* <0.5*   GLUCOSE 96 106*     Hepatic: No results for input(s): AST, ALT, ALB, BILITOT, ALKPHOS in the last 72 hours. No orders to display       Problem List  Principal Problem:    S/P ORIF (open reduction internal fixation) fracture  Active Problems:    Fracture  Resolved Problems:    * No resolved hospital problems.  Gali Woodson MD   1/20/2023 9:11 AM

## 2023-01-20 NOTE — CARE COORDINATION
Per Chart Review:  PT/OT pending. Inpatient consult to Physical Medicine Rehab.    Electronically signed by BECKY RUTH on 1/20/2023 at 8:58 AM

## 2023-01-20 NOTE — DISCHARGE INSTR - COC
Continuity of Care Form    Patient Name: Catrachito Osullivan   :  1950  MRN:  [id number]    Admit date:  2023  Discharge date:  ***    Code Status Order: Full Code   Advance Directives:   Advance Care Flowsheet Documentation       Date/Time Healthcare Directive Type of Healthcare Directive Copy in [address] Agent's Name Healthcare Agent's Phone Number    [phone] Yes, patient has an advance directive for healthcare treatment Living will -- --  Misty Eason --            Admitting Physician:  Lisa Rubio MD  PCP: Mary Lou Varela MD    Discharging Nurse: [facility] Unit/Room#: 1FM-8080/2783-72  Discharging Unit Phone Number: ***    Emergency Contact:   Extended Emergency Contact Information  Primary Emergency Contact: [address] [place] [address] Phone: [phone]  Relation: Spouse  Preferred language: English   needed?  No    Past Surgical History:  Past Surgical History:   Procedure Laterality Date    ABDOMEN SURGERY      gastri bypass     BREAST REDUCTION SURGERY      COLONOSCOPY      CYSTOSCOPY N/A 2021    CYSTOSCOPY EVACUATION OF CLOTS FULGURATION performed by Yoselin Mcintosh MD at [address] [address] (CERVIX STATUS UNKNOWN)      LEG SURGERY Right 2023    RIGHT KNEE OPEN REDUCTION INTERNAL FIXATION TIBIAL PLATEAU performed by Lizet Coffey MD at [address] Right 2023    OPEN REDUCTION INTERNAL FIXATION RIGHT DISTAL RADIUS - MADISYN performed by Lizet Coffey MD at [facility]       Immunization History:   Immunization History   Administered Date(s) Administered    COVID-19, PFIZER Bivalent BOOSTER, DO NOT Dilute, (age 12y+), IM, 30 mcg/0.3 mL 2022    COVID-19, PFIZER GRAY top, DO NOT Dilute, (age 15 y+), IM, 30 mcg/0.3 mL 2022    COVID-19, PFIZER PURPLE top, DILUTE for use, (age 15 y+), 30mcg/0.3mL 2021, 03/10/2021, 11/03/2021       Active Problems:  Patient Active Problem List   Diagnosis Code    Clot retention of urine R33.8    Multiple fractures T07. XXXA    Closed fracture of right distal radius S52.501A    Closed fracture of right tibial plateau Q34.612O    S/P ORIF (open reduction internal fixation) fracture Z98.890, Z87.81    Fracture T14. 8XXA       Isolation/Infection:   Isolation            No Isolation          Patient Infection Status       Infection Onset Added Last Indicated Last Indicated By Review Planned Expiration Resolved Resolved By    None active    Resolved    COVID-19 (Rule Out) 08/01/21 08/01/21 08/01/21 COVID-19, Rapid (Ordered)   08/01/21 Rule-Out Test Resulted            Nurse Assessment:  Last Vital Signs: BP (!) 122/56   Pulse 78   Temp 98.4 °F (36.9 °C) (Oral)   Resp 18   Ht 5' 5\" (1.651 m)   Wt 159 lb (72.1 kg)   SpO2 94%   BMI 26.46 kg/m²     Last documented pain score (0-10 scale): Pain Level: 8  Last Weight:   Wt Readings from Last 1 Encounters:   01/19/23 159 lb (72.1 kg)     Mental Status:  {IP PT MENTAL STATUS:20030}    IV Access:  { TANNER IV ACCESS:515283812}    Nursing Mobility/ADLs:  Walking   {CHP DME QHNR:753399644}  Transfer  {P DME QCMD:244419903}  Bathing  {P DME MOEI:723452199}  Dressing  {P DME QSVX:586772524}  Toileting  {CHP DME OQSS:331127667}  Feeding  {P DME GBHR:337541656}  Med Admin  {P DME TFHX:125903302}  Med Delivery   { TANNER MED Delivery:285802439}    Wound Care Documentation and Therapy:  Incision 01/19/23 Radial Distal;Right (Active)   Dressing Status Clean;Dry; Intact 01/20/23 1022   Dressing/Treatment Ace wrap;Dry dressing;Splint 01/20/23 1022   Closure Other (Comment) 01/20/23 1022   Incision Assessment Other (Comment) 01/20/23 1022   Drainage Amount None 01/20/23 1022   Odor None 01/20/23 1022   Sarah-incision Assessment Other (Comment) 01/20/23 1022   Number of days: 1       Incision 01/19/23 Knee Anterior;Right (Active)   Dressing Status Clean;Dry; Intact 01/20/23 1022   Dressing/Treatment Ace wrap;Dry dressing 01/20/23 1022   Closure Other (Comment) 01/20/23 1022   Incision Assessment Other (Comment) 01/20/23 1022   Drainage Amount None 01/20/23 1022   Odor None 01/20/23 1022   Sarah-incision Assessment Other (Comment) 01/20/23 1022   Number of days: 1        Elimination:  Continence: Bowel: {YES / UE:67307}  Bladder: {YES / TW:96772}  Urinary Catheter: {Urinary Catheter:318037400}   Colostomy/Ileostomy/Ileal Conduit: {YES / UV:29919}       Date of Last BM: ***    Intake/Output Summary (Last 24 hours) at 1/20/2023 1128  Last data filed at 1/20/2023 0950  Gross per 24 hour   Intake 860 ml   Output 1850 ml   Net -990 ml     I/O last 3 completed shifts: In: 2764 [P.O.:620; I.V.:1200]  Out: 950 [Urine:950]    Safety Concerns:     [employer] Safety Concerns:367733507}    Impairments/Disabilities:      { TANNER Impairments/Disabilities:647289648}    Nutrition Therapy:  Current Nutrition Therapy:   [employer] Diet List:515499806}    Routes of Feeding: {P DME Other Feedings:357380520}  Liquids: {Slp liquid thickness:00383}  Daily Fluid Restriction: {CHP DME Yes amt example:326239990}  Last Modified Barium Swallow with Video (Video Swallowing Test): {Done Not Done IERN:275108964}    Treatments at the Time of Hospital Discharge:   Respiratory Treatments: ***  Oxygen Therapy:  {Therapy; copd oxygen:96504}  Ventilator:    { CC Vent HZFC:822918222}    Rehab Therapies: Physical Therapy  Weight Bearing Status/Restrictions: TTWB on the right leg in the knee immobilizer. Keep splint in place to the right wrist - no WB on the right wrist but ok for use of hemiplatform walker. Other Medical Equipment (for information only, NOT a DME order):  {EQUIPMENT:695735387}  Other Treatments: ASA 81mg BID x 30 days. Followup in 2 weeks to remove splint and wound check with new radiographs.   Dr. Kelli Snowden [phone]    Patient's personal belongings (please select all that are sent with patient):  {JANET DME Belongings:474934300}    RN SIGNATURE:  {Esignature:179100216}    CASE MANAGEMENT/SOCIAL WORK SECTION    Inpatient Status Date: ***    Readmission Risk Assessment Score:  Readmission Risk              Risk of Unplanned Readmission:  10           Discharging to Facility/ Agency   Name:   Address:  Phone:  Fax:    Dialysis Facility (if applicable)   Name:  Address:  Dialysis Schedule:  Phone:  Fax:    / signature: {Esignature:265145303}    PHYSICIAN SECTION    Prognosis: {Prognosis:7674540638}    Condition at Discharge: [address] [place] [address] Patient Condition:928912991}    Rehab Potential (if transferring to Rehab): {Prognosis:1144799756}    Recommended Labs or Other Treatments After Discharge: ***    Physician Certification: I certify the above information and transfer of Sandy Cesar  is necessary for the continuing treatment of the diagnosis listed and that she requires {Admit to Appropriate Level of Care:76389} for {GREATER/LESS:082625306} 30 days.      Update Admission H&P: {JANET MARTINEZ Changes in [facility]:220399841}    PHYSICIAN SIGNATURE:  {Esignature:627317649}

## 2023-01-20 NOTE — OP NOTE
[facility]                     [facility], [address]                                OPERATIVE REPORT    PATIENT NAME: Cornelio Israel                      :        1950  MED REC NO:   [id number]                          ROOM:       4182  ACCOUNT NO:   [de-identified]                           ADMIT DATE: 2023  PROVIDER:     Aguilar Gonsales MD    DATE OF PROCEDURE:  2023    PRIMARY CARE PROVIDER:  Cheyenne Dunne MD    PREOPERATIVE DIAGNOSES:  1. Right proximal tibial plateau bicondylar fracture. 2.  Right distal radius two-part intraarticular displaced fracture. POSTOPERATIVE DIAGNOSES:  1. Right proximal tibia plateau bicondylar fracture. 2.  Right distal radius two-part intraarticular displaced fracture. OPERATION PERFORMED:  1. Open treatment of right proximal tibial bicondylar fracture tibial  plateau fracture with open reduction and internal fixation. 2.  Open treatment of right distal radius two-part intra-articular  fracture with open reduction and internal fixation. SURGEON:  Aguilar Gonsales MD    ASSISTANT:  Salvatore Guthrie CNP    ANESTHESIA:  General anesthesia. ESTIMATED BLOOD LOSS:  Minimal.    COMPLICATIONS:  None. TOURNIQUET:  Right upper arm as well as right upper thigh. IMPLANTS USED:  1.  Chris Titanium volar locking plate, distal radius with three  proximal screws and five distal locking screws. 2.  Ewen Titanium 3.5 VariAx locking plate placed medially for the  tibial plateau. INDICATIONS:  This is a 78-year-old white female, who sustained a fall  off a ladder as she was trying to remove the Amada decoration. That  happened on 2023. The patient was taken to [facility], and  she was admitted to the hospital and she was seen by Orthopedics. At  that time, they recommend nonsurgical treatment. The patient was then  transferred to the [facility].   We had consultation from the  rehab about her injuries and repeat x-rays as well as reviewing T2 scan  with the bilateral extremity injury, we recommended surgical fixation. All risks, benefits, and alternatives were discussed with the patient  and her  and they agreed to proceed with the surgical fixation of  both fractures. Given the patient's over 3weeks old added significant challenge to the procedure. It required significant physical and mental effort. It required 80% more time for such procedure. DESCRIPTION OF THE PROCEDURE:  The patient's right arm and knee were  both marked preoperatively. The patient was then brought to the  operating room and underwent general anesthesia. She received 2 gm of  Ancef IV preoperatively. A well-padded tourniquet was placed to the  right upper arm. First, we addressed the radius. The right upper  extremity was then prepped and draped in a regular sterile routine  fashion. A time-out was called, confirming the patient name, the site,  and the procedure. Esmarch was used for exsanguination. The tourniquet was inflated to 250  mmHg. A volar approach was performed. An incision was made over the  FCR tendon and the tendon sheath was opened. We then incised the  pronator quadratus muscle with the cautery. We exposed the fracture and  it was markedly displaced. There was beginning of callus formation. We  had to mobilize the fracture. It was significantly challenging  physically and mentally very difficult because of the chronicity of the  fracture being over two weeks. After we mobilized the fracture, we were  able to reduce it anatomically. We used a styloid process K-wire for  provisional fixation. While maintaining the reduction, we put the plate  in the appropriate position. We put one screw in the oblong hole and  then, another locking screw in the shaft. We then reduced the fracture  to the plate and locked it with a total of five distal 2.7 locking  screws. Mini C-arm confirmed the anatomic reduction and restoration of  the inclination and height of the distal radius. At this point, we let  the tourniquet down and hemostasis was secured. We irrigated the  incision copiously with normal saline mixed with gentamicin. We closed  the subcutaneous with a 3-0 Vicryl and the skin with a 4-0 Monocryl. Steri-Strips were then applied. Dressing was then applied in the form  of Xeroform, 4x4s, and sterile Webril and a volar splint was applied. At this point, we turned our attention towards the right tip of the toe. A well-padded tourniquet was placed to the right upper thigh. The right  lower extremity was then prepped and draped in a regular sterile routine  fashion. Given the orientation of the fracture, we made an incision to  proceed with the medial fixation. An incision was made over the medial  aspect of the proximal tibia. Careful dissection was performed down to  the proximal medial tibia. We then made a room for the plate. We  elected to use a 3.5 seven-hole VariAx plate. We bent the plate to  accommodate the proximal tibia medially. We placed two non-locking  screws in the shaft. We were able to reduce the fracture anatomically  in place. We then added four more locking screws, two of which were  proximally spanning the lateral tibial plateau for more stabilization on  the lateral side. Overall, we were very satisfied with the anatomic  reduction and position of all the screws. At this point, we let the  tourniquet down and hemostasis was secured. We irrigated the incision  copiously with normal saline mixed with gentamicin. We closed the deep  layer with 0 Vicryl, subcutaneous with a 2-0 Vicryl, and the skin with a  4-0 Monocryl. Steri-Strips were then applied. Dressing was then  applied in the form of Xeroform, 4x4s, sterile Webril, and Ace bandage  and a knee immobilizer was applied.     The patient tolerated the procedure well and was taken to the recovery  in a stable condition. Tim Heller CNP was 1st Assist given the nature of the procedure that needed advanced assistance. She assisted in all aspect of the procedure, including but limited to draping in a sterile fashion, exposure of surgical area, controlling bleeding, retracting and protecting important structures, achieve and maintain bone reduction and surgical wound closure with dressing application. POSTOPERATIVE PLAN:  The patient will be readmitted as an inpatient. She will go back to the rehab hopefully in one or two days. She will be  toe touch weightbearing on the right lower extremity, weightbearing as  tolerated on the right elbow using a platform walker.         Carly Rivera MD    D: 01/19/2023 13:55:17       T: 01/19/2023 14:58:28     /[email or web address]  Job#: [id number]     Doc#: [id number]    CC:  Garnet Scheuermann

## 2023-01-21 PROCEDURE — 1200000000 HC SEMI PRIVATE

## 2023-01-21 PROCEDURE — 6360000002 HC RX W HCPCS: Performed by: NURSE PRACTITIONER

## 2023-01-21 PROCEDURE — 6370000000 HC RX 637 (ALT 250 FOR IP): Performed by: NURSE PRACTITIONER

## 2023-01-21 PROCEDURE — 97535 SELF CARE MNGMENT TRAINING: CPT

## 2023-01-21 PROCEDURE — 94760 N-INVAS EAR/PLS OXIMETRY 1: CPT

## 2023-01-21 PROCEDURE — 2580000003 HC RX 258: Performed by: NURSE PRACTITIONER

## 2023-01-21 PROCEDURE — 97530 THERAPEUTIC ACTIVITIES: CPT

## 2023-01-21 RX ADMIN — Medication 1000 MG: at 23:25

## 2023-01-21 RX ADMIN — ATORVASTATIN CALCIUM 40 MG: 40 TABLET, FILM COATED ORAL at 23:28

## 2023-01-21 RX ADMIN — CYCLOBENZAPRINE 10 MG: 10 TABLET, FILM COATED ORAL at 23:26

## 2023-01-21 RX ADMIN — ASPIRIN 81 MG: 81 TABLET, COATED ORAL at 09:02

## 2023-01-21 RX ADMIN — CYCLOBENZAPRINE 10 MG: 10 TABLET, FILM COATED ORAL at 14:55

## 2023-01-21 RX ADMIN — ACETAMINOPHEN 1000 MG: 500 TABLET ORAL at 14:55

## 2023-01-21 RX ADMIN — ACETAMINOPHEN 1000 MG: 500 TABLET ORAL at 09:01

## 2023-01-21 RX ADMIN — HYDROMORPHONE HYDROCHLORIDE 0.5 MG: 1 INJECTION, SOLUTION INTRAMUSCULAR; INTRAVENOUS; SUBCUTANEOUS at 03:47

## 2023-01-21 RX ADMIN — STANDARDIZED SENNA CONCENTRATE AND DOCUSATE SODIUM 1 TABLET: 8.6; 5 TABLET ORAL at 23:28

## 2023-01-21 RX ADMIN — ASPIRIN 81 MG: 81 TABLET, COATED ORAL at 23:26

## 2023-01-21 RX ADMIN — ESCITALOPRAM OXALATE 10 MG: 10 TABLET ORAL at 09:17

## 2023-01-21 RX ADMIN — OXYCODONE HYDROCHLORIDE 10 MG: 5 TABLET ORAL at 23:20

## 2023-01-21 RX ADMIN — SODIUM CHLORIDE: 9 INJECTION, SOLUTION INTRAVENOUS at 08:59

## 2023-01-21 RX ADMIN — ARMODAFINIL 250 MG: 250 TABLET ORAL at 10:47

## 2023-01-21 RX ADMIN — KETOROLAC TROMETHAMINE 15 MG: 30 INJECTION, SOLUTION INTRAMUSCULAR; INTRAVENOUS at 06:22

## 2023-01-21 RX ADMIN — STANDARDIZED SENNA CONCENTRATE AND DOCUSATE SODIUM 1 TABLET: 8.6; 5 TABLET ORAL at 09:02

## 2023-01-21 RX ADMIN — OXYCODONE HYDROCHLORIDE 10 MG: 5 TABLET ORAL at 12:37

## 2023-01-21 RX ADMIN — BUPROPION HYDROCHLORIDE 300 MG: 300 TABLET, EXTENDED RELEASE ORAL at 10:46

## 2023-01-21 RX ADMIN — HYDROMORPHONE HYDROCHLORIDE 0.5 MG: 1 INJECTION, SOLUTION INTRAMUSCULAR; INTRAVENOUS; SUBCUTANEOUS at 09:02

## 2023-01-21 RX ADMIN — Medication 2000 UNITS: at 17:58

## 2023-01-21 RX ADMIN — TRAZODONE HYDROCHLORIDE 100 MG: 50 TABLET ORAL at 23:25

## 2023-01-21 RX ADMIN — Medication 1000 MG: at 10:46

## 2023-01-21 RX ADMIN — CYCLOBENZAPRINE 10 MG: 10 TABLET, FILM COATED ORAL at 09:02

## 2023-01-21 RX ADMIN — ACETAMINOPHEN 1000 MG: 500 TABLET ORAL at 23:24

## 2023-01-21 RX ADMIN — FERROUS SULFATE TAB 325 MG (65 MG ELEMENTAL FE) 325 MG: 325 (65 FE) TAB at 09:02

## 2023-01-21 ASSESSMENT — PAIN DESCRIPTION - ORIENTATION
ORIENTATION: RIGHT

## 2023-01-21 ASSESSMENT — PAIN - FUNCTIONAL ASSESSMENT
PAIN_FUNCTIONAL_ASSESSMENT: PREVENTS OR INTERFERES WITH MANY ACTIVE NOT PASSIVE ACTIVITIES
PAIN_FUNCTIONAL_ASSESSMENT: PREVENTS OR INTERFERES WITH MANY ACTIVE NOT PASSIVE ACTIVITIES
PAIN_FUNCTIONAL_ASSESSMENT: PREVENTS OR INTERFERES SOME ACTIVE ACTIVITIES AND ADLS

## 2023-01-21 ASSESSMENT — PAIN SCALES - GENERAL
PAINLEVEL_OUTOF10: 7
PAINLEVEL_OUTOF10: 8
PAINLEVEL_OUTOF10: 7
PAINLEVEL_OUTOF10: 4
PAINLEVEL_OUTOF10: 8
PAINLEVEL_OUTOF10: 5
PAINLEVEL_OUTOF10: 5

## 2023-01-21 ASSESSMENT — PAIN DESCRIPTION - FREQUENCY
FREQUENCY: INTERMITTENT
FREQUENCY: INTERMITTENT
FREQUENCY: CONTINUOUS
FREQUENCY: CONTINUOUS

## 2023-01-21 ASSESSMENT — PAIN DESCRIPTION - PAIN TYPE
TYPE: SURGICAL PAIN
TYPE: ACUTE PAIN;SURGICAL PAIN

## 2023-01-21 ASSESSMENT — PAIN SCALES - WONG BAKER
WONGBAKER_NUMERICALRESPONSE: 0

## 2023-01-21 ASSESSMENT — PAIN DESCRIPTION - DESCRIPTORS
DESCRIPTORS: ACHING;DISCOMFORT
DESCRIPTORS: ACHING
DESCRIPTORS: ACHING;DISCOMFORT
DESCRIPTORS: ACHING;DISCOMFORT
DESCRIPTORS: STABBING;THROBBING;ACHING
DESCRIPTORS: ACHING;DISCOMFORT

## 2023-01-21 ASSESSMENT — PAIN DESCRIPTION - LOCATION
LOCATION: KNEE;WRIST
LOCATION: WRIST;KNEE
LOCATION: WRIST;KNEE
LOCATION: KNEE;WRIST

## 2023-01-21 ASSESSMENT — PAIN DESCRIPTION - ONSET
ONSET: ON-GOING

## 2023-01-21 NOTE — PLAN OF CARE
Problem: Pain  Goal: Verbalizes/displays adequate comfort level or baseline comfort level  Outcome: Progressing  Flowsheets (Taken 1/20/2023 1911)  Verbalizes/displays adequate comfort level or baseline comfort level:   Encourage patient to monitor pain and request assistance   Assess pain using appropriate pain scale   Administer analgesics based on type and severity of pain and evaluate response   Implement non-pharmacological measures as appropriate and evaluate response   Consider cultural and social influences on pain and pain management   Notify Licensed Independent Practitioner if interventions unsuccessful or patient reports new pain     Problem: Discharge Planning  Goal: Discharge to home or other facility with appropriate resources  Outcome: Progressing  Flowsheets (Taken 1/20/2023 1911)  Discharge to home or other facility with appropriate resources:   Identify barriers to discharge with patient and caregiver   Arrange for needed discharge resources and transportation as appropriate   Identify discharge learning needs (meds, wound care, etc)   Arrange for interpreters to assist at discharge as needed   Refer to discharge planning if patient needs post-hospital services based on physician order or complex needs related to functional status, cognitive ability or social support system

## 2023-01-21 NOTE — PROGRESS NOTES
Natalie [facility] [address]   Phone: [phone]    Physical Therapy    [] Initial Evaluation            [x] Daily Treatment Note         [] Discharge Summary      Patient: Clay Aguirre   : 1950   MRN: [id number]   Date of Service:  2023  Admitting Diagnosis: S/P ORIF (open reduction internal fixation) fracture  Current Admission Summary: 81-year-old female with a history of hyperlipidemia and bladder cancer who was admitted to [facility] on  with right arm and leg pain after a fall from a ladder. She reports she was on the last step and thought she was on the ground and fell 1 step to the ground. Work-up revealed a right radius fracture and a right tibial fracture. CT of the knee revealed an intra-articular tibial fracture involving the spine. Ortho evaluated and suggested nonweightbearing nonoperative management with a knee immobilizer on the right lower extremity. Her hospital course was complicated by urinary retention requiring catheterization. Discharged to ARU. S/p ORIF right tibial plateau and right distal radius ORIF (23),   Past Medical History:  has a past medical history of Arthritis, Cancer ([facility] [address] and Hyperlipidemia. Past Surgical History:  has a past surgical history that includes Abdomen surgery; Colonoscopy; fracture surgery; Hysterectomy; Tonsillectomy; Breast reduction surgery (); eye surgery (); Cystoscopy (N/A, 2021); Wrist fracture surgery (Right, 2023); and Leg Surgery (Right, 2023). Discharge Recommendations: Clay Aguirre scored a  on the AM-PAC short mobility form. Current research shows that an AM-PAC score of 17 or less is typically not associated with a discharge to the patient's home setting.  Based on the patient's AM-PAC score and their current functional mobility deficits, it is recommended that the patient have 5-7 sessions per week of Physical Therapy at d/c to increase the patient's independence. At this time, this patient demonstrates complex nursing, medical, and rehabilitative needs, and would benefit from intensive rehabilitation services upon discharge from the Inpatient setting. This patient demonstrates the ability to participate in and benefit from an intensive therapy program with a coordinated interdisciplinary team approach to foster frequent, structured, and documented communication among disciplines, who will work together to establish, prioritize, and achieve treatment goals. Please see assessment section for further patient specific details. If patient discharges prior to next session this note will serve as a discharge summary. Please see below for the latest assessment towards goals. DME Required For Discharge: DME to be determined at next level of care  Precautions/Restrictions: high fall risk, weight bearing  Weight Bearing Restrictions:   NWBing R wrist - ok for platform walker  TTWB RLE  Required Braces/Orthotics: knee immobilizer - when OOB   [x] Right  [] Left  Positional Restrictions:no positional restrictions    Pre-Admission Information   Lives With: spouse                  Type of Home: [facility]  Home Layout: one level  Home Access:  1 + 1 NORA with (R) grab bar  Bathroom Layout: walk in shower  Bathroom Equipment:  owns grab bars but not installed  Toilet Height: standard height  Home Equipment: no prior equipment  Transfer Assistance: Independent without use of device  Ambulation Assistance:Independent without use of device  ADL Assistance: independent with all ADL's  IADL Assistance: requires assistance with all homemaking tasks  Active :        [x] Yes                 [] No  Hand Dominance: [] Left                 [x] Right  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      Examination   Vision:   Vision Gross Assessment: Impaired and Vision Corrective Device: wears glasses for distance while driving  Hearing:   left hearing aid, right hearing aid - able to carry on casual conversation without use  Observation:   Splint R wrist - significant edema noted in hand and fingers - ortho alerted and ace wrap adjusted  Ace wrap on RLE with KI - ace wrap removed by ortho during session. Sensation:   WFL    ROM:   LLE WFL  RLE - ankle and hip WFL   Strength:   LLE WFL, RLE not formally assessed due to recent surgery        Subjective  General: Pt reports soreness from muscles spasms yesterday and is fearful to move, not wanting to have more spasms. Agreeable to work with PT and motivated to increase mobility. Pain: 8/10. Location: R UE, R knee area, specifically medial. Sharp pain, felt like something was poking her in her brace  Pain Interventions: pain medication in place prior to arrival and repositioned     Functional Mobility  Bed Mobility  Supine to Sit: 2 person assistance with mod A x 2   Sit to Supine: moderate assistance, not attempted  Scooting: moderate assistance  Comments: 1 person support of RLE while transferring supine to sit to the R side, 2nd person assist at trunk to come to upright position    Transfers  Sit to stand transfer: 2 person assistance with mod A x 2   Stand to sit transfer: moderate assistance  Stand step transfer: 2 person assistance with min x 2   Comments:  Ambulation  Surface:level surface  Assistive Device: platform walker (R)  Assistance: 2 person assistance with min x 1 + CGA x 1   Distance: 3 ft  Gait Mechanics: hopping pattern without placing RLE on floor, short hop length/height  Comments:    Stair Mobility  Stair mobility not completed on this date. Comments:  Wheelchair Mobility:  No w/c mobility completed on this date.   Comments:  Balance  Static Sitting Balance: fair (+): maintains balance at SBA/supervision without use of UE support  Dynamic Sitting Balance: fair (+): maintains balance at SBA/supervision without use of UE support  Comments: sat EOB 20 min while manual muscle release techniques performed on cervical paraspinals and upper trapezius/scapular area. Pt reports much relief from stiffness after techniques performed. Other Therapeutic Interventions  Brace adjusted while lying in bed due to c/o \"poking\". After examination, it appears that this was surgical pain, not irritation from the brace. 2nd session: pt seated in recliner chair on arrival and was agreeable to PT/OT. Pt at lunch in the chair and is ready to return to bed. Discussed with pt getting up with nursing and using BSC using platform walker. Pt encouraged to spend meals in chair vs bed. Pt verbalized understanding. Pt completed STS from recliner chair mod x 2. Pt ambulated 3 ft to bed CGA with platform walker, maintaining WB status well; stand to sit min x 2. Pt completed sit to supine min A for RLE. Increased time required for positioning with pillows and placing heat pad to pt's specifications. Pt continued to report difficulty with turning head due to stiff neck. Encouraged neck mobility to decreased stiffness, as pt still apprehensive to move neck despite improved muscle spasms. Pt in bed at end of session, call light in reach and bed alarm engaged. Functional Outcomes  AM-PAC Inpatient Mobility Raw Score : 7              Cognition  WFL  Orientation:    alert and oriented x 4  Command Following:   [facility]    Education  Barriers To Learning: none  Patient Education: patient educated on goals, PT role and benefits, plan of care, precautions, weight-bearing education, HEP, general safety, transfer training, discharge recommendations  Learning Assessment:  patient verbalizes and demonstrates understanding    Assessment  Activity Tolerance: Limited by pain but improved from yesetrday.   Impairments Requiring Therapeutic Intervention: decreased functional mobility, decreased ROM, decreased strength, decreased endurance, decreased balance  Prognosis: good  Clinical Assessment: Pt limited by pain but improved tolerance from yesterday. Pt requires 2 person assist for safe and effective mobility at this time. Pt was able to progress to very short distance ambulation today with platform walker and to sit up in the chair. Pt is well below functional baseline of being independent with functional mobility and would benefit from continued skilled PT to maximize safe functional independence.      Safety Interventions: patient left in chair, chair alarm in place, call light within reach, patient at risk for falls, and nurse notified    Plan  Frequency: 7 x/week  Current Treatment Recommendations: strengthening, ROM, balance training, functional mobility training, transfer training, gait training, endurance training, home exercise program, and safety education    Goals  Patient Goals: to improve mobility  Short Term Goals:  Time Frame: discharge  Patient will complete bed mobility at minimal assistance   Patient will complete transfers at moderate assistance   Patient will ambulate 10 ft with use of platform walker (R) at minimal assistance    Therapy Session Time      Individual Group Co-treatment   Time In     0945   Time Out     1039   Minutes     54     Timed Code Treatment Minutes:  54 Minutes  Total Treatment Minutes:  [address] Time:   Individual Concurrent Group Co-treatment   Time In       1317   Time Out       1346   Minutes       29     Timed Code Treatment Minutes:  54 + 29    Total Treatment Minutes:  83    Electronically Signed By: Zach Flowers, [address] [facility], DPT [id number]

## 2023-01-21 NOTE — PLAN OF CARE
Last appt was 7-25-19  Next appt is 8-13-19    Last refill of this medication was     3-7-19 #30 with 1 rf Problem: Safety - Adult  Goal: Free from fall injury  Outcome: Progressing     Problem: Pain  Goal: Verbalizes/displays adequate comfort level or baseline comfort level  Outcome: Progressing     Problem: Discharge Planning  Goal: Discharge to home or other facility with appropriate resources  Outcome: Progressing     Problem: Skin/Tissue Integrity  Goal: Absence of new skin breakdown  Description: 1. Monitor for areas of redness and/or skin breakdown  2. Assess vascular access sites hourly  3. Every 4-6 hours minimum:  Change oxygen saturation probe site  4. Every 4-6 hours:  If on nasal continuous positive airway pressure, respiratory therapy assess nares and determine need for appliance change or resting period.   Outcome: Progressing

## 2023-01-21 NOTE — PROGRESS NOTES
HOSPITALISTS PROGRESS NOTE    1/21/2023 10:06 AM        Name: Bright Ramirez . Admitted: 1/19/2023  Primary Care Provider: Lanre Guillen MD (Tel: [phone])      Brief Course: 71 y.o. female  with PMHx of bladder cancer, hyperlipidemia, depression, recent E. coli UTI and moderately displaced distal radius fracture and tibial plateau fracture who was in ARU for rehab after fall on 1/4/2023 resulting in right wrist and right knee injury. Patient was initially evaluated by [facility] orthopedic and recommended nonoperative treatment. Patient was evaluated by  on 1/17/23 in t ARU and admitted for ORIF right distal radius and right tibial plateau. .    Interval history:   Pt seen and examined today. Overnight events noted, interval ancillary notes and labs reviewed. Up in chair; working with physical therapy stated she is feeling better today   denied cough, SOB, chest pain, nausea, vomiting or abdominal pain      Assessment & Plan:     Right wrist moderately displaced distal radius fracture & 2- Right knee Medial, Lateral tibial plateau fracture. Orthopedic surgery on board; S/P ORIF right distal radius and right knee tibial plateau  Continue as needed pain medication. PT OT consulted  TTWB weight bearing through operative leg; No ROM; NO WB through right wrist, but ok for hemiplatform walker   Monitor electrolytes closely     Hx of recent Ecoli UTI: Completed course of Cipro      Hyperlipidemia: Continue statins     Lexapro: Continue Wellbutrin and Lexapro         Hx bladder cancer: Continue Flomax        DVT prophylaxis: Refusing Lovenox.   Switch to aspirin twice daily  Code:Full Code    Disposition: ARU awaiting pre-CERT    Current Medications  cyclobenzaprine (FLEXERIL) tablet 10 mg, TID  oxyCODONE (ROXICODONE) immediate release tablet 5 mg, Q4H PRN   Or  oxyCODONE (ROXICODONE) immediate release tablet 10 mg, Q4H PRN  HYDROmorphone HCl PF (DILAUDID) injection 0.5 mg, Q4H PRN  aspirin EC tablet 81 mg, BID  Armodafinil TABS 250 mg , Daily  traZODone (DESYREL) tablet 100 mg, Nightly  ferrous sulfate (IRON 325) tablet 325 mg, Daily with breakfast  escitalopram (LEXAPRO) tablet 10 mg, Daily  Vitamin D (CHOLECALCIFEROL) tablet 2,000 Units, QPM  calcium elemental (OSCAL) tablet 1,000 mg, BID  buPROPion (WELLBUTRIN XL) extended release tablet 300 mg, QAM  atorvastatin (LIPITOR) tablet 40 mg, Nightly  sodium chloride flush 0.9 % injection 5-40 mL, 2 times per day  sodium chloride flush 0.9 % injection 5-40 mL, PRN  0.9 % sodium chloride infusion, PRN  0.9 % sodium chloride infusion, Continuous  acetaminophen (TYLENOL) tablet 1,000 mg, TID  ketorolac (TORADOL) injection 15 mg, Q6H PRN  sennosides-docusate sodium (SENOKOT-S) 8.6-50 MG tablet 1 tablet, BID  ondansetron (ZOFRAN-ODT) disintegrating tablet 4 mg, Q8H PRN   Or  ondansetron (ZOFRAN) injection 4 mg, Q6H PRN  diphenhydrAMINE (BENADRYL) tablet 25 mg, Q6H PRN   Or  diphenhydrAMINE (BENADRYL) injection 25 mg, Q6H PRN      Objective:  BP (!) 145/73   Pulse 72   Temp 97.8 °F (36.6 °C) (Oral)   Resp 16   Ht 5' 5\" (1.651 m)   Wt 159 lb (72.1 kg)   SpO2 94%   BMI 26.46 kg/m²   No intake or output data in the 24 hours ending 01/21/23 1006     Wt Readings from Last 3 Encounters:   01/19/23 159 lb (72.1 kg)   01/11/23 159 lb 4.8 oz (72.3 kg)   08/01/21 146 lb 2.6 oz (66.3 kg)       Physical Examination:   General appearance:  No apparent distress, appears stated age and cooperative. HEENT: Normocephalic, sclera clear., PERRLA. Trachea midline, no adenopathy. Cardiovascular: Regular rate and rhythm, normal S1, S2. No murmur. Respiratory:Clear to auscultation bilaterally, no wheeze or crackles. GI: Abdomen soft, no tenderness, not distended, normal bowel sounds  Musculoskeletal: No cyanosis in digits. No BLE edema present  Neurology: CN 2-12 grossly intact.  No speech or motor deficits  Psych: Not agitated, appropriate affect  Skin: Warm, dry, normal turgor    Labs and Tests:  CBC:   Recent Labs     01/19/23  0558 01/20/23  0448   WBC 5.8 9.3   HGB 9.8* 10.5*    459*       BMP:    Recent Labs     01/19/23  0557 01/20/23  0448    142   K 3.6 3.7    108   CO2 26 27   BUN 12 11   CREATININE <0.5* <0.5*   GLUCOSE 96 106*       Hepatic: No results for input(s): AST, ALT, ALB, BILITOT, ALKPHOS in the last 72 hours. No orders to display       Problem List  Principal Problem:    S/P ORIF (open reduction internal fixation) fracture  Active Problems:    Fracture  Resolved Problems:    * No resolved hospital problems.  Unknown MD Nicki   1/21/2023 10:06 AM

## 2023-01-21 NOTE — PROGRESS NOTES
Shift assessment complete, VSS, pt A&Ox4 in bed, c/o pain 7/10. Dressing to right wrist and right knee intact. POC and education reviewed with the pt. Morning med administered per [facility]. All needs met at this time, call light in reach, will continue to monitor.

## 2023-01-21 NOTE — PROGRESS NOTES
[address] [place] [address],[address] Msb Department   Phone: [phone]    Occupational Therapy    [] Initial Evaluation            [x] Daily Treatment Note         [] Discharge Summary      Patient: Chivo Glover   : 1950   MRN: [id number]   Date of Service:  2023    Admitting Diagnosis:  Date of Procedure: 2023     Pre-Op Diagnosis: Closed fracture of distal end of right radius, unspecified fracture morphology, initial encounter [S52.501A]  Closed fracture of right tibial plateau, initial encounter [R98.012A]   Post-Op Diagnosis: Same   Procedure(s):  OPEN REDUCTION INTERNAL FIXATION RIGHT DISTAL RADIUS - MADISYN  RIGHT KNEE OPEN REDUCTION INTERNAL FIXATION TIBIAL PLATEAU   Surgeon(s):  Esperanza Jones MD    _____________________________________________________________________________________________________________________________  Current Admission Summary:   Inpatient Rehabilitation Course:   Chivo Glover is a 71 y.o. female admitted to inpatient rehabilitation on 2023 s/p Multiple fractures. The patient participated in an aggressive multidisciplinary inpatient rehabilitation program involving 3 hours of therapy per day, at least 5 days per week. She was progressing well in therapy when she requested to transition orthopedic services. Ortho evaluated and suggested surgical intervention. Patient was in agreement with this plan. She was transferred to surgical preop for scheduled ORIF of the RLE and RUE. The remainder of her medical rehab course was significant for below:     Multiple fractures: Nonweightbearing RUE/RLE. Pain control. PT/OT. Ortho following. To OR for ORIF. Initial H & P--71year-old female with a history of hyperlipidemia and bladder cancer who was admitted to [facility] on  with right arm and leg pain after a fall from a ladder.   She reports she was on the last step and thought she was on the ground and fell 1 step to the ground. Work-up revealed a right radius fracture and a right tibial fracture. CT of the knee revealed an intra-articular tibial fracture involving the spine. Ortho evaluated and suggested nonweightbearing nonoperative management with a knee immobilizer on the right lower extremity. Her hospital course was complicated by urinary retention requiring catheterization. Her catheter was removed prior to transfer to this facility. She was evaluated by therapy and suggested to continue in an inpatient setting prior to returning home. She reports her pain remains significant. She does remark about some dysuria this morning and a urinalysis was positive for UTI    Past Medical History:  has a past medical history of Arthritis, Cancer ([facility] [address] and Hyperlipidemia. Past Surgical History:  has a past surgical history that includes Abdomen surgery; Colonoscopy; fracture surgery; Hysterectomy; Tonsillectomy; Breast reduction surgery (2004); eye surgery (1990); Cystoscopy (N/A, 8/1/2021); Wrist fracture surgery (Right, 1/19/2023); and Leg Surgery (Right, 1/19/2023). Discharge Recommendations: David Gatica scored a 10/24 on the AM-PAC ADL Inpatient form. Current research shows that an AM-PAC score of 17 or less is typically not associated with a discharge to the patient's home setting. Based on the patient's AM-PAC score and their current ADL deficits, it is recommended that the patient have 5-7 sessions per week of Occupational Therapy at d/c to increase the patient's independence. At this time, this patient demonstrates complex nursing, medical, and rehabilitative needs, and would benefit from intensive rehabilitation services upon discharge from the Inpatient setting.   This patient demonstrates the ability to participate in and benefit from an intensive therapy program with a coordinated interdisciplinary team approach to foster frequent, structured, and documented communication among disciplines, who will work together to establish, prioritize, and achieve treatment goals. Please see assessment section for further patient specific details. If patient discharges prior to next session this note will serve as a discharge summary. Please see below for the latest assessment towards goals. Recommend patient return to the ARU unit once medically stable. DME Required For Discharge: DME to be determined at next level of care, DME to be determined pending patient progress    Precautions/Restrictions: high fall risk, weight bearing, surgical protocols  Weight Bearing Restrictions: toe touch weight bearing right LE, NWB  right UE-- allowed to use platform walker        Required Braces/Orthotics: knee immobilizer   [x] Right  [] Left  Positional Restrictions:no positional restrictions           Pre-Admission Information   Lives With: spouse                  Type of Home: condo  Home Layout: one level  Home Access:  1 + 1 NORA with (R) grab bar  Bathroom Layout: walk in shower  Bathroom Equipment:  owns grab bars but not installed  Toilet Height: standard height  Home Equipment: no prior equipment  Transfer Assistance: Independent without use of device  Ambulation Assistance:Independent without use of device  ADL Assistance: independent with all ADL's  IADL Assistance: requires assistance with all homemaking tasks  Active :        [x] Yes                 [] No  Hand Dominance: [] Left                 [x] Right  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  Able to move fingers and thumb @ 1/2 range but limited due to pain. Edema in fingers-- gently readjusted ace wrap  Posture: pt demonstrated the ability to sit EOB w/ sba w/ RLE resting on floor. On this date. Sensation:   denies numbness and tingling      Subjective  General: pt presented to OT/PT therapy treatment session supine in bed and anxious about movement due to pain and back spasms yesterday. Pt c/o of 8/10 pain in RLE. Pain: 8/10. Location: R LE at surgical site/knee area. Brace was re-adjusted w/ pillow case placed to reduce rubbing on skin and irritation. Pain Interventions: repositioned  and therapy activities modified (heating pad placed by PT onto back), warm blanket, elevation of right arm on pillows, ortho NP present during session and aware         Activities of Daily Living  Basic Activities of Daily Living  Feeding: setup assistance  Feeding Comments: pt unable to cut food, open containers,  Grooming: dependent  General Comments: pt sat EOB and hair was brushed, pt unable due to pain in RUE  Instrumental Activities of Daily Living  No IADL completed on this date. Functional Mobility  Bed Mobility  Supine to Sit: 2 person assistance with mod a    Scootin person assistance with mod a    Comments: pt came from supine to sit w/ mod a x2. RLE was supported throughout the movement by therapist seated on the floor and then lowered to the floor. Transfers  Sit to stand transfer:2 person assistance with mod a    Stand to sit transfer: 2 person assistance with mod a    Stand step transfer: 2 person assistance with min a    Comments:   Functional Mobility:  Sitting Balance Comment: SBA static sitting EOB    Functional Mobility: .  2 person assistance with min a + CGAx 1    Functional Mobility Activity: stand step transfer w/ in room from EOB-Recliner  Functional Mobility Device Use: platform walker (R)    Other Therapeutic Interventions: Pt c/o back/neck spasms impacting her ability to move and increased anxiety w/ movement.  Therapist provided tissue massage/stretch at shoulders and neck to decrease pain and increase movement. Pt was encouraged to preform deep breathing. Functional Outcomes  AM-PAC Inpatient Daily Activity Raw Score: 10    Cognition  WFL  Orientation:    alert and oriented x 4  Command Following:   [facility]     Education  Barriers To Learning: emotional and physical  Patient Education: patient educated on goals, OT role and benefits, weight-bearing education, discharge recommendations  Learning Assessment:  patient verbalizes and demonstrates understanding    Assessment  Activity Tolerance: Limited tolerance due to pain and back spasms. Patient tearful upon entering room but stated felt much better at end of session  Impairments Requiring Therapeutic Intervention: decreased functional mobility, decreased ADL status, decreased ROM, decreased endurance, decreased balance, decreased coordination, increased pain  Prognosis: good  Clinical Assessment: Patient very motivated to work with therapy but limited due to pain. Recommend return to ARU once medically stable   Safety Interventions: patient left in chair, chair alarm in place, call light within reach, and nurse notified    Second Therapy Session: 13:17-13:46   Pt presented to therapy session seated in recliner and ready to return to bed. Pt reported that nursing staff was able to transfer her from recliner to [facility] and back to recliner. Pt continues to verbalize anxiety w/ movement due to pain/fear of pain. Pts RLE was supported during scoot in recliner. Pt was give the platform walker and performed a sit<>stand w/ mod a x2. Pt hopped using the platform walker w/ cga x2 from recliner to EOB. Stand<>sit at EOB w/ platform walker min a x2. Pt dep w/ bed mobility. Pt was provided w/ heating pad set at 100* on the machine, pillows placed for comfort, bed adjusted, call light given, water/ice refilled and all needs met. Nursing was notified of transfer status and WB precautions. Plan  Frequency: 7 x/week  Current Treatment Recommendations: strengthening, ROM, balance training, functional mobility training, transfer training, patient/caregiver education, pain management, and positioning    Goals  Patient Goals: Go to ARU    Short Term Goals:  Time Frame: until discharge  Patient will complete lower body ADL at moderate assistance   Patient will complete toileting at moderate assistance   Patient will complete functional transfers at moderate assistance   Patient will increase [facility] ADL score = to or > than 14/24    Therapy Session Time     Individual Group Co-treatment   Time In    945, 1317   Time Out    1137, 1346   Minutes    54 + 29        Timed Code Treatment Minutes:  54 + 29  Total Treatment Minutes:  83 min         Electronically Signed By: Wauneta Lennox, OTR/L   Wauneta Lennox OTR/L [id number]

## 2023-01-22 LAB
ANION GAP SERPL CALCULATED.3IONS-SCNC: 9 MMOL/L (ref 3–16)
BASOPHILS ABSOLUTE: 0.1 K/UL (ref 0–0.2)
BASOPHILS RELATIVE PERCENT: 1.5 %
BUN BLDV-MCNC: 13 MG/DL (ref 7–20)
CALCIUM SERPL-MCNC: 8.4 MG/DL (ref 8.3–10.6)
CHLORIDE BLD-SCNC: 106 MMOL/L (ref 99–110)
CO2: 28 MMOL/L (ref 21–32)
CREAT SERPL-MCNC: <0.5 MG/DL (ref 0.6–1.2)
EOSINOPHILS ABSOLUTE: 0.5 K/UL (ref 0–0.6)
EOSINOPHILS RELATIVE PERCENT: 6.8 %
GFR SERPL CREATININE-BSD FRML MDRD: >60 ML/MIN/{1.73_M2}
GLUCOSE BLD-MCNC: 102 MG/DL (ref 70–99)
HCT VFR BLD CALC: 31.4 % (ref 36–48)
HEMOGLOBIN: 10.5 G/DL (ref 12–16)
LYMPHOCYTES ABSOLUTE: 2.2 K/UL (ref 1–5.1)
LYMPHOCYTES RELATIVE PERCENT: 28.2 %
MCH RBC QN AUTO: 31.1 PG (ref 26–34)
MCHC RBC AUTO-ENTMCNC: 33.5 G/DL (ref 31–36)
MCV RBC AUTO: 92.7 FL (ref 80–100)
MONOCYTES ABSOLUTE: 0.6 K/UL (ref 0–1.3)
MONOCYTES RELATIVE PERCENT: 8.3 %
NEUTROPHILS ABSOLUTE: 4.3 K/UL (ref 1.7–7.7)
NEUTROPHILS RELATIVE PERCENT: 55.2 %
PDW BLD-RTO: 13.1 % (ref 12.4–15.4)
PLATELET # BLD: 468 K/UL (ref 135–450)
PMV BLD AUTO: 7.6 FL (ref 5–10.5)
POTASSIUM REFLEX MAGNESIUM: 4.1 MMOL/L (ref 3.5–5.1)
RBC # BLD: 3.39 M/UL (ref 4–5.2)
SODIUM BLD-SCNC: 143 MMOL/L (ref 136–145)
WBC # BLD: 7.7 K/UL (ref 4–11)

## 2023-01-22 PROCEDURE — 85025 COMPLETE CBC W/AUTO DIFF WBC: CPT

## 2023-01-22 PROCEDURE — 80048 BASIC METABOLIC PNL TOTAL CA: CPT

## 2023-01-22 PROCEDURE — 97535 SELF CARE MNGMENT TRAINING: CPT

## 2023-01-22 PROCEDURE — 6370000000 HC RX 637 (ALT 250 FOR IP): Performed by: NURSE PRACTITIONER

## 2023-01-22 PROCEDURE — 36415 COLL VENOUS BLD VENIPUNCTURE: CPT

## 2023-01-22 PROCEDURE — 1200000000 HC SEMI PRIVATE

## 2023-01-22 PROCEDURE — 2580000003 HC RX 258: Performed by: NURSE PRACTITIONER

## 2023-01-22 PROCEDURE — 97530 THERAPEUTIC ACTIVITIES: CPT

## 2023-01-22 RX ORDER — HYDROMORPHONE HYDROCHLORIDE 1 MG/ML
0.25 INJECTION, SOLUTION INTRAMUSCULAR; INTRAVENOUS; SUBCUTANEOUS EVERY 4 HOURS PRN
Status: DISCONTINUED | OUTPATIENT
Start: 2023-01-22 | End: 2023-01-23

## 2023-01-22 RX ADMIN — ATORVASTATIN CALCIUM 40 MG: 40 TABLET, FILM COATED ORAL at 20:53

## 2023-01-22 RX ADMIN — Medication 10 ML: at 21:01

## 2023-01-22 RX ADMIN — STANDARDIZED SENNA CONCENTRATE AND DOCUSATE SODIUM 1 TABLET: 8.6; 5 TABLET ORAL at 20:53

## 2023-01-22 RX ADMIN — CYCLOBENZAPRINE 10 MG: 10 TABLET, FILM COATED ORAL at 20:53

## 2023-01-22 RX ADMIN — ESCITALOPRAM OXALATE 10 MG: 10 TABLET ORAL at 09:20

## 2023-01-22 RX ADMIN — SODIUM CHLORIDE: 9 INJECTION, SOLUTION INTRAVENOUS at 04:31

## 2023-01-22 RX ADMIN — ACETAMINOPHEN 1000 MG: 500 TABLET ORAL at 20:53

## 2023-01-22 RX ADMIN — FERROUS SULFATE TAB 325 MG (65 MG ELEMENTAL FE) 325 MG: 325 (65 FE) TAB at 09:20

## 2023-01-22 RX ADMIN — CYCLOBENZAPRINE 10 MG: 10 TABLET, FILM COATED ORAL at 14:44

## 2023-01-22 RX ADMIN — OXYCODONE HYDROCHLORIDE 10 MG: 5 TABLET ORAL at 04:39

## 2023-01-22 RX ADMIN — ASPIRIN 81 MG: 81 TABLET, COATED ORAL at 20:53

## 2023-01-22 RX ADMIN — Medication 1000 MG: at 21:04

## 2023-01-22 RX ADMIN — Medication 2000 UNITS: at 17:30

## 2023-01-22 RX ADMIN — STANDARDIZED SENNA CONCENTRATE AND DOCUSATE SODIUM 1 TABLET: 8.6; 5 TABLET ORAL at 09:20

## 2023-01-22 RX ADMIN — BUPROPION HYDROCHLORIDE 300 MG: 300 TABLET, EXTENDED RELEASE ORAL at 09:50

## 2023-01-22 RX ADMIN — SODIUM CHLORIDE: 9 INJECTION, SOLUTION INTRAVENOUS at 21:14

## 2023-01-22 RX ADMIN — ARMODAFINIL 250 MG: 250 TABLET ORAL at 09:19

## 2023-01-22 RX ADMIN — TRAZODONE HYDROCHLORIDE 100 MG: 50 TABLET ORAL at 20:52

## 2023-01-22 RX ADMIN — ACETAMINOPHEN 1000 MG: 500 TABLET ORAL at 14:45

## 2023-01-22 RX ADMIN — Medication 1000 MG: at 09:19

## 2023-01-22 RX ADMIN — OXYCODONE HYDROCHLORIDE 10 MG: 5 TABLET ORAL at 10:15

## 2023-01-22 RX ADMIN — ASPIRIN 81 MG: 81 TABLET, COATED ORAL at 09:20

## 2023-01-22 RX ADMIN — CYCLOBENZAPRINE 10 MG: 10 TABLET, FILM COATED ORAL at 09:20

## 2023-01-22 RX ADMIN — ACETAMINOPHEN 1000 MG: 500 TABLET ORAL at 09:19

## 2023-01-22 ASSESSMENT — PAIN DESCRIPTION - FREQUENCY
FREQUENCY: CONTINUOUS
FREQUENCY: INTERMITTENT

## 2023-01-22 ASSESSMENT — PAIN SCALES - GENERAL
PAINLEVEL_OUTOF10: 7
PAINLEVEL_OUTOF10: 5
PAINLEVEL_OUTOF10: 4
PAINLEVEL_OUTOF10: 5
PAINLEVEL_OUTOF10: 6
PAINLEVEL_OUTOF10: 4
PAINLEVEL_OUTOF10: 7

## 2023-01-22 ASSESSMENT — PAIN DESCRIPTION - LOCATION
LOCATION: KNEE;WRIST
LOCATION: WRIST;KNEE
LOCATION: KNEE;WRIST

## 2023-01-22 ASSESSMENT — PAIN DESCRIPTION - ORIENTATION
ORIENTATION: RIGHT

## 2023-01-22 ASSESSMENT — PAIN DESCRIPTION - PAIN TYPE
TYPE: SURGICAL PAIN
TYPE: SURGICAL PAIN

## 2023-01-22 ASSESSMENT — PAIN DESCRIPTION - DESCRIPTORS
DESCRIPTORS: ACHING;STABBING
DESCRIPTORS: STABBING;DISCOMFORT;THROBBING
DESCRIPTORS: STABBING
DESCRIPTORS: DISCOMFORT
DESCRIPTORS: STABBING;THROBBING
DESCRIPTORS: ACHING;DISCOMFORT

## 2023-01-22 ASSESSMENT — PAIN SCALES - WONG BAKER: WONGBAKER_NUMERICALRESPONSE: 0

## 2023-01-22 ASSESSMENT — PAIN DESCRIPTION - ONSET: ONSET: ON-GOING

## 2023-01-22 NOTE — PROGRESS NOTES
Shift assessment complete, VSS, pt A&Ox4 in bed, c/o pain 6/10 to right knee and right wrist, dressing to right knee and wrist intact. AM med administered per [facility], POC and education reviewed with the pt. All needs met at this time, call light in reach, will continue to monitor.

## 2023-01-22 NOTE — PROGRESS NOTES
[address] [place] [address],[address] Msb Department   Phone: [phone]    Occupational Therapy    [] Initial Evaluation            [x] Daily Treatment Note         [] Discharge Summary      Patient: Jari Sacks   : 1950   MRN: [id number]   Date of Service:  2023    Admitting Diagnosis:  Date of Procedure: 2023     Pre-Op Diagnosis: Closed fracture of distal end of right radius, unspecified fracture morphology, initial encounter [S52.501A]  Closed fracture of right tibial plateau, initial encounter [W12.260A]   Post-Op Diagnosis: Same   Procedure(s):  OPEN REDUCTION INTERNAL FIXATION RIGHT DISTAL RADIUS - MADISYN  RIGHT KNEE OPEN REDUCTION INTERNAL FIXATION TIBIAL PLATEAU   Surgeon(s):  Nguyen Pleitez MD    _____________________________________________________________________________________________________________________________  Current Admission Summary:   Inpatient Rehabilitation Course:   Jari Sacks is a 71 y.o. female admitted to inpatient rehabilitation on 2023 s/p Multiple fractures. The patient participated in an aggressive multidisciplinary inpatient rehabilitation program involving 3 hours of therapy per day, at least 5 days per week. She was progressing well in therapy when she requested to transition orthopedic services. Ortho evaluated and suggested surgical intervention. Patient was in agreement with this plan. She was transferred to surgical preop for scheduled ORIF of the RLE and RUE. The remainder of her medical rehab course was significant for below:     Multiple fractures: Nonweightbearing RUE/RLE. Pain control. PT/OT. Ortho following. To OR for ORIF. Initial H & P--71year-old female with a history of hyperlipidemia and bladder cancer who was admitted to [facility] on  with right arm and leg pain after a fall from a ladder.   She reports she was on the last step and thought she was on the ground and fell 1 step to the ground. Work-up revealed a right radius fracture and a right tibial fracture. CT of the knee revealed an intra-articular tibial fracture involving the spine. Ortho evaluated and suggested nonweightbearing nonoperative management with a knee immobilizer on the right lower extremity. Her hospital course was complicated by urinary retention requiring catheterization. Her catheter was removed prior to transfer to this facility. She was evaluated by therapy and suggested to continue in an inpatient setting prior to returning home. She reports her pain remains significant. She does remark about some dysuria this morning and a urinalysis was positive for UTI    Past Medical History:  has a past medical history of Arthritis, Cancer ([facility] [address] and Hyperlipidemia. Past Surgical History:  has a past surgical history that includes Abdomen surgery; Colonoscopy; fracture surgery; Hysterectomy; Tonsillectomy; Breast reduction surgery (2004); eye surgery (1990); Cystoscopy (N/A, 8/1/2021); Wrist fracture surgery (Right, 1/19/2023); and Leg Surgery (Right, 1/19/2023). Discharge Recommendations: Shreyas Krishnan scored a 14/24 on the AM-PAC ADL Inpatient form. Current research shows that an AM-PAC score of 17 or less is typically not associated with a discharge to the patient's home setting. Based on the patient's AM-PAC score and their current ADL deficits, it is recommended that the patient have 5-7 sessions per week of Occupational Therapy at d/c to increase the patient's independence. At this time, this patient demonstrates complex nursing, medical, and rehabilitative needs, and would benefit from intensive rehabilitation services upon discharge from the Inpatient setting.   This patient demonstrates the ability to participate in and benefit from an intensive therapy program with a coordinated interdisciplinary team approach to foster frequent, structured, and documented communication among disciplines, who will work together to establish, prioritize, and achieve treatment goals. Please see assessment section for further patient specific details. If patient discharges prior to next session this note will serve as a discharge summary. Please see below for the latest assessment towards goals. DME Required For Discharge: DME to be determined at next level of care, DME to be determined pending patient progress    Precautions/Restrictions: high fall risk, weight bearing, surgical protocols  Weight Bearing Restrictions: toe touch weight bearing right LE, NWB  right UE-- allowed to use platform walker        Required Braces/Orthotics: knee immobilizer   [x] Right  [] Left  Positional Restrictions:no positional restrictions           Pre-Admission Information   Lives With: spouse                  Type of Home: [place]  Home Layout: one level  Home Access:  1 + 1 NORA with (R) grab bar  Bathroom Layout: walk in shower  Bathroom Equipment:  owns grab bars but not installed  Toilet Height: standard height  Home Equipment: no prior equipment  Transfer Assistance: Independent without use of device  Ambulation Assistance:Independent without use of device  ADL Assistance: independent with all ADL's  IADL Assistance: requires assistance with all homemaking tasks  Active :        [x] Yes                 [] No  Hand Dominance: [] Left                 [x] Right  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   Location: R UE/R LE  Pain Interventions: repositioned  and therapy activities modified , R UE elevated on pillow, padding added to R hand splint at site of irriation/sharp edge of splinting material         Activities of Daily Living  Basic Activities of Daily Living  Upper Extremity Bathing: setup assistance stand by assistance  Lower Extremity Bathing: moderate assistance   Upper Extremity Dressing: minimal assistance  Lower Extremity Dressing: maximum assistance dependent  Toileting: maximum assistance dependent. General Comments: pt completed sponge bathing and changing gown from reclining chair - toileted once during each session with two person assist (one person providing CGA for balance while other assisted with clothing management) dep assist to doff and mary socks today - assist to thread brief onto legs - assist pt to wash hair from chair - declined oral care - able to complete pericare in sitting    Instrumental Activities of Daily Living  No IADL completed on this date.     Functional Mobility  Bed Mobility  Supine to Sit: minimal assistance  Sit to Supine: minimal assistance  Scootin person assistance with mod A of 1 and min A of another to guard leg due to fear of it dropping to the floor    Comments: HOB elevated, use of elbow on bed   Transfers  Sit to stand transfer:2 person assistance with min A of 2    Stand to sit transfer: minimal assistance  Stand step transfer: once in standing position pt was min A of 1 with use of platform walker from EOB to BSC  , BSC to reclining chair, reclining chair to BSC and BSC to EOB going about 3 feet between surfaces each time   Comments: requires two person assist primarily during sit to stand portions of trasnfers   Functional Mobility:  Sitting Balance Comment: SBA at EOB and from toilet   Functional Mobility Comment: see above       Functional Outcomes       Cognition  WFL  Orientation:    alert and oriented x 4  Command Following:   [facility]     Education  Barriers To Learning: emotional and physical  Patient Education: patient educated on goals, OT role and benefits, weight-bearing education, discharge recommendations  Learning Assessment:  patient verbalizes and demonstrates understanding    Assessment  Activity Tolerance: Limited tolerance due to pain and back spasms. Patient tearful upon entering room but stated felt much better at end of session  Impairments Requiring Therapeutic Intervention: decreased functional mobility, decreased ADL status, decreased ROM, decreased endurance, decreased balance, decreased coordination, increased pain  Prognosis: good  Clinical Assessment: Patient very motivated to work with therapy but limited due to pain.  Recommend return to ARU once medically stable   Safety Interventions: patient left in chair, chair alarm in place, call light within reach, and nurse notified - second session pt in bed with alarm in place and needs in reach         Plan  Frequency: 7 x/week  Current Treatment Recommendations: strengthening, ROM, balance training, functional mobility training, transfer training, patient/caregiver education, pain management, and positioning    Goals  Patient Goals: Go to ARU    Short Term Goals:  Time Frame: until discharge  Patient will complete lower body ADL at moderate assistance   Patient will complete toileting at moderate assistance   Patient will complete functional transfers at moderate assistance   Patient will increase [facility] ADL score = to or > than 14/24    Therapy Session Time     Individual Group Co-treatment   Time In    1322, 1513   Time Out    1411, 1534   Minutes    50+21        Timed Code Treatment Minutes:  71  Total Treatment Minutes: 71         Electronically Signed By: JOSE Soto/ANNEL

## 2023-01-22 NOTE — PROGRESS NOTES
HOSPITALISTS PROGRESS NOTE    1/22/2023 9:48 AM        Name: Rachel Hess . Admitted: 1/19/2023  Primary Care Provider: Mili Bull MD (Tel: [phone])      Brief Course: 71 y.o. female  with PMHx of bladder cancer, hyperlipidemia, depression, recent E. coli UTI and moderately displaced distal radius fracture and tibial plateau fracture who was in ARU for rehab after fall on 1/4/2023 resulting in right wrist and right knee injury. Patient was initially evaluated by [facility] orthopedic and recommended nonoperative treatment. Patient was evaluated by  on 1/17/23 in t ARU and admitted for ORIF right distal radius and right tibial plateau. .    Interval history:   Pt seen and examined today. Overnight events noted, interval ancillary notes and labs reviewed. Afebrile overnight, WBC WNL. denied cough, SOB, chest pain, bowel or bladder dysfunction  Awaiting for ARU pre-CERT    Assessment & Plan:     Right wrist moderately displaced distal radius fracture & 2- Right knee Medial, Lateral tibial plateau fracture. Orthopedic surgery on board; S/P ORIF right distal radius and right knee tibial plateau  Continue as needed pain medication. PT OT consulted  TTWB weight bearing through operative leg; No ROM; NO WB through right wrist, but ok for hemiplatform walker   Monitor electrolytes closely     Hx of recent Ecoli UTI: Completed course of Cipro      Hyperlipidemia: Continue statins     Lexapro: Continue Wellbutrin and Lexapro         Hx bladder cancer: Continue Flomax        DVT prophylaxis: Refusing Lovenox.   Switch to aspirin twice daily  Code:Full Code    Disposition: ARU awaiting pre-CERT    Current Medications  cyclobenzaprine (FLEXERIL) tablet 10 mg, TID  oxyCODONE (ROXICODONE) immediate release tablet 5 mg, Q4H PRN   Or  oxyCODONE (ROXICODONE) immediate release tablet 10 mg, Q4H PRN  HYDROmorphone HCl PF (DILAUDID) injection 0.5 mg, Q4H PRN  aspirin EC tablet 81 mg, BID  Armodafinil TABS 250 mg , Daily  traZODone (DESYREL) tablet 100 mg, Nightly  ferrous sulfate (IRON 325) tablet 325 mg, Daily with breakfast  escitalopram (LEXAPRO) tablet 10 mg, Daily  Vitamin D (CHOLECALCIFEROL) tablet 2,000 Units, QPM  calcium elemental (OSCAL) tablet 1,000 mg, BID  buPROPion (WELLBUTRIN XL) extended release tablet 300 mg, QAM  atorvastatin (LIPITOR) tablet 40 mg, Nightly  sodium chloride flush 0.9 % injection 5-40 mL, 2 times per day  sodium chloride flush 0.9 % injection 5-40 mL, PRN  0.9 % sodium chloride infusion, PRN  0.9 % sodium chloride infusion, Continuous  acetaminophen (TYLENOL) tablet 1,000 mg, TID  ketorolac (TORADOL) injection 15 mg, Q6H PRN  sennosides-docusate sodium (SENOKOT-S) 8.6-50 MG tablet 1 tablet, BID  ondansetron (ZOFRAN-ODT) disintegrating tablet 4 mg, Q8H PRN   Or  ondansetron (ZOFRAN) injection 4 mg, Q6H PRN  diphenhydrAMINE (BENADRYL) tablet 25 mg, Q6H PRN   Or  diphenhydrAMINE (BENADRYL) injection 25 mg, Q6H PRN      Objective:  /77   Pulse 77   Temp 98.2 °F (36.8 °C) (Oral)   Resp 16   Ht 5' 5\" (1.651 m)   Wt 159 lb (72.1 kg)   SpO2 96%   BMI 26.46 kg/m²   No intake or output data in the 24 hours ending 01/22/23 0948     Wt Readings from Last 3 Encounters:   01/19/23 159 lb (72.1 kg)   01/11/23 159 lb 4.8 oz (72.3 kg)   08/01/21 146 lb 2.6 oz (66.3 kg)       Physical Examination:   General appearance:  No apparent distress, appears stated age and cooperative. HEENT: Normocephalic, sclera clear., PERRLA. Trachea midline, no adenopathy. Cardiovascular: Regular rate and rhythm, normal S1, S2. No murmur. Respiratory:Clear to auscultation bilaterally, no wheeze or crackles. GI: Abdomen soft, no tenderness, not distended, normal bowel sounds  Musculoskeletal: No cyanosis in digits. No BLE edema present  Neurology: CN 2-12 grossly intact.  No speech or motor deficits  Psych: Not agitated, appropriate affect  Skin: Warm, dry, normal turgor    Labs and Tests:  CBC:   Recent Labs     01/20/23  0448 01/22/23 0428   WBC 9.3 7.7   HGB 10.5* 10.5*   * 468*       BMP:    Recent Labs     01/20/23 0448 01/22/23 0428    143   K 3.7 4.1    106   CO2 27 28   BUN 11 13   CREATININE <0.5* <0.5*   GLUCOSE 106* 102*       Hepatic: No results for input(s): AST, ALT, ALB, BILITOT, ALKPHOS in the last 72 hours. No orders to display       Problem List  Principal Problem:    S/P ORIF (open reduction internal fixation) fracture  Active Problems:    Fracture  Resolved Problems:    * No resolved hospital problems.  Caro Vaughan MD   1/22/2023 9:48 AM

## 2023-01-22 NOTE — PROGRESS NOTES
[facility]   Phone: [phone]    Physical Therapy    [] Initial Evaluation            [x] Daily Treatment Note         [] Discharge Summary      Patient: Johanne Vo   : 1950   MRN: [id number]   Date of Service:  2023  Admitting Diagnosis: S/P ORIF (open reduction internal fixation) fracture  Current Admission Summary: 71-year-old female with a history of hyperlipidemia and bladder cancer who was admitted to [facility] on  with right arm and leg pain after a fall from a ladder. She reports she was on the last step and thought she was on the ground and fell 1 step to the ground. Work-up revealed a right radius fracture and a right tibial fracture. CT of the knee revealed an intra-articular tibial fracture involving the spine. Ortho evaluated and suggested nonweightbearing nonoperative management with a knee immobilizer on the right lower extremity. Her hospital course was complicated by urinary retention requiring catheterization. Discharged to ARU. S/p ORIF right tibial plateau and right distal radius ORIF (23)    Past Medical History:  has a past medical history of Arthritis, Cancer ([facility] [address] and Hyperlipidemia. Past Surgical History:  has a past surgical history that includes Abdomen surgery; Colonoscopy; fracture surgery; Hysterectomy; Tonsillectomy; Breast reduction surgery (); eye surgery (); Cystoscopy (N/A, 2021); Wrist fracture surgery (Right, 2023); and Leg Surgery (Right, 2023). Discharge Recommendations: Johanne Vo scored a 14/24 on the AM-PAC short mobility form. Current research shows that an AM-PAC score of 17 or less is typically not associated with a discharge to the patient's home setting.  Based on the patient's AM-PAC score and their current functional mobility deficits, it is recommended that the patient have 5-7 sessions per week of Physical Therapy at d/c to increase the patient's independence. At this time, this patient demonstrates complex nursing, medical, and rehabilitative needs, and would benefit from intensive rehabilitation services upon discharge from the Inpatient setting. This patient demonstrates the ability to participate in and benefit from an intensive therapy program with a coordinated interdisciplinary team approach to foster frequent, structured, and documented communication among disciplines, who will work together to establish, prioritize, and achieve treatment goals. Please see assessment section for further patient specific details. If patient discharges prior to next session this note will serve as a discharge summary. Please see below for the latest assessment towards goals. DME Required For Discharge: DME to be determined at next level of care  Precautions/Restrictions: high fall risk, weight bearing  Weight Bearing Restrictions:   NWBing R wrist - ok for platform walker  TTWB RLE  Required Braces/Orthotics: knee immobilizer - when OOB   [x] Right  [] Left  Positional Restrictions:no positional restrictions    Pre-Admission Information   Lives With: spouse                  [facility] of Home: [facility]  Home Layout: one level  Home Access:  1 + 1 NORA with (R) grab bar  Bathroom Layout: walk in shower  Bathroom Equipment:  owns grab bars but not installed  Toilet Height: standard height  Home Equipment: no prior equipment  Transfer Assistance: Independent without use of device  Ambulation Assistance:Independent without use of device  ADL Assistance: independent with all ADL's  IADL Assistance: requires assistance with all homemaking tasks  Active :        [x] Yes                 [] No  Hand Dominance: [] Left                 [x] Right  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      Examination   Vision:   Vision Gross Assessment: Impaired and Vision Corrective Device: wears glasses for distance while driving  Hearing:   left hearing aid, right hearing aid - able to carry on casual conversation without use        Subjective  General: Pt supine in bed on arrival, agreeable to participate in PT/OT treatment session. Pain: 3/10. Location: R UE and R LE  Pain Interventions: pain medication in place prior to arrival and repositioned     Functional Mobility  Bed Mobility  Supine to Sit: minimal assistance  Scooting: minimal assistance- toward EOB  Comments: Supine to sit: HOB elevated, increased time required to complete task, use of bed rail. Physical assistance required for R LE. Transfers  Sit to stand transfer: minimal assistance  Stand to sit transfer: minimal assistance  Stand pivot transfer: minimal assistance  Comments: All transfers completed with a platform walker. Pt required verbal cues for hand placement. Stand pivot transfers completed from bed to [facility] and from [facility] to recliner. Ambulation  Ambulation not tested on this date. Comments:    Stair Mobility  Stair mobility not completed on this date. Comments:  Wheelchair Mobility:  No w/c mobility completed on this date. Comments:  Balance  Static Sitting Balance: fair (+): maintains balance at SBA/supervision without use of UE support  Dynamic Sitting Balance: fair (+): maintains balance at SBA/supervision without use of UE support  Static Standing Balance: poor (+): requires min (A) to maintain balance  Dynamic Standing Balance: poor (+): requires min (A) to maintain balance  Comments: sat EOB 20 min while manual muscle release techniques performed on cervical paraspinals and upper trapezius/scapular area. Pt reports much relief from stiffness after techniques performed. Other Therapeutic Interventions  Pt participated in ADLs with OT. See OT note for assist levels. 2nd session: Pt seated in recliner on arrival, requesting to get back into bed.  Sit <=> stand transfers completed throughout session at min A with use of a platform walker. Pt completes stand pivot transfers with use of platform walker with min A. Stand pivot transfers completed from recliner to [facility] and from [facility] to bed. Pt transferred sit to supine with CGA with assist for the R LE. Pt was left supine in bed with her call light within reach and her bed alarm on. Functional Outcomes  AM-PAC Inpatient Mobility Raw Score : 14              Cognition  WFL  Orientation:    alert and oriented x 4  Command Following:   [facility]  Barriers To Learning: none  Patient Education: patient educated on PT role and benefits, general safety, functional mobility training, proper use of assistive device/equipment, transfer training, discharge recommendations  Learning Assessment:  patient verbalizes and demonstrates understanding    Assessment  Activity Tolerance: Pt tolerated the PT/OT treatment session well with no significant limitations. Impairments Requiring Therapeutic Intervention: decreased functional mobility, decreased ROM, decreased strength, decreased endurance, decreased balance  Prognosis: good  Clinical Assessment: Pt presents today with improvement in functional mobility. Pt was able to perform multiple stand pivot transfers with use of the platform walker with the assistance of one person. Pt was unable to ambulate this date and is primarily limited by R LE weight bearing status. Pt continues to present below her baseline level of function and will benefit from continued skilled PT to facilitate return to PLOF and to promote independence.   Safety Interventions: patient left in chair, chair alarm in place, call light within reach, gait belt, and nurse notified    Plan  Frequency: 7 x/week  Current Treatment Recommendations: strengthening, ROM, balance training, functional mobility training, transfer training, gait training, endurance training, home exercise program, and safety education    Goals  Patient Goals: to improve mobility  Short Term Goals:  Time Frame: discharge  Patient will complete bed mobility at minimal assistance- met 1/22/23   Patient will complete transfers at moderate assistance- met 1/22/23   Patient will ambulate 10 ft with use of platform walker (R) at minimal assistance  Patient will complete bed mobility at stand by assistance  Patient will complete transfers with contact guard assistance  *Two goals met and updated 1/22/23    Therapy Session Time      Individual Group Co-treatment   Time In     1322   Time Out     1411   Minutes     49     Timed Code Treatment Minutes: 52 Minutes     Second Session Therapy Time:   Individual Concurrent Group Co-treatment   Time In       1513   Time Out       1534   Minutes       21     Timed Code Treatment Minutes: 52 + 21 Minutes  Total Treatment Minutes: 70 Minutes    Electronically Signed By: Jame Rodriguez, PT  Ariana Ramsey PT, DPT [id number]

## 2023-01-23 LAB
ANION GAP SERPL CALCULATED.3IONS-SCNC: 7 MMOL/L (ref 3–16)
BUN BLDV-MCNC: 12 MG/DL (ref 7–20)
CALCIUM SERPL-MCNC: 8.8 MG/DL (ref 8.3–10.6)
CHLORIDE BLD-SCNC: 107 MMOL/L (ref 99–110)
CO2: 31 MMOL/L (ref 21–32)
CREAT SERPL-MCNC: <0.5 MG/DL (ref 0.6–1.2)
GFR SERPL CREATININE-BSD FRML MDRD: >60 ML/MIN/{1.73_M2}
GLUCOSE BLD-MCNC: 124 MG/DL (ref 70–99)
POTASSIUM REFLEX MAGNESIUM: 4.4 MMOL/L (ref 3.5–5.1)
SODIUM BLD-SCNC: 145 MMOL/L (ref 136–145)

## 2023-01-23 PROCEDURE — APPNB45 APP NON BILLABLE 31-45 MINUTES: Performed by: NURSE PRACTITIONER

## 2023-01-23 PROCEDURE — 2580000003 HC RX 258: Performed by: NURSE PRACTITIONER

## 2023-01-23 PROCEDURE — 1200000000 HC SEMI PRIVATE

## 2023-01-23 PROCEDURE — 99024 POSTOP FOLLOW-UP VISIT: CPT | Performed by: NURSE PRACTITIONER

## 2023-01-23 PROCEDURE — 6370000000 HC RX 637 (ALT 250 FOR IP): Performed by: INTERNAL MEDICINE

## 2023-01-23 PROCEDURE — 36415 COLL VENOUS BLD VENIPUNCTURE: CPT

## 2023-01-23 PROCEDURE — 97530 THERAPEUTIC ACTIVITIES: CPT

## 2023-01-23 PROCEDURE — 97535 SELF CARE MNGMENT TRAINING: CPT

## 2023-01-23 PROCEDURE — 80048 BASIC METABOLIC PNL TOTAL CA: CPT

## 2023-01-23 PROCEDURE — 6370000000 HC RX 637 (ALT 250 FOR IP): Performed by: NURSE PRACTITIONER

## 2023-01-23 RX ORDER — SENNA AND DOCUSATE SODIUM 50; 8.6 MG/1; MG/1
1 TABLET, FILM COATED ORAL 2 TIMES DAILY
Status: CANCELLED | OUTPATIENT
Start: 2023-01-23

## 2023-01-23 RX ORDER — TRAMADOL HYDROCHLORIDE 50 MG/1
50 TABLET ORAL EVERY 8 HOURS PRN
Status: DISCONTINUED | OUTPATIENT
Start: 2023-01-23 | End: 2023-01-25

## 2023-01-23 RX ORDER — ACETAMINOPHEN 325 MG/1
650 TABLET ORAL EVERY 4 HOURS PRN
Status: CANCELLED | OUTPATIENT
Start: 2023-01-23

## 2023-01-23 RX ORDER — BISACODYL 10 MG
10 SUPPOSITORY, RECTAL RECTAL ONCE
Status: COMPLETED | OUTPATIENT
Start: 2023-01-23 | End: 2023-01-23

## 2023-01-23 RX ORDER — ONDANSETRON 4 MG/1
4 TABLET, ORALLY DISINTEGRATING ORAL EVERY 8 HOURS PRN
Status: CANCELLED | OUTPATIENT
Start: 2023-01-23

## 2023-01-23 RX ORDER — ASPIRIN 81 MG/1
81 TABLET ORAL 2 TIMES DAILY
Status: CANCELLED | OUTPATIENT
Start: 2023-01-23

## 2023-01-23 RX ORDER — DIPHENHYDRAMINE HCL 25 MG
25 TABLET ORAL EVERY 6 HOURS PRN
Status: CANCELLED | OUTPATIENT
Start: 2023-01-23

## 2023-01-23 RX ORDER — CALCIUM CARBONATE 500(1250)
2 TABLET ORAL 2 TIMES DAILY
Status: CANCELLED | OUTPATIENT
Start: 2023-01-23

## 2023-01-23 RX ORDER — ATORVASTATIN CALCIUM 40 MG/1
40 TABLET, FILM COATED ORAL NIGHTLY
Status: CANCELLED | OUTPATIENT
Start: 2023-01-23

## 2023-01-23 RX ORDER — HYDRALAZINE HYDROCHLORIDE 25 MG/1
50 TABLET, FILM COATED ORAL EVERY 8 HOURS PRN
Status: CANCELLED | OUTPATIENT
Start: 2023-01-23

## 2023-01-23 RX ORDER — SODIUM CHLORIDE 9 MG/ML
INJECTION, SOLUTION INTRAVENOUS PRN
Status: CANCELLED | OUTPATIENT
Start: 2023-01-23

## 2023-01-23 RX ORDER — ACETAMINOPHEN 500 MG
1000 TABLET ORAL 3 TIMES DAILY
Status: CANCELLED | OUTPATIENT
Start: 2023-01-23

## 2023-01-23 RX ORDER — ESCITALOPRAM OXALATE 10 MG/1
10 TABLET ORAL DAILY
Status: CANCELLED | OUTPATIENT
Start: 2023-01-24

## 2023-01-23 RX ORDER — KETOROLAC TROMETHAMINE 15 MG/ML
15 INJECTION, SOLUTION INTRAMUSCULAR; INTRAVENOUS EVERY 6 HOURS PRN
Status: CANCELLED | OUTPATIENT
Start: 2023-01-23 | End: 2023-01-24

## 2023-01-23 RX ORDER — TRAMADOL HYDROCHLORIDE 50 MG/1
50 TABLET ORAL EVERY 8 HOURS PRN
Status: CANCELLED | OUTPATIENT
Start: 2023-01-23

## 2023-01-23 RX ORDER — HEPARIN SODIUM 5000 [USP'U]/ML
5000 INJECTION, SOLUTION INTRAVENOUS; SUBCUTANEOUS EVERY 8 HOURS SCHEDULED
Status: CANCELLED | OUTPATIENT
Start: 2023-01-23

## 2023-01-23 RX ORDER — TRAZODONE HYDROCHLORIDE 50 MG/1
100 TABLET ORAL NIGHTLY
Status: CANCELLED | OUTPATIENT
Start: 2023-01-23

## 2023-01-23 RX ORDER — POLYETHYLENE GLYCOL 3350 17 G/17G
17 POWDER, FOR SOLUTION ORAL DAILY
Status: CANCELLED | OUTPATIENT
Start: 2023-01-24

## 2023-01-23 RX ORDER — BUPROPION HYDROCHLORIDE 150 MG/1
300 TABLET ORAL EVERY MORNING
Status: CANCELLED | OUTPATIENT
Start: 2023-01-24

## 2023-01-23 RX ORDER — SODIUM CHLORIDE 0.9 % (FLUSH) 0.9 %
5-40 SYRINGE (ML) INJECTION EVERY 12 HOURS SCHEDULED
Status: CANCELLED | OUTPATIENT
Start: 2023-01-23

## 2023-01-23 RX ORDER — SODIUM CHLORIDE 0.9 % (FLUSH) 0.9 %
5-40 SYRINGE (ML) INJECTION PRN
Status: CANCELLED | OUTPATIENT
Start: 2023-01-23

## 2023-01-23 RX ORDER — VITAMIN B COMPLEX
2000 TABLET ORAL EVERY EVENING
Status: CANCELLED | OUTPATIENT
Start: 2023-01-23

## 2023-01-23 RX ORDER — ARMODAFINIL 250 MG/1
250 TABLET ORAL DAILY
Status: CANCELLED | OUTPATIENT
Start: 2023-01-24

## 2023-01-23 RX ORDER — FERROUS SULFATE 325(65) MG
325 TABLET ORAL
Status: CANCELLED | OUTPATIENT
Start: 2023-01-24

## 2023-01-23 RX ORDER — CYCLOBENZAPRINE HCL 10 MG
10 TABLET ORAL 3 TIMES DAILY
Status: CANCELLED | OUTPATIENT
Start: 2023-01-23

## 2023-01-23 RX ORDER — POLYETHYLENE GLYCOL 3350 17 G/17G
17 POWDER, FOR SOLUTION ORAL DAILY
Status: DISCONTINUED | OUTPATIENT
Start: 2023-01-23 | End: 2023-01-26 | Stop reason: HOSPADM

## 2023-01-23 RX ADMIN — BISACODYL 10 MG: 10 SUPPOSITORY RECTAL at 10:01

## 2023-01-23 RX ADMIN — ASPIRIN 81 MG: 81 TABLET, COATED ORAL at 20:25

## 2023-01-23 RX ADMIN — TRAZODONE HYDROCHLORIDE 100 MG: 50 TABLET ORAL at 20:24

## 2023-01-23 RX ADMIN — POLYETHYLENE GLYCOL 3350 17 G: 17 POWDER, FOR SOLUTION ORAL at 10:04

## 2023-01-23 RX ADMIN — Medication 10 ML: at 10:10

## 2023-01-23 RX ADMIN — ACETAMINOPHEN 1000 MG: 500 TABLET ORAL at 16:00

## 2023-01-23 RX ADMIN — Medication 1000 MG: at 20:48

## 2023-01-23 RX ADMIN — STANDARDIZED SENNA CONCENTRATE AND DOCUSATE SODIUM 1 TABLET: 8.6; 5 TABLET ORAL at 20:25

## 2023-01-23 RX ADMIN — ESCITALOPRAM OXALATE 10 MG: 10 TABLET ORAL at 10:04

## 2023-01-23 RX ADMIN — ACETAMINOPHEN 1000 MG: 500 TABLET ORAL at 10:04

## 2023-01-23 RX ADMIN — ASPIRIN 81 MG: 81 TABLET, COATED ORAL at 10:04

## 2023-01-23 RX ADMIN — Medication 1000 MG: at 10:29

## 2023-01-23 RX ADMIN — Medication 2000 UNITS: at 17:14

## 2023-01-23 RX ADMIN — CYCLOBENZAPRINE 10 MG: 10 TABLET, FILM COATED ORAL at 20:25

## 2023-01-23 RX ADMIN — STANDARDIZED SENNA CONCENTRATE AND DOCUSATE SODIUM 1 TABLET: 8.6; 5 TABLET ORAL at 10:04

## 2023-01-23 RX ADMIN — ARMODAFINIL 250 MG: 250 TABLET ORAL at 11:47

## 2023-01-23 RX ADMIN — FERROUS SULFATE TAB 325 MG (65 MG ELEMENTAL FE) 325 MG: 325 (65 FE) TAB at 10:04

## 2023-01-23 RX ADMIN — ACETAMINOPHEN 1000 MG: 500 TABLET ORAL at 20:25

## 2023-01-23 RX ADMIN — Medication 10 ML: at 20:32

## 2023-01-23 RX ADMIN — ATORVASTATIN CALCIUM 40 MG: 40 TABLET, FILM COATED ORAL at 20:25

## 2023-01-23 RX ADMIN — CYCLOBENZAPRINE 10 MG: 10 TABLET, FILM COATED ORAL at 10:04

## 2023-01-23 RX ADMIN — BUPROPION HYDROCHLORIDE 300 MG: 300 TABLET, EXTENDED RELEASE ORAL at 10:29

## 2023-01-23 RX ADMIN — CYCLOBENZAPRINE 10 MG: 10 TABLET, FILM COATED ORAL at 16:00

## 2023-01-23 ASSESSMENT — PAIN DESCRIPTION - LOCATION
LOCATION: ARM;LEG
LOCATION: ARM;LEG

## 2023-01-23 ASSESSMENT — PAIN DESCRIPTION - DESCRIPTORS
DESCRIPTORS: ACHING
DESCRIPTORS: ACHING

## 2023-01-23 ASSESSMENT — PAIN SCALES - GENERAL
PAINLEVEL_OUTOF10: 3
PAINLEVEL_OUTOF10: 9
PAINLEVEL_OUTOF10: 3

## 2023-01-23 ASSESSMENT — PAIN DESCRIPTION - ORIENTATION
ORIENTATION: RIGHT
ORIENTATION: RIGHT

## 2023-01-23 NOTE — PROGRESS NOTES
Missy Prose  1/23/2023  [id number]    Chief Complaint: S/P ORIF (open reduction internal fixation) fracture    Subjective:   No overnight events. Pain overall better controlled today. Continues to work with therapy. ROS: No CP, SOB, dyspnea    Objective:  Patient Vitals for the past 24 hrs:   BP Temp Temp src Pulse Resp SpO2   01/23/23 0958 138/65 98.2 °F (36.8 °C) Oral 71 18 98 %   01/23/23 0415 124/69 98 °F (36.7 °C) Oral 69 18 95 %   01/22/23 2127 -- -- -- 76 18 96 %   01/22/23 2030 133/71 98.5 °F (36.9 °C) Oral 76 16 95 %   01/22/23 1642 136/73 98 °F (36.7 °C) Axillary 74 16 97 %   01/22/23 1200 126/72 -- -- 72 16 97 %   01/22/23 1045 -- -- -- -- 16 --       Gen: No distress, pleasant. Resting in bed   HEENT: Normocephalic, atraumatic. CV: No audible murmurs, well perfused extremities  Resp: No respiratory distress. No increased WOB  Abd: Soft, nontender nondistended  Ext: No edema. RUE in postoperative dressing, RLE in postoperative dressing  Neuro: Alert, oriented, appropriately interactive. Laboratory data: Available via EMR. Therapy progress:    PT    Rolling: Level of difficulty - A Little   Sit to Stand from a Chair: Level of difficulty - A Little  Supine to Sit: Level of difficulty - A Little     Bed to Chair: Physical Assistance Required - A Little  Ambulate Across Room: Physical Assistance Required - A Little  Ascend 3-5 Steps With HR: Physical Assistance Required - A Little    OT    Eating: Physical Assistance Required - None  Grooming: Physical Assistance Required - A Little  LB Dressing: Physical Assistance Required - Total  UB Dressing: Physical Assistance Required - A Little  Bathing: Physical Assistance Required - A Lot  Toileting: Physical Assistance Required - Total    SLP         Body mass index is 26.46 kg/m².     Assessment:  Patient Active Problem List   Diagnosis    Clot retention of urine    Multiple fractures    Closed fracture of right distal radius    Closed fracture of right tibial plateau    S/P ORIF (open reduction internal fixation) fracture    Fracture       Plan:   Multiple fractures: s/p ORIF of R radius and tibia. Nonweightbearing RUE/RLE. Pain control. PT/OT. Ortho following. Ecoli UTI: Started Cipro, Cx sensitive  HLD: Lipitor 40  Depression: Lexapro 10 HS, Wellbutrin 300  Hx bladder cancer: Flomax    Dispo: Patient is an appropriate ARU candidate. She was doing well in ARU prior to surgically required transfer. Pre-CERT remains pending today. Hopeful for an approval.  Orders placed in anticipation. We will continue to follow. Brianna Carter MD 1/23/2023, 10:36 AM    * This document was created using dictation software. While all precautions were taken to ensure accuracy, errors may have occurred. Please disregard any typographical errors.

## 2023-01-23 NOTE — PROGRESS NOTES
[facility] Orthopedic Surgery   Progress Note    CHIEF COMPLAINT/DIAGNOSIS: S/p right tibial plateau ORIF     SUBJECTIVE: The patient is sitting up in the bed. She reports some irritation of the right wrist splint proximally. Her pain is much better controlled today compared to Friday. She denies new issues other than some constipation. Open to returning to ARU. Awaiting pre-CERT. OBJECTIVE  Physical    VITALS:  /65   Pulse 71   Temp 98.2 °F (36.8 °C) (Oral)   Resp 18   Ht 5' 5\" (1.651 m)   Wt 159 lb (72.1 kg)   SpO2 98%   BMI 26.46 kg/m²     GENERAL: Alert and oriented x3, in no acute distress. MUSCULOSKELETAL: Intact DF/PF in operative leg. INCISION:  Covered with post-op dressing; clean, dry and intact. Swelling as expected; compartments remain easily compressible and reasonably supple. KI removed and skin was evaluated. There is no evidence of skin breakdown. Monitor dressing placed on Friday remains intact and clean dry. ROM: To operative knee deferred. Sensory:  Intact to light touch in tibial and peroneal distributions. Vascular:   2+ DP pulse with brisk cap refill. The right short arm volar splint was removed. New padding was placed proximally. The splint itself was packed as well and replaced with a new Ace wrap. She reported no discomfort or wrapping afterwards. Swelling as expected in all 5 digits of the right hand. Able to wiggle all digits of the right hand. Brisk cap refill. Splint now well fitted. Able to flex and extend elbow. Data    ALL MEDICATIONS HAVE BEEN REVIEWED    CBC:   Recent Labs     01/22/23 0428   WBC 7.7   HGB 10.5*   HCT 31.4*   *     BMP:   Recent Labs     01/22/23 0428 01/23/23  0610    145   K 4.1 4.4    107   CO2 28 31   BUN 13 12   CREATININE <0.5* <0.5*     INR: No results for input(s): INR in the last 72 hours.     ASSESSMENT:  S/p ORIF right tibial plateau and right distal radius ORIF (1/19/23), POD#4  HLD  Hx bladder ca    PLAN:   Suppository today for constipation.  - WB status:  TTWB weight bearing through operative leg; No ROM;   NO WB through right wrist, but ok for hemiplatform walker. Reviewed post op precautions.  - DVT prophylaxis: ASA 81mg BID x 30 days  - PT/OT  - Pain Control:  Due to orthopaedic surgical procedure/condition, patient may require pain medication for up to 6-8 weeks. - Expected acute blood loss anemia: H/H today: 10.5/31.4 yesterday  - ID:  Ancef x 2 post-op  - Dispo: per ARU; ok to d/c from our end once medically stable and dispo needs met. Follow-up with Dr. Gabriella Bruno in 2 weeks. Office # [phone]  No future appointments.     BESSY Rowland - CNP  1/23/2023  12:34 PM

## 2023-01-23 NOTE — PROGRESS NOTES
[address] [facility],[address] Msb Department   Phone: [phone]    Occupational Therapy    [] Initial Evaluation            [x] Daily Treatment Note         [] Discharge Summary      Patient: Yamil Pedro   : 1950   MRN: [id number]   Date of Service:  2023    Admitting Diagnosis:  s/p ORIF right tibial plateau and right distal radius   Current Admission Summary:   71-year-old female with a history of hyperlipidemia and bladder cancer who was admitted to [facility] on  with right arm and leg pain after a fall from a ladder. She reports she was on the last step and thought she was on the ground and fell 1 step to the ground. Work-up revealed a right radius fracture and a right tibial fracture. CT of the knee revealed an intra-articular tibial fracture involving the spine. Ortho evaluated and suggested nonweightbearing nonoperative management with a knee immobilizer on the right lower extremity. Her hospital course was complicated by urinary retention requiring catheterization. Discharged to ARU. S/p ORIF right tibial plateau and right distal radius ORIF (23)    Past Medical History:  has a past medical history of Arthritis, Cancer ([facility] [address] and Hyperlipidemia. Past Surgical History:  has a past surgical history that includes Abdomen surgery; Colonoscopy; fracture surgery; Hysterectomy; Tonsillectomy; Breast reduction surgery (); eye surgery (); Cystoscopy (N/A, 2021); Wrist fracture surgery (Right, 2023); and Leg Surgery (Right, 2023). Discharge Recommendations: Yamil Pedro scored a 15/24 on the AM-PAC ADL Inpatient form. Current research shows that an AM-PAC score of 17 or less is typically not associated with a discharge to the patient's home setting.  Based on the patient's AM-PAC score and their current ADL deficits, it is recommended that the patient have 5-7 sessions per week of Occupational Therapy at TRAMADOL HCL 50MG TABS       Last Written Prescription Date:  3/1/2020  Last Fill Quantity: 60tablet,   # refills: 0  Last Office Visit: 8/12/2019 Nancy  Future Office visit:       Routing refill request to provider for review/approval because:  Drug not on the [facility], [facility] or [facility] refill protocol or controlled substance     d/c to increase the patient's independence. At this time, this patient demonstrates complex nursing, medical, and rehabilitative needs, and would benefit from intensive rehabilitation services upon discharge from the Inpatient setting. This patient demonstrates the ability to participate in and benefit from an intensive therapy program with a coordinated interdisciplinary team approach to foster frequent, structured, and documented communication among disciplines, who will work together to establish, prioritize, and achieve treatment goals. Please see assessment section for further patient specific details. If patient discharges prior to next session this note will serve as a discharge summary. Please see below for the latest assessment towards goals. DME Required For Discharge: DME to be determined at next level of care, DME to be determined pending patient progress    Precautions/Restrictions: high fall risk, weight bearing, surgical protocols  Weight Bearing Restrictions: toe touch weight bearing right LE, NWB  right UE-- allowed to use platform walker        Required Braces/Orthotics: knee immobilizer   [x] Right  [] Left  Positional Restrictions:no positional restrictions      Pre-Admission Information   Lives With: spouse                  Type of Home: condo  Home Layout: one level  Home Access:  1 + 1 NORA with (R) grab bar  Bathroom Layout: walk in shower  Bathroom Equipment:  owns grab bars but not installed  Toilet Height: standard height  Home Equipment: no prior equipment  Transfer Assistance: Independent without use of device  Ambulation Assistance:Independent without use of device  ADL Assistance: independent with all ADL's  IADL Assistance: requires assistance with all homemaking tasks  Active :        [x] Yes                 [] No  Hand Dominance: [] Left                 [x] Right  Current Employment: part time employment.   Occupation: [employer] shop  Hobbies: quilt, sew  Recent Falls: 0 additional recent falls excluding reason for hospital admission       Subjective  General: Patient in bed upon arrival, agreeable to OT/PT session. Patient on bedpan-requesting to get onto [facility]. Pain: Patient does not rate upon questioning patient reporting some pain in L pectoralis during mobility   Pain Interventions: pain medication in place prior to arrival, ice applied, and repositioned  , R UE elevated on pillow     Activities of Daily Living  Basic Activities of Daily Living  Lower Extremity Dressing: maximum assistance dependent  Dressing Comments: total A for adjusting KI. Total A for donning socks. maxA for donning brief. Patient requesting brief instead of pullup  Toileting: maximum assistance. Toileting Comments: patient on bedpan upon arrival- bedpan removed, patient had voided bladder and small BM on bedpan. Patient voided large BM on BSC. Rear pericare completed for patient, patient completed front pericare with CGA. maxA for managing brief while in stance with RW   General Comments: increased time spent voiding BM   Instrumental Activities of Daily Living  No IADL completed on this date. Functional Mobility  Bed Mobility  Supine to Sit: minimal assistance  Sit to Supine: minimal assistance  Scooting: minimal assistance  Comments: HOB elevated  Transfers  Sit to stand transfer:minimal assistance  Stand to sit transfer: minimal assistance  Stand pivot transfer: minimal assistance  Comments: Adam for transfers from EOB and BSC to platform walker. Patient attempted hop for stand step to [facility] but completed stand pivot due to pain. Assistance required for strapping R arm on walker. Patient able to maintain weight bearing precautions during transfers   Functional Mobility:  Sitting Balance: stand by assistance. Sitting Balance Comment: SBA at EOB and from toilet   Standing Balance: contact guard assistance, minimal assistance.     Standing Balance Comment: with platform walker Functional Outcomes  AM-PAC Inpatient Daily Activity Raw Score: 15    Cognition  WFL  Orientation:    alert and oriented x 4  Command Following:   [facility]  Barriers To Learning: emotional and physical  Patient Education: patient educated on goals, OT role and benefits, weight-bearing education, discharge recommendations  Learning Assessment:  patient verbalizes and demonstrates understanding    Assessment  Activity Tolerance: patient fatigued from attempting to void BM throughout day   Impairments Requiring Therapeutic Intervention: decreased functional mobility, decreased ADL status, decreased ROM, decreased endurance, decreased balance, decreased coordination, increased pain  Prognosis: good  Clinical Assessment: Patient very motivated to work with therapy but limited due to pain. Patient able to complete stand pivot transfer this date with platform walker and Adam. Recommend return to ARU once medically stable   Safety Interventions: patient left in bed, bed alarm in place, chair alarm in place, patient at risk for falls, and nurse notified         Plan  Frequency: 7 x/week  Current Treatment Recommendations: strengthening, ROM, balance training, functional mobility training, transfer training, patient/caregiver education, pain management, and positioning    Goals  Patient Goals: Go to ARU    Short Term Goals:  Time Frame: until discharge  Patient will complete lower body ADL at moderate assistance   Patient will complete toileting at moderate assistance   Patient will complete functional transfers at moderate assistance- goal met 1/23. Upgraded: functional transfers at [address]   Patient will increase [facility] ADL score = to or > than 14/24- goal met 1/23.  Upgraded: [facility] 18/24     Therapy Session Time     Individual Group Co-treatment   Time In    1428   Time Out    1506   Minutes    38        Timed Code Treatment Minutes:  38 minutes   Total Treatment Minutes: 38 minutes          Electronically Signed By: Shavonne Hyman, [address] [facility], [address] [facility] OTR/L [id number]

## 2023-01-23 NOTE — PROGRESS NOTES
HOSPITALISTS PROGRESS NOTE    1/23/2023 8:08 AM        Name: Augustus Baugh . Admitted: 1/19/2023  Primary Care Provider: Ana Mclain MD (Tel: [phone])      Brief Course: 71 y.o. female  with PMHx of bladder cancer, hyperlipidemia, depression, recent E. coli UTI and moderately displaced distal radius fracture and tibial plateau fracture who was in ARU for rehab after fall on 1/4/2023 resulting in right wrist and right knee injury. Patient was initially evaluated by [facility] orthopedic and recommended nonoperative treatment. Patient was evaluated by  on 1/17/23 in t ARU and admitted for ORIF right distal radius and right tibial plateau. .    Interval history:   Pt seen and examined today. Overnight events noted, interval ancillary notes and labs reviewed. Reported constipation denied cough, SOB, chest pain, nausea, vomiting or abdominal pain      Assessment & Plan:     Right wrist moderately displaced distal radius fracture & 2- Right knee Medial, Lateral tibial plateau fracture. Orthopedic surgery on board; S/P ORIF right distal radius and right knee tibial plateau  Continue as needed pain medication. PT OT consulted  TTWB weight bearing through operative leg; No ROM; NO WB through right wrist, but ok for hemiplatform walker   Monitor electrolytes closely     Hx of recent Ecoli UTI: Completed course of Cipro      Hyperlipidemia: Continue statins     Lexapro: Continue Wellbutrin and Lexapro         Hx bladder cancer: Continue Flomax     Constipation; bowel regimen ordered     DVT prophylaxis: Refusing Lovenox.   Switch to aspirin twice daily  Code:Full Code    Disposition: ARU awaiting pre-CERT    Current Medications  HYDROmorphone HCl PF (DILAUDID) injection 0.25 mg, Q4H PRN  cyclobenzaprine (FLEXERIL) tablet 10 mg, TID  oxyCODONE (ROXICODONE) immediate release tablet 5 mg, Q4H PRN   Or  oxyCODONE (ROXICODONE) immediate release tablet 10 mg, Q4H PRN  aspirin EC tablet 81 mg, BID  Armodafinil TABS 250 mg , Daily  traZODone (DESYREL) tablet 100 mg, Nightly  ferrous sulfate (IRON 325) tablet 325 mg, Daily with breakfast  escitalopram (LEXAPRO) tablet 10 mg, Daily  Vitamin D (CHOLECALCIFEROL) tablet 2,000 Units, QPM  calcium elemental (OSCAL) tablet 1,000 mg, BID  buPROPion (WELLBUTRIN XL) extended release tablet 300 mg, QAM  atorvastatin (LIPITOR) tablet 40 mg, Nightly  sodium chloride flush 0.9 % injection 5-40 mL, 2 times per day  sodium chloride flush 0.9 % injection 5-40 mL, PRN  0.9 % sodium chloride infusion, PRN  0.9 % sodium chloride infusion, Continuous  acetaminophen (TYLENOL) tablet 1,000 mg, TID  ketorolac (TORADOL) injection 15 mg, Q6H PRN  sennosides-docusate sodium (SENOKOT-S) 8.6-50 MG tablet 1 tablet, BID  ondansetron (ZOFRAN-ODT) disintegrating tablet 4 mg, Q8H PRN   Or  ondansetron (ZOFRAN) injection 4 mg, Q6H PRN  diphenhydrAMINE (BENADRYL) tablet 25 mg, Q6H PRN   Or  diphenhydrAMINE (BENADRYL) injection 25 mg, Q6H PRN      Objective:  /69   Pulse 69   Temp 98 °F (36.7 °C) (Oral)   Resp 18   Ht 5' 5\" (1.651 m)   Wt 159 lb (72.1 kg)   SpO2 95%   BMI 26.46 kg/m²     Intake/Output Summary (Last 24 hours) at 1/23/2023 7386  Last data filed at 1/22/2023 1834  Gross per 24 hour   Intake 780 ml   Output 100 ml   Net 680 ml        Wt Readings from Last 3 Encounters:   01/19/23 159 lb (72.1 kg)   01/11/23 159 lb 4.8 oz (72.3 kg)   08/01/21 146 lb 2.6 oz (66.3 kg)       Physical Examination:   General appearance:  No apparent distress, appears stated age and cooperative. HEENT: Normocephalic, sclera clear., PERRLA. Trachea midline, no adenopathy. Cardiovascular: Regular rate and rhythm, normal S1, S2. No murmur. Respiratory:Clear to auscultation bilaterally, no wheeze or crackles. GI: Abdomen soft, no tenderness, not distended, normal bowel sounds  Musculoskeletal: No cyanosis in digits.   No BLE edema present  Neurology: CN 2-12 grossly intact. No speech or motor deficits  Psych: Not agitated, appropriate affect  Skin: Warm, dry, normal turgor    Labs and Tests:  CBC:   Recent Labs     01/22/23 0428   WBC 7.7   HGB 10.5*   *       BMP:    Recent Labs     01/22/23 0428 01/23/23  0610    145   K 4.1 4.4    107   CO2 28 31   BUN 13 12   CREATININE <0.5* <0.5*   GLUCOSE 102* 124*       Hepatic: No results for input(s): AST, ALT, ALB, BILITOT, ALKPHOS in the last 72 hours. No orders to display       Problem List  Principal Problem:    S/P ORIF (open reduction internal fixation) fracture  Active Problems:    Fracture  Resolved Problems:    * No resolved hospital problems.  Vikki Angulo MD   1/23/2023 8:08 AM

## 2023-01-23 NOTE — CARE COORDINATION
01/23/23 0843   IMM Letter   IMM Letter given to Patient/Family/Significant other/Guardian/POA/by: Patient   IMM Letter date given: 01/23/23   IMM Letter time given: 6145       Copy given to patient.     ISRAEL DavisN RN    [facility]  Phone: [phone]

## 2023-01-23 NOTE — PROGRESS NOTES
[facility] [address]   Phone: [phone]    Physical Therapy    [] Initial Evaluation            [x] Daily Treatment Note         [] Discharge Summary      Patient: Elmer Ohara   : 1950   MRN: [id number]   Date of Service:  2023  Admitting Diagnosis: S/P ORIF (open reduction internal fixation) fracture  Current Admission Summary: 73-year-old female with a history of hyperlipidemia and bladder cancer who was admitted to [facility] on  with right arm and leg pain after a fall from a ladder. She reports she was on the last step and thought she was on the ground and fell 1 step to the ground. Work-up revealed a right radius fracture and a right tibial fracture. CT of the knee revealed an intra-articular tibial fracture involving the spine. Ortho evaluated and suggested nonweightbearing nonoperative management with a knee immobilizer on the right lower extremity. Her hospital course was complicated by urinary retention requiring catheterization. Discharged to ARU. S/p ORIF right tibial plateau and right distal radius ORIF (23)    Past Medical History:  has a past medical history of Arthritis, Cancer ([facility] [address] and Hyperlipidemia. Past Surgical History:  has a past surgical history that includes Abdomen surgery; Colonoscopy; fracture surgery; Hysterectomy; Tonsillectomy; Breast reduction surgery (); eye surgery (); Cystoscopy (N/A, 2021); Wrist fracture surgery (Right, 2023); and Leg Surgery (Right, 2023). Discharge Recommendations: Elmer Ohara scored a 13/24 on the AM-PAC short mobility form. Current research shows that an AM-PAC score of 17 or less is typically not associated with a discharge to the patient's home setting.  Based on the patient's AM-PAC score and their current functional mobility deficits, it is recommended that the patient have 5-7 sessions per week of Physical Therapy at d/c to increase the patient's independence. At this time, this patient demonstrates complex nursing, medical, and rehabilitative needs, and would benefit from intensive rehabilitation services upon discharge from the Inpatient setting. This patient demonstrates the ability to participate in and benefit from an intensive therapy program with a coordinated interdisciplinary team approach to foster frequent, structured, and documented communication among disciplines, who will work together to establish, prioritize, and achieve treatment goals. Please see assessment section for further patient specific details. If patient discharges prior to next session this note will serve as a discharge summary. Please see below for the latest assessment towards goals. DME Required For Discharge: DME to be determined at next level of care    Precautions/Restrictions: high fall risk, weight bearing  Weight Bearing Restrictions:   NWBing R wrist - ok for platform walker  TTWB RLE  Required Braces/Orthotics: knee immobilizer - when OOB   [x] Right  [] Left  Positional Restrictions:no positional restrictions    Pre-Admission Information   Lives With: spouse                  Type of Home: 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        Subjective  General: Pt semi-reclined in bed upon therapist arrival. Reports she has been trying to have a BM on the bedpan all day and requests assistance to [facility] instead. Agreeable to PT/OT tx. Pain: Patient does not rate upon questioning - pt voices pain with activation of (L) pectoralis during functional mobility but doesn't rate   Pain Interventions: pain medication in place prior to arrival, ice applied, and repositioned     Functional Mobility  Bed Mobility  Supine to Sit: minimal assistance  Sit to Supine: minimal assistance  Scooting: minimal assistance  Comments: Supine<>sit HOB elevated (lower on return to bed), increased time required to complete task, use of bed rail. Physical assistance required for R LE. Transfers  Sit to stand transfer: minimal assistance  Stand to sit transfer: minimal assistance  Stand pivot transfer: minimal assistance  Comments: All transfers completed with a platform walker. Pt attempted hop-to for stand step transfer from EOB>BSC, but pt was limited by pain, transitioned to stand pivot transfer instead. Stand pivot transfer to return from [facility]. Pt requires assist and intermittent cues for sequencing with strapping (R) arm to walker. Ambulation  Ambulation not tested on this date. Comments:  Deferred due to fatigue following toilet transfer  Stair Mobility  Stair mobility not completed on this date. Comments:  Wheelchair Mobility:  No w/c mobility completed on this date. Comments:  Balance  Static Sitting Balance: fair (+): maintains balance at SBA/supervision without use of UE support  Dynamic Sitting Balance: fair (+): maintains balance at SBA/supervision without use of UE support  Static Standing Balance: fair (-): maintains balance at CGA with use of UE support  Dynamic Standing Balance: poor (+): requires min (A) to maintain balance  Comments: Pt sat x10 minutes on BSC with (R) LE elevated on foot stool. Pt stood x1 minute with (B) UE support on RW for pericare with good compliance to (R) LE WBing restrictions.      Other Therapeutic Interventions  Pt voided bowel on BSC. See OT note for assist with toileting. Ice pack applied to pt's (L) chest to assist with muscle strain pain, instructions given for 20 minutes on/off. Pt educated on scapular retraction for gentle pectoral stretch. Also educated on manual massage of muscle with use of (L) hand. Functional Outcomes  AM-PAC Inpatient Mobility Raw Score : 13              Cognition  WFL  Orientation:    alert and oriented x 4  Command Following:   [facility]    Education  Barriers To Learning: none  Patient Education: patient educated on PT role and benefits, general safety, functional mobility training, proper use of assistive device/equipment, transfer training, discharge recommendations  Learning Assessment:  patient verbalizes and demonstrates understanding    Assessment  Activity Tolerance: Pt with increased pain in (L) pectoral when attempting hop-to vs pivot step. Ice pack applied to (L) pectoral at end of session. Impairments Requiring Therapeutic Intervention: decreased functional mobility, decreased ADL status, decreased ROM, decreased strength, decreased endurance, decreased balance, increased pain  Prognosis: good  Clinical Assessment: Pt continues to do well with sit<>stand transfers and requires only Min A. The patient had increased (L) pectoralis pain from muscle strain earlier this date which limited her tolerance to transfers and ambulation. She remains motivated though to complete as much mobility for herself as she can. Pt continues to present below her baseline level of function and will benefit from continued skilled PT to facilitate return to PLOF and to promote independence.   Safety Interventions: patient left in bed, bed alarm in place, call light within reach, gait belt, and nurse notified    Plan  Frequency: 7 x/week  Current Treatment Recommendations: strengthening, ROM, balance training, functional mobility training, transfer training, gait training, endurance training, patient/caregiver education, home exercise program, and safety education    Goals  Patient Goals: to improve mobility  Short Term Goals:  Time Frame: discharge  Patient will complete bed mobility at minimal assistance- met 1/22/23   Patient will complete transfers at moderate assistance- met 1/22/23   Patient will ambulate 10 ft with use of platform walker (R) at minimal assistance  New goal 1/22 - Patient will complete bed mobility at stand by assistance  New goal 1/22 - Patient will complete transfers with contact guard assistance    *No goals met 1/23    Therapy Session Time      Individual Group Co-treatment   Time In     1428   Time Out     1506   Minutes     38     Timed Code Treatment Minutes: 38 Minutes  Total Treatment Minutes: [id number] SHERLYN Castañeda  Therapist was present, directed the patient's care, made skilled judgement, and was responsible for assessment and treatment of the patient.         Documentation completed by supervising session    Electronically Signed By: Jefe White PT      Jose Pink PT, DPT #[id number]

## 2023-01-24 LAB
ANION GAP SERPL CALCULATED.3IONS-SCNC: 7 MMOL/L (ref 3–16)
BUN BLDV-MCNC: 11 MG/DL (ref 7–20)
CALCIUM SERPL-MCNC: 8.8 MG/DL (ref 8.3–10.6)
CHLORIDE BLD-SCNC: 104 MMOL/L (ref 99–110)
CO2: 29 MMOL/L (ref 21–32)
CREAT SERPL-MCNC: 0.5 MG/DL (ref 0.6–1.2)
GFR SERPL CREATININE-BSD FRML MDRD: >60 ML/MIN/{1.73_M2}
GLUCOSE BLD-MCNC: 114 MG/DL (ref 70–99)
POTASSIUM REFLEX MAGNESIUM: 4.3 MMOL/L (ref 3.5–5.1)
SODIUM BLD-SCNC: 140 MMOL/L (ref 136–145)

## 2023-01-24 PROCEDURE — 6370000000 HC RX 637 (ALT 250 FOR IP): Performed by: INTERNAL MEDICINE

## 2023-01-24 PROCEDURE — 97116 GAIT TRAINING THERAPY: CPT

## 2023-01-24 PROCEDURE — 97530 THERAPEUTIC ACTIVITIES: CPT

## 2023-01-24 PROCEDURE — 6370000000 HC RX 637 (ALT 250 FOR IP): Performed by: NURSE PRACTITIONER

## 2023-01-24 PROCEDURE — 80048 BASIC METABOLIC PNL TOTAL CA: CPT

## 2023-01-24 PROCEDURE — 2580000003 HC RX 258: Performed by: NURSE PRACTITIONER

## 2023-01-24 PROCEDURE — 36415 COLL VENOUS BLD VENIPUNCTURE: CPT

## 2023-01-24 PROCEDURE — 1200000000 HC SEMI PRIVATE

## 2023-01-24 RX ADMIN — ACETAMINOPHEN 1000 MG: 500 TABLET ORAL at 15:02

## 2023-01-24 RX ADMIN — STANDARDIZED SENNA CONCENTRATE AND DOCUSATE SODIUM 1 TABLET: 8.6; 5 TABLET ORAL at 08:38

## 2023-01-24 RX ADMIN — Medication 10 ML: at 23:49

## 2023-01-24 RX ADMIN — POLYETHYLENE GLYCOL 3350 17 G: 17 POWDER, FOR SOLUTION ORAL at 08:37

## 2023-01-24 RX ADMIN — STANDARDIZED SENNA CONCENTRATE AND DOCUSATE SODIUM 1 TABLET: 8.6; 5 TABLET ORAL at 20:58

## 2023-01-24 RX ADMIN — ACETAMINOPHEN 1000 MG: 500 TABLET ORAL at 20:58

## 2023-01-24 RX ADMIN — ESCITALOPRAM OXALATE 10 MG: 10 TABLET ORAL at 08:37

## 2023-01-24 RX ADMIN — TRAZODONE HYDROCHLORIDE 100 MG: 50 TABLET ORAL at 20:58

## 2023-01-24 RX ADMIN — Medication 2000 UNITS: at 17:22

## 2023-01-24 RX ADMIN — Medication 1000 MG: at 08:38

## 2023-01-24 RX ADMIN — ARMODAFINIL 250 MG: 250 TABLET ORAL at 09:31

## 2023-01-24 RX ADMIN — CYCLOBENZAPRINE 10 MG: 10 TABLET, FILM COATED ORAL at 15:02

## 2023-01-24 RX ADMIN — CYCLOBENZAPRINE 10 MG: 10 TABLET, FILM COATED ORAL at 08:38

## 2023-01-24 RX ADMIN — Medication 10 ML: at 08:44

## 2023-01-24 RX ADMIN — BUPROPION HYDROCHLORIDE 300 MG: 300 TABLET, EXTENDED RELEASE ORAL at 08:38

## 2023-01-24 RX ADMIN — ASPIRIN 81 MG: 81 TABLET, COATED ORAL at 20:58

## 2023-01-24 RX ADMIN — Medication 1000 MG: at 20:58

## 2023-01-24 RX ADMIN — FERROUS SULFATE TAB 325 MG (65 MG ELEMENTAL FE) 325 MG: 325 (65 FE) TAB at 08:37

## 2023-01-24 RX ADMIN — ATORVASTATIN CALCIUM 40 MG: 40 TABLET, FILM COATED ORAL at 20:58

## 2023-01-24 RX ADMIN — CYCLOBENZAPRINE 10 MG: 10 TABLET, FILM COATED ORAL at 20:58

## 2023-01-24 RX ADMIN — ACETAMINOPHEN 1000 MG: 500 TABLET ORAL at 08:38

## 2023-01-24 RX ADMIN — ASPIRIN 81 MG: 81 TABLET, COATED ORAL at 08:37

## 2023-01-24 ASSESSMENT — PAIN SCALES - GENERAL: PAINLEVEL_OUTOF10: 0

## 2023-01-24 NOTE — PROGRESS NOTES
Occupational Therapy    [facility] - Inpatient Rehabilitation Department   Phone: [phone]    Occupational Therapy    [] Initial Evaluation            [x] Daily Treatment Note         [] Discharge Summary      Patient: Zakiya Charlton   : 1950   MRN: [id number]   Date of Service:  2023    Admitting Diagnosis:  s/p ORIF right tibial plateau and right distal radius   Current Admission Summary:      S/p ORIF right tibial plateau and right distal radius ORIF (23)    Past Medical History:  has a past medical history of Arthritis, Cancer (HCC), and Hyperlipidemia.  Past Surgical History:  has a past surgical history that includes Abdomen surgery; Colonoscopy; fracture surgery; Hysterectomy; Tonsillectomy; Breast reduction surgery (); eye surgery (); Cystoscopy (N/A, 2021); Wrist fracture surgery (Right, 2023); and Leg Surgery (Right, 2023).    Discharge Recommendations: Zakiya Charlton scored a 15/24 on the AM-PAC ADL Inpatient form. Current research shows that an AM-PAC score of 17 or less is typically not associated with a discharge to the patient's home setting. Based on the patient's AM-PAC score and their current ADL deficits, it is recommended that the patient have 5-7 sessions per week of Occupational Therapy at d/c to increase the patient's independence.  At this time, this patient demonstrates complex nursing, medical, and rehabilitative needs, and would benefit from intensive rehabilitation services upon discharge from the Inpatient setting.  This patient demonstrates the ability to participate in and benefit from an intensive therapy program with a coordinated interdisciplinary team approach to foster frequent, structured, and documented communication among disciplines, who will work together to establish, prioritize, and achieve treatment goals. Please see assessment section for further patient specific details.    If patient discharges prior to next session  this note will serve as a discharge summary. Please see below for the latest assessment towards goals. DME Required For Discharge: DME to be determined at next level of care, DME to be determined pending patient progress    Precautions/Restrictions: high fall risk, weight bearing, surgical protocols  Weight Bearing Restrictions: toe touch weight bearing right LE, NWB  right UE-- allowed to use platform walker        Required Braces/Orthotics: knee immobilizer   [x] Right  [] Left  Positional Restrictions: no R LE ROM      Pre-Admission Information   Lives With: spouse                  Type of Home: condo  Home Layout: one level  Home Access:  1 + 1 NORA with (R) grab bar  Bathroom Layout: walk in shower  Bathroom Equipment:  owns grab bars but not installed  Toilet Height: standard height  Home Equipment: no prior equipment  Transfer Assistance: Independent without use of device  Ambulation Assistance:Independent without use of device  ADL Assistance: independent with all ADL's  IADL Assistance: requires assistance with all homemaking tasks  Active :        [x] Yes                 [] No  Hand Dominance: [] Left                 [x] Right  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 Ice currently present. Requesting biofreeze. 3-4/10 pain reported in R LE and UE. Pain: 3/10. Location: R LE and UE    Pain Interventions: pain medication in place prior to arrival, biofreeze applied, and repositioned    Activities of Daily Living  Basic Activities of Daily Living  General Comments: assist to clean R hand, pt declined all other ADLs  Instrumental Activities of Daily Living  No IADL completed on this date.     Functional Mobility  Bed Mobility  Supine to Sit: minimal assistance  Sit to Supine: minimal assistance  Scooting: minimal assistance  Comments: HOB elevated  Transfers  Sit to stand transfer:minimal assistance  Stand to sit transfer: minimal assistance  Comments: also requires assist to put/remove R UE in velcro strap   Functional Mobility:  Sitting Balance: supervision. Sitting Balance Comment: in recliner  Standing Balance: contact guard assistance, minimal assistance. Standing Balance Comment: with platform walker    Functional Mobility: .  minimal assistance  Functional Mobility Device Use: platform walker (R)  Functional Mobility Comment: 15 ft x 2. One standing rest break.  Pt able to maintain precautions      Functional Outcomes  AM-PAC Inpatient Daily Activity Raw Score: 15    Cognition  WFL  Orientation:    alert and oriented x 4  Command Following:   [facility]     Education  Barriers To Learning: none  Patient Education: patient educated on goals, OT role and benefits, weight-bearing education, discharge recommendations  Learning Assessment:  patient verbalizes and demonstrates understanding    Assessment  Activity Tolerance: tolerated well   Impairments Requiring Therapeutic Intervention: decreased functional mobility, decreased ADL status, decreased ROM, decreased endurance, decreased balance, decreased coordination, increased pain  Prognosis: good  Clinical Assessment: pt typically independent, not currently at baseline level of functioning, pt would benefit from ongoing skilled OT services in order to return to [facility]  Safety Interventions: patient left in bed, bed alarm in place, patient at risk for falls, nurse notified, and family/caregiver present         Plan  Frequency: 7 x/week  Current Treatment Recommendations: strengthening, ROM, balance training, functional mobility training, transfer training, patient/caregiver education, pain management, and positioning    Goals  Patient Goals: Go to ARU    Short Term Goals:  Time Frame: until discharge  Patient will complete lower body ADL at moderate assistance   Patient will complete toileting at moderate assistance   Patient will complete functional transfers at moderate assistance- goal met 1/23. Upgraded: functional transfers at [address]   Patient will increase [facility] ADL score = to or > than 14/24- goal met 1/23.  Upgraded: [facility] 18/24     NEW GOAL: UB ADLs Adam  NEW GOAL: functional mobility with platform walker SBA    Therapy Session Time     Individual Group Co-treatment   Time In    1240   Time Out    1318   Minutes    38        Timed Code Treatment Minutes:  38 minutes   Total Treatment Minutes: 38 minutes          Electronically Signed By: Mary Kay Jordan, OTR/L [id number]

## 2023-01-24 NOTE — CARE COORDINATION
Discharge Planning:     (CM) There was an appeal made by Patient's insurance for the ARU. Pre-cert still pending. CM team to follow.      Electronically signed by BECKY Olsen on 1/24/2023 at 3:44 PM

## 2023-01-24 NOTE — PROGRESS NOTES
HOSPITALISTS PROGRESS NOTE    1/24/2023 9:05 AM        Name: Zakiya Charlton .              Admitted: 1/19/2023  Primary Care Provider: MASTER MOLIAN MD (Tel: [phone])      Brief Course: 72 y.o. female  with PMHx of bladder cancer, hyperlipidemia, depression, recent E. coli UTI and moderately displaced distal radius fracture and tibial plateau fracture who was in ARU for rehab after fall on 1/4/2023 resulting in right wrist and right knee injury. Patient was initially evaluated by [facility] orthopedic and recommended nonoperative treatment. Patient was evaluated by  on 1/17/23 in t ARU and admitted for ORIF right distal radius and right tibial plateau..    Interval history:   Pt seen and examined today.  Overnight events noted, interval ancillary notes and labs reviewed.   Up in chair eating breakfast; denied any fever, chills, SOB, chest pain, bowel bladder dysfunction  Pain controlled with pain meds.  Recommend surgical therapy  Awaiting pre-CERT for ARU    Assessment & Plan:     Right wrist moderately displaced distal radius fracture & 2- Right knee Medial, Lateral tibial plateau fracture.  Orthopedic surgery on board; S/P ORIF right distal radius and right knee tibial plateau  Continue as needed pain medication.  PT OT consulted  TTWB weight bearing through operative leg; No ROM; NO WB through right wrist, but ok for hemiplatform walker   Monitor electrolytes closely     Hx of recent Ecoli UTI: Completed course of Cipro      Hyperlipidemia: Continue statins     Lexapro: Continue Wellbutrin and Lexapro         Hx bladder cancer: Continue Flomax     Constipation; bowel regimen ordered     DVT prophylaxis: Refusing Lovenox.  Switch to aspirin twice daily  Code:Full Code    Disposition: ARU awaiting pre-CERT    Current Medications  polyethylene glycol (GLYCOLAX) packet 17 g, Daily  traMADol (ULTRAM) tablet 50 mg, Q8H  PRN  cyclobenzaprine (FLEXERIL) tablet 10 mg, TID  aspirin EC tablet 81 mg, BID  Armodafinil TABS 250 mg , Daily  traZODone (DESYREL) tablet 100 mg, Nightly  ferrous sulfate (IRON 325) tablet 325 mg, Daily with breakfast  escitalopram (LEXAPRO) tablet 10 mg, Daily  Vitamin D (CHOLECALCIFEROL) tablet 2,000 Units, QPM  calcium elemental (OSCAL) tablet 1,000 mg, BID  buPROPion (WELLBUTRIN XL) extended release tablet 300 mg, QAM  atorvastatin (LIPITOR) tablet 40 mg, Nightly  sodium chloride flush 0.9 % injection 5-40 mL, 2 times per day  sodium chloride flush 0.9 % injection 5-40 mL, PRN  0.9 % sodium chloride infusion, PRN  acetaminophen (TYLENOL) tablet 1,000 mg, TID  ketorolac (TORADOL) injection 15 mg, Q6H PRN  sennosides-docusate sodium (SENOKOT-S) 8.6-50 MG tablet 1 tablet, BID  ondansetron (ZOFRAN-ODT) disintegrating tablet 4 mg, Q8H PRN   Or  ondansetron (ZOFRAN) injection 4 mg, Q6H PRN  diphenhydrAMINE (BENADRYL) tablet 25 mg, Q6H PRN   Or  diphenhydrAMINE (BENADRYL) injection 25 mg, Q6H PRN      Objective:  /65   Pulse 74   Temp 98.2 °F (36.8 °C) (Oral)   Resp 18   Ht 5' 5\" (1.651 m)   Wt 159 lb (72.1 kg)   SpO2 96%   BMI 26.46 kg/m²     Intake/Output Summary (Last 24 hours) at 1/24/2023 5252  Last data filed at 1/23/2023 1818  Gross per 24 hour   Intake 930 ml   Output 700 ml   Net 230 ml        Wt Readings from Last 3 Encounters:   01/19/23 159 lb (72.1 kg)   01/11/23 159 lb 4.8 oz (72.3 kg)   08/01/21 146 lb 2.6 oz (66.3 kg)       Physical Examination:   General appearance:  No apparent distress, appears stated age and cooperative. HEENT: Normocephalic, sclera clear., PERRLA. Trachea midline, no adenopathy. Cardiovascular: Regular rate and rhythm, normal S1, S2. No murmur. Respiratory:Clear to auscultation bilaterally, no wheeze or crackles. GI: Abdomen soft, no tenderness, not distended, normal bowel sounds  Musculoskeletal: No cyanosis in digits.   No BLE edema present  Neurology: CN 2-12 grossly intact. No speech or motor deficits  Psych: Not agitated, appropriate affect  Skin: Warm, dry, normal turgor    Labs and Tests:  CBC:   Recent Labs     01/22/23 0428   WBC 7.7   HGB 10.5*   *       BMP:    Recent Labs     01/22/23  0428 01/23/23  0610 01/24/23  0445    145 140   K 4.1 4.4 4.3    107 104   CO2 28 31 29   BUN 13 12 11   CREATININE <0.5* <0.5* 0.5*   GLUCOSE 102* 124* 114*       Hepatic: No results for input(s): AST, ALT, ALB, BILITOT, ALKPHOS in the last 72 hours.  No orders to display       Problem List  Principal Problem:    S/P ORIF (open reduction internal fixation) fracture  Active Problems:    Fracture  Resolved Problems:    * No resolved hospital problems. *       YUMIKO RAMÍREZ MD   1/24/2023 9:05 AM

## 2023-01-24 NOTE — PROGRESS NOTES
[facility]   Phone: [phone]    Physical Therapy    [] Initial Evaluation            [x] Daily Treatment Note         [] Discharge Summary      Patient: Missy Boggs   : 1950   MRN: [id number]   Date of Service:  2023  Admitting Diagnosis: S/P ORIF (open reduction internal fixation) fracture  Current Admission Summary: 60-year-old female with a history of hyperlipidemia and bladder cancer who was admitted to [facility] on  with right arm and leg pain after a fall from a ladder. She reports she was on the last step and thought she was on the ground and fell 1 step to the ground. Work-up revealed a right radius fracture and a right tibial fracture. CT of the knee revealed an intra-articular tibial fracture involving the spine. Ortho evaluated and suggested nonweightbearing nonoperative management with a knee immobilizer on the right lower extremity. Her hospital course was complicated by urinary retention requiring catheterization. Discharged to ARU. S/p ORIF right tibial plateau and right distal radius ORIF (23)    Past Medical History:  has a past medical history of Arthritis, Cancer ([facility] [address] and Hyperlipidemia. Past Surgical History:  has a past surgical history that includes Abdomen surgery; Colonoscopy; fracture surgery; Hysterectomy; Tonsillectomy; Breast reduction surgery (); eye surgery (); Cystoscopy (N/A, 2021); Wrist fracture surgery (Right, 2023); and Leg Surgery (Right, 2023). Discharge Recommendations: Missy Boggs scored a 14/ on the AM-PAC short mobility form. Current research shows that an AM-PAC score of 17 or less is typically not associated with a discharge to the patient's home setting.  Based on the patient's AM-PAC score and their current functional mobility deficits, it is recommended that the patient have 5-7 sessions per week of Physical Therapy at d/c to increase the patient's independence. At this time, this patient demonstrates complex nursing, medical, and rehabilitative needs, and would benefit from intensive rehabilitation services upon discharge from the Inpatient setting. This patient demonstrates the ability to participate in and benefit from an intensive therapy program with a coordinated interdisciplinary team approach to foster frequent, structured, and documented communication among disciplines, who will work together to establish, prioritize, and achieve treatment goals. Please see assessment section for further patient specific details. If patient discharges prior to next session this note will serve as a discharge summary. Please see below for the latest assessment towards goals. DME Required For Discharge: DME to be determined at next level of care    Precautions/Restrictions: high fall risk, weight bearing  Weight Bearing Restrictions:   NWBing R wrist - ok for platform walker  TTWB RLE  Required Braces/Orthotics: knee immobilizer - when OOB   [x] Right  [] Left  Positional Restrictions:no positional restrictions    Pre-Admission Information   Lives With: spouse                  Type of Home: [facility]  Home Layout: one level  Home Access:  1 + 1 NORA with (R) grab bar  Bathroom Layout: walk in shower  Bathroom Equipment:  owns grab bars but not installed  Toilet Height: standard height  Home Equipment: no prior equipment  Transfer Assistance: Independent without use of device  Ambulation Assistance:Independent without use of device  ADL Assistance: independent with all ADL's  IADL Assistance: requires assistance with all homemaking tasks  Active :        [x] Yes                 [] No  Hand Dominance: [] Left                 [x] Right  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        Subjective  General: Pt sitting up in recliner upon arrival with  present and agreeable to PT/OT session. Pain: 4/10. Location: arm and leg incision  - pt notes L sided muscular chest pain during ambulation  Pain Interventions: ice applied and repositioned     Functional Mobility  Bed Mobility  Sit to Supine: contact guard assistance  Scooting: contact guard assistance  Comments: CGA for R LE    Transfers  Sit to stand transfer: minimal assistance  Stand to sit transfer: minimal assistance  Comments: All transfers completed with a platform walker. Ambulation Trial   Surface:level surface  Assistive Device: platform walker (R)  Assistance: minimal assistance  Distance: 15 ft with 1 standing rest break  Gait Mechanics: hopping with L LE, decreased hop distance and foot clearance   Comments:  L chest muscular pain due to pt report of \"pulling muscle yesterday during hopping\"    Stair Mobility  Stair mobility not completed on this date. Comments:  Wheelchair Mobility:  No w/c mobility completed on this date. Comments:  Balance  Static Sitting Balance: fair (+): maintains balance at SBA/supervision without use of UE support  Dynamic Sitting Balance: fair (+): maintains balance at SBA/supervision without use of UE support  Static Standing Balance: fair (-): maintains balance at CGA with use of UE support  Dynamic Standing Balance: poor (+): requires min (A) to maintain balance  Comments:     Other Therapeutic Interventions  Biofreeze applied to L chest muscular pain region    Pt educated on ankle pumps throughout the day and prior to standing to promote increased blood flow.      Functional Outcomes  AM-PAC Inpatient Mobility Raw Score : 14              Cognition  WFL  Orientation:    alert and oriented x 4  Command Following:   [facility]    Education  Barriers To Learning: none  Patient Education: patient educated on PT role and benefits, general safety, functional mobility training, proper use of assistive device/equipment, transfer training, discharge recommendations  Learning Assessment:  patient verbalizes and demonstrates understanding    Assessment  Activity Tolerance: Pt with increased pain in (L) pectoral when attempting hop-to vs pivot step. Ice pack applied to (L) pectoral at end of session. Impairments Requiring Therapeutic Intervention: decreased functional mobility, decreased ADL status, decreased ROM, decreased strength, decreased endurance, decreased balance, increased pain  Prognosis: good  Clinical Assessment: Pt demonstrates improved functional activity tolerance ambulating further distances this date. Pt continues to demonstrate good safety awareness with the ability to maintain R UE and R LE wt bearing precautions. Pt notes continued L sided chest muscular pain that started yesterday when she felt like she \"pulled\" something during attempt to hop. She remains motivated though to complete as much mobility for herself as she can. Pt continues to present below her baseline level of function and will benefit from continued skilled PT to facilitate return to PLOF and to promote independence.   Safety Interventions: patient left in bed, bed alarm in place, call light within reach, gait belt, and nurse notified    Plan  Frequency: 7 x/week  Current Treatment Recommendations: strengthening, ROM, balance training, functional mobility training, transfer training, gait training, endurance training, patient/caregiver education, home exercise program, and safety education    Goals  Patient Goals: to improve mobility  Short Term Goals:  Time Frame: discharge  Patient will complete bed mobility at minimal assistance- met 1/22/23   Patient will complete transfers at moderate assistance- met 1/22/23   Patient will ambulate 10 ft with use of platform walker (R) at minimal assistance- met 1/24/23  New goal 1/22 - Patient will complete bed mobility at stand by assistance  New goal 1/22 - Patient will complete transfers with contact guard assistance  New goal 1/23- Patient will ambulate 25 ft with use of platform walker (R) at minimal assistance    *No goals met 1/23    Therapy Session Time      Individual Group Co-treatment   Time In     1240   Time Out     1318   Minutes     38     Timed Code Treatment Minutes: 38 Minutes  Total Treatment Minutes: 38 Minutes      Electronically Signed By: Faisal Mendez, PT      Faisal Mendez, PT, DPT PT [id number]

## 2023-01-24 NOTE — PLAN OF CARE
Problem: Safety - Adult  Goal: Free from fall injury  1/24/2023 1609 by Yolanda Mortimer, RN  Outcome: Progressing     Problem: Pain  Goal: Verbalizes/displays adequate comfort level or baseline comfort level  1/24/2023 1609 by Yolanda Mortimer, RN  Outcome: Progressing     Problem: Discharge Planning  Goal: Discharge to home or other facility with appropriate resources  1/24/2023 1609 by Yolanda Mortimer, RN  Outcome: Progressing     Problem: Skin/Tissue Integrity  Goal: Absence of new skin breakdown  Description: 1. Monitor for areas of redness and/or skin breakdown  2. Assess vascular access sites hourly  3. Every 4-6 hours minimum:  Change oxygen saturation probe site  4. Every 4-6 hours:  If on nasal continuous positive airway pressure, respiratory therapy assess nares and determine need for appliance change or resting period.   1/24/2023 1609 by Yolanda Mortimer, RN  Outcome: Progressing

## 2023-01-24 NOTE — PROGRESS NOTES
Deangelo Dears  1/24/2023  [id number]    Chief Complaint: S/P ORIF (open reduction internal fixation) fracture    Subjective:   No overnight events. No current complaints. Precert offered a F9E which will be declined and anticipated expedited appeal will be submitted today. ROS: No CP, SOB, dyspnea    Objective:  Patient Vitals for the past 24 hrs:   BP Temp Temp src Pulse Resp SpO2   01/24/23 0830 113/65 98.2 °F (36.8 °C) Oral 74 18 96 %   01/24/23 0400 117/66 97.9 °F (36.6 °C) Axillary 66 16 97 %   01/24/23 0015 (!) 146/70 98.1 °F (36.7 °C) Axillary 72 16 98 %   01/23/23 2022 109/61 98.4 °F (36.9 °C) Oral 77 16 96 %   01/23/23 1804 (!) 143/70 98.2 °F (36.8 °C) Oral 71 16 98 %       Gen: No distress, pleasant. Resting in chair  HEENT: Normocephalic, atraumatic. CV: No audible murmurs, well perfused extremities  Resp: No respiratory distress. No increased WOB  Abd: Soft, nontender nondistended  Ext: No edema. RUE in postoperative dressing, RLE in postoperative dressing  Neuro: Alert, oriented, appropriately interactive. Laboratory data: Available via EMR. Therapy progress:    PT    Rolling: Level of difficulty - A Little   Sit to Stand from a Chair: Level of difficulty - Unable  Supine to Sit: Level of difficulty - A Little     Bed to Chair: Physical Assistance Required - A Little  Ambulate Across Room: Physical Assistance Required - A Little  Ascend 3-5 Steps With HR: Physical Assistance Required - A Little    OT    Eating: Physical Assistance Required - None  Grooming: Physical Assistance Required - A Little  LB Dressing: Physical Assistance Required - Total  UB Dressing: Physical Assistance Required - A Little  Bathing: Physical Assistance Required - A Lot  Toileting: Physical Assistance Required - A Lot    SLP         Body mass index is 26.46 kg/m².     Assessment:  Patient Active Problem List   Diagnosis    Clot retention of urine    Multiple fractures    Closed fracture of right distal radius Closed fracture of right tibial plateau    S/P ORIF (open reduction internal fixation) fracture    Fracture       Plan:   Multiple fractures: s/p ORIF of R radius and tibia. Nonweightbearing RUE/RLE. Pain control. PT/OT. Ortho following. Ecoli UTI: Started Cipro, Cx sensitive  HLD: Lipitor 40  Depression: Lexapro 10 HS, Wellbutrin 300  Hx bladder cancer: Flomax    Dispo: Patient is an appropriate ARU candidate. She was doing well in ARU prior to surgically required transfer. Pre-CERT anticipated appeal with [employer] to be submitted today. Optimistic about approval.  Orders placed in anticipation. We will continue to follow. Modesto Mac MD 1/24/2023, 10:22 AM    * This document was created using dictation software. While all precautions were taken to ensure accuracy, errors may have occurred. Please disregard any typographical errors.

## 2023-01-24 NOTE — PROGRESS NOTES
Shift assessment completed, pt is alert and oriented, VSS, fall precautions in place, call light within reach, denies any pain or there needs at this time, neuro checks WNL. See showsheets and MAR, will monitor pt. The care plan and education has been reviewed and mutually agreed upon with the patient.

## 2023-01-25 PROCEDURE — 97116 GAIT TRAINING THERAPY: CPT

## 2023-01-25 PROCEDURE — 97535 SELF CARE MNGMENT TRAINING: CPT

## 2023-01-25 PROCEDURE — 97530 THERAPEUTIC ACTIVITIES: CPT

## 2023-01-25 PROCEDURE — 2580000003 HC RX 258: Performed by: NURSE PRACTITIONER

## 2023-01-25 PROCEDURE — 6370000000 HC RX 637 (ALT 250 FOR IP): Performed by: NURSE PRACTITIONER

## 2023-01-25 PROCEDURE — 6370000000 HC RX 637 (ALT 250 FOR IP): Performed by: INTERNAL MEDICINE

## 2023-01-25 PROCEDURE — 1200000000 HC SEMI PRIVATE

## 2023-01-25 RX ORDER — OXYCODONE HYDROCHLORIDE 5 MG/1
5 TABLET ORAL EVERY 6 HOURS PRN
Status: DISCONTINUED | OUTPATIENT
Start: 2023-01-25 | End: 2023-01-26 | Stop reason: HOSPADM

## 2023-01-25 RX ADMIN — CYCLOBENZAPRINE 10 MG: 10 TABLET, FILM COATED ORAL at 21:06

## 2023-01-25 RX ADMIN — STANDARDIZED SENNA CONCENTRATE AND DOCUSATE SODIUM 1 TABLET: 8.6; 5 TABLET ORAL at 09:15

## 2023-01-25 RX ADMIN — ATORVASTATIN CALCIUM 40 MG: 40 TABLET, FILM COATED ORAL at 21:07

## 2023-01-25 RX ADMIN — Medication 10 ML: at 09:55

## 2023-01-25 RX ADMIN — OXYCODONE HYDROCHLORIDE 5 MG: 5 TABLET ORAL at 21:14

## 2023-01-25 RX ADMIN — Medication 1000 MG: at 09:44

## 2023-01-25 RX ADMIN — Medication 1000 MG: at 21:13

## 2023-01-25 RX ADMIN — STANDARDIZED SENNA CONCENTRATE AND DOCUSATE SODIUM 1 TABLET: 8.6; 5 TABLET ORAL at 21:07

## 2023-01-25 RX ADMIN — CYCLOBENZAPRINE 10 MG: 10 TABLET, FILM COATED ORAL at 14:42

## 2023-01-25 RX ADMIN — Medication 10 ML: at 21:07

## 2023-01-25 RX ADMIN — ASPIRIN 81 MG: 81 TABLET, COATED ORAL at 21:06

## 2023-01-25 RX ADMIN — TRAMADOL HYDROCHLORIDE 50 MG: 50 TABLET ORAL at 09:44

## 2023-01-25 RX ADMIN — TRAZODONE HYDROCHLORIDE 100 MG: 50 TABLET ORAL at 21:06

## 2023-01-25 RX ADMIN — CYCLOBENZAPRINE 10 MG: 10 TABLET, FILM COATED ORAL at 09:15

## 2023-01-25 RX ADMIN — ACETAMINOPHEN 1000 MG: 500 TABLET ORAL at 14:42

## 2023-01-25 RX ADMIN — POLYETHYLENE GLYCOL 3350 17 G: 17 POWDER, FOR SOLUTION ORAL at 09:15

## 2023-01-25 RX ADMIN — ASPIRIN 81 MG: 81 TABLET, COATED ORAL at 09:15

## 2023-01-25 RX ADMIN — ARMODAFINIL 250 MG: 250 TABLET ORAL at 09:45

## 2023-01-25 RX ADMIN — ESCITALOPRAM OXALATE 10 MG: 10 TABLET ORAL at 09:15

## 2023-01-25 RX ADMIN — ACETAMINOPHEN 1000 MG: 500 TABLET ORAL at 21:06

## 2023-01-25 RX ADMIN — FERROUS SULFATE TAB 325 MG (65 MG ELEMENTAL FE) 325 MG: 325 (65 FE) TAB at 09:15

## 2023-01-25 RX ADMIN — Medication 2000 UNITS: at 17:58

## 2023-01-25 RX ADMIN — ACETAMINOPHEN 1000 MG: 500 TABLET ORAL at 09:15

## 2023-01-25 RX ADMIN — BUPROPION HYDROCHLORIDE 300 MG: 300 TABLET, EXTENDED RELEASE ORAL at 09:45

## 2023-01-25 ASSESSMENT — PAIN DESCRIPTION - ORIENTATION: ORIENTATION: RIGHT

## 2023-01-25 ASSESSMENT — PAIN DESCRIPTION - LOCATION
LOCATION: CHEST;ARM
LOCATION: CHEST

## 2023-01-25 ASSESSMENT — PAIN DESCRIPTION - DESCRIPTORS: DESCRIPTORS: ACHING

## 2023-01-25 ASSESSMENT — PAIN SCALES - GENERAL
PAINLEVEL_OUTOF10: 6
PAINLEVEL_OUTOF10: 1
PAINLEVEL_OUTOF10: 6

## 2023-01-25 NOTE — PLAN OF CARE
Problem: Safety - Adult  Goal: Free from fall injury  1/25/2023 0201 by Daryl Johnston RN  Outcome: Progressing   Patient has remained free of falls. 2/4 bed rails up, bed locked and in lowest position, call light within reach. Patient instructed on use of call light and uses appropriately. Bed alarm on. Non-skid footwear and fall band on. Problem: Pain  Goal: Verbalizes/displays adequate comfort level or baseline comfort level  1/25/2023 0201 by Daryl Johnston RN  Outcome: Progressing   Patient denies pain at this time.

## 2023-01-25 NOTE — PROGRESS NOTES
Patient alert and oriented x4. VSS. Dressing to knee is CDI. Dressing to right arm is CDI. Neuro checks WNL. Patient tolerating ambulation well. Urinating adequately. Pain controlled. Fall precautions in place.

## 2023-01-25 NOTE — PROGRESS NOTES
[facility] [address]   Phone: [phone]    Physical Therapy    [] Initial Evaluation            [x] Daily Treatment Note         [] Discharge Summary      Patient: Lilian Dao   : 1950   MRN: [id number]   Date of Service:  2023  Admitting Diagnosis: S/P ORIF (open reduction internal fixation) fracture  Current Admission Summary: 78-year-old female with a history of hyperlipidemia and bladder cancer who was admitted to [facility] on  with right arm and leg pain after a fall from a ladder. She reports she was on the last step and thought she was on the ground and fell 1 step to the ground. Work-up revealed a right radius fracture and a right tibial fracture. CT of the knee revealed an intra-articular tibial fracture involving the spine. Ortho evaluated and suggested nonweightbearing nonoperative management with a knee immobilizer on the right lower extremity. Her hospital course was complicated by urinary retention requiring catheterization. Discharged to ARU. S/p ORIF right tibial plateau and right distal radius ORIF (23)    Past Medical History:  has a past medical history of Arthritis, Cancer ([facility] [address] and Hyperlipidemia. Past Surgical History:  has a past surgical history that includes Abdomen surgery; Colonoscopy; fracture surgery; Hysterectomy; Tonsillectomy; Breast reduction surgery (); eye surgery (); Cystoscopy (N/A, 2021); Wrist fracture surgery (Right, 2023); and Leg Surgery (Right, 2023). Discharge Recommendations: Lilian Dao scored a 15/24 on the AM-PAC short mobility form. Current research shows that an AM-PAC score of 17 or less is typically not associated with a discharge to the patient's home setting.  Based on the patient's AM-PAC score and their current functional mobility deficits, it is recommended that the patient have 5-7 sessions per week of Physical Therapy at d/c to increase the patient's independence. At this time, this patient demonstrates complex nursing, medical, and rehabilitative needs, and would benefit from intensive rehabilitation services upon discharge from the Inpatient setting. This patient demonstrates the ability to participate in and benefit from an intensive therapy program with a coordinated interdisciplinary team approach to foster frequent, structured, and documented communication among disciplines, who will work together to establish, prioritize, and achieve treatment goals. Please see assessment section for further patient specific details. If patient discharges prior to next session this note will serve as a discharge summary. Please see below for the latest assessment towards goals. DME Required For Discharge: DME to be determined at next level of care    Precautions/Restrictions: high fall risk, weight bearing  Weight Bearing Restrictions:   NWBing R wrist - ok for platform walker  TTWB RLE  Required Braces/Orthotics: knee immobilizer - when OOB   [x] Right  [] Left  Positional Restrictions:no positional restrictions    Pre-Admission Information   Lives With: spouse                  Type of Home: [facility]  Home Layout: one level  Home Access:  1 + 1 NORA with (R) grab bar  Bathroom Layout: walk in shower  Bathroom Equipment:  owns grab bars but not installed  Toilet Height: standard height  Home Equipment: no prior equipment  Transfer Assistance: Independent without use of device  Ambulation Assistance:Independent without use of device  ADL Assistance: independent with all ADL's  IADL Assistance: requires assistance with all homemaking tasks  Active :        [x] Yes                 [] No  Hand Dominance: [] Left                 [x] Right  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        Subjective  General: Pt sitting up in recliner upon arrival. Agreeable to PT session. Pain: 4/10. Location: arm and leg incision  - pt notes L sided muscular chest pain during ambulation and when supine  Pain Interventions: ice applied and repositioned  OT recently applied bio-freeze according to pt    Functional Mobility  Bed Mobility  Sit to Supine: stand by assistance  Scooting: maximum assistance  Comments:     Transfers  Sit to stand transfer: minimal assistance  Stand to sit transfer: contact guard assistance  Comments: All transfers completed with a platform walker. Ambulation Trial   Surface:level surface  Assistive Device: platform walker (R)  Assistance: stand by assistance  Distance: 15 ft with 1 standing rest break  Gait Mechanics: hopping with L LE, decreased hop distance and foot clearance   Comments:  L chest muscular pain due to pt report of \"pulling muscle Monday during use of the restroom. \"    Stair Mobility  Stair mobility not completed on this date. Comments:  Wheelchair Mobility:  No w/c mobility completed on this date.   Comments:  Balance  Static Sitting Balance: fair (+): maintains balance at SBA/supervision without use of UE support  Dynamic Sitting Balance: fair (+): maintains balance at SBA/supervision without use of UE support  Static Standing Balance: fair (-): maintains balance at CGA with use of UE support  Dynamic Standing Balance: poor (+): requires min (A) to maintain balance  Comments:     Other Therapeutic Interventions  SLR x5 R LE minimal height, AP x10 B,     Functional Outcomes  AM-PAC Inpatient Mobility Raw Score : 15              Cognition  WFL  Orientation:    alert and oriented x 4  Command Following:   [facility]    Education  Barriers To Learning: none  Patient Education: patient educated on PT role and benefits, general safety, functional mobility training, proper use of assistive device/equipment, transfer training, discharge recommendations  Learning Assessment:  patient verbalizes and demonstrates understanding    Assessment  Activity Tolerance: Pt with increased pain in (L) pectoral when attempting hop-to vs pivot step. Ice pack applied to (L) pectoral at end of session. Impairments Requiring Therapeutic Intervention: decreased functional mobility, decreased ADL status, decreased ROM, decreased strength, decreased endurance, decreased balance, increased pain  Prognosis: good  Clinical Assessment: Pt demonstrates improved functional activity tolerance ambulating further distances this date. Pt continues to demonstrate good safety awareness with the ability to maintain R UE and R LE wt bearing precautions. Pt notes continued L sided chest muscular pain that started yesterday when she felt like she \"pulled\" something during attempt to hop. She remains motivated to complete as much mobility for herself as she can. Pt continues to present below her baseline level of function and will benefit from continued skilled PT to facilitate return to PLOF and to promote independence.   Safety Interventions: patient left in bed, bed alarm in place, call light within reach, gait belt, and nurse notified    Plan  Frequency: 7 x/week  Current Treatment Recommendations: strengthening, ROM, balance training, functional mobility training, transfer training, gait training, endurance training, patient/caregiver education, home exercise program, and safety education    Goals  Patient Goals: to improve mobility  Short Term Goals:  Time Frame: discharge  Patient will complete bed mobility at minimal assistance- met 1/22/23   Patient will complete transfers at moderate assistance- met 1/22/23   Patient will ambulate 10 ft with use of platform walker (R) at minimal assistance- met 1/24/23  New goal 1/22 - Patient will complete bed mobility at stand by assistance  New goal 1/22 - Patient will complete transfers with contact guard assistance  New goal 1/23- Patient will ambulate 25 ft with use of platform walker (R) at minimal assistance    *No goals met in full 1/25    Therapy Session Time      Individual Group Co-treatment   Time In 1301       Time Out 1341       Minutes 40         Timed Code Treatment Minutes: 40 Minutes  Total Treatment Minutes: 36 Minutes      Electronically Signed By: Leann Jenkins PTA          I agree with the above note, goals addressed by PT.   Tapan Cabrera, PT, DPT #[id number]

## 2023-01-25 NOTE — PROGRESS NOTES
HOSPITALISTS PROGRESS NOTE    1/25/2023 9:13 AM        Name: Rowdy Vaughn . Admitted: 1/19/2023  Primary Care Provider: Yari Umaña MD (Tel: [phone])      Brief Course: 71 y.o. female  with PMHx of bladder cancer, hyperlipidemia, depression, recent E. coli UTI and moderately displaced distal radius fracture and tibial plateau fracture who was in ARU for rehab after fall on 1/4/2023 resulting in right wrist and right knee injury. Patient was initially evaluated by [facility] orthopedic and recommended nonoperative treatment. Patient was evaluated by  on 1/17/23 in t ARU and admitted for ORIF right distal radius and right tibial plateau. .    Interval history:   Pt seen and examined today. Overnight events noted, interval ancillary notes and labs reviewed. Hemodynamically stable. Denied cough, SOB, chest pain, nausea, vomiting or abdominal pain  Awaiting for ARU pre-CERT      Assessment & Plan:     Right wrist moderately displaced distal radius fracture & 2- Right knee Medial, Lateral tibial plateau fracture. Orthopedic surgery on board; S/P ORIF right distal radius and right knee tibial plateau  Continue as needed pain medication. PT OT consulted  TTWB weight bearing through operative leg; No ROM; NO WB through right wrist, but ok for hemiplatform walker   Monitor electrolytes closely     Hx of recent Ecoli UTI: Completed course of Cipro      Hyperlipidemia: Continue statins     Lexapro: Continue Wellbutrin and Lexapro         Hx bladder cancer: Continue Flomax     Constipation; bowel regimen ordered     DVT prophylaxis: Refusing Lovenox.   Switch to aspirin twice daily  Code:Full Code    Disposition: ARU awaiting pre-CERT    Current Medications  polyethylene glycol (GLYCOLAX) packet 17 g, Daily  traMADol (ULTRAM) tablet 50 mg, Q8H PRN  cyclobenzaprine (FLEXERIL) tablet 10 mg, TID  aspirin EC tablet 81 mg, BID  Armodafinil TABS 250 mg , Daily  traZODone (DESYREL) tablet 100 mg, Nightly  ferrous sulfate (IRON 325) tablet 325 mg, Daily with breakfast  escitalopram (LEXAPRO) tablet 10 mg, Daily  Vitamin D (CHOLECALCIFEROL) tablet 2,000 Units, QPM  calcium elemental (OSCAL) tablet 1,000 mg, BID  buPROPion (WELLBUTRIN XL) extended release tablet 300 mg, QAM  atorvastatin (LIPITOR) tablet 40 mg, Nightly  sodium chloride flush 0.9 % injection 5-40 mL, 2 times per day  sodium chloride flush 0.9 % injection 5-40 mL, PRN  0.9 % sodium chloride infusion, PRN  acetaminophen (TYLENOL) tablet 1,000 mg, TID  sennosides-docusate sodium (SENOKOT-S) 8.6-50 MG tablet 1 tablet, BID  ondansetron (ZOFRAN-ODT) disintegrating tablet 4 mg, Q8H PRN   Or  ondansetron (ZOFRAN) injection 4 mg, Q6H PRN  diphenhydrAMINE (BENADRYL) tablet 25 mg, Q6H PRN   Or  diphenhydrAMINE (BENADRYL) injection 25 mg, Q6H PRN      Objective:  /66   Pulse 81   Temp 97.9 °F (36.6 °C) (Oral)   Resp 17   Ht 5' 5\" (1.651 m)   Wt 159 lb (72.1 kg)   SpO2 97%   BMI 26.46 kg/m²     Intake/Output Summary (Last 24 hours) at 1/25/2023 0913  Last data filed at 1/25/2023 0505  Gross per 24 hour   Intake 240 ml   Output --   Net 240 ml        Wt Readings from Last 3 Encounters:   01/19/23 159 lb (72.1 kg)   01/11/23 159 lb 4.8 oz (72.3 kg)   08/01/21 146 lb 2.6 oz (66.3 kg)       Physical Examination:   General appearance:  No apparent distress, appears stated age and cooperative. HEENT: Normocephalic, sclera clear., PERRLA. Trachea midline, no adenopathy. Cardiovascular: Regular rate and rhythm, normal S1, S2. No murmur. Respiratory:Clear to auscultation bilaterally, no wheeze or crackles. GI: Abdomen soft, no tenderness, not distended, normal bowel sounds  Musculoskeletal: No cyanosis in digits. No BLE edema present  Neurology: CN 2-12 grossly intact.  No speech or motor deficits  Psych: Not agitated, appropriate affect  Skin: Warm, dry, normal turgor    Labs and Tests:  CBC:   No results for input(s): WBC, HGB, PLT in the last 72 hours. BMP:    Recent Labs     01/23/23  0610 01/24/23  0445    140   K 4.4 4.3    104   CO2 31 29   BUN 12 11   CREATININE <0.5* 0.5*   GLUCOSE 124* 114*       Hepatic: No results for input(s): AST, ALT, ALB, BILITOT, ALKPHOS in the last 72 hours. No orders to display       Problem List  Principal Problem:    S/P ORIF (open reduction internal fixation) fracture  Active Problems:    Fracture  Resolved Problems:    * No resolved hospital problems.  Myke Malik MD   1/25/2023 9:13 AM

## 2023-01-25 NOTE — PROGRESS NOTES
[address] [facility] Department   Phone: [phone]    Occupational Therapy    [] Initial Evaluation            [x] Daily Treatment Note         [] Discharge Summary      Patient: Bright Ramirez   : 1950   MRN: [id number]   Date of Service:  2023    Admitting Diagnosis:  s/p ORIF right tibial plateau and right distal radius   Current Admission Summary:      S/p ORIF right tibial plateau and right distal radius ORIF (23)    Past Medical History:  has a past medical history of Arthritis, Cancer ([facility] [address] and Hyperlipidemia. Past Surgical History:  has a past surgical history that includes Abdomen surgery; Colonoscopy; fracture surgery; Hysterectomy; Tonsillectomy; Breast reduction surgery (); eye surgery (); Cystoscopy (N/A, 2021); Wrist fracture surgery (Right, 2023); and Leg Surgery (Right, 2023). Discharge Recommendations: Bright Ramirez scored a 15/24 on the AM-PAC ADL Inpatient form. Current research shows that an AM-PAC score of 17 or less is typically not associated with a discharge to the patient's home setting. Based on the patient's AM-PAC score and their current ADL deficits, it is recommended that the patient have 5-7 sessions per week of Occupational Therapy at d/c to increase the patient's independence. At this time, this patient demonstrates complex nursing, medical, and rehabilitative needs, and would benefit from intensive rehabilitation services upon discharge from the Inpatient setting. This patient demonstrates the ability to participate in and benefit from an intensive therapy program with a coordinated interdisciplinary team approach to foster frequent, structured, and documented communication among disciplines, who will work together to establish, prioritize, and achieve treatment goals. Please see assessment section for further patient specific details.     If patient discharges prior to next session this note will serve as a discharge summary. Please see below for the latest assessment towards goals. DME Required For Discharge: DME to be determined at next level of care, DME to be determined pending patient progress    Precautions/Restrictions: high fall risk, weight bearing, surgical protocols  Weight Bearing Restrictions: toe touch weight bearing right LE, NWB  right UE-- allowed to use platform walker        Required Braces/Orthotics: knee immobilizer   [x] Right  [] Left  Positional Restrictions: no R LE ROM      Pre-Admission Information   Lives With: spouse                  Type of Home: condo  Home Layout: one level  Home Access:  1 + 1 NORA with (R) grab bar  Bathroom Layout: walk in shower  Bathroom Equipment:  owns grab bars but not installed  Toilet Height: standard height  Home Equipment: no prior equipment  Transfer Assistance: Independent without use of device  Ambulation Assistance:Independent without use of device  ADL Assistance: independent with all ADL's  IADL Assistance: requires assistance with all homemaking tasks  Active :        [x] Yes                 [] No  Hand Dominance: [] Left                 [x] Right  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 Patient reports across sternum due to thinks hurt chest wall while bearing down to have a BM. Pain: 7/10.   Location: chest/sternum    Pain Interventions: pain medication in place prior to arrival, biofreeze applied, and repositioned          Activities of Daily Living  Basic Activities of Daily Living  Feeding: setup assistance  Grooming: setup assistance minimal assistance Comment: assist to wash hair   Upper Extremity Bathing: moderate assistance  Lower Extremity Bathing: maximum assistance   Bathing Comments: sponge bathing sitting EOB  Upper Extremity Dressing: moderate assistance  Toileting: maximum assistance. Toileting Comments: removed Purewick, patient rolled left and right with minimal assist, max/dep pericare, new brief donned   General Comments: patient c/o sternum/chest pain this date. Instrumental Activities of Daily Living  No IADL completed on this date. Functional Mobility  Bed Mobility  Supine to Sit: minimal assistance  Rolling Left: minimal assistance  Rolling Right: minimal assistance  Scooting: minimal assistance  Comments: HOB elevated  Transfers  Sit to stand transfer:minimal assistance  Stand to sit transfer: minimal assistance  Bed / Chair transfer: minimal assistance. Comments: also requires assist to put/remove R UE in velcro strap on platform walker      Functional Mobility:  Sitting Balance: supervision. Sitting Balance Comment: sitting EOB   Standing Balance: contact guard assistance, minimal assistance.     Standing Balance Comment: with platform walker    Functional Mobility: .  minimal assistance  Functional Mobility Device Use: platform walker (R)  Functional Mobility Comment: 3 feet from bed to recliner chair on right       Functional Outcomes  AM-PAC Inpatient Daily Activity Raw Score: 15    Cognition  WFL  Orientation:    alert and oriented x 4  Command Following:   [facility]     Education  Barriers To Learning: none  Patient Education: patient educated on goals, OT role and benefits, weight-bearing education, discharge recommendations  Learning Assessment:  patient verbalizes and demonstrates understanding    Assessment  Activity Tolerance: tolerated well   Impairments Requiring Therapeutic Intervention: decreased functional mobility, decreased ADL status, decreased ROM, decreased endurance, decreased balance, decreased coordination, increased pain  Prognosis: good  Clinical Assessment: pt typically independent, not currently at baseline level of functioning, pt would benefit from ongoing skilled OT services in order to return to PLOF. Recommend patient return to ARU unit. Safety Interventions: patient left in chair, chair alarm in place, call light within reach, and patient at risk for falls         Plan  Frequency: 7 x/week  Current Treatment Recommendations: strengthening, ROM, balance training, functional mobility training, transfer training, patient/caregiver education, pain management, and positioning    Goals  Patient Goals: Go to ARU    Short Term Goals:  Time Frame: until discharge  Patient will complete lower body ADL at moderate assistance   Patient will complete toileting at moderate assistance   Patient will complete functional transfers at moderate assistance- goal met 1/23. Upgraded: functional transfers at [facility]   Patient will increase [facility] ADL score = to or > than 14/24- goal met 1/23. Upgraded: [facility] 18/24     NEW GOAL: UB ADLs Adam  NEW GOAL: functional mobility with platform walker SBA      CONTINUE ALL GOALS 1/24/23      Therapy Session Time     Individual Group Co-treatment   Time In 1202     Time Out 1247     Minutes 45          Timed Code Treatment Minutes: 40  minutes   Total Treatment Minutes: 40 minutes          Electronically Signed By: Berna Bocanegra  [address] [place] [address]

## 2023-01-25 NOTE — PROGRESS NOTES
Zakiya Charlton  1/25/2023  [id number]    Chief Complaint: S/P ORIF (open reduction internal fixation) fracture    Subjective:   No overnight events.  Having some chest discomfort after hearing a \"pop\" in her chest with a transfer 2 days ago. Precert appeal remains pending.     ROS: No CP, SOB, dyspnea    Objective:  Patient Vitals for the past 24 hrs:   BP Temp Temp src Pulse Resp SpO2   01/25/23 0800 109/66 97.9 °F (36.6 °C) Oral 81 17 97 %   01/25/23 0435 105/62 98.1 °F (36.7 °C) Oral 71 16 98 %   01/24/23 2345 115/66 97.4 °F (36.3 °C) Oral 65 16 97 %   01/24/23 2057 106/64 98 °F (36.7 °C) Oral 80 16 97 %   01/24/23 1133 129/74 98 °F (36.7 °C) Oral 75 16 97 %       Gen: No distress, pleasant.  Resting in bed  HEENT: Normocephalic, atraumatic.   CV: No audible murmurs, well perfused extremities. Palpable reproduction of her pain at the sternum  Resp: No respiratory distress. No increased WOB  Abd: Soft, nontender nondistended  Ext: No edema.  RUE in postoperative dressing, RLE in postoperative dressing  Neuro: Alert, oriented, appropriately interactive.     Laboratory data: Available via EMR.     Therapy progress:    PT    Rolling: Level of difficulty - A Little   Sit to Stand from a Chair: Level of difficulty - Unable  Supine to Sit: Level of difficulty - A Little     Bed to Chair: Physical Assistance Required - A Little  Ambulate Across Room: Physical Assistance Required - A Little  Ascend 3-5 Steps With HR: Physical Assistance Required - A Little    OT    Eating: Physical Assistance Required - None  Grooming: Physical Assistance Required - A Little  LB Dressing: Physical Assistance Required - Total  UB Dressing: Physical Assistance Required - A Little  Bathing: Physical Assistance Required - A Lot  Toileting: Physical Assistance Required - A Lot    SLP         Body mass index is 26.46 kg/m².    Assessment:  Patient Active Problem List   Diagnosis    Clot retention of urine    Multiple fractures    Closed fracture  of right distal radius    Closed fracture of right tibial plateau    S/P ORIF (open reduction internal fixation) fracture    Fracture       Plan:   Multiple fractures: s/p ORIF of R radius and tibia. Nonweightbearing RUE/RLE. Pain control. PT/OT. Ortho following. Ecoli UTI: Started Cipro, Cx sensitive  HLD: Lipitor 40  Depression: Lexapro 10 HS, Wellbutrin 300  Hx bladder cancer: Flomax    Dispo: Patient is an appropriate ARU candidate. She was doing well in ARU prior to surgically required transfer. Pre-CERT appeal remains pending. Optimistic about approval.  Orders placed in anticipation. We will continue to follow. Kesha Anderson MD 1/25/2023, 10:34 AM    * This document was created using dictation software. While all precautions were taken to ensure accuracy, errors may have occurred. Please disregard any typographical errors.

## 2023-01-26 ENCOUNTER — HOSPITAL ENCOUNTER (INPATIENT)
Age: 73
DRG: 560 | End: 2023-01-26
Attending: PHYSICAL MEDICINE & REHABILITATION | Admitting: PHYSICAL MEDICINE & REHABILITATION
Payer: MEDICARE

## 2023-01-26 VITALS
DIASTOLIC BLOOD PRESSURE: 57 MMHG | BODY MASS INDEX: 26.49 KG/M2 | WEIGHT: 159 LBS | RESPIRATION RATE: 16 BRPM | TEMPERATURE: 97.6 F | HEIGHT: 65 IN | OXYGEN SATURATION: 97 % | SYSTOLIC BLOOD PRESSURE: 117 MMHG | HEART RATE: 72 BPM

## 2023-01-26 DIAGNOSIS — Z98.890 S/P ORIF (OPEN REDUCTION INTERNAL FIXATION) FRACTURE: Primary | ICD-10-CM

## 2023-01-26 DIAGNOSIS — Z87.81 S/P ORIF (OPEN REDUCTION INTERNAL FIXATION) FRACTURE: Primary | ICD-10-CM

## 2023-01-26 PROCEDURE — 2580000003 HC RX 258: Performed by: NURSE PRACTITIONER

## 2023-01-26 PROCEDURE — 2580000003 HC RX 258: Performed by: PHYSICAL MEDICINE & REHABILITATION

## 2023-01-26 PROCEDURE — 6360000002 HC RX W HCPCS: Performed by: PHYSICAL MEDICINE & REHABILITATION

## 2023-01-26 PROCEDURE — 6370000000 HC RX 637 (ALT 250 FOR IP): Performed by: INTERNAL MEDICINE

## 2023-01-26 PROCEDURE — 6370000000 HC RX 637 (ALT 250 FOR IP): Performed by: PHYSICIAN ASSISTANT

## 2023-01-26 PROCEDURE — 6360000002 HC RX W HCPCS: Performed by: PHYSICIAN ASSISTANT

## 2023-01-26 PROCEDURE — 6370000000 HC RX 637 (ALT 250 FOR IP): Performed by: NURSE PRACTITIONER

## 2023-01-26 PROCEDURE — 97110 THERAPEUTIC EXERCISES: CPT

## 2023-01-26 PROCEDURE — 97530 THERAPEUTIC ACTIVITIES: CPT

## 2023-01-26 PROCEDURE — 6370000000 HC RX 637 (ALT 250 FOR IP): Performed by: PHYSICAL MEDICINE & REHABILITATION

## 2023-01-26 PROCEDURE — 97535 SELF CARE MNGMENT TRAINING: CPT

## 2023-01-26 PROCEDURE — 1280000000 HC REHAB R&B

## 2023-01-26 RX ORDER — HYDRALAZINE HYDROCHLORIDE 25 MG/1
50 TABLET, FILM COATED ORAL EVERY 8 HOURS PRN
Status: DISCONTINUED | OUTPATIENT
Start: 2023-01-26 | End: 2023-02-07 | Stop reason: HOSPADM

## 2023-01-26 RX ORDER — POLYETHYLENE GLYCOL 3350 17 G/17G
17 POWDER, FOR SOLUTION ORAL DAILY
Status: DISCONTINUED | OUTPATIENT
Start: 2023-01-27 | End: 2023-01-26

## 2023-01-26 RX ORDER — CYCLOBENZAPRINE HCL 10 MG
10 TABLET ORAL 3 TIMES DAILY
Status: DISCONTINUED | OUTPATIENT
Start: 2023-01-26 | End: 2023-01-27

## 2023-01-26 RX ORDER — ARMODAFINIL 250 MG/1
250 TABLET ORAL DAILY
Status: DISCONTINUED | OUTPATIENT
Start: 2023-01-27 | End: 2023-02-07 | Stop reason: HOSPADM

## 2023-01-26 RX ORDER — CALCIUM CARBONATE 500(1250)
2 TABLET ORAL 2 TIMES DAILY
Status: DISCONTINUED | OUTPATIENT
Start: 2023-01-26 | End: 2023-02-07 | Stop reason: HOSPADM

## 2023-01-26 RX ORDER — FERROUS SULFATE 325(65) MG
325 TABLET ORAL
Status: DISCONTINUED | OUTPATIENT
Start: 2023-01-27 | End: 2023-02-07 | Stop reason: HOSPADM

## 2023-01-26 RX ORDER — VITAMIN B COMPLEX
2000 TABLET ORAL EVERY EVENING
Status: DISCONTINUED | OUTPATIENT
Start: 2023-01-27 | End: 2023-02-07 | Stop reason: HOSPADM

## 2023-01-26 RX ORDER — ESCITALOPRAM OXALATE 10 MG/1
10 TABLET ORAL DAILY
Status: DISCONTINUED | OUTPATIENT
Start: 2023-01-27 | End: 2023-02-07 | Stop reason: HOSPADM

## 2023-01-26 RX ORDER — POLYETHYLENE GLYCOL 3350 17 G/17G
17 POWDER, FOR SOLUTION ORAL DAILY PRN
Status: DISCONTINUED | OUTPATIENT
Start: 2023-01-26 | End: 2023-02-07 | Stop reason: HOSPADM

## 2023-01-26 RX ORDER — SENNA AND DOCUSATE SODIUM 50; 8.6 MG/1; MG/1
1 TABLET, FILM COATED ORAL 2 TIMES DAILY
Status: DISCONTINUED | OUTPATIENT
Start: 2023-01-26 | End: 2023-02-07 | Stop reason: HOSPADM

## 2023-01-26 RX ORDER — ACETAMINOPHEN 325 MG/1
650 TABLET ORAL EVERY 4 HOURS PRN
Status: DISCONTINUED | OUTPATIENT
Start: 2023-01-26 | End: 2023-02-07 | Stop reason: HOSPADM

## 2023-01-26 RX ORDER — SODIUM CHLORIDE 0.9 % (FLUSH) 0.9 %
5-40 SYRINGE (ML) INJECTION EVERY 12 HOURS SCHEDULED
Status: DISCONTINUED | OUTPATIENT
Start: 2023-01-26 | End: 2023-01-28

## 2023-01-26 RX ORDER — HEPARIN SODIUM 5000 [USP'U]/ML
5000 INJECTION, SOLUTION INTRAVENOUS; SUBCUTANEOUS EVERY 8 HOURS SCHEDULED
Status: DISCONTINUED | OUTPATIENT
Start: 2023-01-26 | End: 2023-02-07 | Stop reason: HOSPADM

## 2023-01-26 RX ORDER — ASPIRIN 81 MG/1
81 TABLET ORAL 2 TIMES DAILY
Status: DISCONTINUED | OUTPATIENT
Start: 2023-01-26 | End: 2023-01-27

## 2023-01-26 RX ORDER — BUPROPION HYDROCHLORIDE 150 MG/1
300 TABLET ORAL EVERY MORNING
Status: DISCONTINUED | OUTPATIENT
Start: 2023-01-27 | End: 2023-02-07 | Stop reason: HOSPADM

## 2023-01-26 RX ORDER — SODIUM CHLORIDE 0.9 % (FLUSH) 0.9 %
5-40 SYRINGE (ML) INJECTION PRN
Status: DISCONTINUED | OUTPATIENT
Start: 2023-01-26 | End: 2023-02-07 | Stop reason: HOSPADM

## 2023-01-26 RX ORDER — DIPHENHYDRAMINE HCL 25 MG
25 TABLET ORAL EVERY 6 HOURS PRN
Status: DISCONTINUED | OUTPATIENT
Start: 2023-01-26 | End: 2023-02-07 | Stop reason: HOSPADM

## 2023-01-26 RX ORDER — ATORVASTATIN CALCIUM 40 MG/1
40 TABLET, FILM COATED ORAL NIGHTLY
Status: DISCONTINUED | OUTPATIENT
Start: 2023-01-26 | End: 2023-02-07 | Stop reason: HOSPADM

## 2023-01-26 RX ORDER — TRAZODONE HYDROCHLORIDE 50 MG/1
100 TABLET ORAL NIGHTLY
Status: DISCONTINUED | OUTPATIENT
Start: 2023-01-26 | End: 2023-02-07 | Stop reason: HOSPADM

## 2023-01-26 RX ORDER — ONDANSETRON 4 MG/1
4 TABLET, ORALLY DISINTEGRATING ORAL EVERY 8 HOURS PRN
Status: DISCONTINUED | OUTPATIENT
Start: 2023-01-26 | End: 2023-02-07 | Stop reason: HOSPADM

## 2023-01-26 RX ORDER — SODIUM CHLORIDE 9 MG/ML
INJECTION, SOLUTION INTRAVENOUS PRN
Status: DISCONTINUED | OUTPATIENT
Start: 2023-01-26 | End: 2023-02-07 | Stop reason: HOSPADM

## 2023-01-26 RX ORDER — ACETAMINOPHEN 500 MG
1000 TABLET ORAL 3 TIMES DAILY
Status: DISCONTINUED | OUTPATIENT
Start: 2023-01-26 | End: 2023-02-07 | Stop reason: HOSPADM

## 2023-01-26 RX ORDER — KETOROLAC TROMETHAMINE 30 MG/ML
15 INJECTION, SOLUTION INTRAMUSCULAR; INTRAVENOUS EVERY 6 HOURS PRN
Status: ACTIVE | OUTPATIENT
Start: 2023-01-26 | End: 2023-01-27

## 2023-01-26 RX ORDER — LIDOCAINE 4 G/G
2 PATCH TOPICAL DAILY
Status: DISCONTINUED | OUTPATIENT
Start: 2023-01-26 | End: 2023-01-26 | Stop reason: HOSPADM

## 2023-01-26 RX ORDER — KETOROLAC TROMETHAMINE 15 MG/ML
15 INJECTION, SOLUTION INTRAMUSCULAR; INTRAVENOUS EVERY 6 HOURS
Status: DISCONTINUED | OUTPATIENT
Start: 2023-01-26 | End: 2023-01-26 | Stop reason: HOSPADM

## 2023-01-26 RX ADMIN — CYCLOBENZAPRINE 10 MG: 10 TABLET, FILM COATED ORAL at 21:50

## 2023-01-26 RX ADMIN — Medication 1000 MG: at 10:51

## 2023-01-26 RX ADMIN — ACETAMINOPHEN 1000 MG: 500 TABLET ORAL at 10:52

## 2023-01-26 RX ADMIN — ASPIRIN 81 MG: 81 TABLET, COATED ORAL at 10:51

## 2023-01-26 RX ADMIN — KETOROLAC TROMETHAMINE 15 MG: 15 INJECTION, SOLUTION INTRAMUSCULAR; INTRAVENOUS at 12:47

## 2023-01-26 RX ADMIN — Medication 1000 MG: at 21:51

## 2023-01-26 RX ADMIN — Medication 10 ML: at 22:02

## 2023-01-26 RX ADMIN — KETOROLAC TROMETHAMINE 15 MG: 15 INJECTION, SOLUTION INTRAMUSCULAR; INTRAVENOUS at 18:08

## 2023-01-26 RX ADMIN — HEPARIN SODIUM 5000 UNITS: 5000 INJECTION INTRAVENOUS; SUBCUTANEOUS at 21:51

## 2023-01-26 RX ADMIN — OXYCODONE HYDROCHLORIDE 5 MG: 5 TABLET ORAL at 11:00

## 2023-01-26 RX ADMIN — Medication 10 ML: at 10:53

## 2023-01-26 RX ADMIN — TRAZODONE HYDROCHLORIDE 100 MG: 50 TABLET ORAL at 21:48

## 2023-01-26 RX ADMIN — Medication 2000 UNITS: at 18:04

## 2023-01-26 RX ADMIN — ASPIRIN 81 MG: 81 TABLET, COATED ORAL at 21:50

## 2023-01-26 RX ADMIN — ESCITALOPRAM OXALATE 10 MG: 10 TABLET ORAL at 10:52

## 2023-01-26 RX ADMIN — BUPROPION HYDROCHLORIDE 300 MG: 300 TABLET, EXTENDED RELEASE ORAL at 10:51

## 2023-01-26 RX ADMIN — STANDARDIZED SENNA CONCENTRATE AND DOCUSATE SODIUM 1 TABLET: 8.6; 5 TABLET ORAL at 10:51

## 2023-01-26 RX ADMIN — ACETAMINOPHEN 1000 MG: 500 TABLET ORAL at 21:49

## 2023-01-26 RX ADMIN — CYCLOBENZAPRINE 10 MG: 10 TABLET, FILM COATED ORAL at 15:13

## 2023-01-26 RX ADMIN — ACETAMINOPHEN 1000 MG: 500 TABLET ORAL at 15:13

## 2023-01-26 RX ADMIN — ARMODAFINIL 250 MG: 250 TABLET ORAL at 10:51

## 2023-01-26 RX ADMIN — ATORVASTATIN CALCIUM 40 MG: 40 TABLET, FILM COATED ORAL at 21:50

## 2023-01-26 RX ADMIN — CYCLOBENZAPRINE 10 MG: 10 TABLET, FILM COATED ORAL at 10:51

## 2023-01-26 ASSESSMENT — PAIN DESCRIPTION - INTENSITY
RATING_3: 4
RATING_2: 4

## 2023-01-26 ASSESSMENT — PAIN SCALES - GENERAL
PAINLEVEL_OUTOF10: 4
PAINLEVEL_OUTOF10: 4
PAINLEVEL_OUTOF10: 2
PAINLEVEL_OUTOF10: 4
PAINLEVEL_OUTOF10: 5
PAINLEVEL_OUTOF10: 4

## 2023-01-26 ASSESSMENT — PAIN DESCRIPTION - LOCATION
LOCATION: CHEST;ARM;LEG
LOCATION: CHEST
LOCATION_3: LEG
LOCATION_2: ARM

## 2023-01-26 ASSESSMENT — PAIN DESCRIPTION - DESCRIPTORS
DESCRIPTORS: ACHING;DISCOMFORT
DESCRIPTORS_3: ACHING;DISCOMFORT
DESCRIPTORS_2: ACHING;DISCOMFORT

## 2023-01-26 ASSESSMENT — PAIN DESCRIPTION - ORIENTATION
ORIENTATION: RIGHT;LEFT;MID
ORIENTATION_3: RIGHT;LEFT
ORIENTATION_2: RIGHT

## 2023-01-26 ASSESSMENT — PAIN DESCRIPTION - FREQUENCY: FREQUENCY: CONTINUOUS

## 2023-01-26 ASSESSMENT — PAIN DESCRIPTION - PAIN TYPE: TYPE: SURGICAL PAIN

## 2023-01-26 ASSESSMENT — PAIN - FUNCTIONAL ASSESSMENT
PAIN_FUNCTIONAL_ASSESSMENT_SITE2: PREVENTS OR INTERFERES SOME ACTIVE ACTIVITIES AND ADLS
PAIN_FUNCTIONAL_ASSESSMENT: PREVENTS OR INTERFERES SOME ACTIVE ACTIVITIES AND ADLS

## 2023-01-26 ASSESSMENT — PAIN DESCRIPTION - ONSET: ONSET: ON-GOING

## 2023-01-26 ASSESSMENT — PAIN SCALES - WONG BAKER
WONGBAKER_NUMERICALRESPONSE: 0
WONGBAKER_NUMERICALRESPONSE: 0

## 2023-01-26 NOTE — PROGRESS NOTES
Patient was admitted to room 4907 at [phone]. Patient was oriented to the Call Light, Phone, TV, Thermostat, Bed Controls, Bathroom and Emergency Cord. Patient verbalized and demonstrated understanding of all. Patient was also given an over view of Unit Routines for Acute Rehab. Patient states that their normal bowel regime is daily. Meal times were explained, including how to order food. The white board, (which is posted on the wall by the door is used for communication) has the Therapy Scheduled that is posted each day along with the name of your doctor, nurse, and therapist for your convenience. We recommend any family that will be care givers or any care givers the patient has, take part in therapy. We have no set visiting hours, we suggest non-caregiver friends and family visitors come after therapy (at 4 PM or later) to allow patient to rest in between sessions.       In conjunction with the patient and patients family, this nurse worked to establish a tailored Fall TIPS plan to ensure patient safety and compliance:    Falls TIPS Completion    Patient identified as increased risk for harm if fall:  [x] Yes     Fall Risks  History of Falls:    [x] Yes   Medication Side Effects:   [] Yes   Walking Aid:    [x] Yes   IV Pole or Equipment:   [] Yes   Unsteady Walk:     [x] Yes   May Forget or Choose Not to Call: [] Yes     Fall Interventions   Communicate Recent Fall and/or Risk of Harm: [x] Yes   Walking Aids:  Crutches: [] Yes   Cane: [] Yes   Walker: [x] Yes   IV Assistance When Walking: [] Yes   Toileting Schedule: Every 2 Hours  Bedpan:   [] Yes   Assist to Commode: [x] Yes   Assist to Bathroom: [] Yes   Bed Alarm On: [] Yes   Assistance Out of Bed:  Bedrest: [] Yes   1 Person: [x] Yes   2 People: [] Yes

## 2023-01-26 NOTE — PROGRESS NOTES
HOSPITALISTS PROGRESS NOTE    1/26/2023 3:59 PM        Name: Pete Fuchs Admitted: 1/19/2023  Primary Care Provider: Tawanda Kearns MD (Tel: [phone])      Brief Course: 71 y.o. female  with PMHx of bladder cancer, hyperlipidemia, depression, recent E. coli UTI and moderately displaced distal radius fracture and tibial plateau fracture who was in ARU for rehab after fall on 1/4/2023 resulting in right wrist and right knee injury. Patient was initially evaluated by [facility] orthopedic and recommended nonoperative treatment. Patient was evaluated by  on 1/17/23 in t ARU and admitted for ORIF right distal radius and right tibial plateau. .    Interval history:   Patient continues to complain of sternal pain with movement, palpation. Now having knee pain as well due having to bear more weight on it. Assessment & Plan:     Right wrist moderately displaced distal radius fracture. Non weight bearing but ok for hemiplatform walker. Right  tibial plateau fracture-Orthopedic surgery on board,S/P ORIF right distal radius and right knee tibial plateau. Awaiting ARU precet. Toe touch weight bearing  Chest wall pain-not cardiac. Add lidocaine. Restart toradol. 4.   Hx of recent Ecoli UTI: Completed course of Cipro         DVT prophylaxis: Refusing Lovenox.   Switch to aspirin twice daily  Code:Full Code    Disposition: ARU awaiting pre-CERT    Current Medications  ketorolac (TORADOL) injection 15 mg, Q6H  lidocaine 4 % external patch 2 patch, Daily  oxyCODONE (ROXICODONE) immediate release tablet 5 mg, Q6H PRN  polyethylene glycol (GLYCOLAX) packet 17 g, Daily  cyclobenzaprine (FLEXERIL) tablet 10 mg, TID  aspirin EC tablet 81 mg, BID  Armodafinil TABS 250 mg- PATIENT SUPPLIED, Daily  traZODone (DESYREL) tablet 100 mg, Nightly  ferrous sulfate (IRON 325) tablet 325 mg, Daily with breakfast  escitalopram (LEXAPRO) tablet 10 mg, Daily  Vitamin D (CHOLECALCIFEROL) tablet 2,000 Units, QPM  calcium elemental (OSCAL) tablet 1,000 mg, BID  buPROPion (WELLBUTRIN XL) extended release tablet 300 mg, QAM  atorvastatin (LIPITOR) tablet 40 mg, Nightly  sodium chloride flush 0.9 % injection 5-40 mL, 2 times per day  sodium chloride flush 0.9 % injection 5-40 mL, PRN  0.9 % sodium chloride infusion, PRN  acetaminophen (TYLENOL) tablet 1,000 mg, TID  sennosides-docusate sodium (SENOKOT-S) 8.6-50 MG tablet 1 tablet, BID  ondansetron (ZOFRAN-ODT) disintegrating tablet 4 mg, Q8H PRN   Or  ondansetron (ZOFRAN) injection 4 mg, Q6H PRN  diphenhydrAMINE (BENADRYL) tablet 25 mg, Q6H PRN   Or  diphenhydrAMINE (BENADRYL) injection 25 mg, Q6H PRN      Objective:  BP (!) 117/57   Pulse 72   Temp 97.6 °F (36.4 °C) (Oral)   Resp 16   Ht 5' 5\" (1.651 m)   Wt 159 lb (72.1 kg)   SpO2 97%   BMI 26.46 kg/m²     Intake/Output Summary (Last 24 hours) at 1/26/2023 1559  Last data filed at 1/26/2023 8461  Gross per 24 hour   Intake 240 ml   Output 700 ml   Net -460 ml        Wt Readings from Last 3 Encounters:   01/19/23 159 lb (72.1 kg)   01/11/23 159 lb 4.8 oz (72.3 kg)   08/01/21 146 lb 2.6 oz (66.3 kg)       Physical Examination:   General appearance:  No apparent distress, appears stated age and cooperative. HEENT: Normocephalic, sclera clear., PERRLA. Trachea midline, no adenopathy. Cardiovascular: Regular rate and rhythm, normal S1, S2. No murmur. Respiratory:Clear to auscultation bilaterally, no wheeze or crackles. GI: Abdomen soft, no tenderness, not distended, normal bowel sounds  Musculoskeletal: No cyanosis in digits. No BLE edema present  Neurology: CN 2-12 grossly intact. No speech or motor deficits  Psych: Not agitated, appropriate affect  Skin: Warm, dry, normal turgor    Labs and Tests:  CBC:   No results for input(s): WBC, HGB, PLT in the last 72 hours.     BMP:    Recent Labs     01/24/23  0445      K 4.3      CO2 29   BUN 11 CREATININE 0.5*   GLUCOSE 114*       Hepatic: No results for input(s): AST, ALT, ALB, BILITOT, ALKPHOS in the last 72 hours. No orders to display       Problem List  Principal Problem:    S/P ORIF (open reduction internal fixation) fracture  Active Problems:    Fracture  Resolved Problems:    * No resolved hospital problems.  Siomara Hilario PA-C   1/26/2023 3:59 PM

## 2023-01-26 NOTE — PROGRESS NOTES
Syliva Cool  1/26/2023  [id number]    Chief Complaint: S/P ORIF (open reduction internal fixation) fracture    Subjective:   No overnight events. No current complaints. Frustrated by the delay by her insurance. ROS: No CP, SOB, dyspnea    Objective:  Patient Vitals for the past 24 hrs:   BP Temp Temp src Pulse Resp SpO2   01/26/23 0518 106/60 98.2 °F (36.8 °C) Oral 66 16 96 %   01/26/23 0026 -- -- -- 66 16 95 %   01/25/23 2114 -- -- -- -- 16 --   01/25/23 2100 112/62 98.2 °F (36.8 °C) Oral 80 16 95 %   01/25/23 1430 115/63 97.8 °F (36.6 °C) Oral 76 18 --       Gen: No distress, pleasant. Resting in bedside chair  HEENT: Normocephalic, atraumatic. CV: No audible murmurs, well perfused extremities. Resp: No respiratory distress. No increased WOB  Abd: Soft, nontender nondistended  Ext: No edema. RUE in postoperative dressing, RLE in postoperative dressing  Neuro: Alert, oriented, appropriately interactive. Laboratory data: Available via EMR. Therapy progress:    PT    Rolling: Level of difficulty - A Little   Sit to Stand from a Chair: Level of difficulty - A Lot  Supine to Sit: Level of difficulty - A Little     Bed to Chair: Physical Assistance Required - A Little  Ambulate Across Room: Physical Assistance Required - A Little  Ascend 3-5 Steps With HR: Physical Assistance Required - A Little    OT    Eating: Physical Assistance Required - None  Grooming: Physical Assistance Required - A Little  LB Dressing: Physical Assistance Required - Total  UB Dressing: Physical Assistance Required - A Little  Bathing: Physical Assistance Required - A Lot  Toileting: Physical Assistance Required - A Lot    SLP         Body mass index is 26.46 kg/m².     Assessment:  Patient Active Problem List   Diagnosis    Clot retention of urine    Multiple fractures    Closed fracture of right distal radius    Closed fracture of right tibial plateau    S/P ORIF (open reduction internal fixation) fracture    Fracture Plan:   Multiple fractures: s/p ORIF of R radius and tibia. Nonweightbearing RUE/RLE. Pain control. PT/OT. Ortho following. Ecoli UTI: Started Cipro, Cx sensitive  HLD: Lipitor 40  Depression: Lexapro 10 HS, Wellbutrin 300  Hx bladder cancer: Flomax    Dispo: Patient is an appropriate ARU candidate. She was doing well in ARU prior to surgically required transfer. Pre-CERT appeal remains pending. Optimistic about approval.  Orders placed in anticipation. Liaison to demand an answer today. We will continue to follow. Pat Hannah MD 1/26/2023, 10:55 AM    * This document was created using dictation software. While all precautions were taken to ensure accuracy, errors may have occurred. Please disregard any typographical errors.

## 2023-01-26 NOTE — PLAN OF CARE
Problem: Safety - Adult  Goal: Free from fall injury  Outcome: Progressing     Problem: Pain  Goal: Verbalizes/displays adequate comfort level or baseline comfort level  Outcome: Progressing     Problem: Discharge Planning  Goal: Discharge to home or other facility with appropriate resources  Outcome: Progressing     Problem: Skin/Tissue Integrity  Goal: Absence of new skin breakdown  Description: 1. Monitor for areas of redness and/or skin breakdown  2. Assess vascular access sites hourly  3. Every 4-6 hours minimum:  Change oxygen saturation probe site  4. Every 4-6 hours:  If on nasal continuous positive airway pressure, respiratory therapy assess nares and determine need for appliance change or resting period.   Outcome: Progressing     Problem: ABCDS Injury Assessment  Goal: Absence of physical injury  Outcome: Progressing

## 2023-01-26 NOTE — PROGRESS NOTES
[id number] [facility] Orthopedic Surgery   Progress Note    CHIEF COMPLAINT/DIAGNOSIS: S/p right tibial plateau ORIF     SUBJECTIVE: The patient is sitting up in the bed. She reports pain in knee and wrist are well-controlled. Also pulled a muscle left chest wall which is improving. Denies new issues. Open to returning to ARU. Awaiting pre-CERT. OBJECTIVE  Physical    VITALS:  /60   Pulse 66   Temp 98.2 °F (36.8 °C) (Oral)   Resp 16   Ht 5' 5\" (1.651 m)   Wt 159 lb (72.1 kg)   SpO2 96%   BMI 26.46 kg/m²     GENERAL: Alert and oriented x3, in no acute distress. MUSCULOSKELETAL: Intact DF/PF in operative leg. INCISION:  Covered with post-op dressing; clean, dry and intact. Swelling as expected; compartments remain easily compressible and reasonably supple. KI removed and skin was evaluated. There is no evidence of skin breakdown. Monitor dressing placed on Friday remains intact and clean dry. ROM: To operative knee deferred. Sensory:  Intact to light touch in tibial and peroneal distributions. Vascular:   2+ DP pulse with brisk cap refill. The right short arm volar splint is well-fitted - no issues. Swelling as expected in all 5 digits of the right hand. Able to wiggle all digits of the right hand. Brisk cap refill. Splint now well fitted. Able to flex and extend elbow. Data    ALL MEDICATIONS HAVE BEEN REVIEWED    CBC:   No results for input(s): WBC, HGB, HCT, PLT in the last 72 hours. BMP:   Recent Labs     01/24/23  0445      K 4.3      CO2 29   BUN 11   CREATININE 0.5*     INR: No results for input(s): INR in the last 72 hours. ASSESSMENT:  S/p ORIF right tibial plateau and right distal radius ORIF (1/19/23), POD#7  HLD  Hx bladder ca  Expected acute blood loss anemia    PLAN:     - WB status:  TTWB weight bearing through operative leg; No ROM;   NO WB through right wrist, but ok for hemiplatform walker.   Reviewed post op precautions.  - DVT prophylaxis: ASA 81mg BID x 30 days  - PT/OT  - Pain Control:  Due to orthopaedic surgical procedure/condition, patient may require pain medication for up to 6-8 weeks. - ID:  Ancef x 2 post-op  - Dispo: per ARU; ok to d/c from our end once medically stable and dispo needs met. Follow-up with Dr. Tony Arreaga in 2 weeks. Office # [phone]  No future appointments.     BESSY Shields - CNP  1/26/2023  10:57 AM

## 2023-01-26 NOTE — PROGRESS NOTES
[facility]   Phone: [phone]    Physical Therapy    [] Initial Evaluation            [x] Daily Treatment Note         [] Discharge Summary      Patient: Concepcion Cruz   : 1950   MRN: [id number]   Date of Service:  2023  Admitting Diagnosis: S/P ORIF (open reduction internal fixation) fracture  Current Admission Summary: 71-year-old female with a history of hyperlipidemia and bladder cancer who was admitted to [facility] on  with right arm and leg pain after a fall from a ladder. She reports she was on the last step and thought she was on the ground and fell 1 step to the ground. Work-up revealed a right radius fracture and a right tibial fracture. CT of the knee revealed an intra-articular tibial fracture involving the spine. Ortho evaluated and suggested nonweightbearing nonoperative management with a knee immobilizer on the right lower extremity. Her hospital course was complicated by urinary retention requiring catheterization. Discharged to ARU. S/p ORIF right tibial plateau and right distal radius ORIF (23)    Past Medical History:  has a past medical history of Arthritis, Cancer ([facility] [address] and Hyperlipidemia. Past Surgical History:  has a past surgical history that includes Abdomen surgery; Colonoscopy; fracture surgery; Hysterectomy; Tonsillectomy; Breast reduction surgery (); eye surgery (); Cystoscopy (N/A, 2021); Wrist fracture surgery (Right, 2023); and Leg Surgery (Right, 2023). Discharge Recommendations: Concepcion Cruz scored a 15/24 on the AM-PAC short mobility form. Current research shows that an AM-PAC score of 17 or less is typically not associated with a discharge to the patient's home setting.  Based on the patient's AM-PAC score and their current functional mobility deficits, it is recommended that the patient have 5-7 sessions per week of Physical Therapy at d/c to increase the patient's independence. At this time, this patient demonstrates complex nursing, medical, and rehabilitative needs, and would benefit from intensive rehabilitation services upon discharge from the Inpatient setting. This patient demonstrates the ability to participate in and benefit from an intensive therapy program with a coordinated interdisciplinary team approach to foster frequent, structured, and documented communication among disciplines, who will work together to establish, prioritize, and achieve treatment goals. Please see assessment section for further patient specific details. If patient discharges prior to next session this note will serve as a discharge summary. Please see below for the latest assessment towards goals. DME Required For Discharge: DME to be determined at next level of care    Precautions/Restrictions: high fall risk, weight bearing  Weight Bearing Restrictions:   NWBing R wrist - ok for platform walker  TTWB RLE  Required Braces/Orthotics: knee immobilizer - when OOB   [x] Right  [] Left  Positional Restrictions:no positional restrictions    Pre-Admission Information   Lives With: spouse                  [facility]: [facility]  Home Layout: one level  Home Access:  1 + 1 NORA with (R) grab bar  Bathroom Layout: walk in shower  Bathroom Equipment:  owns grab bars but not installed  Toilet Height: standard height  Home Equipment: no prior equipment  Transfer Assistance: Independent without use of device  Ambulation Assistance:Independent without use of device  ADL Assistance: independent with all ADL's  IADL Assistance: requires assistance with all homemaking tasks  Active :        [x] Yes                 [] No  Hand Dominance: [] Left                 [x] Right  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        Subjective  General: Pt sitting up in recliner upon arrival. Agreeable to PT session. Patient states that she is having increased pain in (L) knee this date as well as continued chest discomfort from prior attempt to transfer to toilet on Monday. Patient requests to discuss rehab POC and continue to discuss plan for ascending/descending stairs once returning home. Pain: 8/10. Location: (L) knee, (R) knee and chest  Pain Interventions: repositioned      Functional Mobility  Bed Mobility  Bed mobility not completed on this date. Comments:     Transfers  Sit to stand transfer: minimal assistance  Stand to sit transfer: contact guard assistance  Comments: All transfers completed with a platform walker x 3 reps  Ambulation Trial   Ambulation not tested on this date secondary to patient is limited secondary to pain. Distance:   Gait Mechanics:   Comments:     Stair Mobility  Stair mobility not completed on this date. Comments:  Wheelchair Mobility:  No w/c mobility completed on this date. Comments:  Balance  Static Sitting Balance: fair (+): maintains balance at SBA/supervision without use of UE support  Dynamic Sitting Balance: fair (+): maintains balance at SBA/supervision without use of UE support  Static Standing Balance: fair (-): maintains balance at CGA with use of UE support  Dynamic Standing Balance: poor (+): requires min (A) to maintain balance  Comments:     Other Therapeutic Interventions    Heel raises on (LLE) x 10 reps x 2 sets, RLE hip abduction, flexion, and extension x 10 reps x 2 sets with CGA to maintain balance during task completion. 1 brief episode of min (A) with LOB during transitional movement when readjusting at walker. Patient spends increased time discussing home environment and various techniques for ascending/descending stairs within the home environment. Patient is also educated on therapy progression and wheelchair mobility in the short term while continuing WB precautions.      Functional Outcomes Cognition  WFL  Orientation:    alert and oriented x 4  Command Following:   [facility]    Education  Barriers To Learning: none  Patient Education: patient educated on PT role and benefits, general safety, functional mobility training, proper use of assistive device/equipment, transfer training, discharge recommendations  Learning Assessment:  patient verbalizes and demonstrates understanding    Assessment  Activity Tolerance: Pt with increased pain in (L) LE this date, states that this is new from prior sessions  Impairments Requiring Therapeutic Intervention: decreased functional mobility, decreased ADL status, decreased ROM, decreased strength, decreased endurance, decreased balance, increased pain  Prognosis: good  Clinical Assessment: Pt tolerates therapy session fair this date, presents limited due to pain hindering activity progression. Therapeutic intervention this date focuses primarily on patient education of plan of care, potential techniques to improve independence within the home setting. At this time patient will continue to benefit from aggressive skilled PT to maximize independence upon discharge.    Safety Interventions: patient left in bed, bed alarm in place, call light within reach, gait belt, and nurse notified    Plan  Frequency: 7 x/week  Current Treatment Recommendations: strengthening, ROM, balance training, functional mobility training, transfer training, gait training, endurance training, patient/caregiver education, home exercise program, and safety education    Goals  Patient Goals: to improve mobility  Short Term Goals:  Time Frame: discharge  Patient will complete bed mobility at minimal assistance- met 1/22/23   Patient will complete transfers at moderate assistance- met 1/22/23   Patient will ambulate 10 ft with use of platform walker (R) at minimal assistance- met 1/24/23  New goal 1/22 - Patient will complete bed mobility at stand by assistance  New goal 1/22 - Patient will complete transfers with contact guard assistance  New goal 1/23- Patient will ambulate 25 ft with use of platform walker (R) at minimal assistance    *No goals met in full 1/26    Therapy Session Time     Second Session Therapy Time:   Individual Concurrent Group Co-treatment   Time In 0937         Time Out 1018         Minutes 41           Timed Code Treatment Minutes:  41 minutes    Total Treatment Minutes:  41 minutes        Electronically Signed By: Terrence Barnes 22, DPT [id number]

## 2023-01-26 NOTE — PROGRESS NOTES
[address] [facility],6Th Floor Msb Department   Phone: [phone]    Occupational Therapy    [] Initial Evaluation            [x] Daily Treatment Note         [] Discharge Summary      Patient: Rowdy Vaughn   : 1950   MRN: [id number]   Date of Service:  2023    Admitting Diagnosis:  s/p ORIF right tibial plateau and right distal radius   Current Admission Summary:      S/p ORIF right tibial plateau and right distal radius ORIF (23)    Past Medical History:  has a past medical history of Arthritis, Cancer ([facility] [address] and Hyperlipidemia. Past Surgical History:  has a past surgical history that includes Abdomen surgery; Colonoscopy; fracture surgery; Hysterectomy; Tonsillectomy; Breast reduction surgery (); eye surgery (); Cystoscopy (N/A, 2021); Wrist fracture surgery (Right, 2023); and Leg Surgery (Right, 2023). Discharge Recommendations: Rowdy Vaughn scored a 15/24 on the AM-PAC ADL Inpatient form. Current research shows that an AM-PAC score of 17 or less is typically not associated with a discharge to the patient's home setting. Based on the patient's AM-PAC score and their current ADL deficits, it is recommended that the patient have 5-7 sessions per week of Occupational Therapy at d/c to increase the patient's independence. At this time, this patient demonstrates complex nursing, medical, and rehabilitative needs, and would benefit from intensive rehabilitation services upon discharge from the Inpatient setting. This patient demonstrates the ability to participate in and benefit from an intensive therapy program with a coordinated interdisciplinary team approach to foster frequent, structured, and documented communication among disciplines, who will work together to establish, prioritize, and achieve treatment goals. Please see assessment section for further patient specific details.     If patient discharges prior to next session this note will serve as a discharge summary. Please see below for the latest assessment towards goals. DME Required For Discharge: DME to be determined at next level of care, DME to be determined pending patient progress    Precautions/Restrictions: high fall risk, weight bearing, surgical protocols  Weight Bearing Restrictions: toe touch weight bearing right LE, NWB  right UE-- allowed to use platform walker        Required Braces/Orthotics: knee immobilizer   [x] Right  [] Left  Positional Restrictions: no R LE ROM      Pre-Admission Information   Lives With: spouse                  Type of Home: condo  Home Layout: one level  Home Access:  1 + 1 NORA with (R) grab bar  Bathroom Layout: walk in shower  Bathroom Equipment:  owns grab bars but not installed  Toilet Height: standard height  Home Equipment: no prior equipment  Transfer Assistance: Independent without use of device  Ambulation Assistance:Independent without use of device  ADL Assistance: independent with all ADL's  IADL Assistance: requires assistance with all homemaking tasks  Active :        [x] Yes                 [] No  Hand Dominance: [] Left                 [x] Right  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 Reporting continued pain across chest/sternum from bearing down during BM a few days ago. Reports Biofreeze and heating pad have given some relief. Pain: 5/10.   Location: chest/sternum    Pain Interventions: pain medication in place prior to arrival, biofreeze applied, and repositioned        Activities of Daily Living  Basic Activities of Daily Living  Feeding: setup assistance  Grooming: setup assistance minimal assistance dependent  Grooming Comments: Dep lotion to B feet, Lissett deodorant to L UE, Setup hair comb  Upper Extremity Bathing: moderate assistance  Lower Extremity Bathing: maximum assistance   Bathing Comments: sponge bathing sitting EOB  Upper Extremity Dressing: minimal assistance  Lower Extremity Dressing: dependent  Dressing Comments: Gown change, pt without brief; DEP doff/mary tabbed brief  Toileting: moderate assistance. Toileting Comments: pt voided large, soft BM, Setup seated front ryan-care, DEP rear pericare in stance w/ platform walker, DEP clothing mgmt  Comment: Biofreeze applied to sternum for pain relief    Instrumental Activities of Daily Living  No IADL completed on this date. Functional Mobility  Bed Mobility  Bed mobility not completed on this date. Comments: on BSC at [facility]/room chair at [address] [place] [address] to stand transfer:minimal assistance  Stand to sit transfer: minimal assistance  Bed / Chair transfer: minimal assistance. Toilet transfer: minimal assistance  Toilet transfer equipment: bariatric bedside commode, walker  Comments: also requires assist to strap R UE in platform walker    Functional Mobility:  Sitting Balance: supervision. Sitting Balance Comment: BSC/room chair  Standing Balance: contact guard assistance. Standing Balance Comment: with platform walker    Functional Mobility: .  minimal assistance  Functional Mobility Activity: ~20' in room  Functional Mobility Device Use: platform walker (R)  Functional Mobility Comment: step-hop pattern, 3 standing rest breaks required secondary to fatigue and increased sternum pain with ambulation. Second Session  Pt requesting to return to bed following sitting in room chair. Pt sit>stand with ModA from room chair, ambulated ~2 feet with CGA and platform walker to EOB. Pt stand>sit with Lissett. Pt c/o increased pain in L LE (Knee) with ambulation. Biofreeze applied, pt satisfied. Pt sit>supine with SBA, scoot to [facility] with Lissett. Pt DEP for Purewick per pts request, declining use of BSC at this time.    Pt left with bed alarm on, call light within reach, all needs met and RN aware. [id number]    Functional Outcomes  AM-PAC Inpatient Daily Activity Raw Score: 15    Cognition  WFL  Orientation:    alert and oriented x 4  Command Following:   [facility]     Education  Barriers To Learning: none  Patient Education: patient educated on goals, OT role and benefits, weight-bearing education, discharge recommendations  Learning Assessment:  patient verbalizes and demonstrates understanding    Assessment  Activity Tolerance: tolerated well   Impairments Requiring Therapeutic Intervention: decreased functional mobility, decreased ADL status, decreased ROM, decreased endurance, decreased balance, decreased coordination, increased pain  Prognosis: good  Clinical Assessment: pt typically independent, not currently at baseline level of functioning, pt would benefit from ongoing skilled OT services in order to return to OF. Recommend patient return to ARU unit. Safety Interventions: patient left in chair, chair alarm in place, call light within reach, and patient at risk for falls       Plan  Frequency: 7 x/week  Current Treatment Recommendations: strengthening, ROM, balance training, functional mobility training, transfer training, patient/caregiver education, pain management, and positioning    Goals  Patient Goals: Go to ARU    Short Term Goals:  Time Frame: until discharge  Patient will complete lower body ADL at moderate assistance   Patient will complete toileting at moderate assistance   Patient will complete functional transfers at moderate assistance- goal met 1/23. Upgraded: functional transfers at [facility]   Patient will increase [facility] ADL score = to or > than 14/24- goal met 1/23.  Upgraded: [facility] 18/24     NEW GOAL: UB ADLs Adam  NEW GOAL: functional mobility with platform walker SBA      No goals met, CONTINUE ALL GOALS 1/26  Therapy Session Time     Individual Group Co-treatment   Time In 4721 (4534)     Time Out 5500 (5466) Minutes 54 (14)        Timed Code Treatment Minutes: 54  minutes + 14 Minutes    Total Treatment Minutes: 68 minutes      Electronically Signed By: CLINT Swain/[id number]

## 2023-01-26 NOTE — PROGRESS NOTES
ARU Admission Assessment    Ethnicity  \"Are you of , /a, or Trinidadian origin? \"  Check all that apply:  [x] A. No, not of , /a, or [place] Origin  [] B.  Yes, [place], [place] American, Chicano/a  [] C.  Yes, [address] [place] [address]  [] D.  Yes, [place]  [] E.  Yes, another , , or Trinidadian origin  [] X. Patient unable to respond  [] Y. Patient declines to respond    Race  \"What is your race? \"  Check all that apply:  [x] A. White  [] B. Black or   [] C. American [place] or [place] Native  [] D.  [place]  [] E. [place]  [] F. Chilean  [] G. [place]  [] Hayden Boards  [] I. [place]  [] J.  Other   [] K.   [] L. Stateless or Jade  [] M. [place]otho  [] N. Other [place]  [] X. Patient unable to respond  [] Y. Patient declines to respond  [] Z. None of the above    Language  A. \"What is your preferred language? \"   English    B. \"Do you need or want an  to communicate with a doctor or health care staff? \"  Check only one:  [x] 0. No  [] 1. Yes  [] 9. Unable to determine    Transportation  \"Has lack of transportation kept you from medical appointments, meetings, work, or from getting things needed for daily living? \"Check all that apply:  [] A.  Yes, it has kept me from medical appointments or from getting my medications  [] B.  Yes, it has kept me from non-medical meetings, appointments, work, or from getting things that I need  [x] C.  No  [] X. Patient unable to respond  [] Y. Patient declines to respond    Hearing  Ability to hear (with hearing aid or hearing appliances if normally used)  []  0. Adequate - no difficulty in normal conversation, social interaction, listening to TV  [x]  1. Minimal difficulty - difficulty in some environments (e.g. when person speaks softly or setting is noisy)  []  2. Moderate difficulty - speaker has to increase volume and speak distinctly   []  3.   Highly impaired - absence of useful hearing    Vision  Ability to see in adequate light (with glasses or other visual appliances)  [x]  0. Adequate - sees fine detail, such as regular print in newspapers/books  []  1. Impaired - sees large print, but not regular print in newspapers/books  []  2. Moderately impaired - limited vision; not able to see newspaper headlines but can identify objects  []  3. Highly impaired - object identification in question, but eyes appear to follow objects  []  4. Severely impaired - no vision or sees only light, colors, or shapes; eyes do not appear to follow objects    Health Literacy  \"How often do you need to have someone help you when you read instructions, pamphlets, or other written material from your doctor or pharmacy? \"  [x]  0. Never  []  1. Rarely  []  2. Sometimes  []  3. Often  []  4. Always  []  8. Patient unable to respond    BIMS - **Must be completed in the flowsheet at admission prior to proceeding with Delirium Assessment**  [] BIMS completed in flowsheet at admission    Signs and Symptoms of Delirium  A. Acute Onset Mental Status Change - Is there evidence of an acute change in mental status from the patient's baseline? [x] 0. No  [] 1. Yes    B. Inattention - Did the patient have difficulty focusing attention, for example being easily distractible or having difficulty keeping track of what was being said? [x]  0. Behavior not present  []  1. Behavior continuously present, does not fluctuate  []  2. Behavior present, fluctuates (comes and goes, changes in severity)    C. Disorganized thinking - Was the patient's thinking disorganized or incoherent (rambling or irrelevant conversation, unclear or illogical flow of ideas, or unpredictable switching from subject to subject)? [x]  0. Behavior not present  []  1. Behavior continuously present, does not fluctuate  []  2. Behavior present, fluctuates (comes and goes, changes in severity)    D.   Altered level of consciousness - Did the patient have altered level of consciousness as indicated by any of the following criteria? Vigilant - startled easily to any sound or touch  Lethargic - repeatedly dozed off while being asked questions, but responded to voice or touch  Stuporous - very difficulty to arouse and keep aroused for the interview  Comatose - could not be aroused  [x]  0. Behavior not present  []  1. Behavior continuously present, does not fluctuate  []  2. Behavior present, fluctuates (comes and goes, changes in severity)    Mood    \"Over the last 2 weeks, have you been bothered by any of the following problems?\" 1. Symptom Presence    0 = No  1 = Yes  9 = No Response 2. Symptom Frequency    0 = Never or 1 day  1 = 2-6 days (several days)  2 = 7-11 days (half or more of the days)  3 = 12-14 days (nearly every day)  **Leave blank if 'No Reponse'**      Enter scores in boxes    Column 1 Column 2   Little interest or pleasure in doing things   0 0   Feeling down, depressed, or hopeless   0 0   **If either A or B in column 2 is coded 2 or 3, CONTINUE asking the questions below. If not, END the interview. **     Trouble falling or staying asleep, or sleeping too much       Feeling tired or having little energy       Poor appetite or overeating       Feeling bad about yourself - or that you are a failure or have let yourself or your family down       Trouble concentrating on things, such as reading the newspaper or watching television       Moving or speaking so slowly that other people could have noticed. Or the opposite- being so fidgety or restless that you have been moving around a lot more than usual.       Thoughts that you would be better off dead, or of hurting yourself in some way. Total Severity: Add scores for all frequency responses in column 2 (possible score 0-27, or enter 99 if unable to complete (if symptom frequency (column 2) is blank for 3 or more items).    0     Social Isolation  \"How often do you feel lonely or isolated from those around you? \"  [x] 0. Never  [] 1. Rarely  [] 2. Sometimes  [] 3. Often  [] 4. Always  [] 7. Patient declines to respond  [] 8. Patient unable to respond    Pain Effect on Sleep  \"Over the past 5 days, how much of the time has pain made it hard for you to sleep at night? \"  []  0. Does not apply - I have not had any pain or hurting in the past 5 days  []  1. Rarely or not at all  [x]  2. Occasionally  []  3. Frequently  []  4. Almost constantly  []  8. Unable to answer    **If the patient answers \"0. Does not apply\" to this question, skip the next two \"Pain Effect. Melissa Gip Melissa Gip \" questions**    Pain Interference with Therapy Activities  \"Over the past 5 days, how often have you limited your participation in rehabilitation therapy sessions due to pain? \"  []  0. Does not apply - I have not received rehabilitation therapy in the past 5 days  []  1. Rarely or not at all  [x]  2. Occasionally  []  3. Frequently  []  4. Almost constantly  []  8. Unable to answer    Pain Interference with Day-to-Day Activities: \"Over the past 5 days, how often have you limited your day-to-day activities (excluding rehabilitation therapy session)? \"  []  1. Rarely or not at all  [x]  2. Occasionally  []  3. Frequently  []  4. Almost constantly  []  8. Unable to answer    Nutritional Approaches  Check all of the following nutritional approaches that apply on admission:  []  A. Parenteral/IV feeding (including IV fluids if needed for hydration, but not as part of dialysis/chemo)  []  B. Feeding tube (e.g., nasogastric or abdominal (PEG))  []  C. Mechanically altered diet - requires change in texture of food or liquids (e.g., pureed food, thickened liquids)  []  D. Therapeutic diet (e.g., low salt, diabetic, low cholesterol)  [x]  Z.   None of the above    High Risk Drug Classes:  Use and Indication    Is taking: Check if the pt is taking any medications by pharmacological classification, not how it is used, in the following classes  Indication noted: If column 1 is checked, check if there is an indication noted for all meds in the drug class Is taking  (check all that apply) Indication noted (check all that apply)   Antipsychotic [] []   Anticoagulant [x] [x]   Antibiotic [] []   Opioid [x] [x]   Antiplatelet [x] [x]   Hypoglycemic (including insulin) [] []   None of the above []     Special Treatments, Procedures, and Programs    Check all of the following treatments, procedures, and programs that apply on admission. On admission (check all that apply)   Cancer Treatments   A1. Chemotherapy []           A2. IV []           A3. Oral []           A10. Other []   B1. Radiation []   Respiratory Therapies   C1. Oxygen Therapy []           C2. Continuous (continuously for at least 14 hours per day) []           C3. Intermittent []           C4. High-concentration []   D1. Suctioning (Does not include oral suctioning) []           D2. Scheduled []           D3. As needed []   E1. Tracheostomy Care []   F1. Invasive Mechanical Ventilator (ventilator or respirator) []   G1. Non-invasive Mechanical Ventilator []           G2. BiPAP []           G3. CPAP [x]   Other   H1. IV Medications (Do not include sub Q pumps, flushes, Dextrose 50% or lactated ringers) []           H2. Vasoactive medications []           H3. Antibiotics []           H4. Anticoagulation []           H10. Other []   I1. Transfusions []   J1. Dialysis []           J2. Hemodialysis []           J3. Peritoneal dialysis []   O1. IV access (including a catheter in a vein) []           O2. Peripheral [x]           O3. Midline []           O4. Central (PICC, tunneled, port) []      None of the above (select if no Cancer, Respiratory, or Other boxes are checked) []     The above items have been reviewed and updated as necessary, and are accurate for the admission assessment period.     Assessing/Reviewing RN: Ignacio DAVIS, RN 1/26/2023 at [address] [place] [address] Narcisa    Assessing/Reviewing RN:

## 2023-01-26 NOTE — PROGRESS NOTES
4 Eyes Skin Assessment     NAME:  Brayan Kimball  YOB: 1950  MEDICAL RECORD NUMBER:  [id number]    The patient is being assessed for  Admission    I agree that One RN have performed a thorough Head to Toe Skin Assessment on the patient. ALL assessment sites listed below have been assessed. Areas assessed by both nurses:    Head, Face, Ears, Shoulders, Back, Chest, Arms, Elbows, Hands, Sacrum. Buttock, Coccyx, Ischium, and Legs. Feet and Heels        Does the Patient have a Wound?  No noted wound(s)  Pt has surgical on splint on R arm       Shantanu Prevention initiated by RN: Yes   Wound Care Orders initiated by RN: No    Pressure Injury (Stage 3,4, Unstageable, DTI, NWPT, and Complex wounds) if present place referral order by RN under : No    New and Established Ostomies, if present place, referral order under : No      Nurse 1 eSignature: Electronically signed by Ashish Lawton RN on 1/26/23 at 7:37 PM EST    **SHARE this note so that the co-signing nurse is able to place an eSignature**    Nurse 2 eSignature: Electronically signed by Daniel Sales RN on 1/26/23 at 7:38 PM EST

## 2023-01-27 PROCEDURE — 97530 THERAPEUTIC ACTIVITIES: CPT

## 2023-01-27 PROCEDURE — 6360000002 HC RX W HCPCS: Performed by: PHYSICAL MEDICINE & REHABILITATION

## 2023-01-27 PROCEDURE — 97166 OT EVAL MOD COMPLEX 45 MIN: CPT

## 2023-01-27 PROCEDURE — 94761 N-INVAS EAR/PLS OXIMETRY MLT: CPT

## 2023-01-27 PROCEDURE — 2580000003 HC RX 258: Performed by: PHYSICAL MEDICINE & REHABILITATION

## 2023-01-27 PROCEDURE — 97116 GAIT TRAINING THERAPY: CPT

## 2023-01-27 PROCEDURE — 6370000000 HC RX 637 (ALT 250 FOR IP): Performed by: PHYSICAL MEDICINE & REHABILITATION

## 2023-01-27 PROCEDURE — 97162 PT EVAL MOD COMPLEX 30 MIN: CPT

## 2023-01-27 PROCEDURE — 97535 SELF CARE MNGMENT TRAINING: CPT

## 2023-01-27 PROCEDURE — 97110 THERAPEUTIC EXERCISES: CPT

## 2023-01-27 PROCEDURE — 1280000000 HC REHAB R&B

## 2023-01-27 RX ORDER — LIDOCAINE 4 G/G
2 PATCH TOPICAL DAILY
Status: DISCONTINUED | OUTPATIENT
Start: 2023-01-27 | End: 2023-02-07 | Stop reason: HOSPADM

## 2023-01-27 RX ORDER — OXYCODONE HYDROCHLORIDE 5 MG/1
10 TABLET ORAL EVERY 4 HOURS PRN
Status: DISCONTINUED | OUTPATIENT
Start: 2023-01-27 | End: 2023-02-07 | Stop reason: HOSPADM

## 2023-01-27 RX ORDER — METHOCARBAMOL 750 MG/1
750 TABLET, FILM COATED ORAL 4 TIMES DAILY
Status: DISCONTINUED | OUTPATIENT
Start: 2023-01-27 | End: 2023-02-07 | Stop reason: HOSPADM

## 2023-01-27 RX ORDER — LIDOCAINE 4 G/G
1 PATCH TOPICAL DAILY
Status: DISCONTINUED | OUTPATIENT
Start: 2023-01-27 | End: 2023-01-27

## 2023-01-27 RX ORDER — OXYCODONE HYDROCHLORIDE 5 MG/1
5 TABLET ORAL EVERY 4 HOURS PRN
Status: DISCONTINUED | OUTPATIENT
Start: 2023-01-27 | End: 2023-02-07 | Stop reason: HOSPADM

## 2023-01-27 RX ORDER — ASPIRIN 81 MG/1
81 TABLET ORAL ONCE
Status: COMPLETED | OUTPATIENT
Start: 2023-01-27 | End: 2023-01-27

## 2023-01-27 RX ADMIN — OXYCODONE HYDROCHLORIDE 10 MG: 5 TABLET ORAL at 22:30

## 2023-01-27 RX ADMIN — OXYCODONE HYDROCHLORIDE 10 MG: 5 TABLET ORAL at 13:07

## 2023-01-27 RX ADMIN — ASPIRIN 81 MG: 81 TABLET, COATED ORAL at 08:55

## 2023-01-27 RX ADMIN — Medication 1000 MG: at 08:55

## 2023-01-27 RX ADMIN — Medication 1000 MG: at 20:45

## 2023-01-27 RX ADMIN — BUPROPION HYDROCHLORIDE 300 MG: 150 TABLET, EXTENDED RELEASE ORAL at 08:55

## 2023-01-27 RX ADMIN — OXYCODONE HYDROCHLORIDE 10 MG: 5 TABLET ORAL at 08:54

## 2023-01-27 RX ADMIN — METHOCARBAMOL TABLETS 750 MG: 750 TABLET, COATED ORAL at 17:42

## 2023-01-27 RX ADMIN — HEPARIN SODIUM 5000 UNITS: 5000 INJECTION INTRAVENOUS; SUBCUTANEOUS at 15:18

## 2023-01-27 RX ADMIN — Medication 2000 UNITS: at 17:43

## 2023-01-27 RX ADMIN — STANDARDIZED SENNA CONCENTRATE AND DOCUSATE SODIUM 1 TABLET: 8.6; 5 TABLET ORAL at 20:38

## 2023-01-27 RX ADMIN — Medication 10 ML: at 20:50

## 2023-01-27 RX ADMIN — ESCITALOPRAM OXALATE 10 MG: 10 TABLET, FILM COATED ORAL at 08:55

## 2023-01-27 RX ADMIN — Medication 10 ML: at 10:09

## 2023-01-27 RX ADMIN — METHOCARBAMOL TABLETS 750 MG: 750 TABLET, COATED ORAL at 13:07

## 2023-01-27 RX ADMIN — TRAZODONE HYDROCHLORIDE 100 MG: 50 TABLET ORAL at 21:25

## 2023-01-27 RX ADMIN — ACETAMINOPHEN 1000 MG: 500 TABLET ORAL at 08:55

## 2023-01-27 RX ADMIN — HEPARIN SODIUM 5000 UNITS: 5000 INJECTION INTRAVENOUS; SUBCUTANEOUS at 20:57

## 2023-01-27 RX ADMIN — FERROUS SULFATE TAB 325 MG (65 MG ELEMENTAL FE) 325 MG: 325 (65 FE) TAB at 08:55

## 2023-01-27 RX ADMIN — ARMODAFINIL 250 MG: 250 TABLET ORAL at 08:55

## 2023-01-27 RX ADMIN — ACETAMINOPHEN 1000 MG: 500 TABLET ORAL at 20:39

## 2023-01-27 RX ADMIN — METHOCARBAMOL TABLETS 750 MG: 750 TABLET, COATED ORAL at 20:45

## 2023-01-27 RX ADMIN — METHOCARBAMOL TABLETS 750 MG: 750 TABLET, COATED ORAL at 08:54

## 2023-01-27 RX ADMIN — ATORVASTATIN CALCIUM 40 MG: 40 TABLET, FILM COATED ORAL at 20:40

## 2023-01-27 RX ADMIN — ACETAMINOPHEN 1000 MG: 500 TABLET ORAL at 15:18

## 2023-01-27 RX ADMIN — STANDARDIZED SENNA CONCENTRATE AND DOCUSATE SODIUM 1 TABLET: 8.6; 5 TABLET ORAL at 09:57

## 2023-01-27 ASSESSMENT — PAIN DESCRIPTION - LOCATION
LOCATION: CHEST
LOCATION_3: KNEE
LOCATION_2: LEG

## 2023-01-27 ASSESSMENT — PAIN DESCRIPTION - ORIENTATION
ORIENTATION: RIGHT;LEFT
ORIENTATION: RIGHT;LEFT
ORIENTATION: RIGHT;LEFT;MID
ORIENTATION_3: LEFT
ORIENTATION_2: RIGHT

## 2023-01-27 ASSESSMENT — PAIN SCALES - GENERAL
PAINLEVEL_OUTOF10: 5
PAINLEVEL_OUTOF10: 6
PAINLEVEL_OUTOF10: 4
PAINLEVEL_OUTOF10: 7
PAINLEVEL_OUTOF10: 5
PAINLEVEL_OUTOF10: 5

## 2023-01-27 ASSESSMENT — PAIN - FUNCTIONAL ASSESSMENT
PAIN_FUNCTIONAL_ASSESSMENT: PREVENTS OR INTERFERES SOME ACTIVE ACTIVITIES AND ADLS
PAIN_FUNCTIONAL_ASSESSMENT: PREVENTS OR INTERFERES SOME ACTIVE ACTIVITIES AND ADLS
PAIN_FUNCTIONAL_ASSESSMENT_SITE2: PREVENTS OR INTERFERES SOME ACTIVE ACTIVITIES AND ADLS
PAIN_FUNCTIONAL_ASSESSMENT: PREVENTS OR INTERFERES SOME ACTIVE ACTIVITIES AND ADLS

## 2023-01-27 ASSESSMENT — PAIN DESCRIPTION - DESCRIPTORS
DESCRIPTORS_2: ACHING
DESCRIPTORS: ACHING;DISCOMFORT
DESCRIPTORS: ACHING
DESCRIPTORS_3: ACHING

## 2023-01-27 ASSESSMENT — PAIN SCALES - WONG BAKER
WONGBAKER_NUMERICALRESPONSE: 0
WONGBAKER_NUMERICALRESPONSE: 0

## 2023-01-27 ASSESSMENT — PAIN DESCRIPTION - PAIN TYPE: TYPE: SURGICAL PAIN;ACUTE PAIN

## 2023-01-27 ASSESSMENT — PAIN DESCRIPTION - INTENSITY
RATING_3: 7
RATING_2: 5

## 2023-01-27 NOTE — DISCHARGE INSTRUCTIONS
We hope your stay on rehab has exceeded your expectations. Once again the entire Acute Rehab Staff at [facility] wish to thank you for allowing us the privilege to care for you. A few days after you are discharged from Rehab, you will receive a survey [employer]) in the mail. This is a nationally distributed survey sent to thousands of rehab patients throughout the nation. It is very important to the staff and Dr. Ivan Smalls to receive feedback based on your experience on the Rehab Unit. Thank you, we wish you good health always,         Acute Rehab Team      Hospital Preference:     [facility] [address] Diagnosis/Conditions    _______________________ (free text)    Emergency Contact:    ________________________________________Phone#________________________      Advanced Directives:    Code Status: ?  []  Full Code  ? []  DNR  ? []  [facility]  ? []  [facility] - Arrest    (as of date of discharge:  _________)      Medical POA: ?  []   Yes ______________________________ ? []   No                                       (Name and phone number)                     Living Will:   ?   []   Yes    ?  []   No        Insurance Information:    _______________________ (free text)      Individual Responsible  for the coordination of the discharge/follow up:    ______________________________________________________    Functional Status:    VISUAL DEFICITS:    Yes []  No  []       If yes, assisted device:   Wears Glasses Yes []  No  []  Wears Contacts  Yes []  No  []  Legally Blind Yes []  No  []    HEARING DEFICITS:    Yes []  No  []       If yes, assisted device:   Wears Hearing Aids Yes []  No  []  Pocket Talker  Yes []  No  []       Physical Therapist & Contact #:  OccupationalTherapist & Contact #:  Speech Therapist & Contact #:       Activities of Daily Living:     ADL's - Adaptive Equipment used _____________________ (free text)     []  Independent ?   []  Modified Independent ? []  Supervision                []  Minimal Assistance ? []  Moderate Assistance ? []  Maximal Assistance      Driving Restriction:      []  YES   [] NO     Mobility:     Ambulation: Device _____________________ (free text)     []  Independent ? []  Modified Independent ? []  Supervision                []  Minimal Assistance ? []  Moderate Assistance ? []  Maximal Assistance     Stairs:  Device _____________________ (free text)    Number of stairs: _____________ (free text)    Handrails:    [] Right   [] Left      [] Bilateral   []  Independent ? []  Modified Independent ? []  Supervision                []  Minimal Assistance ? []  Moderate Assistance ? []  Maximal Assistance     Wheelchair:     ? []  Independent ? []  Modified Independent ? []  Supervision                 []  Minimal Assistance  ? []  Moderate Assistance ? []  Maximal Assistance       Current Diet Consistency:?       []  Regular   [] Soft and Bite-Sized  ? [] Minced and Moist     ?[]  Puree     Current Liquid Level:?         [] Thin Liquids     [] Mildly Thick (Nectar) ?     [] Moderately Thick (Honey)    [] Pudding Thick    [] Rogue Embs Water Protocol     Dietary Restrictions:?      []  General Diet   []  Tube Feed, w/ Diet   []  Tube Feed, NPO   []  Carb Control   []  Cardiac   []  Renal    []  Other:

## 2023-01-27 NOTE — PLAN OF CARE
[address], [address] [place] [address] Drive  [phone]      Lilian Dao    : 1950  Acct #: [de-identified]  MRN: [id number]  PHYSICIAN:  Vladimir Saucedo MD  Primary Problem    Patient Active Problem List   Diagnosis    Clot retention of urine    Multiple fractures    Closed fracture of right distal radius    Closed fracture of right tibial plateau    S/P ORIF (open reduction internal fixation) fracture    Fracture       Rehabilitation Diagnosis:      Multiple fractures: s/p ORIF of R radius and tibia. Nonweightbearing RUE/RLE. Pain control. PT/OT. - adjust pain meds     Ecoli UTI: Completed Cipro, Cx sensitive     HLD: Lipitor 40     Depression: Lexapro 10 HS, Wellbutrin 300     Hx bladder cancer: Flomax     MSK pain: lidoderm patch to chest     Iron deficiency anemia: fergon      ADMIT DATE:2023    Patient Goals: to improve mobility  Admitting Impairments: Orthopedic Disorders - 8.4 - Major Multiple Fractures  Activities: Impaired Eating, Hygiene, Toileting, Bathing, Dressing, Bed Mobility, Transfers, Ambulation, Stairs, and Endurance. Participation: Prior to admission patient was living at home with spouse, was independent with all mobility and activities, was an active  and working part time.      CARE PLAN     NURSING:  Lilian Dao while on this unit will:  [x] Be continent of bowel and bladder     [x] Have an adequate number of bowel movements  [] Urinate with no urinary retention >300ml in bladder  [] Complete bladder protocol with bonilla removal  [x] Maintain O2 SATs at _90__%  [x] Have pain managed while on ARU       [] Be pain free by discharge   [x] Have no skin breakdown while on ARU  [] Have improved skin integrity via wound measurements  [x] Have no signs/symptoms of infection at the wound site  [] Be free from injury during hospitalization   [] Complete education with patient/family with understanding demonstrated for:    Nursing Interventions will include:  [] bowel/bladder training   [x] education for medical assistive devices   [x] medication education   [] O2 saturation management   [] energy conservation   [] stress management techniques   [x] fall prevention   [x] alarms protocol   [] seating and positioning   [x] skin/wound care   [] pressure relief instruction   [x] dressing changes     [x] infection protection   [] DVT prophylaxis  [x] assistance with in room safety with transfers to bed, toilet, wheelchair, shower   [x] bathroom activities and hygiene    Patient/Caregiver Education for:  [] Disease/sustained injury/management     [x] Medication Use  [] Surgical intervention  [x] Safety  [] Body mechanics and or joint protection  [] Health maintenance       PHYSICAL THERAPY:  Goals:                   Patient Goals: To return home   Short Term Goals:  Time Frame: 10-14 days  Patient will complete transfers at [facility]   Patient will ambulate 50 ft with use of platform walker (R) at supervision  Patient will ascend/descend 1 + 1 stairs with (R) ascending handrail at supervision  Patient will complete car transfer at [facility]  Patient will complete manual w/c propulsion 270 ft at [facility] including ramp management               These goals were reviewed with this patient at the time of assessment and Gera Hansen is in agreement.      Plan of Care: Pt to be seen 5 out of 7 days per week per ARU protocol ( 90 minutes with PT)                     OCCUPATIONAL THERAPY:  Goals:           Time Frame: 14 days  Patient will complete upper body bathing and dressing at [facility]   Patient will complete lower body bathing and dressing at [facility]   Patient will complete toileting at modified independent   Patient will complete functional transfers at [facility]   Patient will complete functional mobility at modified independent   Patient to gather and transport IADL items at modified independent   Patient will independently don/doff KI and/or independently verbalize brace management to caregiver with 100% accuracy     These goals were reviewed with this patient at the time of assessment and Kamila Gustafson is in agreement    Plan of Care:  Pt to be seen 5 out of 7 days per week per ARU protocol ( 90 minutes with OT)    Therapy Treatments will include:  [x]  therapeutic exercises    [x]  gait training     [x]  neuromuscular re-ed                            [x]  transfer training             [x] community reintegration    [x] bed mobility                          [x]  w/c mobility and training  [x]  self care    [x]home mgmt    []  cognitive training            [x]  energy conservation        []  dysphagia tx    []  speech/language/communication therapy   []  group therapy    [x]  patient/family education    [] Other:    CASE MANAGEMENT:  Goals:   Assist patient/family with discharge planning, patient/family counseling, and coordination with insurance during ARU stay. Kamila Gustafson will be seen a minimum of 3 hours of therapy per day, a minimum of 5 out of 7 days per week  (please see above for specific treatment plan per PT/OT/SLP). [] In this rare instance due to the nature of this patient's medical involvement, this patient will be seen 15 hours per week (900 minutes within a 7 day period). In addition, dietician/nutritionist may monitor calorie count as well as intake and collaboratively work with SLP on dietary upgrades. Neuropsychology/Psychology may evaluate and provide necessary support.     Medical issues being managed closely and that require 24 hour availability of a physician:  [] Swallowing Precautions  [x] Bowel/Bladder Fx  [x] Weight bearing precautions  [x] Wound Care    [x] Pain Mgmt   [x] Infection Protection  [x] DVT Prophylaxis   [x] Fall Precautions  [x] Fluid/Electrolyte/Nutrition Balance  [] Voice Protection   [x] Respiratory  [] Other: Medical Prognosis: [] Good  [x] Fair    [] Guarded   Total expected IRF days: 14  Anticipated discharge destination: Home  [] Home Independently   [x] Home with supervision    []SNF     [] Other                                           Physician anticipated functional outcomes:  By discharge, patient will progress to being supv - independent with all mobility and activities. IPOC brief synthesis: 67-year-old female with a history of hyperlipidemia and bladder cancer who was admitted to [facility] on 1/5 with right arm and leg pain after a fall from a ladder. She reports she was on the last step and thought she was on the ground and fell 1 step to the ground. Work-up revealed a right radius fracture and a right tibial fracture. CT of the knee revealed an intra-articular tibial fracture involving the spine. Ortho evaluated and suggested nonweightbearing nonoperative management with a knee immobilizer on the right lower extremity. Her hospital course was complicated by urinary retention requiring catheterization. Her catheter was removed prior to transfer to this facility. She was admitted to our facility on 1/all and requested to transition her orthopedic providers to [address] [place] [address]. Ortho evaluated and suggested surgical intervention on both fractures in both right upper extremity and right lower extremities. She was transferred to surgery on 1/19. Her postoperative course and an overall unremarkable.        I have reviewed this initial plan of care and agree with its contents:    Title   Name    Date    Time    Physician: Electronically signed by Corine Dalton MD on 1/27/2023 at 2:06 PM      Case Mgmt:  [place], [place], 01/27/2023, 1212    OT: Randee Dickinson OTR/L #[id number], 1/27/2023, 1139    PT:  Gio Ribeiro PT, DPT - [id number], 1/27/2023 12:51 PM    RN: Katerin Garnica, 1/27/2023, [address]    :   Lakeshia Martínez PT, DPT [id number]  1/27/23  1:40 PM

## 2023-01-27 NOTE — PROGRESS NOTES
[facility]   Phone: [phone]    Physical Therapy    [x] Initial Evaluation            [x] Daily Treatment Note         [] Discharge Summary      Patient: Chivo Glover   : 1950   MRN: [id number]   Date of Service:  2023  Admitting Diagnosis: Multiple fractures  Current Admission Summary: 81-year-old female with a history of hyperlipidemia and bladder cancer who was admitted to [facility] on  with right arm and leg pain after a fall from a ladder. She reports she was on the last step and thought she was on the ground and fell 1 step to the ground. Work-up revealed a right radius fracture and a right tibial fracture. CT of the knee revealed an intra-articular tibial fracture involving the spine. Ortho evaluated and suggested nonweightbearing nonoperative management with a knee immobilizer on the right lower extremity. Her hospital course was complicated by urinary retention requiring catheterization. Her catheter was removed prior to transfer to this facility. She was admitted to our facility on 1/all and requested to transition her orthopedic providers to [facility]. Ortho evaluated and suggested surgical intervention on both fractures in both right upper extremity and right lower extremities. She was transferred to surgery on . Her postoperative course and an overall unremarkable. She was evaluated by therapy again and deemed appropriate for an ARU stay. Past Medical History:  has a past medical history of Arthritis, Cancer ([address] and Hyperlipidemia. Past Surgical History:  has a past surgical history that includes Abdomen surgery; Colonoscopy; fracture surgery; Hysterectomy; Tonsillectomy; Breast reduction surgery (); eye surgery (); Cystoscopy (N/A, 2021); Wrist fracture surgery (Right, 2023); and Leg Surgery (Right, 2023).   Discharge Recommendations: Home with Home Health Physical Therapy and family assist PRN  DME Required For Discharge: DME to be determined pending patient progress  Precautions/Restrictions: high fall risk, weight bearing  Weight Bearing Restrictions:   NWBing (R) wrist - ok for platform walker  TTWB (R) LE  Required Braces/Orthotics: knee immobilizer - when out of bed                   [x] Right            [] Left  Positional Restrictions:no positional restrictions    Pre-Admission Information   Lives With: spouse                  Type of Home: [facility]  Home Layout: one level  Home Access:  1 + 1 NORA with (R) grab bar  Bathroom Layout: walk in shower  Bathroom Equipment:  owns grab bars but not installed  Toilet Height: standard height  Home Equipment: no prior equipment  Transfer Assistance: Independent without use of device  Ambulation Assistance:Independent without use of device  ADL Assistance: independent with all ADL's  IADL Assistance: requires assistance with all homemaking tasks  Active :        [x] Yes                 [] No  Hand Dominance: [] Left                 [x] Right  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 Examination   Vision:   Vision Gross Assessment: WFL  Hearing:   WFL  Observation:   General Observation:  Cast with ace wrap to (R) UE. Ace wrap to (L) knee for comfort applied during OT session. (R) knee immobilizer donned. IV line on dorsal surface of (L) hand. Incision/Scar:  (R) knee surgical incision covered with bandages. No abnormal signs of drainage observed through dressing.    Posture:   WFL  Sensation:   WFL  ROM:   (L) Hip: WFL     (R) Hip: WFL  (L) Knee: WFL     (R) Knee: Unable to assess secondary to (R) knee immobilizer  (L) Ankle: WFL     (R) Ankle: WFL  Strength:   (L) hip: 4/5    (L) knee: did not test secondary to complaints of (L) knee pain with AROM  (L) ankle: 4/5   *(R) LE not assessed secondary to pain and knee immobilizer  Therapist Clinical Decision Making (Complexity): medium complexity  Clinical Presentation: evolving      Subjective  General: Patient seated in recliner upon entry, agreeable to therapy. Pain: 3/10. Location: (R) UE and LE, 4/10. Location: (L) knee, and 6/10. Location: (L) chest  Pain Interventions: pain medication in place prior to arrival       Functional Mobility  Bed Mobility  Supine to Sit: Independent  Sit to Supine: Independent  Rolling Left: Independent  Rolling Right: Independent  Scooting: Independent  Comments: All performed on traditional flat bed without use of HR  Transfers  Sit to stand transfer: minimal assistance  Stand to sit transfer: contact guard assistance  Stand pivot transfer: minimal assistance  Car transfer: minimal assistance  Comments: All performed with use of (R) platform RW  Ambulation  Surface:level surface  Assistive Device: platform walker (R)  Assistance: contact guard assistance  Distance: 11 ft  Gait Mechanics: Patient hops on (L) LE with decreased (L) step length and increased use of UE support  Comments:    Stair Mobility  Stair mobility not completed on this date.   Comments: Unable to perform secondary to increased pain impacting patient's safety   Wheelchair Mobility:  Chair: manual  Surface: level surface  Method: (L) UE and (L) LE  Distance: 184 ft  Assistance: supervision  Comments:  Balance  Static Sitting Balance: good: independent with functional balance in unsupported position  Dynamic Sitting Balance: good: independent with functional balance in unsupported position  Static Standing Balance: fair (-): maintains balance at CGA with use of UE support  Dynamic Standing Balance: poor (+): requires min (A) to maintain balance  Patient completes object retrieval from floor in standing position at minimal assistance, with use of reacher and (R) UE resting on platform walker  Comments:    Other Therapeutic Interventions   First session:    See functional mobility above. Second session: Pt seated in recliner chair with legs elevated on arrival. Agreeable to PT. Declines ambulation due to L knee pain and chest muscle strain. Agreeable to therapeutic exercises. Completed R SLR 10 x 2 (attempted LLE but unable due to pain), R hip abduction x 10 reclined and x 10 standing, standing R hip extension x 10, L heel raise standing x 10, reclined ankle pumps x 15, glut sets x 10, upper body rows x 10 with manual stretch in horizontal abducted position for chest stretch x 30 seconds. Pt remained reclined in chair at end of session, call light in reach and chair alarm engaged. Functional Outcomes                 Cognition  WFL  Orientation:    alert and oriented x 4  Command Following:   [facility]    Education  Barriers To Learning: none  Patient Education: patient educated on goals, PT role and benefits, plan of care, precautions, weight-bearing education, general safety, functional mobility training, proper use of assistive device/equipment, transfer training  Learning Assessment:  patient verbalizes and demonstrates understanding    Assessment  Activity Tolerance: Good. C/o increased pain in the (L) knee and (L) chest with mobility. Adequate rest breaks provided for recovery. Impairments Requiring Therapeutic Intervention: decreased functional mobility, decreased ADL status, decreased ROM, decreased strength, decreased endurance, decreased balance, increased pain, decreased posture  Prognosis: good  Clinical Assessment: Patient is a 70 y/o female who presents with increased pain and impaired functional mobility secondary to a right radius fracture and a right tibial fracture from a fall that occurred on 1/5/2023. Patient was discharged from the ARU for surgical procedures to the (R) UE and LE on 1/19.  She now returns to the ARU and presents with (L) knee and (L) chest pain from overuse which is limiting her ability to ambulate and requiring increased assistance with transfers. Prior to admission, patient was independent with all ADLs and mobility without use of an AD. Patient would benefit from skilled therapy services to promote functional independence with all mobility, decrease pain, and reduce caregiver burden upon return to home. Safety Interventions: patient left in chair, chair alarm in place, call light within reach, and gait belt    Plan  Frequency: 5 x/week, 90 min/day  Current Treatment Recommendations: strengthening, ROM, balance training, functional mobility training, transfer training, gait training, stair training, endurance training, neuromuscular re-education, modalities, patient/caregiver education, and ADL/self-care training    Goals  Patient Goals: To return home   Short Term Goals:  Time Frame: 10-14 days  Patient will complete transfers at [facility]   Patient will ambulate 50 ft with use of platform walker (R) at supervision  Patient will ascend/descend 1 + 1 stairs with (R) ascending handrail at supervision  Patient will complete car transfer at [facility]  Patient will complete manual w/c propulsion 270 ft at [facility] including ramp management    Therapy Session Time      Individual Group Co-treatment   Time In  1001       Time Out  1101       Minutes  60         Timed Code Treatment Minutes:  45 minutes  Total Treatment Minutes:  60 minutes    Second Session Therapy Time:   Individual Concurrent Group Co-treatment   Time In  1355         Time Out  1425         Minutes  30           Timed Code Treatment Minutes:  45+ 30    Total Treatment Minutes:  90       Electronically Signed By:     Janett Solis session: Emily Carlisle SPT  Therapist observed and directed the patient's plan of care.   Co-signed and supervised by: Victoria Vieira PT, DPT - [id number]    2nd session: Renu Alonzo PT DPT [id number]

## 2023-01-27 NOTE — PROGRESS NOTES
[facility]atalie [facility] [address]   Phone: [phone]    Occupational Therapy    [x] Initial Evaluation            [] Daily Treatment Note         [] Discharge Summary      Patient: Kelly Michaels   : 1950   MRN: [id number]   Date of Service:  2023    Admitting Diagnosis:  Multiple fractures  Current Admission Summary: 43-year-old female with a history of hyperlipidemia and bladder cancer who was admitted to [facility] on  with right arm and leg pain after a fall from a ladder. She reports she was on the last step and thought she was on the ground and fell 1 step to the ground. Work-up revealed a right radius fracture and a right tibial fracture. CT of the knee revealed an intra-articular tibial fracture involving the spine. Ortho evaluated and suggested nonweightbearing nonoperative management with a knee immobilizer on the right lower extremity. Her hospital course was complicated by urinary retention requiring catheterization. Her catheter was removed prior to transfer to this facility. She was admitted to our facility on 1/all and requested to transition her orthopedic providers to [address] [place] [address]. Ortho evaluated and suggested surgical intervention on both fractures in both right upper extremity and right lower extremities. She was transferred to surgery on . Her postoperative course and an overall unremarkable. She was bilaterally by therapy again and deemed appropriate for an ARU stay. Past Medical History:  has a past medical history of Arthritis, Cancer ([address] and Hyperlipidemia. Past Surgical History:  has a past surgical history that includes Abdomen surgery; Colonoscopy; fracture surgery; Hysterectomy; Tonsillectomy; Breast reduction surgery (); eye surgery (); Cystoscopy (N/A, 2021); Wrist fracture surgery (Right, 2023); and Leg Surgery (Right, 2023).     Discharge Recommendations: [facility] OT S3    DME Required For Discharge: DME to be determined pending patient progress    Precautions/Restrictions: high fall risk, weight bearing, ROM restrictions  Weight Bearing Restrictions: NWB (R) wrist (okay for elbow WB for platform RW use; TTWB R LE  [x] Right Upper Extremity  [] Left Upper Extremity [x] Right Lower Extremity  [] Left Lower Extremity     Required Braces/Orthotics: KI on (R) LE; R UE wrist/forearm cast   [x] Right  [] Left  Positional Restrictions: no ROM to (R) LE    Pre-Admission Information   Lives With: spouse                  Type of Home: condo  Home Layout: one level  Home Access:  1 + 1 NORA with (R) grab bar  Bathroom Layout: walk in shower  Bathroom Equipment:  owns grab bars but not installed  Toilet Height: standard height  Home Equipment: no prior equipment  Transfer Assistance: Independent without use of device  Ambulation Assistance:Independent without use of device  ADL Assistance: independent with all ADL's  IADL Assistance: requires assistance with all homemaking tasks  Active :        [x] Yes                 [] No  Hand Dominance: [] Left                 [x] Right  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        Examination   Vision:   Vision Corrective Device: wears glasses for distance- for driving  Hearing:   hard of hearing  Perception:   WFL  Observation:   General Observation:  IV L forearm; KI on R LE; dressing over R knee; R UE cast; Edema in R LE  Posture:   Fair, slightly flexed forward  Sensation:   denies numbness and tingling  Tone:   Normotonic  Coordination Testing:   WFL ; R hand limited d/t casting- able to flex/extend PIPs/DIPs  ROM:   L UE WFL; R UE WFL elbow/shoulder  Strength:   L UE WFL; R UE not tested     Therapist Clinical Decision Making (Complexity): medium complexity  Clinical Presentation: evolving      Subjective  General: pt in bed at entry, agreeable to eval and shower  Pain: 5/10. Location: chest and L knee chest  requesting ACE wrap for compression on L knee for comfort   Pain Interventions: RN notified of patient request for pain medication and repositioned         Activities of Daily Living  Basic Activities of Daily Living  Grooming: setup assistance  Grooming Comments: w/c level oral care at sink  Upper Extremity Bathing: stand by assistance  Lower Extremity Bathing: minimal assistance   Bathing Comments: assist to dry buttocks w/ pt in stance; pt completes lateral leans for ryan care  Upper Extremity Dressing: supervision  Lower Extremity Dressing: maximum assistance  Dressing Comments: assist to thread R LE into brief/pants + assist for clothing over hips in stance and L sock- pt able to dress L LE seated w set-up/SBA. Assist required to adjust brace w/ pt in long-leg sitting   General Comments: Pt completed UB/LB bathing and dressing seated on TTB in shower- stances to RW for clothing over hips; pt's R LE, R UE and IV on L arm water-proofed prior to shower and remained dry and intact. Pt required increased time for all ADL completion. Instrumental Activities of Daily Living  No IADL completed on this date. Functional Mobility  Bed Mobility  Supine to Sit: minimal assistance  Comments:assist to progress R LE off EOB, HOB elevated   Transfers  Sit to stand transfer:minimal assistance  Stand to sit transfer: minimal assistance  Stand pivot transfer: minimal assistance  Shower transfer: minimal assistance  Shower transfer equipment: tub transfer bench, grab bars, walker, transfers from w/c  Shower transfer comments: VC for sequence, assist for RW management during turn  Comments: platform RW used for all transfers- assist for RW management on R side during turns, increased time to complete pivots  Functional Mobility:  Sitting Balance: stand by assistance. Standing Balance: contact guard assistance.     Pt assisted to/from bathroom and within room via w/c for ADL completion- pt reporting increased pain and declining mobility with platform RW during session d/t plans for ambulation with PT later this date. Other Therapeutic Interventions        Second Session:  Patient in recliner upon arrival, agreeable to OT session. Reporting 6/10 pain in L knee. Nursing aware and in at end of session to administer pain meds. Patient transferred from recliner to platform walker with Adam. Stand pivot from recliner to  with platform walker and Adam. Patient transferred from  to [facility] overtop toilet with grab bars and Adam. Patient required maxA for managing clothing prior to sitting onto toilet. Patient voided urine and completed ryan care with SBA. Patient required modA for clothing mgmt after toileting and Adam for pivot to  with grab bar. Patient transferred from  to recliner with platform walker and Adam. Patient left in recliner with chair alarm on, call button in reach and all needs met.             Cognition  Overall Cognitive Status: WFL  Following Commands: follows multi step commands with repetition  Attention Span: attends with cues to redirect  Sequencing: requires cues for some  Comments: pt impulsive at times; cog overall WFL  Orientation:    alert and oriented x 4  Command Following:   [facility]     Education  Barriers To Learning: physical  Patient Education: patient educated on goals, OT role and benefits, plan of care, precautions, weight-bearing education, transfer training, discharge recommendations  Learning Assessment:  patient verbalizes understanding, would benefit from continued reinforcement    Assessment  Activity Tolerance: fair tolerance; limited by pain at times but able to participate well   Impairments Requiring Therapeutic Intervention: decreased functional mobility, decreased ADL status, decreased strength, decreased endurance, decreased balance, decreased IADL, increased pain  Prognosis: good  Clinical Assessment: Pt is a 71 yr old female who presents with the above deficits impacting her occupational performance. Pt is below her baseline level of function d/t s/p ORIF of R radius fx and R tibial fx. Pt now TTWB in R LE and NWB in R UE w/ exception of elbow WB clearance. Pt now requires extensive assist for LB ADL completion and increased need for assist with AD for transfers/mobility. Pt limited by pain and low endurance. Skilled OT services warranted in order to maximize her IND and safety with all occupational pursuits.    Safety Interventions: patient left in chair, chair alarm in place, call light within reach, and gait belt    Plan  Frequency: 5 x/week, 90 min/day  Current Treatment Recommendations: strengthening, balance training, functional mobility training, transfer training, endurance training, wheelchair mobility training, patient/caregiver education, ADL/self-care training, IADL training, home management training, pain management, safety education, and equipment evaluation/education    Goals  Patient Goals: regain IND with mobility and \"taking care of self\"    Short Term Goals:  Time Frame: 14 days  Patient will complete upper body bathing and dressing at [place]   Patient will complete lower body bathing and dressing at [place]   Patient will complete toileting at modified independent   Patient will complete functional transfers at [place]   Patient will complete functional mobility at modified independent   Patient to gather and transport IADL items at modified independent   Patient will independently don/doff KI and/or independently verbalize brace management to caregiver with 100% accuracy     Therapy Session Time     Individual Group Co-treatment   Time In  0830      Time Out  0950      Minutes  [de-identified]         Second Session Therapy Time:   Individual Concurrent Group Co-treatment   Time In  1250         Time Out  1308         Minutes  18             Timed Code Treatment Minutes:   65 + 18 minutes   Total Treatment Minutes:  98 minutes        First session: Electronically Signed By: JENNA Hayes OTR/L #[id number]     Second session: KIMBERLY Sanders OTR/L [id number]

## 2023-01-27 NOTE — PROGRESS NOTES
Nutrition Note    RECOMMENDATIONS  PO Diet: Regular diet  ONS: Danita Glucerna with bkf daily     NUTRITION ASSESSMENT   Pt receives a regular diet & reports eating well & has no appetite issues. States hx gastric bypass surgery in 2001, but has started gaining wt over past few years. Received Glucerna @ [facility] previously, & would like to con't. Will con't to monitor & encourage adequate nutrition to promote healing & strength. Nutrition Related Findings: Gluc 114; Edema: +1 RUE; nonpitting RLE  Wounds: Surgical Incision (for multiple fractures)  Nutrition Education:  Education not indicated   Nutrition Goals: PO intake 75% or greater     MALNUTRITION ASSESSMENT      Malnutrition Status: No malnutrition    NUTRITION DIAGNOSIS   Increased nutrient needs related to increase demand for energy/nutrients as evidenced by other (comment) (increased protein for healing & strengthening)      CURRENT NUTRITION THERAPIES  ADULT DIET; Regular     PO Intake: %   PO Supplement Intake:None Ordered    ANTHROPOMETRICS  Current Height: 5' 5\" (165.1 cm)  Current Weight: 164 lb 8 oz (74.6 kg)    Ideal Body Weight (IBW): 125 lbs  (57 kg)         BMI: 27.3    The patient will be monitored per nutrition standards of care. Consult dietitian if additional nutrition interventions are needed prior to RD reassessment.      Nick Lomax RD, LD    Contact: [phone]

## 2023-01-27 NOTE — H&P
Patient: Marli Gomez  [id number]  Date: 1/27/2023      Chief Complaint: Right arm and leg pain     History of Present Illness/Hospital Course:  61-year-old female with a history of hyperlipidemia and bladder cancer who was admitted to [facility] on 1/5 with right arm and leg pain after a fall from a ladder. She reports she was on the last step and thought she was on the ground and fell 1 step to the ground. Work-up revealed a right radius fracture and a right tibial fracture. CT of the knee revealed an intra-articular tibial fracture involving the spine. Ortho evaluated and suggested nonweightbearing nonoperative management with a knee immobilizer on the right lower extremity. Her hospital course was complicated by urinary retention requiring catheterization. Her catheter was removed prior to transfer to this facility. She was admitted to our facility on 1/all and requested to transition her orthopedic providers to [facility]. Ortho evaluated and suggested surgical intervention on both fractures in both right upper extremity and right lower extremities. She was transferred to surgery on 1/19. Her postoperative course and an overall unremarkable. She was bilaterally by therapy again and deemed appropriate for an ARU stay. She has multiple medication requests this morning. Prior Level of Function:  Independent     Current Level of Function:  Levi MOSCOSO     has a past medical history of Arthritis, Cancer ([address] and Hyperlipidemia. has a past surgical history that includes Abdomen surgery; Colonoscopy; fracture surgery; Hysterectomy; Tonsillectomy; Breast reduction surgery (2004); eye surgery (1990); Cystoscopy (N/A, 8/1/2021); Wrist fracture surgery (Right, 1/19/2023); and Leg Surgery (Right, 1/19/2023). reports that she quit smoking about 34 years ago. Her smoking use included cigarettes. She has never used smokeless tobacco. She reports that she does not use drugs.     family history includes Colon Cancer in her sister; Diabetes in her brother; Heart Disease in her father; High Cholesterol in her father and mother; Hypertension in her mother; Stroke in her brother and father.     Allergies: Erythromycin and Morphine    Current Facility-Administered Medications   Medication Dose Route Frequency Provider Last Rate Last Admin    lidocaine 4 % external patch 1 patch  1 patch TransDERmal Daily Letty Santacruz MD   1 patch at 01/27/23 0856    methocarbamol (ROBAXIN) tablet 750 mg  750 mg Oral 4x Daily Letty Santacruz MD   750 mg at 01/27/23 0854    oxyCODONE (ROXICODONE) immediate release tablet 5 mg  5 mg Oral Q4H PRN Letty Santacruz MD        Or    oxyCODONE (ROXICODONE) immediate release tablet 10 mg  10 mg Oral Q4H PRN Letty Santacruz MD   10 mg at 01/27/23 0854    Armodafinil TABS 250 mg (Patient Supplied)  250 mg Oral Daily Letty Santacruz MD   250 mg at 01/27/23 0855    traZODone (DESYREL) tablet 100 mg  100 mg Oral Nightly Letty Santacruz MD   100 mg at 01/26/23 2148    ferrous sulfate (IRON 325) tablet 325 mg  325 mg Oral Daily with breakfast Letty Santacruz MD   325 mg at 01/27/23 0855    escitalopram (LEXAPRO) tablet 10 mg  10 mg Oral Daily Letty Santacruz MD   10 mg at 01/27/23 0855    Vitamin D (CHOLECALCIFEROL) tablet 2,000 Units  2,000 Units Oral QPM Letty Santacruz MD        calcium elemental (OSCAL) tablet 1,000 mg  2 tablet Oral BID Letty Santacruz MD   1,000 mg at 01/27/23 0855    buPROPion (WELLBUTRIN XL) extended release tablet 300 mg  300 mg Oral QAM Letty Santacruz MD   300 mg at 01/27/23 0855    atorvastatin (LIPITOR) tablet 40 mg  40 mg Oral Nightly Letty Santacruz MD   40 mg at 01/26/23 2150    acetaminophen (TYLENOL) tablet 1,000 mg  1,000 mg Oral TID Letty Santacruz MD   1,000 mg at 01/27/23 0855    ketorolac (TORADOL) injection 15 mg  15 mg IntraVENous Q6H PRN Letty Santacruz MD        acetaminophen (TYLENOL) tablet 650 mg  650 mg Oral Q4H PRN Letty Neeta, MD        heparin (porcine) injection 5,000 Units  5,000 Units SubCUTAneous 3 times per day Joann Foote MD   5,000 Units at 01/26/23 2151    diphenhydrAMINE (BENADRYL) tablet 25 mg  25 mg Oral Q6H PRN Joann Foote MD        hydrALAZINE (APRESOLINE) tablet 50 mg  50 mg Oral Q8H PRN Joann Foote MD        ondansetron (ZOFRAN-ODT) disintegrating tablet 4 mg  4 mg Oral Q8H PRN Joann Foote MD        sodium chloride flush 0.9 % injection 5-40 mL  5-40 mL IntraVENous 2 times per day Joann Foote MD   10 mL at 01/26/23 2202    sodium chloride flush 0.9 % injection 5-40 mL  5-40 mL IntraVENous PRN Joann Foote MD        0.9 % sodium chloride infusion   IntraVENous PRN Joann Foote MD        sennosides-docusate sodium (SENOKOT-S) 8.6-50 MG tablet 1 tablet  1 tablet Oral BID Joann Foote MD        polyethylene glycol (GLYCOLAX) packet 17 g  17 g Oral Daily PRN Joann Foote MD           REVIEW OF SYSTEMS:   CONSTITUTIONAL: negative for fevers, chills, diaphoresis, appetite change, night sweats and unexpected weight change. EYES: negative for blurred vision, eye discharge, visual disturbance and icterus. HEENT: negative for hearing loss, tinnitus, ear drainage, sinus pressure, nasal congestion, and epistaxis. RESPIRATORY: Negative for hemoptysis, cough, sputum production. CARDIOVASCULAR: negative for chest pain, palpitations, exertional chest pressure/discomfort, edema, syncope. GASTROINTESTINAL: negative for nausea, vomiting, diarrhea, constipation, blood in stool and abdominal pain. GENITOURINARY: negative for frequency, dysuria, urinary incontinence, decreased urine volume, and hematuria. HEMATOLOGIC/LYMPHATIC: negative for easy bruising, bleeding and lymphadenopathy. ALLERGIC/IMMUNOLOGIC: negative for recurrent infections, angioedema, anaphylaxis and drug reactions.    ENDOCRINE: negative for weight changes and diabetic symptoms including polyuria, polydipsia and polyphagia. MUSCULOSKELETAL: positive for pain, joint swelling, decreased range of motion and muscle weakness. NEUROLOGICAL: negative for headaches, slurred speech, unilateral weakness. PSYCHIATRIC/BEHAVIORAL: negative for hallucinations, behavioral problems, confusion and agitation. All pertinent positives are noted in the HPI. Physical Examination:  Vitals: Patient Vitals for the past 24 hrs:   BP Temp Temp src Pulse Resp SpO2 Height Weight   01/27/23 0845 111/70 97.6 °F (36.4 °C) Oral 85 17 96 % -- --   01/26/23 2303 -- -- -- -- 18 96 % -- --   01/26/23 2130 (!) 106/57 97.9 °F (36.6 °C) Oral 72 18 97 % -- --   01/26/23 1910 121/67 97.9 °F (36.6 °C) Oral 80 16 -- -- --   01/26/23 1848 121/67 97.9 °F (36.6 °C) Oral 80 16 96 % 5' 5\" (1.651 m) 164 lb 8 oz (74.6 kg)     Psych: Stable mood, normal judgement, normal affect   Const: No distress  Eyes: Conjunctiva noninjected, no icterus noted; pupils equal, round. HENT: Atraumatic, normocephalic; Oral mucosa moist  Neck: Trachea midline, neck supple. No thyromegaly noted. CV: Regular rate and rhythm, no murmur rub or gallop noted  Resp: Lungs clear to auscultation bilaterally, no rales wheezes or ronchi, no retractions. Respirations unlabored. GI: Soft, nontender, nondistended. Normal bowel sounds. No palpable masses. Neuro: Alert, oriented, appropriate. No cranial nerve deficits appreciated. Sensation intact to light touch. Motor examination reveals: LUE/LLE 4/5 diffusely RUE/RLE deferred. Reflexes normal and symmetric. Skin: Normal temperature and turgor  MSK: RUE in post op splint, RLE in immobilizer  Ext: Mild RUE/RLE edema appreciated. No varicosities.     Lab Results   Component Value Date    WBC 7.7 01/22/2023    HGB 10.5 (L) 01/22/2023    HCT 31.4 (L) 01/22/2023    MCV 92.7 01/22/2023     (H) 01/22/2023     Lab Results   Component Value Date    INR 1.17 (H) 08/02/2021    PROTIME 13.3 (H) 08/02/2021     Lab Results   Component Value Date CREATININE 0.5 (L) 01/24/2023    BUN 11 01/24/2023     01/24/2023    K 4.3 01/24/2023     01/24/2023    CO2 29 01/24/2023     Lab Results   Component Value Date    ALT 40 08/01/2021    AST 38 (H) 08/01/2021    ALKPHOS 89 08/01/2021    BILITOT 0.4 08/01/2021       Most recent imaging studies revealed   EXAMINATION:   3 XRAY VIEWS OF THE RIGHT WRIST       1/16/2023 3:20 pm       COMPARISON:   None. HISTORY:   ORDERING SYSTEM PROVIDED HISTORY: distal radius fx   TECHNOLOGIST PROVIDED HISTORY:   Reason for exam:->distal radius fx   Reason for Exam: distal radius fx       FINDINGS:   Diffuse osteopenia. Comminuted intra-articular fracture of the distal   radius. The fracture is impacted. Articular surface does not appear to be   widened or depressed. Ulnar neutral variance. Negative for dislocation. Minimal dorsal displacement of distal fracture fragment. Minimal soft tissue   swelling and displacement of the fascial planes. Impression   Comminuted impacted intra-articular fracture of the distal radius. EXAMINATION:   FOUR XRAY VIEWS OF THE RIGHT KNEE       1/16/2023 3:20 pm       COMPARISON:   CT 01/04/2023       HISTORY:   ORDERING SYSTEM PROVIDED HISTORY: tibial plateau fx   TECHNOLOGIST PROVIDED HISTORY:   AP/lateral with medial and lateral obliques please   Reason for exam:->tibial plateau fx   Reason for Exam: tibial plateau fx       FINDINGS:   The proximal tibial fracture is difficult to see on the radiograph. It is   extends to the tibial eminence. Articular surface depression is not   appreciated. Though it was seen on the CT scan. Diffuse osteopenia. No   current signs of healing. Small osseous fragments at the tibial eminence   which were noted on the CT scan. Negative for dislocation. Large   lipohemarthrosis which is unchanged. Mild edema in the infrapatellar fat pad. Impression   Comminuted proximal tibial fracture.   The articular surface depression is not   as well seen on the radiograph. The above laboratory data have been reviewed. The above imaging data have been reviewed. The above medical testing have been reviewed. Body mass index is 27.37 kg/m². Barriers to Discharge: pain, weight bearing restrictions, ADLs    Disposition: Home    Prognosis: Fair    Rehabilitation goals: To return patient to home setting at their prior level of function. POST ADMISSION PHYSICIAN EVALUATION  The patient has agreed to being admitted to our comprehensive inpatient  rehabilitation facility consisting of at least 180 minutes of therapy a day,  5 out of 7 days a week. The patient/family has a good understanding of our discharge process. The  patient has potential to make improvement and is in need of at least two of  the following multidisciplinary therapies including but not limited to  physical, occupational, respiratory, and speech, nutritional services, wound care, and prosthetics and orthotics. Given the patients complex condition  and risk of further medical complications, rehabilitation services cannot be  safely provided at a lower level of care such as a skilled nursing facility. I have compared the patients medical and functional status at the time of the  preadmission screening and the same on this date, and there are no significant changes except as documented below in the assessment and plan. By signing this document, I acknowledge that I have personally performed a  full physical examination on this patient within 24 hours of admission to  this inpatient rehabilitation facility and have determined the patient to be  able to tolerate the above course of treatment at an intensive level for a  reasonable period of time. I will be completing a detailed individualized  Plan of Care for this patient by day four of the patients stay based upon the  Preadmission Screen, this Post-Admission Evaluation, and the therapy  evaluations. Assessment and Plan:  Multiple fractures: s/p ORIF of R radius and tibia. Nonweightbearing RUE/RLE. Pain control. PT/OT. - adjust pain meds    Ecoli UTI: Completed Cipro, Cx sensitive    HLD: Lipitor 40    Depression: Lexapro 10 HS, Wellbutrin 300    Hx bladder cancer: Flomax    MSK pain: lidoderm patch to chest    Iron deficiency anemia: fergon      Bowels: Per protocol  Bladder: Per protocol   Sleep: Trazodone 100 scheduled. Pain: resume robaxin scheduled, resume tasia PRN   DVT PPx: heparin   ELOS: 14-17    Tiara Mcmahon MD 1/27/2023, 9:36 AM     * This document was created using dictation software. While all precautions were taken to ensure accuracy, errors may have occurred. Please disregard any typographical errors.

## 2023-01-27 NOTE — PROGRESS NOTES
Secure message sent to provider Santhosh Reddy) due to pt asking questions about her ASA, Heparin and IV.      Tabitha Tavarez BSN, RN   [phone]

## 2023-01-27 NOTE — PROGRESS NOTES
Admission Period/Goal QM Codes for Renu Renteria. QM Admit Code Goal Code   Eating     Oral Hygiene     Toileting Hygiene     Shower/Bathing     UB Dressing     LB Dressing     Putting on/off Footwear     Rolling Left and Right     Sit To Lying     Lying to Sitting on Bedside     Sit to Stand     Chair/Bed to Chair Transfer     Toilet Transfers     Car Transfers     Walk 10 Feet     Walk 50 Feet with Two Turns     Walk 150 Feet     Walk 10 Feet on Uneven Surfaces     1 Step (Curb)     4 Steps     12 Steps     [facility] 1841 50 Feet with 2 Turns     Type     Wheel 150 Feet     Type         The above codes were determined by the treatment team to be the patient's accurate admission assessment codes based on assessment performed soon after the patient's admission and prior to the benefit of services provided by staff, or if appropriate, the patient's usual performance at admission.     OT:       PT:       RN:       ST:       :

## 2023-01-27 NOTE — PROGRESS NOTES
Assessment completed. Patient up in bed, alert and oriented x 4 and able to make all her needs known. C/o pain to RU&RLE d/t trauma/surgery, and left knee and chest areas d/t twisting/pulling muscles. Wrap in place to RUE and immobilizer in place to RLE. Continues with WBR as ordered. VSS. Medications administered as ordered. POC and education reviewed with patient and mutually agreed upon. Tolerating regular diet well.

## 2023-01-28 PROCEDURE — 97535 SELF CARE MNGMENT TRAINING: CPT

## 2023-01-28 PROCEDURE — 97116 GAIT TRAINING THERAPY: CPT

## 2023-01-28 PROCEDURE — 97530 THERAPEUTIC ACTIVITIES: CPT

## 2023-01-28 PROCEDURE — 6370000000 HC RX 637 (ALT 250 FOR IP): Performed by: PHYSICAL MEDICINE & REHABILITATION

## 2023-01-28 PROCEDURE — 97110 THERAPEUTIC EXERCISES: CPT

## 2023-01-28 PROCEDURE — 1280000000 HC REHAB R&B

## 2023-01-28 PROCEDURE — 6360000002 HC RX W HCPCS: Performed by: PHYSICAL MEDICINE & REHABILITATION

## 2023-01-28 RX ADMIN — Medication 1000 MG: at 08:43

## 2023-01-28 RX ADMIN — METHOCARBAMOL TABLETS 750 MG: 750 TABLET, COATED ORAL at 08:45

## 2023-01-28 RX ADMIN — METHOCARBAMOL TABLETS 750 MG: 750 TABLET, COATED ORAL at 12:46

## 2023-01-28 RX ADMIN — ACETAMINOPHEN 1000 MG: 500 TABLET ORAL at 08:45

## 2023-01-28 RX ADMIN — OXYCODONE HYDROCHLORIDE 10 MG: 5 TABLET ORAL at 02:44

## 2023-01-28 RX ADMIN — ACETAMINOPHEN 1000 MG: 500 TABLET ORAL at 16:48

## 2023-01-28 RX ADMIN — METHOCARBAMOL TABLETS 750 MG: 750 TABLET, COATED ORAL at 20:21

## 2023-01-28 RX ADMIN — ARMODAFINIL 250 MG: 250 TABLET ORAL at 08:44

## 2023-01-28 RX ADMIN — FERROUS SULFATE TAB 325 MG (65 MG ELEMENTAL FE) 325 MG: 325 (65 FE) TAB at 08:45

## 2023-01-28 RX ADMIN — ESCITALOPRAM OXALATE 10 MG: 10 TABLET, FILM COATED ORAL at 08:45

## 2023-01-28 RX ADMIN — Medication 1000 MG: at 20:20

## 2023-01-28 RX ADMIN — STANDARDIZED SENNA CONCENTRATE AND DOCUSATE SODIUM 1 TABLET: 8.6; 5 TABLET ORAL at 20:21

## 2023-01-28 RX ADMIN — Medication 2000 UNITS: at 17:13

## 2023-01-28 RX ADMIN — OXYCODONE HYDROCHLORIDE 10 MG: 5 TABLET ORAL at 12:51

## 2023-01-28 RX ADMIN — BUPROPION HYDROCHLORIDE 300 MG: 150 TABLET, EXTENDED RELEASE ORAL at 08:45

## 2023-01-28 RX ADMIN — METHOCARBAMOL TABLETS 750 MG: 750 TABLET, COATED ORAL at 16:48

## 2023-01-28 RX ADMIN — ATORVASTATIN CALCIUM 40 MG: 40 TABLET, FILM COATED ORAL at 20:22

## 2023-01-28 RX ADMIN — HEPARIN SODIUM 5000 UNITS: 5000 INJECTION INTRAVENOUS; SUBCUTANEOUS at 20:22

## 2023-01-28 RX ADMIN — HEPARIN SODIUM 5000 UNITS: 5000 INJECTION INTRAVENOUS; SUBCUTANEOUS at 12:46

## 2023-01-28 RX ADMIN — TRAZODONE HYDROCHLORIDE 100 MG: 50 TABLET ORAL at 20:25

## 2023-01-28 RX ADMIN — HEPARIN SODIUM 5000 UNITS: 5000 INJECTION INTRAVENOUS; SUBCUTANEOUS at 06:59

## 2023-01-28 RX ADMIN — STANDARDIZED SENNA CONCENTRATE AND DOCUSATE SODIUM 1 TABLET: 8.6; 5 TABLET ORAL at 08:44

## 2023-01-28 RX ADMIN — ACETAMINOPHEN 1000 MG: 500 TABLET ORAL at 20:21

## 2023-01-28 RX ADMIN — OXYCODONE HYDROCHLORIDE 10 MG: 5 TABLET ORAL at 08:43

## 2023-01-28 ASSESSMENT — PAIN SCALES - GENERAL
PAINLEVEL_OUTOF10: 4
PAINLEVEL_OUTOF10: 7
PAINLEVEL_OUTOF10: 8
PAINLEVEL_OUTOF10: 4
PAINLEVEL_OUTOF10: 10
PAINLEVEL_OUTOF10: 4

## 2023-01-28 ASSESSMENT — PAIN DESCRIPTION - INTENSITY
RATING_3: 4
RATING_2: 4
RATING_3: 7
RATING_3: 4
RATING_3: 4
RATING_2: 10
RATING_3: 4
RATING_3: 4
RATING_2: 7
RATING_3: 10
RATING_2: 4
RATING_3: 4
RATING_2: 4

## 2023-01-28 ASSESSMENT — PAIN SCALES - WONG BAKER
WONGBAKER_NUMERICALRESPONSE: 0

## 2023-01-28 ASSESSMENT — PAIN DESCRIPTION - FREQUENCY: FREQUENCY: CONTINUOUS

## 2023-01-28 ASSESSMENT — PAIN DESCRIPTION - ORIENTATION
ORIENTATION: RIGHT
ORIENTATION: RIGHT;LEFT;MID
ORIENTATION_2: RIGHT
ORIENTATION: RIGHT;LEFT;MID
ORIENTATION_3: LEFT
ORIENTATION: RIGHT;LEFT;MID

## 2023-01-28 ASSESSMENT — PAIN DESCRIPTION - LOCATION
LOCATION: CHEST;KNEE;LEG
LOCATION: CHEST;KNEE;LEG
LOCATION: HIP
LOCATION_3: KNEE
LOCATION: HIP
LOCATION_2: LEG
LOCATION: CHEST;LEG;KNEE
LOCATION: LEG

## 2023-01-28 ASSESSMENT — PAIN DESCRIPTION - DESCRIPTORS
DESCRIPTORS: ACHING;DISCOMFORT;SORE
DESCRIPTORS: ACHING;DISCOMFORT;SORE
DESCRIPTORS_2: ACHING;DISCOMFORT;SORE
DESCRIPTORS_3: ACHING;SORE

## 2023-01-28 ASSESSMENT — PAIN DESCRIPTION - PAIN TYPE: TYPE: ACUTE PAIN;SURGICAL PAIN

## 2023-01-28 ASSESSMENT — PAIN DESCRIPTION - ONSET: ONSET: ON-GOING

## 2023-01-28 NOTE — DISCHARGE SUMMARY
[address] [facility] DISCHARGE SUMMARY    Patient Demographics    Pardeep Coy  Date of Birth. 1950  MRN. [id number]     Primary care provider. Makenna Ch MD  (Tel: [phone])    Admit date: 1/19/2023    Discharge date (blank if same as Note Date): 1/26/2023  Note Date: 1/28/2023     Reason for Hospitalization. No chief complaint on file. Significant Findings. Principal Problem:    S/P ORIF (open reduction internal fixation) fracture  Active Problems:    Fracture  Resolved Problems:    * No resolved hospital problems. *       Problems and results from this hospitalization that need follow up. Post op follow up       Invasive procedures and treatments. Dr Sean Bernard performed the following on 1/19/23:  1. Open treatment of right proximal tibial bicondylar fracture tibial  plateau fracture with open reduction and internal fixation. 2.  Open treatment of right distal radius two-part intra-articular  fracture with open reduction and internal fixation. Hospital Course. Patient is 78-year-old female who who presented acute rehab with multiple fractures. She had previously been evaluated by an outside orthopedic service who recommended nonoperative treatment. She was doing therapy and requested orthopedic evaluation who recommended patient surgical intervention. Patient was admitted to the hospital and underwent ORIF of the right tibial plateau fracture as well as ORIF of a right distal radius fracture by Dr. Saen Bernard on January 19. She was seen by PT OT. She is toe-touch weightbearing on the leg and nonweightbearing on the wrist but okay for any platform walker. She was seen by PT OT who recommended she return back to. She does have some musculoskeletal chest pain which is being managed with anti-inflammatories and a Lidoderm patch.   She had an uneventful postoperative course. Consults. None    Physical examination on discharge day. BP (!) 117/57   Pulse 72   Temp 97.6 °F (36.4 °C) (Oral)   Resp 16   Ht 5' 5\" (1.651 m)   Wt 159 lb (72.1 kg)   SpO2 97%   BMI 26.46 kg/m²   General appearance. Alert. Looks comfortable. HEENT. Sclera clear. Moist mucus membranes. Cardiovascular. Regular rate and rhythm, normal S1, S2. No murmur. Respiratory. Not using accessory muscles. Clear to auscultation bilaterally, no wheeze. Gastrointestinal. Abdomen soft, non-tender, not distended, normal bowel sounds  Neurology. Facial symmetry. No speech deficits. Moving all extremities equally. Extremities. Brace R leg, splint R wrist  Skin. Warm, dry, normal turgor    Condition at time of discharge stable    Medication instructions provided to patient at discharge. Medication List        CHANGE how you take these medications      aspirin 81 MG EC tablet  Take 1 tablet by mouth in the morning and at bedtime  What changed: when to take this            ASK your doctor about these medications      Armodafinil 250 MG Tabs     atorvastatin 40 MG tablet  Commonly known as: LIPITOR     Biotin 5000 MCG Caps     buPROPion 300 MG extended release tablet  Commonly known as: WELLBUTRIN XL     calcium carbonate 600 MG Tabs tablet     escitalopram 10 MG tablet  Commonly known as: LEXAPRO     estradiol 0.1 MG/GM vaginal cream  Commonly known as: ESTRACE     ferrous sulfate 325 (65 Fe) MG tablet  Commonly known as: IRON 325     Omega-3 500 MG Caps     oxyCODONE 5 MG immediate release tablet  Commonly known as: ROXICODONE  Take 1 tablet by mouth every 4 hours as needed for Pain for up to 5 days. Max Daily Amount: 30 mg  Ask about: Should I take this medication?      therapeutic multivitamin-minerals tablet     traZODone 100 MG tablet  Commonly known as: DESYREL     vitamin C 500 MG tablet  Commonly known as: ASCORBIC ACID     Vitamin D3 50 MCG (2000 UT) Caps               Where to Get Your Medications You can get these medications from any pharmacy    Bring a paper prescription for each of these medications  aspirin 81 MG EC tablet  oxyCODONE 5 MG immediate release tablet         Discharge recommendations given to patient. Disposition. ARU      Spent 33 minutes in discharge process. Signed:   Liliana Warner PA-C     1/28/2023 6:57 PM

## 2023-01-28 NOTE — PROGRESS NOTES
[facility]   Phone: [phone]    Physical Therapy    [] Initial Evaluation            [x] Daily Treatment Note         [] Discharge Summary      Patient: Johanne Vo   : 1950   MRN: [id number]   Date of Service:  2023  Admitting Diagnosis: Multiple fractures  Current Admission Summary: 78-year-old female with a history of hyperlipidemia and bladder cancer who was admitted to [facility] on  with right arm and leg pain after a fall from a ladder. She reports she was on the last step and thought she was on the ground and fell 1 step to the ground. Work-up revealed a right radius fracture and a right tibial fracture. CT of the knee revealed an intra-articular tibial fracture involving the spine. Ortho evaluated and suggested nonweightbearing nonoperative management with a knee immobilizer on the right lower extremity. Her hospital course was complicated by urinary retention requiring catheterization. Her catheter was removed prior to transfer to this facility. She was admitted to our facility on 1/all and requested to transition her orthopedic providers to [address] [place] [address]. Ortho evaluated and suggested surgical intervention on both fractures in both right upper extremity and right lower extremities. She was transferred to surgery on . Her postoperative course and an overall unremarkable. She was evaluated by therapy again and deemed appropriate for an ARU stay. Past Medical History:  has a past medical history of Arthritis, Cancer ([address] and Hyperlipidemia. Past Surgical History:  has a past surgical history that includes Abdomen surgery; Colonoscopy; fracture surgery; Hysterectomy; Tonsillectomy; Breast reduction surgery (); eye surgery (); Cystoscopy (N/A, 2021); Wrist fracture surgery (Right, 2023); and Leg Surgery (Right, 2023).   Discharge Recommendations: Home with Home Health Physical Therapy and family assist PRN  DME Required For Discharge: DME to be determined pending patient progress  Precautions/Restrictions: high fall risk, weight bearing  Weight Bearing Restrictions:   NWBing (R) wrist - ok for platform walker  TTWB (R) LE  Required Braces/Orthotics: knee immobilizer - when out of bed                   [x] Right            [] Left  Positional Restrictions:no positional restrictions    Pre-Admission Information   Lives With: spouse                  [facility] of Home: [facility]  Home Layout: one level  Home Access:  1 + 1 NORA with (R) grab bar  Bathroom Layout: walk in shower  Bathroom Equipment:  owns grab bars but not installed  Toilet Height: standard height  Home Equipment: no prior equipment  Transfer Assistance: Independent without use of device  Ambulation Assistance:Independent without use of device  ADL Assistance: independent with all ADL's  IADL Assistance: requires assistance with all homemaking tasks  Active :        [x] Yes                 [] No  Hand Dominance: [] Left                 [x] Right  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 Examination   Vision:   Vision Gross Assessment: WFL  Hearing:   WFL      Subjective  General: Patient is in the restroom with OT at start of session. Pt is agreeable to session. Pain: 3/10. Location: (R) UE and LE, 4/10. Location: (L) knee, and 6/10. Location: (L) chest  6/10 after ambulation  Pain Interventions: pain medication in place prior to arrival       Functional Mobility  Bed Mobility  Bed mobility not completed on this date.   Comments: Pt was in the restroom at start of session and ended session in recliner  Transfers  Sit to stand transfer: minimal assistance, performed with (R) platform walker and in // bars with use of L UE only  Stand to sit transfer: contact guard assistance, performed with (R) platform walker and in // bars with use of L UE only  Stand pivot transfer: minimal assistance, used (R) platform walker for all transfers, pt demonstrates good awareness of set up for optimal positioning and safety  Comments: pt performs stand pivot transfer from toilet < w/c and from w/c to recliner at end of session  Ambulation  Surface:level surface  Assistive Device: platform walker (R)  Assistance: contact guard assistance  Distance: 8 ft  Gait Mechanics: Patient hops on (L) LE with decreased (L) step length and increased use of UE support  Comments:  pt c/o of L knee pain with hopping  Stair Mobility  Stair mobility not completed on this date. Comments: Unable to perform secondary to increased pain impacting patient's safety   Wheelchair Mobility:  Chair: manual  Surface: level surface  Method: (L) UE and (L) LE  Distance: 260 ft  Assistance: supervision  Comments: pt required 2 rest breaks during w/c propulsion but tolerates well  Balance  Static Sitting Balance: good: independent with functional balance in unsupported position  Dynamic Sitting Balance: good: independent with functional balance in unsupported position  Static Standing Balance: fair (-): maintains balance at CGA with use of UE support  Dynamic Standing Balance: poor (+): requires min (A) to maintain balance  Comments: pt c/p R side feeling \"heavy\" from the UE and LE casting/braces, so she is \"off balance\"    Other Therapeutic Interventions   First session:    See functional mobility above. While in // bars, pt performs L LE heel raises x 10. Attempted to perform slight knee flex on L LE, but pt is unable to tolerate. Second session: Pt endorses fatigue and pain at start of PM session. KIRK Rahman present to administer pain meds. Performs stand pivot transfer from recliner < w/c with platform walker and CGA. Pt declines ambulation due to (L) knee pain. Pt is wheeled into the bathroom. Performs squat pivot transfer from w/c < commode with use of grab bar and min A.  Pt endorses L knee pain (6/10) with pivot. Pt requires assist with clothing management. Stand pivot transfer from toilet < w/c with HHA and CGA. Pt then transfers from w/c to EOB with HHA on R UE and CGA. Performs seated (R) LE ther ex including marches x 10, LAQ x 10. Performs sit < sup transfer with SPV and pt is assisted in doffing (L) knee brace and (R) knee immobilizer while supine. Pt positioned comfortably with call bell in hand, bed alarm on, all needs met. Functional Outcomes                 Cognition  WFL  Orientation:    alert and oriented x 4  Command Following:   [facility]    Education  Barriers To Learning: none  Patient Education: patient educated on goals, PT role and benefits, plan of care, precautions, weight-bearing education, general safety, functional mobility training, proper use of assistive device/equipment, transfer training  Learning Assessment:  patient verbalizes and demonstrates understanding    Assessment  Activity Tolerance: Good. C/o increased pain in the (L) knee and (L) chest with mobility. Reports that the lidocaine patches are helping with pain control. Adequate rest breaks provided for recovery. Impairments Requiring Therapeutic Intervention: decreased functional mobility, decreased ADL status, decreased ROM, decreased strength, decreased endurance, decreased balance, increased pain, decreased posture  Prognosis: good  Clinical Assessment: Patient is a 70 y/o female who presents with increased pain and impaired functional mobility secondary to a right radius fracture and a right tibial fracture from a fall that occurred on 1/5/2023. Patient was discharged from the ARU for surgical procedures to the (R) UE and LE on 1/19. She now returns to the ARU and presents with (L) knee and (L) chest pain from overuse which is limiting her ability to ambulate and requiring increased assistance with transfers.  Prior to admission, patient was independent with all ADLs and mobility without use of an AD. Patient would benefit from skilled therapy services to promote functional independence with all mobility, decrease pain, and reduce caregiver burden upon return to home. Safety Interventions: patient left in chair, chair alarm in place, call light within reach, gait belt, and patient at risk for falls    Plan  Frequency: 5 x/week, 90 min/day  Current Treatment Recommendations: strengthening, ROM, balance training, functional mobility training, transfer training, gait training, stair training, endurance training, neuromuscular re-education, modalities, patient/caregiver education, and ADL/self-care training    Goals  Patient Goals:  To return home   Short Term Goals:  Time Frame: 10-14 days  Patient will complete transfers at [facility]   Patient will ambulate 50 ft with use of platform walker (R) at supervision  Patient will ascend/descend 1 + 1 stairs with (R) ascending handrail at supervision  Patient will complete car transfer at [facility]  Patient will complete manual w/c propulsion 270 ft at [facility] including ramp management    Therapy Session Time      Individual Group Co-treatment   Time In  0930       Time Out  1030       Minutes  60           Second Session Therapy Time:   Individual Concurrent Group Co-treatment   Time In  1245         Time Out  1315         Minutes  30           Timed Code Treatment Minutes:  60 + 30 minutes  Total Treatment Minutes:  90 minutes     Electronically Signed By:   Cherrie Garcia PT, DPT [id number]

## 2023-01-28 NOTE — PROGRESS NOTES
Natalie [facility] [address]   Phone: [phone]    Occupational Therapy    [] Initial Evaluation            [x] Daily Treatment Note         [] Discharge Summary      Patient: Chivo Glover   : 1950   MRN: [id number]   Date of Service:  2023    Admitting Diagnosis:  Multiple fractures  Current Admission Summary: 81-year-old female with a history of hyperlipidemia and bladder cancer who was admitted to [facility] on  with right arm and leg pain after a fall from a ladder. She reports she was on the last step and thought she was on the ground and fell 1 step to the ground. Work-up revealed a right radius fracture and a right tibial fracture. CT of the knee revealed an intra-articular tibial fracture involving the spine. Ortho evaluated and suggested nonweightbearing nonoperative management with a knee immobilizer on the right lower extremity. Her hospital course was complicated by urinary retention requiring catheterization. Her catheter was removed prior to transfer to this facility. She was admitted to our facility on 1/all and requested to transition her orthopedic providers to [facility]. Ortho evaluated and suggested surgical intervention on both fractures in both right upper extremity and right lower extremities. She was transferred to surgery on . Her postoperative course and an overall unremarkable. She was bilaterally by therapy again and deemed appropriate for an ARU stay. Past Medical History:  has a past medical history of Arthritis, Cancer ([address] and Hyperlipidemia. Past Surgical History:  has a past surgical history that includes Abdomen surgery; Colonoscopy; fracture surgery; Hysterectomy; Tonsillectomy; Breast reduction surgery (); eye surgery (); Cystoscopy (N/A, 2021); Wrist fracture surgery (Right, 2023); and Leg Surgery (Right, 2023).     Discharge Recommendations: [facility] OT S3    DME Required For Discharge: DME to be determined pending patient progress    Precautions/Restrictions: high fall risk, weight bearing, ROM restrictions  Weight Bearing Restrictions: NWB (R) wrist (okay for elbow WB for platform RW use; TTWB R LE  [x] Right Upper Extremity  [] Left Upper Extremity [x] Right Lower Extremity  [] Left Lower Extremity     Required Braces/Orthotics: KI on (R) LE; R UE wrist/forearm cast   [x] Right  [] Left  Positional Restrictions: no ROM to (R) LE    Pre-Admission Information   Lives With: spouse                  Type of Home: condo  Home Layout: one level  Home Access:  1 + 1 NORA with (R) grab bar  Bathroom Layout: walk in shower  Bathroom Equipment:  owns grab bars but not installed  Toilet Height: standard height  Home Equipment: no prior equipment  Transfer Assistance: Independent without use of device  Ambulation Assistance:Independent without use of device  ADL Assistance: independent with all ADL's  IADL Assistance: requires assistance with all homemaking tasks  Active :        [x] Yes                 [] No  Hand Dominance: [] Left                 [x] Right  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 Subjective  General: Pt supine in bed upon arrival. Pt pleasant and agreeable to OT session today. Pt with c/o pain in chest, L knee, R forearm, and RLE. Pt reports feeling \"really frustrated\" about current situation and motivated to return to prior function. Pain: Pain rating taken based on observed faces and behaviors  Pt with c/o pain in chest, L knee, R forearm, and RLE, did not rate at this time.  Pt requested wearing R knee brace at beginning of session for \"security\" d/t previously \"twisting it really bad\"  Pain Interventions: RN notified of patient request for pain medication and repositioned         Activities of Daily Living  Basic Activities of Daily Living  Grooming: setup assistance  Grooming Comments: w/c level oral care, hand washing, and brushing hair at sink  Upper Extremity Dressing: supervision Comment: cues for donning shirt with adaptive dressing techniques - donned overhead shirt   Lower Extremity Dressing: moderate assistance requires verbal cueing  Dressing Equipment: reacher, long handled shoe horn, sock aide  Dressing Comments: pt attempted LB dressing but unsuccessful d/t pain and limited ROM with RLE brace - pt was educated on use of LH reacher, sock aide, and shoe horn. Pt able to implement education of adaptive devices with Adam to complete LB dressing. Pt donned pants, underwear, sock, and L shoe. Toileting: moderate assistance. Toileting Comments: assist required for pericare and clothing management   General Comments: Pt completed all ADLs with increased time and c/o pain in RLE, required rest breaks throughout   Instrumental Activities of Daily Living  No IADL completed on this date. Functional Mobility  Bed Mobility  Supine to Sit: stand by assistance  Scooting: stand by assistance  Comments: HOB elevated, use of handrails, increased time to complete   Transfers  Sit to stand transfer:contact guard assistance, minimal assistance  Stand to sit transfer: contact guard assistance, minimal assistance  Stand pivot transfer: contact guard assistance, minimal assistance  Bed / Chair transfer: contact guard assistance, minimal assistance.     Bed / Chair comments: cues for hand placement and sequencing   Toilet transfer: minimal assistance  Toilet transfer equipment: standard toilet, grab bars  Toilet transfer comments: stand pivot with richard RW from w/c to standard toilet   Comments: platform RW used for all transfers- assist for RW management on R side during turns, increased time to complete pivots - fearful of backwards/sidewards hops with t/fs, max cues and education for sequencing/safety/encouragement to complete   Functional Mobility:  Sitting Balance: stand by assistance. Standing Balance: contact guard assistance. Functional Mobility Comment: Pt completed 2-6 hops throughout hospital room to complete transfers - completed with richard RW - requires cues for safety/sequencing  and encouragement d/t fear of mobilization     Other Therapeutic Interventions        Second Session:  Patient in recliner upon arrival, agreeable to OT session. Pt reported no c/o pain this date, however did endorse fatigue from previous therapy sessions. Pt completed t/f from recliner > w/c with use of R richard walker and CGA-Adam. Pt completed fxl mobility to therapy gym in w/c propelling self with LUE/LLE. Pt completed t/f from w/c > mat table with stand pivot and Adam, max cues for sequencing, good awareness or WB precautions. Sit <> stand t/fs from mat table with use of richard RW x3 trials, initially requiring Adam, progressing to CGA. Pt required max cues for sequencing and increased safety awareness. In standing stance, pt completed x10 reps, x1 set of the following LUE therapeutic exercises for increased strength and endurance in preparation for transfers and ADLs: bicep curls, shoulder flexion, shoulder horizontal ab/adduction, and triceps extension. Pt completed therex with fair tolerance, decreased endurance noted requiring rest breaks intermittently. Tolerated standing at richard RW x5 minutes to engage in corn hole activity with reaching out of LIUDMILA to collect task items and toss to target - completed with CGA and x2 minor LOB with Adam for correction. Fair tolerance. Pt propelled self in w/c from therapy gym > room. Completed stand pivot t/f from w/c to recliner chair with Adam and cues for increased safety. Pt was left sitting in recliner chair with call light within reach and all needs met.         Cognition  Overall Cognitive Status: WFL  Following Commands: follows multi step commands with repetition  Attention Span: attends with cues to redirect  Sequencing: requires cues for some  Comments: pt impulsive at times, requires cues for pacing and increased safety; cog overall WFL  Orientation:    alert and oriented x 4  Command Following:   [facility]  Barriers To Learning: physical  Patient Education: patient educated on goals, OT role and benefits, plan of care, precautions, weight-bearing education, transfer training, discharge recommendations  Learning Assessment:  patient verbalizes understanding, would benefit from continued reinforcement    Assessment  Activity Tolerance: fair tolerance; limited by pain at times but able to participate well   Impairments Requiring Therapeutic Intervention: decreased functional mobility, decreased ADL status, decreased strength, decreased endurance, decreased balance, decreased IADL, increased pain  Prognosis: good  Clinical Assessment: Pt is a 71 yr old female who presents with the above deficits impacting her occupational performance. Pt is below her baseline level of function d/t s/p ORIF of R radius fx and R tibial fx. Pt now TTWB in R LE and NWB in R UE w/ exception of elbow WB clearance. Pt now requires extensive assist for LB ADL completion and increased need for assist with AD for transfers/mobility. Pt limited by pain and low endurance/strength. Skilled OT services warranted in order to maximize her IND and safety with all occupational pursuits.    Safety Interventions: patient left in chair, chair alarm in place, call light within reach, and gait belt    Plan  Frequency: 5 x/week, 90 min/day  Current Treatment Recommendations: strengthening, balance training, functional mobility training, transfer training, endurance training, wheelchair mobility training, patient/caregiver education, ADL/self-care training, IADL training, home management training, pain management, safety education, and equipment evaluation/education    Goals  Patient Goals: regain IND with mobility and \"taking care of self\"    Short Term Goals:  Time Frame: 14 days  Patient will complete upper body bathing and dressing at [facility]   Patient will complete lower body bathing and dressing at [facility] independent   Patient will complete toileting at modified independent   Patient will complete functional transfers at [facility] independent   Patient will complete functional mobility at modified independent   Patient to gather and transport IADL items at modified independent   Patient will independently don/doff KI and/or independently verbalize brace management to caregiver with 100% accuracy     Therapy Session Time     Individual Group Co-treatment   Time In  0830      Time Out  0920      Minutes  50         Second Session Therapy Time:   Individual Concurrent Group Co-treatment   Time In  1100         Time Out  1145         Minutes  45             Timed Code Treatment Minutes:   50 + 45 minutes   Total Treatment Minutes:  95 minutes        First session/Second session: Electronically Signed By: Ellen Escalante OTR/ANNEL [id number]

## 2023-01-28 NOTE — PLAN OF CARE
Problem: Discharge Planning  Goal: Discharge to home or other facility with appropriate resources  Outcome: Progressing     Problem: Pain  Goal: Verbalizes/displays adequate comfort level or baseline comfort level  Outcome: Progressing     Problem: Safety - Adult  Goal: Free from fall injury  Outcome: Progressing     Problem: ABCDS Injury Assessment  Goal: Absence of physical injury  Outcome: Leila DAVIS, RN   [phone]  1/28/2023 at 6824

## 2023-01-29 VITALS
WEIGHT: 164.5 LBS | RESPIRATION RATE: 16 BRPM | TEMPERATURE: 98 F | HEIGHT: 65 IN | DIASTOLIC BLOOD PRESSURE: 65 MMHG | OXYGEN SATURATION: 98 % | SYSTOLIC BLOOD PRESSURE: 111 MMHG | BODY MASS INDEX: 27.41 KG/M2 | HEART RATE: 77 BPM

## 2023-01-29 LAB
EKG ATRIAL RATE: 73 BPM
EKG DIAGNOSIS: NORMAL
EKG P AXIS: 59 DEGREES
EKG P-R INTERVAL: 158 MS
EKG Q-T INTERVAL: 382 MS
EKG QRS DURATION: 74 MS
EKG QTC CALCULATION (BAZETT): 420 MS
EKG R AXIS: 26 DEGREES
EKG T AXIS: 64 DEGREES
EKG VENTRICULAR RATE: 73 BPM

## 2023-01-29 PROCEDURE — 93005 ELECTROCARDIOGRAM TRACING: CPT | Performed by: PHYSICAL MEDICINE & REHABILITATION

## 2023-01-29 PROCEDURE — 6360000002 HC RX W HCPCS: Performed by: PHYSICAL MEDICINE & REHABILITATION

## 2023-01-29 PROCEDURE — 93010 ELECTROCARDIOGRAM REPORT: CPT | Performed by: INTERNAL MEDICINE

## 2023-01-29 PROCEDURE — 6370000000 HC RX 637 (ALT 250 FOR IP): Performed by: PHYSICAL MEDICINE & REHABILITATION

## 2023-01-29 PROCEDURE — 1280000000 HC REHAB R&B

## 2023-01-29 RX ADMIN — OXYCODONE HYDROCHLORIDE 10 MG: 5 TABLET ORAL at 09:46

## 2023-01-29 RX ADMIN — TRAZODONE HYDROCHLORIDE 100 MG: 50 TABLET ORAL at 22:03

## 2023-01-29 RX ADMIN — METHOCARBAMOL TABLETS 750 MG: 750 TABLET, COATED ORAL at 09:48

## 2023-01-29 RX ADMIN — Medication 2000 UNITS: at 17:11

## 2023-01-29 RX ADMIN — OXYCODONE HYDROCHLORIDE 10 MG: 5 TABLET ORAL at 14:31

## 2023-01-29 RX ADMIN — Medication 1000 MG: at 22:13

## 2023-01-29 RX ADMIN — HEPARIN SODIUM 5000 UNITS: 5000 INJECTION INTRAVENOUS; SUBCUTANEOUS at 06:24

## 2023-01-29 RX ADMIN — ATORVASTATIN CALCIUM 40 MG: 40 TABLET, FILM COATED ORAL at 22:03

## 2023-01-29 RX ADMIN — ONDANSETRON 4 MG: 4 TABLET, ORALLY DISINTEGRATING ORAL at 09:34

## 2023-01-29 RX ADMIN — METHOCARBAMOL TABLETS 750 MG: 750 TABLET, COATED ORAL at 12:46

## 2023-01-29 RX ADMIN — STANDARDIZED SENNA CONCENTRATE AND DOCUSATE SODIUM 1 TABLET: 8.6; 5 TABLET ORAL at 22:03

## 2023-01-29 RX ADMIN — BUPROPION HYDROCHLORIDE 300 MG: 150 TABLET, EXTENDED RELEASE ORAL at 11:04

## 2023-01-29 RX ADMIN — POLYETHYLENE GLYCOL 3350 17 G: 17 POWDER, FOR SOLUTION ORAL at 06:24

## 2023-01-29 RX ADMIN — ESCITALOPRAM OXALATE 10 MG: 10 TABLET, FILM COATED ORAL at 11:04

## 2023-01-29 RX ADMIN — Medication 1000 MG: at 11:04

## 2023-01-29 RX ADMIN — METHOCARBAMOL TABLETS 750 MG: 750 TABLET, COATED ORAL at 22:03

## 2023-01-29 RX ADMIN — ACETAMINOPHEN 1000 MG: 500 TABLET ORAL at 22:03

## 2023-01-29 RX ADMIN — HEPARIN SODIUM 5000 UNITS: 5000 INJECTION INTRAVENOUS; SUBCUTANEOUS at 14:01

## 2023-01-29 RX ADMIN — FERROUS SULFATE TAB 325 MG (65 MG ELEMENTAL FE) 325 MG: 325 (65 FE) TAB at 12:46

## 2023-01-29 RX ADMIN — ACETAMINOPHEN 1000 MG: 500 TABLET ORAL at 14:21

## 2023-01-29 RX ADMIN — STANDARDIZED SENNA CONCENTRATE AND DOCUSATE SODIUM 1 TABLET: 8.6; 5 TABLET ORAL at 11:04

## 2023-01-29 RX ADMIN — OXYCODONE HYDROCHLORIDE 10 MG: 5 TABLET ORAL at 22:02

## 2023-01-29 RX ADMIN — ACETAMINOPHEN 1000 MG: 500 TABLET ORAL at 09:45

## 2023-01-29 RX ADMIN — METHOCARBAMOL TABLETS 750 MG: 750 TABLET, COATED ORAL at 17:11

## 2023-01-29 RX ADMIN — HEPARIN SODIUM 5000 UNITS: 5000 INJECTION INTRAVENOUS; SUBCUTANEOUS at 22:02

## 2023-01-29 RX ADMIN — ARMODAFINIL 250 MG: 250 TABLET ORAL at 12:45

## 2023-01-29 ASSESSMENT — PAIN SCALES - GENERAL
PAINLEVEL_OUTOF10: 7
PAINLEVEL_OUTOF10: 8
PAINLEVEL_OUTOF10: 7
PAINLEVEL_OUTOF10: 8
PAINLEVEL_OUTOF10: 5
PAINLEVEL_OUTOF10: 4
PAINLEVEL_OUTOF10: 8
PAINLEVEL_OUTOF10: 6
PAINLEVEL_OUTOF10: 0
PAINLEVEL_OUTOF10: 5
PAINLEVEL_OUTOF10: 7

## 2023-01-29 ASSESSMENT — PAIN DESCRIPTION - ORIENTATION
ORIENTATION: RIGHT;MID
ORIENTATION_3: LEFT
ORIENTATION: RIGHT
ORIENTATION_2: RIGHT
ORIENTATION: MID
ORIENTATION_3: LEFT
ORIENTATION: RIGHT
ORIENTATION: MID;RIGHT
ORIENTATION_2: RIGHT

## 2023-01-29 ASSESSMENT — PAIN DESCRIPTION - DESCRIPTORS
DESCRIPTORS: ACHING;DISCOMFORT;SORE
DESCRIPTORS_2: ACHING;DISCOMFORT
DESCRIPTORS_3: ACHING;DISCOMFORT
DESCRIPTORS: ACHING;DISCOMFORT;SORE
DESCRIPTORS: ACHING;DISCOMFORT
DESCRIPTORS: ACHING;SORE
DESCRIPTORS_2: ACHING;DISCOMFORT;SORE
DESCRIPTORS: ACHING;SORE
DESCRIPTORS_3: ACHING;DISCOMFORT;SORE

## 2023-01-29 ASSESSMENT — PAIN - FUNCTIONAL ASSESSMENT
PAIN_FUNCTIONAL_ASSESSMENT: PREVENTS OR INTERFERES SOME ACTIVE ACTIVITIES AND ADLS
PAIN_FUNCTIONAL_ASSESSMENT_SITE2: ACTIVITIES ARE NOT PREVENTED
PAIN_FUNCTIONAL_ASSESSMENT: ACTIVITIES ARE NOT PREVENTED
PAIN_FUNCTIONAL_ASSESSMENT: ACTIVITIES ARE NOT PREVENTED
PAIN_FUNCTIONAL_ASSESSMENT_SITE2: ACTIVITIES ARE NOT PREVENTED

## 2023-01-29 ASSESSMENT — PAIN DESCRIPTION - INTENSITY
RATING_2: 5
RATING_3: 5
RATING_2: 4
RATING_2: 8
RATING_3: 4
RATING_2: 5
RATING_3: 5
RATING_3: 8
RATING_2: 5
RATING_3: 5

## 2023-01-29 ASSESSMENT — PAIN DESCRIPTION - LOCATION
LOCATION: CHEST;KNEE
LOCATION_2: LEG
LOCATION: CHEST
LOCATION: CHEST;LEG
LOCATION: CHEST;LEG
LOCATION_3: KNEE
LOCATION_2: LEG
LOCATION_3: KNEE
LOCATION: CHEST

## 2023-01-29 ASSESSMENT — PAIN SCALES - WONG BAKER: WONGBAKER_NUMERICALRESPONSE: 0

## 2023-01-29 ASSESSMENT — PAIN DESCRIPTION - ONSET
ONSET: ON-GOING
ONSET: ON-GOING

## 2023-01-29 ASSESSMENT — PAIN DESCRIPTION - FREQUENCY
FREQUENCY: CONTINUOUS
FREQUENCY: CONTINUOUS
FREQUENCY: INTERMITTENT

## 2023-01-29 ASSESSMENT — PAIN DESCRIPTION - PAIN TYPE
TYPE: ACUTE PAIN

## 2023-01-29 NOTE — PLAN OF CARE
Problem: Discharge Planning  Goal: Discharge to home or other facility with appropriate resources  Outcome: Progressing     Problem: Pain  Goal: Verbalizes/displays adequate comfort level or baseline comfort level  Outcome: Progressing     Problem: Safety - Adult  Goal: Free from fall injury  Outcome: Progressing     Problem: ABCDS Injury Assessment  Goal: Absence of physical injury  Outcome: Gypsy DAVIS, RN   1/29/2023 at 9074  [phone]

## 2023-01-30 LAB
ANION GAP SERPL CALCULATED.3IONS-SCNC: 8 MMOL/L (ref 3–16)
BUN BLDV-MCNC: 12 MG/DL (ref 7–20)
CALCIUM SERPL-MCNC: 9.3 MG/DL (ref 8.3–10.6)
CHLORIDE BLD-SCNC: 101 MMOL/L (ref 99–110)
CO2: 31 MMOL/L (ref 21–32)
CREAT SERPL-MCNC: <0.5 MG/DL (ref 0.6–1.2)
GFR SERPL CREATININE-BSD FRML MDRD: >60 ML/MIN/{1.73_M2}
GLUCOSE BLD-MCNC: 93 MG/DL (ref 70–99)
HCT VFR BLD CALC: 33.7 % (ref 36–48)
HEMOGLOBIN: 10.8 G/DL (ref 12–16)
MCH RBC QN AUTO: 29.8 PG (ref 26–34)
MCHC RBC AUTO-ENTMCNC: 32 G/DL (ref 31–36)
MCV RBC AUTO: 93.2 FL (ref 80–100)
PDW BLD-RTO: 13.4 % (ref 12.4–15.4)
PLATELET # BLD: 343 K/UL (ref 135–450)
PMV BLD AUTO: 7.3 FL (ref 5–10.5)
POTASSIUM SERPL-SCNC: 4.6 MMOL/L (ref 3.5–5.1)
RBC # BLD: 3.61 M/UL (ref 4–5.2)
SODIUM BLD-SCNC: 140 MMOL/L (ref 136–145)
WBC # BLD: 5.7 K/UL (ref 4–11)

## 2023-01-30 PROCEDURE — 97116 GAIT TRAINING THERAPY: CPT

## 2023-01-30 PROCEDURE — 6370000000 HC RX 637 (ALT 250 FOR IP): Performed by: PHYSICAL MEDICINE & REHABILITATION

## 2023-01-30 PROCEDURE — 97530 THERAPEUTIC ACTIVITIES: CPT

## 2023-01-30 PROCEDURE — 80048 BASIC METABOLIC PNL TOTAL CA: CPT

## 2023-01-30 PROCEDURE — 6360000002 HC RX W HCPCS: Performed by: PHYSICAL MEDICINE & REHABILITATION

## 2023-01-30 PROCEDURE — 1280000000 HC REHAB R&B

## 2023-01-30 PROCEDURE — 36415 COLL VENOUS BLD VENIPUNCTURE: CPT

## 2023-01-30 PROCEDURE — 97535 SELF CARE MNGMENT TRAINING: CPT

## 2023-01-30 PROCEDURE — 85027 COMPLETE CBC AUTOMATED: CPT

## 2023-01-30 PROCEDURE — 97110 THERAPEUTIC EXERCISES: CPT

## 2023-01-30 RX ADMIN — STANDARDIZED SENNA CONCENTRATE AND DOCUSATE SODIUM 1 TABLET: 8.6; 5 TABLET ORAL at 21:58

## 2023-01-30 RX ADMIN — METHOCARBAMOL TABLETS 750 MG: 750 TABLET, COATED ORAL at 21:58

## 2023-01-30 RX ADMIN — METHOCARBAMOL TABLETS 750 MG: 750 TABLET, COATED ORAL at 08:57

## 2023-01-30 RX ADMIN — METHOCARBAMOL TABLETS 750 MG: 750 TABLET, COATED ORAL at 13:46

## 2023-01-30 RX ADMIN — HEPARIN SODIUM 5000 UNITS: 5000 INJECTION INTRAVENOUS; SUBCUTANEOUS at 07:15

## 2023-01-30 RX ADMIN — METHOCARBAMOL TABLETS 750 MG: 750 TABLET, COATED ORAL at 17:22

## 2023-01-30 RX ADMIN — HEPARIN SODIUM 5000 UNITS: 5000 INJECTION INTRAVENOUS; SUBCUTANEOUS at 13:45

## 2023-01-30 RX ADMIN — TRAZODONE HYDROCHLORIDE 100 MG: 50 TABLET ORAL at 21:58

## 2023-01-30 RX ADMIN — HEPARIN SODIUM 5000 UNITS: 5000 INJECTION INTRAVENOUS; SUBCUTANEOUS at 21:57

## 2023-01-30 RX ADMIN — ACETAMINOPHEN 1000 MG: 500 TABLET ORAL at 08:57

## 2023-01-30 RX ADMIN — OXYCODONE HYDROCHLORIDE 10 MG: 5 TABLET ORAL at 21:57

## 2023-01-30 RX ADMIN — ATORVASTATIN CALCIUM 40 MG: 40 TABLET, FILM COATED ORAL at 21:58

## 2023-01-30 RX ADMIN — Medication 1000 MG: at 08:57

## 2023-01-30 RX ADMIN — ACETAMINOPHEN 1000 MG: 500 TABLET ORAL at 21:58

## 2023-01-30 RX ADMIN — ACETAMINOPHEN 1000 MG: 500 TABLET ORAL at 13:46

## 2023-01-30 RX ADMIN — Medication 2000 UNITS: at 17:22

## 2023-01-30 RX ADMIN — OXYCODONE HYDROCHLORIDE 10 MG: 5 TABLET ORAL at 08:57

## 2023-01-30 RX ADMIN — FERROUS SULFATE TAB 325 MG (65 MG ELEMENTAL FE) 325 MG: 325 (65 FE) TAB at 08:57

## 2023-01-30 RX ADMIN — ARMODAFINIL 250 MG: 250 TABLET ORAL at 10:42

## 2023-01-30 RX ADMIN — ESCITALOPRAM OXALATE 10 MG: 10 TABLET, FILM COATED ORAL at 08:57

## 2023-01-30 RX ADMIN — OXYCODONE HYDROCHLORIDE 10 MG: 5 TABLET ORAL at 13:46

## 2023-01-30 RX ADMIN — STANDARDIZED SENNA CONCENTRATE AND DOCUSATE SODIUM 1 TABLET: 8.6; 5 TABLET ORAL at 08:57

## 2023-01-30 RX ADMIN — Medication 1000 MG: at 21:57

## 2023-01-30 RX ADMIN — BUPROPION HYDROCHLORIDE 300 MG: 150 TABLET, EXTENDED RELEASE ORAL at 08:57

## 2023-01-30 ASSESSMENT — PAIN DESCRIPTION - DESCRIPTORS
DESCRIPTORS: PRESSURE;TIGHTNESS
DESCRIPTORS: PRESSURE
DESCRIPTORS_3: ACHING;DISCOMFORT
DESCRIPTORS: ACHING;PRESSURE

## 2023-01-30 ASSESSMENT — PAIN DESCRIPTION - ORIENTATION
ORIENTATION: RIGHT;LEFT
ORIENTATION_3: LEFT
ORIENTATION: RIGHT;LEFT

## 2023-01-30 ASSESSMENT — PAIN DESCRIPTION - INTENSITY: RATING_3: 5

## 2023-01-30 ASSESSMENT — PAIN SCALES - GENERAL
PAINLEVEL_OUTOF10: 7
PAINLEVEL_OUTOF10: 8
PAINLEVEL_OUTOF10: 4
PAINLEVEL_OUTOF10: 6

## 2023-01-30 ASSESSMENT — PAIN DESCRIPTION - LOCATION
LOCATION: CHEST
LOCATION_3: KNEE

## 2023-01-30 ASSESSMENT — PAIN SCALES - WONG BAKER: WONGBAKER_NUMERICALRESPONSE: 0

## 2023-01-30 ASSESSMENT — PAIN DESCRIPTION - PAIN TYPE: TYPE: ACUTE PAIN

## 2023-01-30 NOTE — PROGRESS NOTES
Kerri Alberts  1/30/2023  [id number]    Chief Complaint: Multiple fractures    Subjective:   No significant weekend events. No current complaints. Labs reviewed. MSK chest pain remains. ROS: No CP, SOB, dyspnea    Objective:  Patient Vitals for the past 24 hrs:   BP Temp Temp src Pulse Resp SpO2 Weight   01/30/23 0845 (!) 116/57 98 °F (36.7 °C) Oral -- 18 97 % --   01/30/23 0730 -- -- -- -- -- -- 165 lb (74.8 kg)   01/29/23 2354 -- -- -- -- 18 -- --   01/29/23 2232 -- -- -- -- 16 -- --   01/29/23 2145 111/65 98 °F (36.7 °C) Oral 77 18 98 % --   01/29/23 1431 -- -- -- -- 16 -- --   01/29/23 0859 (!) 110/53 97.8 °F (36.6 °C) Oral 77 16 95 % --     Gen: No distress, pleasant. Resting in WC  HEENT: Normocephalic, atraumatic. CV: Regular rate and rhythm. No MRG   Resp: No respiratory distress. CTAB   Abd: Soft, nontender nondistended  Ext: No edema. RLE in immobilizer, RUE in splint  Neuro: Alert, oriented, appropriately interactive. Laboratory data: Available via EMR. Therapy progress:    PT    Supine to Sit: Independent  Sit to Supine: Independent   Sit to Stand: Partial/moderate assistance  Chair/Bed to Chair Transfer: Partial/moderate assistance  Car Transfer:    Ambulation 10 ft: Supervision or touching assistance  Ambulation 50 ft:    Ambulation 150 ft:    Stairs - 1 Step:    Stairs - 4 Step:    Stairs - 12 Step:      OT    Eating: Setup or clean-up assistance  Oral Hygiene: Setup or clean-up assistance  Bathing: Partial/moderate assistance  Upper Body Dressing: Supervision or touching assistance  Lower Body Dressing: Substantial/maximal assistance  Toilet Transfer: Partial/moderate assistance  Toilet Hygiene: Partial/moderate assistance    Speech Therapy         Body mass index is 27.46 kg/m².     Assessment:  Patient Active Problem List   Diagnosis    Clot retention of urine    Multiple fractures    Closed fracture of right distal radius    Closed fracture of right tibial plateau    S/P ORIF (open reduction internal fixation) fracture    Fracture       Plan:   Multiple fractures: s/p ORIF of R radius and tibia 1/19. Nonweightbearing RUE/RLE. Pain control. PT/OT. 2 week FU 2/2     Ecoli UTI: Completed Cipro, Cx sensitive     HLD: Lipitor 40     Depression: Lexapro 10 HS, Wellbutrin 300     Hx bladder cancer: Flomax     MSK chest pain: lidoderm patch to chest     Iron deficiency anemia: fergon       Bowels: Per protocol  Bladder: Per protocol   Sleep: Trazodone 100 scheduled. Pain: resume robaxin scheduled, resume tasia PRN   DVT PPx: heparin   LEE: TBD    Merline Broody, MD 1/30/2023, 8:58 AM    * This document was created using dictation software. While all precautions were taken to ensure accuracy, errors may have occurred. Please disregard any typographical errors.

## 2023-01-30 NOTE — PATIENT CARE CONFERENCE
[facility]  Inpatient Rehabilitation  Weekly Team Conference Note    Patient Name: Tomi Carver        MRN: [id number]    : 1950  (76 y.o.)  Gender: female           The team conference for this patient was held on 2023 at 11:00am and led by:  Zhen Spencer MD    CASE MANAGEMENT:  Assessment:   Patient is a 71year old female who admitted to ARU on 2023 with the admitting diagnosis of Multiple fractures. Patient was originally admitted to ARU on 2023 after being hospitalized at [facility] for a fall from a ladder. While in ARU, Ortho evaluated patient and suggested patient have surgical intervention. Patient was transferred to surgery on . Patient resides at home with spouse. 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 Patient would benefit from skilled ARU PT/OT/SLP to promote increased safety and independence in order to return to her prior level of functioning. Patient anticipates discharging to home with home health care services and recommended DME. PHYSICAL THERAPY:  Bed Mobility  Supine <=> Sit: Independent level.   Comments:   Transfers  Sit to stand transfer: contact guard assistance, performed with (R) platform walker and at ballet bars with use of L UE only  Stand to sit transfer: contact guard assistance, performed with (R) platform walker and at ballet bars with use of L UE only  Stand pivot transfer: contact guard assistance, used (R) platform walker for all transfers  Comments:   Ambulation  Surface:level surface  Assistive Device: platform walker (R)  Assistance: contact guard assistance  Distance: 30 ft  Gait Mechanics: Patient hops on (L) LE with decreased but improving (L) step length and increased use of UE support  Comments:    Stair Mobility  Stair mobility not completed secondary to pain/fatigue with involved NWB status  Wheelchair Mobility:  Chair: manual  Surface: level surface  Method: (L) UE and (L) LE  Distance: 100 + 65 ft  Assistance: supervision  Comments:     QM:  Roll Left and Right  Assistance Needed: Independent  CARE Score: 6  Discharge Goal: Independent  Sit to Lying  Assistance Needed: Independent  CARE Score: 6  Discharge Goal: Independent  Lying to Sitting on Side of Bed  Assistance Needed: Independent  CARE Score: 6  Discharge Goal: Independent  Sit to Stand  Assistance Needed: Partial/moderate assistance  CARE Score: 3  Discharge Goal: Independent  Chair/Bed-to-Chair Transfer  Assistance Needed: Partial/moderate assistance  CARE Score: 3  Discharge Goal: Independent  Car Transfer  Discharge Goal: Independent  Walk 10 Feet  Assistance Needed: Supervision or touching assistance  CARE Score: 4  Discharge Goal: Independent  Walk 50 Feet with Two Turns  Reason if not Attempted: Not attempted due to medical condition or safety concerns  CARE Score: 88  Discharge Goal: Independent  Walk 150 Feet  Reason if not Attempted: Not attempted due to medical condition or safety concerns  CARE Score: 88  Discharge Goal: Independent  Walking 10 Feet on Uneven Surfaces  Reason if not Attempted: Not attempted due to medical condition or safety concerns  CARE Score: 88  Discharge Goal: Independent  1 Step (Curb)  Reason if not Attempted: Not attempted due to medical condition or safety concerns  CARE Score: 88  Discharge Goal: Supervision or touching assistance  4 Steps  Reason if not Attempted: Not attempted due to medical condition or safety concerns  CARE Score: 88  Discharge Goal: Supervision or touching assistance  12 Steps  Reason if not Attempted: Not attempted due to medical condition or safety concerns  CARE Score: 88  Discharge Goal: Supervision or touching assistance  Picking Up Object  Assistance Needed: Partial/moderate assistance  CARE Score: 3  Discharge Goal: Independent  Wheelchair Ability  Uses a Wheelchair and/or Scooter?: Yes    Goals:                   Patient Goals:  To return home   Short Term Goals: Time Frame: 10-14 days  Patient will complete transfers at [facility]   Patient will ambulate 50 ft with use of platform walker (R) at supervision  Patient will ascend/descend 1 + 1 stairs with (R) ascending handrail at supervision  Patient will complete car transfer at [facility]  Patient will complete manual w/c propulsion 270 ft at [facility] including ramp management               These goals were reviewed with this patient at the time of assessment and Kadi Muniz is in agreement. Plan of Care: Pt to be seen 5 out of 7 days per week per ARU protocol ( 90 minutes with PT)           OCCUPATIONAL THERAPY:    ADL:    Feeding: setup assistance  Grooming: setup assistance  Grooming Comments: w/c level oral care and brushing hair at sink  Upper Extremity Bathing: minimal assistance  Lower Extremity Bathing: minimal assistance   Bathing Comments: assist for thoroughness of washing L UE; assist to dry buttocks and CGA for lateral leans for ryan care seated on TTB in shower   Upper Extremity Dressing: supervision  Lower Extremity Dressing: minimal assistance Comment: + assist for brace management/adjustment   Dressing Equipment: reacher  Dressing Comments: CGA to [place] for clothing over hips in stance; use of reacher as needed to thread R/L LE, pt able to don L sock with richard-technique and L foot placed on stool; Brace adjusted with pt bed level prior to shower  Toileting: minimal assistance. Toileting Comments: assist required for clothing management; CGA for balance; voided urine seated  General Comments: toileting completed prior to UB/LB bathing and dressing completed seated on TTB in shower; pt's KI and R UE casting water-proofed for shower and remained dry and intact throughout; post dressing pt sat w/c level at sink for grooming needs; pt requires increased time for all ADL completion     Toilet Transfers:   Toilet transfer: minimal assistance  Toilet transfer equipment: drop-arm bariatric bedside commode, grab bars, walker, transfers from w/c  Toilet transfer comments: SPT w/c >> commode; pt ambulated commode>> shower post voiding     Tub/ShowerTransfers:  Shower transfer: minimal assistance  Shower transfer equipment: tub transfer bench, grab bars, walker  Shower transfer comments: intermittent assist for RW management during turn; VC for surface proximity      QM:  Eating  Assistance Needed: Setup or clean-up assistance  CARE Score: 5  Discharge Goal: Independent  Oral Hygiene  Assistance Needed: Setup or clean-up assistance  CARE Score: 5  Discharge Goal: [address] needed: Partial/moderate assistance  CARE Score: 3  Discharge Goal: Independent  Toilet Transfer  Assistance needed: Supervision or touching assistance  CARE Score: 4  Discharge Goal: Independent  Shower/Bathe Self  Assistance Needed: Partial/moderate assistance  CARE Score: 3  Discharge Goal: Independent  Upper Body Dressing  Assistance Needed: Setup or clean-up assistance  CARE Score: 5  Discharge Goal: Independent  Lower Body Dressing  Assistance Needed: Partial/moderate assistance  CARE Score: 3  Discharge Goal: Independent  Putting On/Taking Off Footwear  Assistance Needed: Partial/moderate assistance  CARE Score: 3  Discharge Goal: Independent    Goals:           Time Frame: 14 days  Patient will complete upper body bathing and dressing at [facility]   Patient will complete lower body bathing and dressing at [facility]   Patient will complete toileting at modified independent   Patient will complete functional transfers at [facility]   Patient will complete functional mobility at modified independent   Patient to gather and transport IADL items at modified independent   Patient will independently don/doff KI and/or independently verbalize brace management to caregiver with 100% accuracy    These goals were reviewed with this patient at the time of assessment and Kirit Hollins is in agreement    Plan of Care:  Pt to be seen 5 out of 7 days per week per ARU protocol ( 90 minutes with OT)     NUTRITION:  Weight: 165 lb (74.8 kg) / Body mass index is 27.46 kg/m². Diet Order: Regular    Supplements:Glucerna @ [facility] daily    Pt receives a regular diet & reports eating well & has no appetite issues. Received Glucerna @ [facility] previously, & would like to con't. Will con't to monitor & encourage adequate nutrition to promote healing & strength. Please see nutrition note for details. NURSING:    Risk for Readmission: 14%    Briones Fall Risk Score: 70  Wounds/Incisions/Ulcers: Incisions to right upper and right lower extremities  Medication Review: Reviewed daily with patient. Pain: Managed with and without medications. Consultations/Labs/X-rays:    Labs: BMP & CBC every Monday & Thursday    Patient/Family Education provided by team:    Discharge Plan   Estimated Length of Stay:7 days  Destination: Home  Pass:No  Services at Discharge: [place] OT/PT S3  Equipment at Discharge: w/c, shower chair, platform RW, w/c  Factors facilitating achievement of predicted outcomes: Cooperative, PLOF  Barriers to the achievement of predicted outcomes: Pain and Decreased endurance    Patient Goals:  Regain independence, be able to take care of self, go home. Interdisciplinary Individualized Plan of Care Review of Previous Week:    Medical and functional progress towards goals:  Medically doing well, 2 week follow up with ortho Thursday, not anticipating any significant changes to [address], labs and vitals stable. Transfers and ambulation now independent, will do home eval this week. ADLs improving, pt progressing with adaptive equipment use.   Barriers towards progress:  Chest pain, standing balance impaired  Interventions to address Barriers:  pain meds ordered, PT still working with pt on standing balance training  Goals still appropriate:  Yes  Modifications to goals: None  Continue Current Plan of Care:  Yes  Modifications to Plan of Care: None    Rehab Team Members in attendance for Team Conference:  Samanta Sierra, MSW, LSW    Albert Milton, RD, LD    George Diego OTR/ANNEL Lam, PT, DPT    Ej Nichole, KIRK De Los Santos PT, DPT,     I approve the established interdisciplinary plan of care as documented within the medical record of Haley Luther.     Christofer Barker MD   Electronically signed by Christofer Barker MD on 1/31/2023 at 11:28 AM

## 2023-01-30 NOTE — PROGRESS NOTES
[facility]   Phone: [phone]    Occupational Therapy    [] Initial Evaluation            [x] Daily Treatment Note         [] Discharge Summary      Patient: Nick Albarado   : 1950   MRN: [id number]   Date of Service:  2023    Admitting Diagnosis:  Multiple fractures  Current Admission Summary: 30-year-old female with a history of hyperlipidemia and bladder cancer who was admitted to [facility] on  with right arm and leg pain after a fall from a ladder. She reports she was on the last step and thought she was on the ground and fell 1 step to the ground. Work-up revealed a right radius fracture and a right tibial fracture. CT of the knee revealed an intra-articular tibial fracture involving the spine. Ortho evaluated and suggested nonweightbearing nonoperative management with a knee immobilizer on the right lower extremity. Her hospital course was complicated by urinary retention requiring catheterization. Her catheter was removed prior to transfer to this facility. She was admitted to our facility on 1/all and requested to transition her orthopedic providers to [facility]. Ortho evaluated and suggested surgical intervention on both fractures in both right upper extremity and right lower extremities. She was transferred to surgery on . Her postoperative course and an overall unremarkable. She was bilaterally by therapy again and deemed appropriate for an ARU stay. Past Medical History:  has a past medical history of Arthritis, Cancer ([address] and Hyperlipidemia. Past Surgical History:  has a past surgical history that includes Abdomen surgery; Colonoscopy; fracture surgery; Hysterectomy; Tonsillectomy; Breast reduction surgery (); eye surgery (); Cystoscopy (N/A, 2021); Wrist fracture surgery (Right, 2023); and Leg Surgery (Right, 2023).     Discharge Recommendations: [place] OT S3    DME Required For Discharge: wheelchair, shower chair with back, grab bars - shower, reacher    Precautions/Restrictions: high fall risk, weight bearing, ROM restrictions  Weight Bearing Restrictions: NWB (R) wrist (okay for elbow WB for platform RW use; TTWB R LE  [x] Right Upper Extremity  [] Left Upper Extremity [x] Right Lower Extremity  [] Left Lower Extremity     Required Braces/Orthotics: KI on (R) LE; R UE wrist/forearm cast   [x] Right  [] Left  Positional Restrictions: no ROM to (R) LE    Pre-Admission Information   Lives With: spouse                  Type of Home: condo  Home Layout: one level  Home Access:  1 + 1 NORA with (R) grab bar  Bathroom Layout: walk in shower  Bathroom Equipment:  owns grab bars but not installed  Toilet Height: standard height  Home Equipment: no prior equipment  Transfer Assistance: Independent without use of device  Ambulation Assistance:Independent without use of device  ADL Assistance: independent with all ADL's  IADL Assistance: requires assistance with all homemaking tasks  Active :        [x] Yes                 [] No  Hand Dominance: [] Left                 [x] Right  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 Subjective  General: Pt supine in bed upon arrival. Pt pleasant and agreeable to OT session and shower  Pain: 4/10. Location: R LE, pain in R wrist reported as session progressed; ongoing chest pain complaints      Pain Interventions: patient denies pain interventions- Pt requested pain meds from RN at [facility].          Activities of Daily Living  Basic Activities of Daily Living  Feeding: setup assistance  Grooming: setup assistance  Grooming Comments: w/c level oral care and brushing hair at sink  Upper Extremity Bathing: minimal assistance  Lower Extremity Bathing: minimal assistance   Bathing Comments: assist for thoroughness of washing L UE; assist to dry buttocks and CGA for lateral leans for ryan care seated on TTB in shower   Upper Extremity Dressing: supervision  Lower Extremity Dressing: minimal assistance Comment: + assist for brace management/adjustment   Dressing Equipment: reacher  Dressing Comments: CGA to [place] for clothing over hips in stance; use of reacher as needed to thread R/L LE, pt able to don L sock with richard-technique and L foot placed on stool; Brace adjusted with pt bed level prior to shower  Toileting: minimal assistance. Toileting Comments: assist required for clothing management; CGA for balance; voided urine seated  General Comments: toileting completed prior to UB/LB bathing and dressing completed seated on TTB in shower; pt's KI and R UE casting water-proofed for shower and remained dry and intact throughout; post dressing pt sat w/c level at sink for grooming needs; pt requires increased time for all ADL completion   Instrumental Activities of Daily Living  No IADL completed on this date. Functional Mobility  Bed Mobility  Supine to Sit: supervision  Comments: HOB elevated, use of handrails, increased time to complete   Transfers  Sit to stand transfer:contact guard assistance  Stand to sit transfer: contact guard assistance  Stand pivot transfer: minimal assistance  Toilet transfer: minimal assistance  Toilet transfer equipment: drop-arm bariatric bedside commode, grab bars, walker, transfers from w/c  Toilet transfer comments: SPT w/c >> commode; pt ambulated commode>> shower post voiding   Shower transfer: minimal assistance  Shower transfer equipment: tub transfer bench, grab bars, walker  Shower transfer comments: intermittent assist for RW management during turn; VC for surface proximity   Comments: platform RW used for all transfers  Functional Mobility:  Sitting Balance: supervision, stand by assistance. Standing Balance: contact guard assistance.     Functional Mobility: .  minimal assistance  Functional Mobility Activity: commode >> shower   Functional Mobility Device Use: platform walker (R)  Functional Mobility Comment: assist for RW stability, increased time     Other Therapeutic Interventions        Second Session:  Pt on BSC in room at entry, agreeable to OT assist and tx session. Pt denied pain at rest. Declined additional ADL needs. Sit>stand to RW from [facility] at [place] [address], ryan care completed seated w/ set-up, clothing in stance at [place] [address] with increased time. Pt completed stand-step transfer BSC >> w/c with platform RW at [facility] to [place] [address] (intermittent assist to turn RW fully). Pt propelled w/c 100+ft during session at SUP w/ use of L UE/LE. IADL activity w/c level in kitchenette and dining area in order to increase item transportation safety and w/c management for d/c planning: pt retrieved/replaced and transported 2 items in kitchen- low cabinet <>stove top and counter<>refrigerator, pt then retrieved 5 items from floor/and counter height with reacher as needed- items then folded at table top. Pt demo'd good seated balance throughout, able to maneuver w/c w/o assist at SUP. 1 VC for break management to increase safety during pt reach to floor. Pt propelled back to room at [facility], pt left in w/c, call light and needs in reach, PCA notified.           Cognition  Overall Cognitive Status: WFL  Following Commands: follows multi step commands with repetition  Attention Span: attends with cues to redirect  Sequencing: requires cues for some  Comments: cog overall WFL  Orientation:    alert and oriented x 4  Command Following:   [facility]     Education  Barriers To Learning: physical  Patient Education: patient educated on goals, OT role and benefits, plan of care, precautions, ADL adaptive strategies, weight-bearing education, adaptive device training, energy conservation, transfer training, discharge recommendations  Learning Assessment:  patient verbalizes understanding, would benefit from continued reinforcement    Assessment  Activity Tolerance: fair tolerance; limited by pain at times but able to participate well   Impairments Requiring Therapeutic Intervention: decreased functional mobility, decreased ADL status, decreased strength, decreased endurance, decreased balance, decreased IADL, increased pain  Prognosis: good  Clinical Assessment: Pt is a 71 yr old female who presents with the above deficits impacting her occupational performance. Pt is below her baseline level of function d/t s/p ORIF of R radius fx and R tibial fx. Pt now TTWB in R LE and NWB in R UE w/ exception of elbow WB clearance. Pt is progressing with transfers with CGA to [place] consistently throughotu BADL. Pt tolerated shower level BADL- ongoing training with AE and richard-style techniques recommended. Pt demo'd overall decreased pain compared to prior dates. Pt demo's good abilitiy to complete light IADLs w/c level and progression towards w/c mobility IND. Skilled OT services warranted in order to maximize her IND and safety with all occupational pursuits.      Safety Interventions: patient left in chair, chair alarm in place, call light within reach, and gait belt    Plan  Frequency: 5 x/week, 90 min/day  Current Treatment Recommendations: strengthening, balance training, functional mobility training, transfer training, endurance training, wheelchair mobility training, patient/caregiver education, ADL/self-care training, IADL training, home management training, pain management, safety education, and equipment evaluation/education    Goals  Patient Goals: regain IND with mobility and \"taking care of self\"    Short Term Goals:  Time Frame: 14 days  Patient will complete upper body bathing and dressing at [facility]   Patient will complete lower body bathing and dressing at [facility]   Patient will complete toileting at modified independent   Patient will complete functional transfers at [facility]   Patient will complete functional mobility at modified independent   Patient to gather and transport IADL items at modified independent   Patient will independently don/doff KI and/or independently verbalize brace management to caregiver with 100% accuracy     Goals not yet met, progressing 1/30    Therapy Session Time     Individual Group Co-treatment   Time In 0730      Time Out 0830      Minutes 60         Second Session Therapy Time:   Individual Concurrent Group Co-treatment   Time In 1245         Time Out 1315         Minutes 30             Timed Code Treatment Minutes:  60 + 30  Total Treatment Minutes:  90       First session/Second session: Electronically Signed By:  Leeanna Izquierdo OTR/L #[id number]

## 2023-01-30 NOTE — PROGRESS NOTES
[facility] [facility]   Phone: [phone]    Physical Therapy    [] Initial Evaluation            [x] Daily Treatment Note         [] Discharge Summary      Patient: Kadi Muniz   : 1950   MRN: [id number]   Date of Service:  2023  Admitting Diagnosis: Multiple fractures  Current Admission Summary: 73-year-old female with a history of hyperlipidemia and bladder cancer who was admitted to [facility] on  with right arm and leg pain after a fall from a ladder. She reports she was on the last step and thought she was on the ground and fell 1 step to the ground. Work-up revealed a right radius fracture and a right tibial fracture. CT of the knee revealed an intra-articular tibial fracture involving the spine. Ortho evaluated and suggested nonweightbearing nonoperative management with a knee immobilizer on the right lower extremity. Her hospital course was complicated by urinary retention requiring catheterization. Her catheter was removed prior to transfer to this facility. She was admitted to our facility on 1/all and requested to transition her orthopedic providers to [facility]. Ortho evaluated and suggested surgical intervention on both fractures in both right upper extremity and right lower extremities. She was transferred to surgery on . Her postoperative course and an overall unremarkable. She was evaluated by therapy again and deemed appropriate for an ARU stay. Past Medical History:  has a past medical history of Arthritis, Cancer ([address] and Hyperlipidemia. Past Surgical History:  has a past surgical history that includes Abdomen surgery; Colonoscopy; fracture surgery; Hysterectomy; Tonsillectomy; Breast reduction surgery (); eye surgery (); Cystoscopy (N/A, 2021); Wrist fracture surgery (Right, 2023); and Leg Surgery (Right, 2023).   Discharge Recommendations: Home with Home Health Physical Therapy and family assist PRN  DME Required For Discharge: DME to be determined pending patient progress  Precautions/Restrictions: high fall risk, weight bearing  Weight Bearing Restrictions:   NWBing (R) wrist - ok for platform walker  TTWB (R) LE  Required Braces/Orthotics: knee immobilizer - when out of bed                   [x] Right            [] Left  Positional Restrictions:no positional restrictions    Pre-Admission Information   Lives With: spouse                  Type of Home: [facility]  Home Layout: one level  Home Access:  1 + 1 NORA with (R) grab bar  Bathroom Layout: walk in shower  Bathroom Equipment:  owns grab bars but not installed  Toilet Height: standard height  Home Equipment: no prior equipment  Transfer Assistance: Independent without use of device  Ambulation Assistance:Independent without use of device  ADL Assistance: independent with all ADL's  IADL Assistance: requires assistance with all homemaking tasks  Active :        [x] Yes                 [] No  Hand Dominance: [] Left                 [x] Right  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 Examination   Vision:   Vision Gross Assessment: WFL  Hearing:   WFL      Subjective  General: Patient seated in recliner upon entry, agreeable to therapy. Pain: 3/10. Location: (R) and (L) LE and 5/10. Location: (R) UE and (L) chest  Pain Interventions: pain medication in place prior to arrival       Functional Mobility  Bed Mobility  Bed mobility not completed on this date.   Comments:   Transfers  Sit to stand transfer: contact guard assistance, performed with (R) platform walker and at ballet bars with use of L UE only  Stand to sit transfer: contact guard assistance, performed with (R) platform walker and at ballet bars with use of L UE only  Stand pivot transfer: contact guard assistance, used (R) platform walker for all transfers  Comments:   Ambulation  Surface:level surface  Assistive Device: platform walker (R)  Assistance: contact guard assistance  Distance: 30 ft  Gait Mechanics: Patient hops on (L) LE with decreased but improving (L) step length and increased use of UE support  Comments:    Stair Mobility  Stair mobility not completed on this date. Comments: Unable to perform secondary to increased pain impacting patient's safety   Wheelchair Mobility:  Chair: manual  Surface: level surface  Method: (L) UE and (L) LE  Distance: 100 + 65 ft  Assistance: supervision  Comments:   Balance  Static Sitting Balance: good: independent with functional balance in unsupported position  Dynamic Sitting Balance: good: independent with functional balance in unsupported position  Static Standing Balance: fair (-): maintains balance at CGA with use of UE support  Dynamic Standing Balance: fair (-): maintains balance at CGA with use of UE support  Comments:     Other Therapeutic Interventions   First session:   See functional mobility above. Session began with donning of (L) knee brace and shoe with therapist assist secondary to time constraints. (R) platform adjusted on hemiwalker at the start of the session to promote improved posture with standing tasks and ambulation. Patient reported reduced (L) chest pain during ambulation s/p adjustment. In addition, patient performed standing LE exercises at ballet bar with (L) UE support at CGA: (R) LE 3-way hip 3 x 10 ea, (L) LE calf raises 3 x 10. Standing cone activities with (L) UE at CGA to promote decreased UE reliance with standing tasks: cone stack to stool with (R) UE on hemiwalker 2 x 5, cone reach across and outside LIUDMILA with (R) UE on hemiwalker 2 x 5. Patient demonstrated improved control and stability with weight shifting for cone reaching as evidenced by no LOB. Second session:    Patient seated in recliner upon entry, agreeable to therapy.  Reporting 8/10 pain in the (L) chest. Pain medication being administered via nursing staff upon arrival. Patient performed w/c mobility 100 ft x 2 from room <=> therapy gym at supervision. All transfers performed at [facility] with use of platform walker. In addition, bed mobility performed on mat table at elevated height (27.5 inches) to mimic home setup at [facility]. VC provided for correct sequencing. Mat exercises performed: modified crunches 2 x 10, (R) LE SLR with therapist assist 1 x 10. Patient experienced increased pain in the (R) knee with SLR. However, this eased quickly with rest. Patient propelled herself in her w/c back to her room and transferred to bed via SPT at [facility] with use of HR. Sit to supine performed at modified independent level with HOB partially elevated. Patient left in bed with bed alarm on and call light within reach. Functional Outcomes                 Cognition  WFL  Orientation:    alert and oriented x 4  Command Following:   [facility]    Education  Barriers To Learning: none  Patient Education: patient educated on goals, PT role and benefits, plan of care, precautions, weight-bearing education, general safety, functional mobility training, proper use of assistive device/equipment, transfer training, education on home evaluation  Learning Assessment:  patient verbalizes and demonstrates understanding    Assessment  Activity Tolerance: Good. C/o pain in the (L) knee and (L) chest with mobility. Reports that the lidocaine patches are helping with pain control. Adequate rest breaks provided for recovery. Impairments Requiring Therapeutic Intervention: decreased functional mobility, decreased ADL status, decreased ROM, decreased strength, decreased endurance, decreased balance, increased pain, decreased posture  Prognosis: good  Clinical Assessment: Patient's functional mobility improving this date as evidenced by progressing from min A to CGA with all transfers.  In addition, patient able to ambulate further distances this date with decreased reports of (L) chest pain. However, ambulation continues to be limited by (L) knee pain requiring CGA for stability. Patient would benefit from skilled therapy services to promote functional independence with all mobility, decrease pain, and reduce caregiver burden upon return to home. Safety Interventions: patient left in chair, chair alarm in place, call light within reach, gait belt, and patient at risk for falls    Plan  Frequency: 5 x/week, 90 min/day  Current Treatment Recommendations: strengthening, ROM, balance training, functional mobility training, transfer training, gait training, stair training, endurance training, neuromuscular re-education, modalities, patient/caregiver education, and ADL/self-care training    Goals  Patient Goals: To return home   Short Term Goals:  Time Frame: 10-14 days  Patient will complete transfers at [facility]   Patient will ambulate 50 ft with use of platform walker (R) at supervision  Patient will ascend/descend 1 + 1 stairs with (R) ascending handrail at supervision  Patient will complete car transfer at [facility]  Patient will complete manual w/c propulsion 270 ft at [facility] including ramp management    Therapy Session Time      Individual Group Co-treatment   Time In  0930       Time Out  1034       Minutes  64           Second Session Therapy Time:   Individual Concurrent Group Co-treatment   Time In  1348         Time Out  1418         Minutes  30           Timed Code Treatment Minutes:  64 + 30 minutes  Total Treatment Minutes:  94 minutes     Electronically Signed By:   SHERLYN Gomez  Therapist observed and directed the patient's plan of care.   Co-signed and supervised by: Daniel Nguyen PT, DPT - [id number]

## 2023-01-30 NOTE — PLAN OF CARE
Problem: Discharge Planning  Goal: Discharge to home or other facility with appropriate resources  1/30/2023 1216 by Bessy Faulkner RN  Outcome: Progressing     Problem: Pain  Goal: Verbalizes/displays adequate comfort level or baseline comfort level  1/30/2023 1216 by Bessy Faulkner RN  Outcome: Progressing     Problem: Safety - Adult  Goal: Free from fall injury  1/30/2023 1216 by Bessy Faulkner RN  Outcome: Progressing     Problem: ABCDS Injury Assessment  Goal: Absence of physical injury  1/30/2023 1216 by Bessy Faulkner RN  Outcome: Progressing

## 2023-01-31 PROCEDURE — 6360000002 HC RX W HCPCS: Performed by: PHYSICAL MEDICINE & REHABILITATION

## 2023-01-31 PROCEDURE — 94761 N-INVAS EAR/PLS OXIMETRY MLT: CPT

## 2023-01-31 PROCEDURE — 97116 GAIT TRAINING THERAPY: CPT

## 2023-01-31 PROCEDURE — 97530 THERAPEUTIC ACTIVITIES: CPT

## 2023-01-31 PROCEDURE — 97535 SELF CARE MNGMENT TRAINING: CPT

## 2023-01-31 PROCEDURE — 1280000000 HC REHAB R&B

## 2023-01-31 PROCEDURE — 97110 THERAPEUTIC EXERCISES: CPT

## 2023-01-31 PROCEDURE — 6370000000 HC RX 637 (ALT 250 FOR IP): Performed by: PHYSICAL MEDICINE & REHABILITATION

## 2023-01-31 RX ADMIN — ACETAMINOPHEN 1000 MG: 500 TABLET ORAL at 22:13

## 2023-01-31 RX ADMIN — BUPROPION HYDROCHLORIDE 300 MG: 150 TABLET, EXTENDED RELEASE ORAL at 08:02

## 2023-01-31 RX ADMIN — OXYCODONE HYDROCHLORIDE 10 MG: 5 TABLET ORAL at 12:52

## 2023-01-31 RX ADMIN — STANDARDIZED SENNA CONCENTRATE AND DOCUSATE SODIUM 1 TABLET: 8.6; 5 TABLET ORAL at 08:02

## 2023-01-31 RX ADMIN — OXYCODONE HYDROCHLORIDE 5 MG: 5 TABLET ORAL at 22:13

## 2023-01-31 RX ADMIN — METHOCARBAMOL TABLETS 750 MG: 750 TABLET, COATED ORAL at 22:12

## 2023-01-31 RX ADMIN — STANDARDIZED SENNA CONCENTRATE AND DOCUSATE SODIUM 1 TABLET: 8.6; 5 TABLET ORAL at 22:12

## 2023-01-31 RX ADMIN — METHOCARBAMOL TABLETS 750 MG: 750 TABLET, COATED ORAL at 08:02

## 2023-01-31 RX ADMIN — FERROUS SULFATE TAB 325 MG (65 MG ELEMENTAL FE) 325 MG: 325 (65 FE) TAB at 06:47

## 2023-01-31 RX ADMIN — Medication 2000 UNITS: at 17:02

## 2023-01-31 RX ADMIN — ESCITALOPRAM OXALATE 10 MG: 10 TABLET, FILM COATED ORAL at 08:02

## 2023-01-31 RX ADMIN — HEPARIN SODIUM 5000 UNITS: 5000 INJECTION INTRAVENOUS; SUBCUTANEOUS at 14:41

## 2023-01-31 RX ADMIN — Medication 1000 MG: at 22:28

## 2023-01-31 RX ADMIN — TRAZODONE HYDROCHLORIDE 100 MG: 50 TABLET ORAL at 22:12

## 2023-01-31 RX ADMIN — HEPARIN SODIUM 5000 UNITS: 5000 INJECTION INTRAVENOUS; SUBCUTANEOUS at 06:47

## 2023-01-31 RX ADMIN — ACETAMINOPHEN 1000 MG: 500 TABLET ORAL at 15:28

## 2023-01-31 RX ADMIN — Medication 1000 MG: at 08:15

## 2023-01-31 RX ADMIN — HEPARIN SODIUM 5000 UNITS: 5000 INJECTION INTRAVENOUS; SUBCUTANEOUS at 22:15

## 2023-01-31 RX ADMIN — ACETAMINOPHEN 1000 MG: 500 TABLET ORAL at 08:02

## 2023-01-31 RX ADMIN — OXYCODONE HYDROCHLORIDE 10 MG: 5 TABLET ORAL at 07:59

## 2023-01-31 RX ADMIN — ARMODAFINIL 250 MG: 250 TABLET ORAL at 09:39

## 2023-01-31 RX ADMIN — METHOCARBAMOL TABLETS 750 MG: 750 TABLET, COATED ORAL at 17:02

## 2023-01-31 RX ADMIN — METHOCARBAMOL TABLETS 750 MG: 750 TABLET, COATED ORAL at 12:52

## 2023-01-31 RX ADMIN — ATORVASTATIN CALCIUM 40 MG: 40 TABLET, FILM COATED ORAL at 22:13

## 2023-01-31 ASSESSMENT — PAIN DESCRIPTION - LOCATION
LOCATION: GENERALIZED
LOCATION: KNEE
LOCATION: GENERALIZED

## 2023-01-31 ASSESSMENT — PAIN SCALES - GENERAL
PAINLEVEL_OUTOF10: 0
PAINLEVEL_OUTOF10: 7
PAINLEVEL_OUTOF10: 5
PAINLEVEL_OUTOF10: 8
PAINLEVEL_OUTOF10: 3

## 2023-01-31 ASSESSMENT — PAIN DESCRIPTION - FREQUENCY: FREQUENCY: INTERMITTENT

## 2023-01-31 ASSESSMENT — PAIN DESCRIPTION - PAIN TYPE: TYPE: ACUTE PAIN

## 2023-01-31 ASSESSMENT — PAIN DESCRIPTION - ORIENTATION
ORIENTATION: LEFT
ORIENTATION: LEFT

## 2023-01-31 ASSESSMENT — PAIN DESCRIPTION - DESCRIPTORS
DESCRIPTORS: ACHING;DISCOMFORT
DESCRIPTORS: TIGHTNESS;PRESSURE
DESCRIPTORS: ACHING

## 2023-01-31 ASSESSMENT — PAIN DESCRIPTION - ONSET: ONSET: ON-GOING

## 2023-01-31 NOTE — PLAN OF CARE
Problem: Discharge Planning  Goal: Discharge to home or other facility with appropriate resources  1/30/2023 2344 by Lissa Leong RN  Outcome: Progressing     Problem: Pain  Goal: Verbalizes/displays adequate comfort level or baseline comfort level  1/30/2023 2344 by Lissa Leong RN  Outcome: Progressing     Problem: Safety - Adult  Goal: Free from fall injury  1/30/2023 2344 by Lissa Leong RN  Outcome: Progressing     Problem: ABCDS Injury Assessment  Goal: Absence of physical injury  1/30/2023 2344 by Lissa Leong RN  Outcome: Progressing

## 2023-01-31 NOTE — CARE COORDINATION
Team conference held today. Spoke with patient to discuss progress with therapy, barriers to discharge, and plans to return home. Estimated discharge date set for 2/07/2022. Patient anticipates discharging to home with needed supports. Will continue to follow for support and discharge planning.     Electronically signed by BECKY Jarquin on 1/31/2023 at 5:57 PM

## 2023-01-31 NOTE — PROGRESS NOTES
[facility]   Phone: [phone]    Occupational Therapy    [] Initial Evaluation            [x] Daily Treatment Note         [] Discharge Summary      Patient: Dimitry Ortez   : 1950   MRN: [id number]   Date of Service:  2023    Admitting Diagnosis:  Multiple fractures  Current Admission Summary: 81-year-old female with a history of hyperlipidemia and bladder cancer who was admitted to [facility] on  with right arm and leg pain after a fall from a ladder. She reports she was on the last step and thought she was on the ground and fell 1 step to the ground. Work-up revealed a right radius fracture and a right tibial fracture. CT of the knee revealed an intra-articular tibial fracture involving the spine. Ortho evaluated and suggested nonweightbearing nonoperative management with a knee immobilizer on the right lower extremity. Her hospital course was complicated by urinary retention requiring catheterization. Her catheter was removed prior to transfer to this facility. She was admitted to our facility on 1/all and requested to transition her orthopedic providers to [facility]. Ortho evaluated and suggested surgical intervention on both fractures in both right upper extremity and right lower extremities. She was transferred to surgery on . Her postoperative course and an overall unremarkable. She was bilaterally by therapy again and deemed appropriate for an ARU stay. Past Medical History:  has a past medical history of Arthritis, Cancer ([address] and Hyperlipidemia. Past Surgical History:  has a past surgical history that includes Abdomen surgery; Colonoscopy; fracture surgery; Hysterectomy; Tonsillectomy; Breast reduction surgery (); eye surgery (); Cystoscopy (N/A, 2021); Wrist fracture surgery (Right, 2023); and Leg Surgery (Right, 2023).     Discharge Recommendations: [facility]    DME Required For Discharge: wheelchair, shower chair with back, grab bars - shower, reacher    Precautions/Restrictions: high fall risk, weight bearing, ROM restrictions  Weight Bearing Restrictions: NWB (R) wrist (okay for elbow WB for platform RW use; TTWB R LE  [x] Right Upper Extremity  [] Left Upper Extremity [x] Right Lower Extremity  [] Left Lower Extremity     Required Braces/Orthotics: KI on (R) LE; R UE wrist/forearm cast   [x] Right  [] Left  Positional Restrictions: no ROM to (R) LE    Pre-Admission Information   Lives With: spouse                  Type of Home: condo  Home Layout: one level  Home Access:  1 + 1 NORA with (R) grab bar  Bathroom Layout: walk in shower  Bathroom Equipment:  owns grab bars but not installed  Toilet Height: standard height  Home Equipment: no prior equipment  Transfer Assistance: Independent without use of device  Ambulation Assistance:Independent without use of device  ADL Assistance: independent with all ADL's  IADL Assistance: requires assistance with all homemaking tasks  Active :        [x] Yes                 [] No  Hand Dominance: [] Left                 [x] Right  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 Subjective  General: Pt seated on commode at entry, agreeable to session  Pain: 6/10.   Location: R wrist, R LE, and chest     Pain Interventions: RN notified of patient request for pain medication and repositioned         Activities of Daily Living  Basic Activities of Daily Living  Feeding: setup assistance  Grooming: setup assistance  Grooming Comments: w/c level oral care and hand hygiene  Upper Extremity Dressing: setup assistance  Lower Extremity Dressing: contact guard assistance minimal assistance  Dressing Equipment: reacher  Dressing Comments: CGA for clothing over hips in stance with increased time; use of reacher as needed to thread pants; pt able to don/doff sock and shoe with L hand w/ richard-techniques; assist to tie shoe only, assist for brace management  Toileting: contact guard assistance. Toileting Comments: seated ryan care set-up; CGA for clothing over hips in stance   General Comments: post toileting pt completed UB/LB dressing w/c level with stances to RW  Instrumental Activities of Daily Living  No IADL completed on this date. Functional Mobility  Bed Mobility  Bed mobility not completed on this date. Comments:   Transfers  Sit to stand transfer:contact guard assistance, minimal assistance  Stand to sit transfer: contact guard assistance  Stand step transfer: minimal assistance  Toilet transfer: contact guard assistance  Toilet transfer equipment: drop-arm bariatric bedside commode, grab bars, walker  Toilet transfer comments: SPT commode>w/c with RW  Comments: platform RW used for all transfers  Functional Mobility:  Sitting Balance: supervision, stand by assistance. Standing Balance: contact guard assistance. Functional Mobility: .  minimal assistance  Functional Mobility Activity: w/c >> recliner ~7ft  Functional Mobility Device Use: platform walker (R)  Functional Mobility Comment: assist for RW stability with turns, increased time; pt completed 200+ft w/c mobility at SUP with use of (L) UE/LE, able to maneuver turns and doorways w/o assist, min VC for obstacle avoidance      Other Therapeutic Interventions  UB TE in stance for strengthening and balance for increased ease of ADL/mobility: shoulder ab/adductions, forward punches, and shoulder flexion- 10 reps x1 of each, CGA for balance throughout      Second Session:  Pt in w/c at entry, agreeable to session, reporting up to date on pain meds. Declined all ADL needs. Pt reporting fatigue and requesting to lay in bed at EOS. Pt propelled w/c to/from therapy gym at SUP with use of L UE/LE. All sit<>stands completed at [facility]. Stand-pivot with RW at [facility] w/c<>EOM and w/c>> EOB.      EOM core and UB strengthening for increased strength and overall activity tolerance for BADL/mobility participation: 10 reps x2 of modified sit-ups and trunk rotations; with 2# weight in L UE and no weight to R UE  pt completed elbow flexion, rows, and shoulder flex + shoulder abduction combos 15 reps x1 each; 2# L UE pronation/supination and wrist flexion/extension; with medium band L UE- shoulder extension 10 reps x2- pt tolerated well, rest breaks as needed- pt educated on gentle stretch to chest secondary to ongoing pain/stiffness reported. Sit>>supine at SBA. Pt left in bed, bed alarm on, call light and needs in reach. Cognition  Overall Cognitive Status: WFL  Following Commands: follows multi step commands with repetition  Attention Span: attends with cues to redirect  Sequencing: requires cues for some  Comments: cog overall WFL  Orientation:    alert and oriented x 4  Command Following:   [facility]     Education  Barriers To Learning: physical  Patient Education: patient educated on goals, OT role and benefits, plan of care, precautions, ADL adaptive strategies, weight-bearing education, adaptive device training, energy conservation, transfer training, discharge recommendations  Learning Assessment:  patient verbalizes understanding, would benefit from continued reinforcement    Assessment  Activity Tolerance: fair tolerance; limited by pain at times but able to participate well   Impairments Requiring Therapeutic Intervention: decreased functional mobility, decreased ADL status, decreased strength, decreased endurance, decreased balance, decreased IADL, increased pain  Prognosis: good  Clinical Assessment: Pt is a 71 yr old female who presents with the above deficits impacting her occupational performance. Pt is below her baseline level of function d/t s/p ORIF of R radius fx and R tibial fx. Pt now TTWB in R LE and NWB in R UE w/ exception of elbow WB clearance.      Pt is progressing with transfers with CGA to [place] consistently throughout BADL. Pt linda's carryover of AE training. Pt demo'd overall decreased pain compared to prior dates. Pt demo's good abilitiy to complete light IADLs w/c level and progression towards w/c mobility IND. Skilled OT services warranted in order to maximize her IND and safety with all occupational pursuits.      Safety Interventions: patient left in chair, chair alarm in place, call light within reach, and gait belt    Plan  Frequency: 5 x/week, 90 min/day  Current Treatment Recommendations: strengthening, balance training, functional mobility training, transfer training, endurance training, wheelchair mobility training, patient/caregiver education, ADL/self-care training, IADL training, home management training, pain management, safety education, and equipment evaluation/education    Goals  Patient Goals: regain IND with mobility and \"taking care of self\"    Short Term Goals:  Time Frame: 14 days  Patient will complete upper body bathing and dressing at [facility]   Patient will complete lower body bathing and dressing at [facility]   Patient will complete toileting at modified independent   Patient will complete functional transfers at [facility]   Patient will complete functional mobility at modified independent   Patient to gather and transport IADL items at modified independent   Patient will independently don/doff KI and/or independently verbalize brace management to caregiver with 100% accuracy     Goals not yet met, progressing 1/31    Therapy Session Time     Individual Group Co-treatment   Time In 0730      Time Out 0820      Minutes 50         Second Session Therapy Time:   Individual Concurrent Group Co-treatment   Time In 1345         Time Out 1425         Minutes 40             Timed Code Treatment Minutes:   50 + 40  Total Treatment Minutes:  90       First session/Second session: Electronically Signed By:  JOSE Lawler/ANNEL #[id number]

## 2023-01-31 NOTE — PROGRESS NOTES
[facility]atalie [facility] [address]   Phone: [phone]    Physical Therapy    [] Initial Evaluation            [x] Daily Treatment Note         [] Discharge Summary      Patient: Elisa Traore   : 1950   MRN: [id number]   Date of Service:  2023  Admitting Diagnosis: Multiple fractures  Current Admission Summary: 73-year-old female with a history of hyperlipidemia and bladder cancer who was admitted to [facility] on  with right arm and leg pain after a fall from a ladder. She reports she was on the last step and thought she was on the ground and fell 1 step to the ground. Work-up revealed a right radius fracture and a right tibial fracture. CT of the knee revealed an intra-articular tibial fracture involving the spine. Ortho evaluated and suggested nonweightbearing nonoperative management with a knee immobilizer on the right lower extremity. Her hospital course was complicated by urinary retention requiring catheterization. Her catheter was removed prior to transfer to this facility. She was admitted to our facility on 1/all and requested to transition her orthopedic providers to [facility]. Ortho evaluated and suggested surgical intervention on both fractures in both right upper extremity and right lower extremities. She was transferred to surgery on . Her postoperative course and an overall unremarkable. She was evaluated by therapy again and deemed appropriate for an ARU stay. Past Medical History:  has a past medical history of Arthritis, Cancer ([address] and Hyperlipidemia. Past Surgical History:  has a past surgical history that includes Abdomen surgery; Colonoscopy; fracture surgery; Hysterectomy; Tonsillectomy; Breast reduction surgery (); eye surgery (); Cystoscopy (N/A, 2021); Wrist fracture surgery (Right, 2023); and Leg Surgery (Right, 2023).   Discharge Recommendations: Home with Home Health Physical Therapy and family assist PRN  DME Required For Discharge: wheelchair, platform walker, ramp  Precautions/Restrictions: high fall risk, weight bearing  Weight Bearing Restrictions:   NWBing (R) wrist - ok for platform walker  TTWB (R) LE  Required Braces/Orthotics: knee immobilizer - when out of bed                   [x] Right            [] Left  Positional Restrictions:no positional restrictions    Pre-Admission Information   Lives With: spouse                  Type of Home: [facility]  Home Layout: one level  Home Access:  1 + 1 NORA with (R) grab bar  Bathroom Layout: walk in shower  Bathroom Equipment:  owns grab bars but not installed  Toilet Height: standard height  Home Equipment: no prior equipment  Transfer Assistance: Independent without use of device  Ambulation Assistance:Independent without use of device  ADL Assistance: independent with all ADL's  IADL Assistance: requires assistance with all homemaking tasks  Active :        [x] Yes                 [] No  Hand Dominance: [] Left                 [x] Right  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 Examination   Vision:   Vision Gross Assessment: WFL  Hearing:   WFL      Subjective  General: Patient seated in recliner upon entry, agreeable to therapy. Pain: 3/10. Location: (R) and (L) LE, (R) UE, (L) chest   Pain Interventions: pain medication in place prior to arrival       Functional Mobility  Bed Mobility  Bed mobility not completed on this date. Comments:   Transfers  Sit to stand transfer: contact guard assistance  Stand to sit transfer: contact guard assistance  Stand pivot transfer: contact guard assistance  Comments:  All performed with use of (R) platform walker  Ambulation  Surface:level surface  Assistive Device: platform walker (R)  Assistance: contact guard assistance  Distance: 18 ft  Gait Mechanics: Patient hops on (L) LE with decreased but improving (L) step length and increased use of UE support  Comments:  VC provided to encourage increased step length on the (L)  Stair Mobility  Number of Steps: 1 + 1  Step Height: 4 inch  Device: platform walker (R)  Assistance: minimal assistance  Comments: Patient ascended backwards by hopping on (L) LE  Wheelchair Mobility:  Chair: manual  Surface: level surface  Method: (L) UE and (L) LE  Distance: 315 ft  Assistance: modified independent  Comments:   Balance  Static Sitting Balance: good: independent with functional balance in unsupported position  Dynamic Sitting Balance: good: independent with functional balance in unsupported position  Static Standing Balance: fair (-): maintains balance at CGA with use of UE support  Dynamic Standing Balance: fair (-): maintains balance at CGA with use of UE support  Comments:     Other Therapeutic Interventions   First session:   See functional mobility above. In addition, patient performed LE exercises on mat table 2 x 10 ea: (L) LE supine SLR, (L) LE sidelying hip abduction, (L) LE sidelying hip extension. VC/TC provided to reduce hip flexion compensation with sidelying hip abduction. Second session:    Patient seated in recliner upon entry, agreeable to therapy. Reporting general 7/10 pain. Pain medication administered via nursing staff at the start of this session. Patient performed all transfers this session at [facility] with use of platform walker. W/c mobility performed 100' at [facility]. In addition to this, static standing activities with UE tasks to improve standing balance at [facility] with (R) UE on platform walker: reaching to blaze pods across and outside LIUDMILA (formula reactions - 6 pods lit up 1 blue target) 2 x 2 min, cornhole toss with reaching outside LIUDMILA to retrieve bags 3 x 6. No LOB occurred. Patient ambulated 20' towards her room at [facility] with use of platform walker. VC provided to promote increased (L) step length.  Patient left in w/c with chair alarm on and call light within reach. Functional Outcomes                 Cognition  WFL  Orientation:    alert and oriented x 4  Command Following:   [facility]    Education  Barriers To Learning: none  Patient Education: patient educated on goals, PT role and benefits, plan of care, precautions, weight-bearing education, general safety, functional mobility training, proper use of assistive device/equipment, transfer training, education on home evaluation  Learning Assessment:  patient verbalizes and demonstrates understanding    Assessment  Activity Tolerance: Good. C/o pain in the (L) knee and (L) chest with mobility. Reports that the lidocaine patches are helping with pain control. Adequate rest breaks provided for recovery. Impairments Requiring Therapeutic Intervention: decreased functional mobility, decreased ADL status, decreased ROM, decreased strength, decreased endurance, decreased balance, increased pain, decreased posture  Prognosis: good  Clinical Assessment: Patient's ambulation continues to be limited by (L) chest pain and fatigue. However, patient able to perform curb navigation this date with no LOB and less assistance. Patient has progressed to w/c mobility on level surfaces at mod (I). Patient would benefit from skilled therapy services to promote functional independence with all mobility, decrease pain, and reduce caregiver burden upon return to home. Safety Interventions: patient left in chair, chair alarm in place, call light within reach, gait belt, and patient at risk for falls    Plan  Frequency: 5 x/week, 90 min/day  Current Treatment Recommendations: strengthening, ROM, balance training, functional mobility training, transfer training, gait training, stair training, endurance training, neuromuscular re-education, modalities, patient/caregiver education, and ADL/self-care training    Goals  Patient Goals:  To return home   Short Term Goals:  Time Frame: 10-14 days  Patient will complete transfers at [facility]   Patient will ambulate 50 ft with use of platform walker (R) at supervision  Patient will ascend/descend 1 + 1 stairs with (R) ascending handrail at supervision  Patient will complete car transfer at [facility]  Patient will complete manual w/c propulsion 270 ft at [facility] including ramp management    Therapy Session Time      Individual Group Co-treatment   Time In  0845       Time Out  0931       Minutes  55           Second Session Therapy Time:   Individual Concurrent Group Co-treatment   Time In 1245         Time Out 1331         Minutes 46           Timed Code Treatment Minutes:  46 + 46 minutes  Total Treatment Minutes:  92 minutes     Electronically Signed By:     Keira Tapia, SPT  Therapist observed and directed the patient's plan of care.   Co-signed and supervised by: Kami Villegas PT, DPT - [id number]

## 2023-01-31 NOTE — PLAN OF CARE
Problem: Pain  Goal: Verbalizes/displays adequate comfort level or baseline comfort level  1/31/2023 1220 by Estrada Sosa RN  Outcome: Progressing  1/30/2023 2344 by Cain Nichols RN  Outcome: Progressing     Problem: Safety - Adult  Goal: Free from fall injury  1/31/2023 1220 by Estrada Sosa RN  Outcome: Progressing  1/30/2023 2344 by Cain Nichols, RN  Outcome: Progressing     Problem: ABCDS Injury Assessment  Goal: Absence of physical injury  1/30/2023 2344 by Cain Nichols, RN  Outcome: Progressing

## 2023-02-01 ENCOUNTER — APPOINTMENT (OUTPATIENT)
Dept: GENERAL RADIOLOGY | Age: 73
DRG: 560 | End: 2023-02-01
Attending: PHYSICAL MEDICINE & REHABILITATION
Payer: MEDICARE

## 2023-02-01 PROCEDURE — APPNB45 APP NON BILLABLE 31-45 MINUTES: Performed by: NURSE PRACTITIONER

## 2023-02-01 PROCEDURE — 1280000000 HC REHAB R&B

## 2023-02-01 PROCEDURE — 73562 X-RAY EXAM OF KNEE 3: CPT

## 2023-02-01 PROCEDURE — 6370000000 HC RX 637 (ALT 250 FOR IP): Performed by: PHYSICAL MEDICINE & REHABILITATION

## 2023-02-01 PROCEDURE — 73110 X-RAY EXAM OF WRIST: CPT

## 2023-02-01 PROCEDURE — 6360000002 HC RX W HCPCS: Performed by: PHYSICAL MEDICINE & REHABILITATION

## 2023-02-01 PROCEDURE — 97530 THERAPEUTIC ACTIVITIES: CPT

## 2023-02-01 PROCEDURE — 97116 GAIT TRAINING THERAPY: CPT

## 2023-02-01 PROCEDURE — 99024 POSTOP FOLLOW-UP VISIT: CPT | Performed by: NURSE PRACTITIONER

## 2023-02-01 PROCEDURE — 97535 SELF CARE MNGMENT TRAINING: CPT

## 2023-02-01 PROCEDURE — 97110 THERAPEUTIC EXERCISES: CPT

## 2023-02-01 RX ADMIN — Medication 1000 MG: at 08:45

## 2023-02-01 RX ADMIN — HEPARIN SODIUM 5000 UNITS: 5000 INJECTION INTRAVENOUS; SUBCUTANEOUS at 13:11

## 2023-02-01 RX ADMIN — Medication 2000 UNITS: at 18:34

## 2023-02-01 RX ADMIN — BUPROPION HYDROCHLORIDE 300 MG: 150 TABLET, EXTENDED RELEASE ORAL at 08:46

## 2023-02-01 RX ADMIN — ACETAMINOPHEN 1000 MG: 500 TABLET ORAL at 20:18

## 2023-02-01 RX ADMIN — ARMODAFINIL 250 MG: 250 TABLET ORAL at 09:30

## 2023-02-01 RX ADMIN — METHOCARBAMOL TABLETS 750 MG: 750 TABLET, COATED ORAL at 18:34

## 2023-02-01 RX ADMIN — ACETAMINOPHEN 1000 MG: 500 TABLET ORAL at 15:00

## 2023-02-01 RX ADMIN — METHOCARBAMOL TABLETS 750 MG: 750 TABLET, COATED ORAL at 13:11

## 2023-02-01 RX ADMIN — Medication 1000 MG: at 20:18

## 2023-02-01 RX ADMIN — ESCITALOPRAM OXALATE 10 MG: 10 TABLET, FILM COATED ORAL at 08:46

## 2023-02-01 RX ADMIN — METHOCARBAMOL TABLETS 750 MG: 750 TABLET, COATED ORAL at 20:18

## 2023-02-01 RX ADMIN — METHOCARBAMOL TABLETS 750 MG: 750 TABLET, COATED ORAL at 08:46

## 2023-02-01 RX ADMIN — OXYCODONE HYDROCHLORIDE 10 MG: 5 TABLET ORAL at 08:55

## 2023-02-01 RX ADMIN — STANDARDIZED SENNA CONCENTRATE AND DOCUSATE SODIUM 1 TABLET: 8.6; 5 TABLET ORAL at 20:18

## 2023-02-01 RX ADMIN — TRAZODONE HYDROCHLORIDE 100 MG: 50 TABLET ORAL at 20:18

## 2023-02-01 RX ADMIN — STANDARDIZED SENNA CONCENTRATE AND DOCUSATE SODIUM 1 TABLET: 8.6; 5 TABLET ORAL at 08:46

## 2023-02-01 RX ADMIN — ATORVASTATIN CALCIUM 40 MG: 40 TABLET, FILM COATED ORAL at 20:18

## 2023-02-01 RX ADMIN — HEPARIN SODIUM 5000 UNITS: 5000 INJECTION INTRAVENOUS; SUBCUTANEOUS at 07:28

## 2023-02-01 RX ADMIN — FERROUS SULFATE TAB 325 MG (65 MG ELEMENTAL FE) 325 MG: 325 (65 FE) TAB at 08:46

## 2023-02-01 RX ADMIN — HEPARIN SODIUM 5000 UNITS: 5000 INJECTION INTRAVENOUS; SUBCUTANEOUS at 20:19

## 2023-02-01 RX ADMIN — ACETAMINOPHEN 1000 MG: 500 TABLET ORAL at 08:46

## 2023-02-01 ASSESSMENT — PAIN SCALES - GENERAL
PAINLEVEL_OUTOF10: 7
PAINLEVEL_OUTOF10: 0
PAINLEVEL_OUTOF10: 5
PAINLEVEL_OUTOF10: 7
PAINLEVEL_OUTOF10: 0

## 2023-02-01 ASSESSMENT — PAIN DESCRIPTION - DESCRIPTORS
DESCRIPTORS: PRESSURE;TIGHTNESS

## 2023-02-01 ASSESSMENT — PAIN SCALES - WONG BAKER
WONGBAKER_NUMERICALRESPONSE: 0

## 2023-02-01 ASSESSMENT — PAIN DESCRIPTION - LOCATION
LOCATION: CHEST

## 2023-02-01 NOTE — PROGRESS NOTES
Natalie [facility]   Phone: [phone]    Physical Therapy    [] Initial Evaluation            [x] Daily Treatment Note         [] Discharge Summary      Patient: Vangie Dowling   : 1950   MRN: [id number]   Date of Service:  2023  Admitting Diagnosis: Multiple fractures  Current Admission Summary: 73-year-old female with a history of hyperlipidemia and bladder cancer who was admitted to [facility] on  with right arm and leg pain after a fall from a ladder. She reports she was on the last step and thought she was on the ground and fell 1 step to the ground. Work-up revealed a right radius fracture and a right tibial fracture. CT of the knee revealed an intra-articular tibial fracture involving the spine. Ortho evaluated and suggested nonweightbearing nonoperative management with a knee immobilizer on the right lower extremity. Her hospital course was complicated by urinary retention requiring catheterization. Her catheter was removed prior to transfer to this facility. She was admitted to our facility on 1/all and requested to transition her orthopedic providers to [facility]. Ortho evaluated and suggested surgical intervention on both fractures in both right upper extremity and right lower extremities. She was transferred to surgery on . Her postoperative course and an overall unremarkable. She was evaluated by therapy again and deemed appropriate for an ARU stay. Past Medical History:  has a past medical history of Arthritis, Cancer ([address] and Hyperlipidemia. Past Surgical History:  has a past surgical history that includes Abdomen surgery; Colonoscopy; fracture surgery; Hysterectomy; Tonsillectomy; Breast reduction surgery (); eye surgery (); Cystoscopy (N/A, 2021); Wrist fracture surgery (Right, 2023); and Leg Surgery (Right, 2023).   Discharge Recommendations: Home with [address] and family assist PRN  DME Required For Discharge: wheelchair, platform walker, ramp  Precautions/Restrictions: high fall risk, weight bearing  Weight Bearing Restrictions: (WB status upgraded on 2/1/2023)  WBAT (R) wrist with splint donned  TTWB (R) LE - ROM allowed per ortho note  Required Braces/Orthotics: Wrist splint - no longer required to wear KO when OOB (upgraded on 2/1/2023)                   [x] Right            [] Left  Positional Restrictions:no positional restrictions    Pre-Admission Information   Lives With: spouse                  Type of Home: condo  Home Layout: one level  Home Access:  1 + 1 NORA with (R) grab bar  Bathroom Layout: walk in shower  Bathroom Equipment:  owns grab bars but not installed  Toilet Height: standard height  Home Equipment: no prior equipment  Transfer Assistance: Independent without use of device  Ambulation Assistance:Independent without use of device  ADL Assistance: independent with all ADL's  IADL Assistance: requires assistance with all homemaking tasks  Active :        [x] Yes                 [] No  Hand Dominance: [] Left                 [x] Right  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 Examination   Vision:   Vision Gross Assessment: WFL  Hearing:   WFL      Subjective  General: Patient seated in recliner upon entry, agreeable to therapy. Pain: 4/10. Location: (R) and (L) LE, (R) UE, (L) chest   Pain Interventions: pain medication in place prior to arrival       Functional Mobility  Bed Mobility  Bed mobility not completed on this date. Comments:   Transfers  Sit to stand transfer: contact guard assistance  Stand to sit transfer: stand by assistance  Stand pivot transfer: contact guard assistance  Comments:  All performed with use of (R) platform walker  Ambulation  Surface:level surface  Assistive Device: platform walker (R)  Assistance: contact guard assistance  Distance: 27 ft  Gait Mechanics: Patient hops on (L) LE with decreased but improving (L) step length and increased use of UE support  Comments:   Stair Mobility  Stair mobility not completed on this date. Comments:  Wheelchair Mobility:  Chair: manual  Surface: level surface and ramp  Method: (L) UE and (L) LE  Distance: 100' + 80' level surface, 80' ramp ft  Assistance: modified independent, stand by assistance, (mod (I) on level surface, SBA on ramp)  Comments:   Balance  Static Sitting Balance: good: independent with functional balance in unsupported position  Dynamic Sitting Balance: good: independent with functional balance in unsupported position  Static Standing Balance: fair (-): maintains balance at CGA with use of UE support  Dynamic Standing Balance: fair (-): maintains balance at CGA with use of UE support  Comments:     Other Therapeutic Interventions   First session:   See functional mobility above. Session began with donning of (L) knee brace, (L) shoe and LE clothing with min A of therapist secondary to time constraints. Patient able to maintain static standing balance with (R) UE on platform walker while therapist performed clothing management at [place] [address]. W/c mobility performed in outside environment to practice navigating ramp upon return to home. See functional mobility above. In addition, patient performed static standing activity at mirror with (R) UE on platform walker at CGA: hangman x 2 performed to encourage (L) UE use as well as reaching across and outside LIUDMILA to improve balance and stability. No LOB occurred. Second session:    Patient supine in bed with HOB elevated upon arrival. Nursing staff completing medication administration at this time. Patient reporting 7/10 pain in the (L) chest. Supine <=> sit performed at mod (I) with use of HR and HOB elevated. Patient propelled herself in her w/c 100 ft x 2 at mod (I).  In addition, seated active (R) heel slides performed this session 2 x 5. Educated patient on performing exercise through pain-free ranges. Seated in w/c and standing in // bars (B) UE push-ups to promote (R) wrist WB as tolerated 1 x 10 ea. Patient requesting to return to bed at end of session. Patient left in bed with HOB elevated, call light within reach, and bed alarm turned on. (R) knee flexion AROM: 48*    Functional Outcomes                 Cognition  WFL  Orientation:    alert and oriented x 4  Command Following:   WFL    Education  Barriers To Learning: none  Patient Education: patient educated on goals, PT role and benefits, plan of care, precautions, weight-bearing education, general safety, functional mobility training, proper use of assistive device/equipment, transfer training  Learning Assessment:  patient verbalizes and demonstrates understanding    Assessment  Activity Tolerance: Good. C/o pain in the (L) knee and (L) chest with mobility. Adequate rest breaks provided for recovery.  Impairments Requiring Therapeutic Intervention: decreased functional mobility, decreased ADL status, decreased ROM, decreased strength, decreased endurance, decreased balance, increased pain, decreased posture  Prognosis: good  Clinical Assessment: Patient mobility continues to improve as seen by ability to ambulate further distances this date with minimal reports of (L) chest pain. In addition, patient demonstrated safe navigation of w/c on ramp in outside environment with SBA. Per ortho orders, patient WBAT on (R) UE and able to perform (R) knee AROM this date. Patient would benefit from skilled therapy services to promote functional independence with all mobility, decrease pain, and reduce caregiver burden upon return to home.   Safety Interventions: patient left in chair, chair alarm in place, call light within reach, and gait belt    Plan  Frequency: 5 x/week, 90 min/day  Current Treatment Recommendations: strengthening, ROM, balance training, functional mobility training,  transfer training, gait training, stair training, endurance training, neuromuscular re-education, modalities, patient/caregiver education, and ADL/self-care training    Goals  Patient Goals: To return home   Short Term Goals:  Time Frame: 10-14 days  Patient will complete transfers at [facility]   Patient will ambulate 50 ft with use of platform walker (R) at supervision  Patient will ascend/descend 1 + 1 stairs with (R) ascending handrail at supervision  Patient will complete car transfer at [facility]  Patient will complete manual w/c propulsion 270 ft at [facility] including ramp management    Therapy Session Time      Individual Group Co-treatment   Time In  0947       Time Out  1047       Minutes  60           Second Session Therapy Time:   Individual Concurrent Group Co-treatment   Time In 1315         Time Out 1347         Minutes 32           Timed Code Treatment Minutes:  60 + 32 minutes  Total Treatment Minutes:  92 minutes     Electronically Signed By:     Sally Hernandez SPT  Therapist observed and directed the patient's plan of care.   Co-signed and supervised by: Jade Gayle PT, DPT - [id number]

## 2023-02-01 NOTE — PROGRESS NOTES
Mary Hurst  2/1/2023  [id number]    Chief Complaint: Multiple fractures    Subjective:   No overnight events. No current complaints. Participating well in therapy. ROS: No CP, SOB, dyspnea    Objective:  Patient Vitals for the past 24 hrs:   BP Temp Temp src Pulse Resp SpO2   01/31/23 2335 -- -- -- -- 16 --   01/31/23 2243 -- -- -- -- 16 --   01/31/23 2200 111/64 98.7 °F (37.1 °C) Oral 78 16 96 %   01/31/23 1322 -- -- -- -- 18 --   01/31/23 1252 -- -- -- -- 20 --   01/31/23 1100 105/64 98.3 °F (36.8 °C) Oral 76 16 96 %   01/31/23 0906 -- -- -- -- 16 96 %       Gen: No distress, pleasant. Resting in chair  HEENT: Normocephalic, atraumatic. CV: Regular rate and rhythm. No MRG   Resp: No respiratory distress. CTAB   Abd: Soft, nontender nondistended  Ext: No edema. RLE incision well approximated. RUE in splint  Neuro: Alert, oriented, appropriately interactive. Laboratory data: Available via EMR. Therapy progress:    PT    Supine to Sit: Independent  Sit to Supine: Independent   Sit to Stand: Supervision or touching assistance  Chair/Bed to Chair Transfer: Supervision or touching assistance  Car Transfer:    Ambulation 10 ft: Supervision or touching assistance  Ambulation 50 ft:    Ambulation 150 ft:    Stairs - 1 Step: Partial/moderate assistance  Stairs - 4 Step:    Stairs - 12 Step:      OT    Eating: Setup or clean-up assistance  Oral Hygiene: Setup or clean-up assistance  Bathing: Partial/moderate assistance  Upper Body Dressing: Setup or clean-up assistance  Lower Body Dressing: Supervision or touching assistance  Toilet Transfer: Supervision or touching assistance  Toilet Hygiene: Supervision or touching assistance    Speech Therapy         Body mass index is 26.31 kg/m².     Assessment:  Patient Active Problem List   Diagnosis    Clot retention of urine    Multiple fractures    Closed fracture of right distal radius    Closed fracture of right tibial plateau    S/P ORIF (open reduction internal fixation) fracture    Fracture       Plan:   Multiple fractures: s/p ORIF of R radius and tibia 1/19. EWV RUE. TTWB RLE. Pain control. PT/OT. 2 week FU 2/1     Ecoli UTI: Completed Cipro, Cx sensitive     HLD: Lipitor 40     Depression: Lexapro 10 HS, Wellbutrin 300     Hx bladder cancer: Flomax     MSK chest pain: lidoderm patch to chest     Iron deficiency anemia: fergon       Bowels: Per protocol  Bladder: Per protocol   Sleep: Trazodone 100 scheduled. Pain: resume robaxin scheduled, resume tasia PRN   DVT PPx: heparin   LEE: 2/7    Enrico Woodard MD 2/1/2023, 8:39 AM    * This document was created using dictation software. While all precautions were taken to ensure accuracy, errors may have occurred. Please disregard any typographical errors.

## 2023-02-01 NOTE — PLAN OF CARE
Problem: Pain  Goal: Verbalizes/displays adequate comfort level or baseline comfort level  Outcome: Progressing  Flowsheets (Taken 2/1/2023 1508)  Verbalizes/displays adequate comfort level or baseline comfort level:   Encourage patient to monitor pain and request assistance   Assess pain using appropriate pain scale   Administer analgesics based on type and severity of pain and evaluate response   Implement non-pharmacological measures as appropriate and evaluate response   Consider cultural and social influences on pain and pain management   Notify Licensed Independent Practitioner if interventions unsuccessful or patient reports new pain     Problem: ABCDS Injury Assessment  Goal: Absence of physical injury  Outcome: Progressing  Flowsheets (Taken 2/1/2023 1508)  Absence of Physical Injury: Implement safety measures based on patient assessment

## 2023-02-01 NOTE — PROGRESS NOTES
[address] [facility] Orthopedic Surgery   Progress Note    CHIEF COMPLAINT/DIAGNOSIS: S/p right tibial plateau ORIF     SUBJECTIVE: The patient is sitting up in the chair. Reports improving pain in the knee and wrist - denies new issues. Denies paresthesias. OBJECTIVE  Physical    VITALS:  /63   Pulse 71   Temp 98.1 °F (36.7 °C) (Oral)   Resp 18   Ht 5' 5\" (1.651 m)   Wt 158 lb 1.6 oz (71.7 kg)   SpO2 96%   BMI 26.31 kg/m²     GENERAL: Alert and oriented x3, in no acute distress. MUSCULOSKELETAL: Intact DF/PF in operative leg. INCISION:  Covered with post-op dressing; clean, dry and intact. Swelling as expected; compartments remain easily compressible and reasonably supple. KI removed and skin was evaluated. There is no evidence of skin breakdown. Incision is healing well. ROM: To operative knee 0-55 degrees. Sensory:  Intact to light touch in tibial and peroneal distributions. Vascular:   2+ DP pulse with brisk cap refill. The right short arm volar splint is well-fitted - no issues. Swelling as expected in all 5 digits of the right hand. Able to wiggle all digits of the right hand. Brisk cap refill. Splint now well fitted. Able to flex and extend elbow. Splint removed and incision is healing well with steri strips in place. Removable velcro splint placed without issues. Data    ALL MEDICATIONS HAVE BEEN REVIEWED    CBC:   Recent Labs     01/30/23  0537   WBC 5.7   HGB 10.8*   HCT 33.7*          BMP:   Recent Labs     01/30/23  0537      K 4.6      CO2 31   BUN 12   CREATININE <0.5*     INR: No results for input(s): INR in the last 72 hours.     ASSESSMENT:  S/p ORIF right tibial plateau and right distal radius ORIF (1/19/23), POD#13  HLD  Hx bladder ca  Expected acute blood loss anemia    PLAN:   - WB status:  TTWB weight bearing through operative leg; OK for knee ROM;   OK to WB with splint on right wrist  Reviewed post op precautions.  - DVT prophylaxis: ASA 81mg BID x 30 days  - PT/OT  - Pain Control: Current regimen. Due to orthopaedic surgical procedure/condition, patient may require pain medication for up to 6-8 weeks. - Dispo: per ARU    Follow-up with Dr. Cinda Mann in 6 weeks. Office # [phone]  No future appointments.     BESSY Resendiz - CNP  2/1/2023  12:27 PM

## 2023-02-01 NOTE — PROGRESS NOTES
[address] [place] [address] Department   Phone: [phone]    Occupational Therapy    [] Initial Evaluation            [x] Daily Treatment Note         [] Discharge Summary      Patient: Luke Chapa   : 1950   MRN: [id number]   Date of Service:  2023    Admitting Diagnosis:  Multiple fractures  Current Admission Summary: 67-year-old female with a history of hyperlipidemia and bladder cancer who was admitted to [facility] on  with right arm and leg pain after a fall from a ladder. She reports she was on the last step and thought she was on the ground and fell 1 step to the ground. Work-up revealed a right radius fracture and a right tibial fracture. CT of the knee revealed an intra-articular tibial fracture involving the spine. Ortho evaluated and suggested nonweightbearing nonoperative management with a knee immobilizer on the right lower extremity. Her hospital course was complicated by urinary retention requiring catheterization. Her catheter was removed prior to transfer to this facility. She was admitted to our facility on 1/all and requested to transition her orthopedic providers to [facility]. Ortho evaluated and suggested surgical intervention on both fractures in both right upper extremity and right lower extremities. She was transferred to surgery on . Her postoperative course and an overall unremarkable. She was bilaterally by therapy again and deemed appropriate for an ARU stay. Past Medical History:  has a past medical history of Arthritis, Cancer ([address] and Hyperlipidemia. Past Surgical History:  has a past surgical history that includes Abdomen surgery; Colonoscopy; fracture surgery; Hysterectomy; Tonsillectomy; Breast reduction surgery (); eye surgery (); Cystoscopy (N/A, 2021); Wrist fracture surgery (Right, 2023); and Leg Surgery (Right, 2023).     Discharge Recommendations: [place] [facility] S3    DME Required For Discharge: wheelchair, shower chair with back, grab bars - shower, reacher    Precautions/Restrictions: high fall risk, weight bearing, ROM restrictions  Weight Bearing Restrictions: WBAT (R) wrist with splint donned; okay to remove splint for bathing per ortho; TTWB R LE. [x] Right Upper Extremity  [] Left Upper Extremity [x] Right Lower Extremity  [] Left Lower Extremity       Required Braces/Orthotics: R wrist splint; KI discontinued on 2/1   [x] Right  [] Left    Positional Restrictions: no wrist ROM per ortho; ROM on R knee okay     Pre-Admission Information   Lives With: spouse                  Type of Home: condo  Home Layout: one level  Home Access:  1 + 1 NORA with (R) grab bar  Bathroom Layout: walk in shower  Bathroom Equipment:  owns grab bars but not installed  Toilet Height: standard height  Home Equipment: no prior equipment  Transfer Assistance: Independent without use of device  Ambulation Assistance:Independent without use of device  ADL Assistance: independent with all ADL's  IADL Assistance: requires assistance with all homemaking tasks  Active :        [x] Yes                 [] No  Hand Dominance: [] Left                 [x] Right  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        Subjective  General: Pt in bed at entry, agreeable to OT tx session and shower  Pain: Patient does not rate upon questioning- reporting intermittent chest pain during repositioning and mobility- declined intervention stating she'll request pain meds at EOS    Pain Interventions: patient denies pain interventions and repositioned         Activities of Daily Living  Basic Activities of Daily Living  Feeding: Independent  Grooming: modified independent  Grooming Comments: w/c level oral care   Upper Extremity Bathing: supervision  Lower Extremity Bathing: minimal assistance   Bathing Equipment: long handled sponge  Bathing Comments: assist for thoroughly washing/drying R foot; SBA for lateral leans for ryan hygiene; use of LHS for R LE as needed; use of KI in shower- post shower pt assisted to change to dry KI prior to donning clothing- R LE propped up for comfort and to reduce knee flexion during bathing/dressing   Upper Extremity Dressing: setup assistance  Lower Extremity Dressing: minimal assistance  Dressing Equipment: reacher, sock aide  Dressing Comments: assist to adjust brace- use of reacher as needed to doff/don clothing; use of sock aid to don R sock with set-up/Lissett d/t novelty; CGA for clothing management in stance- increased time to complete all dressing    Toileting: contact guard assistance. Toileting Comments: CGA for clothing over hips in stance   General Comments: Pt ambulated to bathroom for ADL needs- toileting completed prior to UB/LB bathing and dressing on TTB- pt then transitioned to w/c for grooming needs- increased time for all tasks- pt's R cast water-proofed and remained dry and intact   Instrumental Activities of Daily Living  No IADL completed on this date. Functional Mobility  Bed Mobility  Supine to Sit: supervision  Comments: pt elevated HOB, use of HR as needed  Transfers  Sit to stand transfer:contact guard assistance  Stand to sit transfer: contact guard assistance  Stand pivot transfer: contact guard assistance  Toilet transfer: contact guard assistance  Toilet transfer equipment: drop-arm bariatric bedside commode, grab bars, walker  Toilet transfer comments: cues for proximity to commode  Shower transfer: contact guard assistance  Shower transfer equipment: tub transfer bench, grab bars, walker  Shower transfer comments: pt able to manage platform RW strap as needed- increased time for turn and cues for surface proximity  Comments: platform RW used for all transfers  Functional Mobility:  Sitting Balance: supervision, stand by assistance.     Standing Balance: contact guard assistance. Functional Mobility: .  contact guard assistance  Functional Mobility Activity: EOB >> commode >> shower  Functional Mobility Device Use: platform walker (R)  Functional Mobility Comment: increased time, pt able to manage strap, standing rest breaks as needed     Other Therapeutic Interventions        Second Session:  Pt in recliner at entry, agreeable to session, denied pain and declined ADL needs. Ortho NP present at start of session- pt's R cast removed and replaced with wrist splint- KI doffed and pt cleared for ROM of R knee and brace no longer. required with OOB activity. Pt completed sit<>stands and stand-pivots with CGA to Adam throughout session; w/c mobility completed to/from gym with SUP, increased time- pt cleared to use R hand WBAT in splint- pt educated on use for w/c mobility but pt declined at this time. EOM AROM for strengthening: (B) shoulder flexion and chest press 12 reps x2 holding sensory ball; pt completed thumb isolated flex/ext and flex/extension of digits (within brace ROM)- 10 reps x2 of each secondary to stiffness     Pt tolerated well, demo's hesitation at times w/o KI donned and new wrist splint but no new complaints reports at EOS. Pt tolerated slight knee flexion seated EOM w/o complaints of pain. Pt left in recliner at EOS, chair alarm on, call light and needs in reach.         Cognition  Overall Cognitive Status: WFL  Following Commands: follows multi step commands with repetition  Attention Span: attends with cues to redirect  Sequencing: requires cues for some  Comments: cog overall WFL  Orientation:    alert and oriented x 4  Command Following:   [facility]     Education  Barriers To Learning: physical  Patient Education: patient educated on goals, OT role and benefits, plan of care, precautions, ADL adaptive strategies, weight-bearing education, adaptive device training, energy conservation, transfer training, discharge recommendations  Learning Assessment:  patient verbalizes understanding, would benefit from continued reinforcement    Assessment  Activity Tolerance: fair tolerance; limited by pain at times but able to participate well   Impairments Requiring Therapeutic Intervention: decreased functional mobility, decreased ADL status, decreased strength, decreased endurance, decreased balance, decreased IADL, increased pain  Prognosis: good  Clinical Assessment: Pt is a 71 yr old female who presents with the above deficits impacting her occupational performance. Pt is below her baseline level of function d/t s/p ORIF of R radius fx and R tibial fx. Pt now TTWB in R LE and NWB in R UE w/ exception of elbow WB clearance. Pt is progressing with transfers with CGA consistently with increased ability to manage platform strap and sequence transfers with less Vcs. throughout BADL. Pt demo's carryover of AE training during shower level BADL this date. Continues to demo overall decreased pain during session. Pt demo's good abilitiy to complete light IADLs w/c level and progression towards w/c mobility IND. Skilled OT services warranted in order to maximize her IND and safety with all occupational pursuits.      Safety Interventions: patient left in chair, chair alarm in place, call light within reach, gait belt, and patient at risk for falls    Plan  Frequency: 5 x/week, 90 min/day  Current Treatment Recommendations: strengthening, balance training, functional mobility training, transfer training, endurance training, wheelchair mobility training, patient/caregiver education, ADL/self-care training, IADL training, home management training, pain management, safety education, and equipment evaluation/education    Goals  Patient Goals: regain IND with mobility and \"taking care of self\"    Short Term Goals:  Time Frame: 14 days  Patient will complete upper body bathing and dressing at modified independent   Patient will complete lower body bathing and dressing at modified independent   Patient will complete toileting at modified independent   Patient will complete functional transfers at modified independent   Patient will complete functional mobility at modified independent   Patient to gather and transport IADL items at modified independent   Patient will independently don/doff KI and/or independently verbalize brace management to caregiver with 100% accuracy     Goals not yet met, progressing 2/1    Therapy Session Time     Individual Group Co-treatment   Time In 0730      Time Out 0830      Minutes 60         Second Session Therapy Time:   Individual Concurrent Group Co-treatment   Time In 1115         Time Out 1145         Minutes 30             Timed Code Treatment Minutes:60 + 30   Total Treatment Minutes:  90       First session/Second session: Electronically Signed By:  Flores Goldstein OTR/L #[id number]

## 2023-02-01 NOTE — PLAN OF CARE
Problem: Discharge Planning  Goal: Discharge to home or other facility with appropriate resources  Outcome: Progressing  Flowsheets (Taken 1/31/2023 2200)  Discharge to home or other facility with appropriate resources: Identify barriers to discharge with patient and caregiver     Problem: Pain  Goal: Verbalizes/displays adequate comfort level or baseline comfort level  1/31/2023 2324 by Christian Altamirano RN  Outcome: Progressing  Flowsheets (Taken 1/31/2023 2200)  Verbalizes/displays adequate comfort level or baseline comfort level: Encourage patient to monitor pain and request assistance  1/31/2023 1220 by Marilyn Cam RN  Outcome: Progressing     Problem: Safety - Adult  Goal: Free from fall injury  1/31/2023 2324 by Christian Altamirano RN  Outcome: Progressing  Flowsheets (Taken 1/31/2023 2322)  Free From Fall Injury: Instruct family/caregiver on patient safety  1/31/2023 1220 by Marilyn Cam RN  Outcome: Progressing     Problem: ABCDS Injury Assessment  Goal: Absence of physical injury  Outcome: Progressing  Flowsheets (Taken 1/31/2023 2322)  Absence of Physical Injury: Implement safety measures based on patient assessment

## 2023-02-01 NOTE — PROGRESS NOTES
Assessment completed, vss, schedule meds given, The care plan and education has been reviewed and mutually agreed upon with the patient. Pt remains free from falls. Fall precautions in place--bed in lowest position, call light within reach, bed alarm in use.  Pt aware to call for assistance before getting

## 2023-02-02 LAB
ANION GAP SERPL CALCULATED.3IONS-SCNC: 7 MMOL/L (ref 3–16)
BUN BLDV-MCNC: 13 MG/DL (ref 7–20)
CALCIUM SERPL-MCNC: 9.5 MG/DL (ref 8.3–10.6)
CHLORIDE BLD-SCNC: 103 MMOL/L (ref 99–110)
CO2: 30 MMOL/L (ref 21–32)
CREAT SERPL-MCNC: 0.5 MG/DL (ref 0.6–1.2)
GFR SERPL CREATININE-BSD FRML MDRD: >60 ML/MIN/{1.73_M2}
GLUCOSE BLD-MCNC: 103 MG/DL (ref 70–99)
HCT VFR BLD CALC: 34.3 % (ref 36–48)
HEMOGLOBIN: 11.1 G/DL (ref 12–16)
MCH RBC QN AUTO: 30.4 PG (ref 26–34)
MCHC RBC AUTO-ENTMCNC: 32.3 G/DL (ref 31–36)
MCV RBC AUTO: 94 FL (ref 80–100)
PDW BLD-RTO: 13.9 % (ref 12.4–15.4)
PLATELET # BLD: 310 K/UL (ref 135–450)
PMV BLD AUTO: 7.4 FL (ref 5–10.5)
POTASSIUM SERPL-SCNC: 4.6 MMOL/L (ref 3.5–5.1)
RBC # BLD: 3.65 M/UL (ref 4–5.2)
SODIUM BLD-SCNC: 140 MMOL/L (ref 136–145)
WBC # BLD: 5.3 K/UL (ref 4–11)

## 2023-02-02 PROCEDURE — 97530 THERAPEUTIC ACTIVITIES: CPT

## 2023-02-02 PROCEDURE — 6370000000 HC RX 637 (ALT 250 FOR IP): Performed by: PHYSICAL MEDICINE & REHABILITATION

## 2023-02-02 PROCEDURE — 6360000002 HC RX W HCPCS: Performed by: PHYSICAL MEDICINE & REHABILITATION

## 2023-02-02 PROCEDURE — 80048 BASIC METABOLIC PNL TOTAL CA: CPT

## 2023-02-02 PROCEDURE — 97110 THERAPEUTIC EXERCISES: CPT

## 2023-02-02 PROCEDURE — 97535 SELF CARE MNGMENT TRAINING: CPT

## 2023-02-02 PROCEDURE — 85027 COMPLETE CBC AUTOMATED: CPT

## 2023-02-02 PROCEDURE — 1280000000 HC REHAB R&B

## 2023-02-02 RX ADMIN — ACETAMINOPHEN 1000 MG: 500 TABLET ORAL at 07:53

## 2023-02-02 RX ADMIN — ESCITALOPRAM OXALATE 10 MG: 10 TABLET, FILM COATED ORAL at 07:53

## 2023-02-02 RX ADMIN — Medication 1000 MG: at 07:52

## 2023-02-02 RX ADMIN — HEPARIN SODIUM 5000 UNITS: 5000 INJECTION INTRAVENOUS; SUBCUTANEOUS at 05:58

## 2023-02-02 RX ADMIN — DICLOFENAC SODIUM 2 G: 10 GEL TOPICAL at 20:37

## 2023-02-02 RX ADMIN — ATORVASTATIN CALCIUM 40 MG: 40 TABLET, FILM COATED ORAL at 20:37

## 2023-02-02 RX ADMIN — BUPROPION HYDROCHLORIDE 300 MG: 150 TABLET, EXTENDED RELEASE ORAL at 07:53

## 2023-02-02 RX ADMIN — DICLOFENAC SODIUM 2 G: 10 GEL TOPICAL at 06:38

## 2023-02-02 RX ADMIN — ACETAMINOPHEN 1000 MG: 500 TABLET ORAL at 20:36

## 2023-02-02 RX ADMIN — METHOCARBAMOL TABLETS 750 MG: 750 TABLET, COATED ORAL at 16:58

## 2023-02-02 RX ADMIN — Medication 1000 MG: at 20:41

## 2023-02-02 RX ADMIN — Medication 2000 UNITS: at 16:58

## 2023-02-02 RX ADMIN — FERROUS SULFATE TAB 325 MG (65 MG ELEMENTAL FE) 325 MG: 325 (65 FE) TAB at 07:53

## 2023-02-02 RX ADMIN — METHOCARBAMOL TABLETS 750 MG: 750 TABLET, COATED ORAL at 14:39

## 2023-02-02 RX ADMIN — HEPARIN SODIUM 5000 UNITS: 5000 INJECTION INTRAVENOUS; SUBCUTANEOUS at 14:39

## 2023-02-02 RX ADMIN — METHOCARBAMOL TABLETS 750 MG: 750 TABLET, COATED ORAL at 07:53

## 2023-02-02 RX ADMIN — OXYCODONE HYDROCHLORIDE 10 MG: 5 TABLET ORAL at 12:44

## 2023-02-02 RX ADMIN — TRAZODONE HYDROCHLORIDE 100 MG: 50 TABLET ORAL at 20:37

## 2023-02-02 RX ADMIN — ARMODAFINIL 250 MG: 250 TABLET ORAL at 08:51

## 2023-02-02 RX ADMIN — METHOCARBAMOL TABLETS 750 MG: 750 TABLET, COATED ORAL at 20:37

## 2023-02-02 RX ADMIN — STANDARDIZED SENNA CONCENTRATE AND DOCUSATE SODIUM 1 TABLET: 8.6; 5 TABLET ORAL at 07:52

## 2023-02-02 RX ADMIN — STANDARDIZED SENNA CONCENTRATE AND DOCUSATE SODIUM 1 TABLET: 8.6; 5 TABLET ORAL at 20:37

## 2023-02-02 RX ADMIN — DICLOFENAC SODIUM 2 G: 10 GEL TOPICAL at 09:41

## 2023-02-02 RX ADMIN — ACETAMINOPHEN 1000 MG: 500 TABLET ORAL at 14:39

## 2023-02-02 RX ADMIN — HEPARIN SODIUM 5000 UNITS: 5000 INJECTION INTRAVENOUS; SUBCUTANEOUS at 20:37

## 2023-02-02 ASSESSMENT — PAIN SCALES - GENERAL
PAINLEVEL_OUTOF10: 7
PAINLEVEL_OUTOF10: 0

## 2023-02-02 ASSESSMENT — PAIN SCALES - WONG BAKER
WONGBAKER_NUMERICALRESPONSE: 0

## 2023-02-02 ASSESSMENT — PAIN DESCRIPTION - ORIENTATION: ORIENTATION: RIGHT

## 2023-02-02 ASSESSMENT — PAIN DESCRIPTION - LOCATION: LOCATION: CHEST;WRIST

## 2023-02-02 NOTE — PROGRESS NOTES
[facility] [address] Department   Phone: [phone]    Physical Therapy    [] Initial Evaluation            [x] Daily Treatment Note         [] Discharge Summary      Patient: Jordon Townsend   : 1950   MRN: [id number]   Date of Service:  2023  Admitting Diagnosis: Multiple fractures  Current Admission Summary: 71-year-old female with a history of hyperlipidemia and bladder cancer who was admitted to [facility] on  with right arm and leg pain after a fall from a ladder. She reports she was on the last step and thought she was on the ground and fell 1 step to the ground. Work-up revealed a right radius fracture and a right tibial fracture. CT of the knee revealed an intra-articular tibial fracture involving the spine. Ortho evaluated and suggested nonweightbearing nonoperative management with a knee immobilizer on the right lower extremity. Her hospital course was complicated by urinary retention requiring catheterization. Her catheter was removed prior to transfer to this facility. She was admitted to our facility on 1/all and requested to transition her orthopedic providers to [facility]. Ortho evaluated and suggested surgical intervention on both fractures in both right upper extremity and right lower extremities. She was transferred to surgery on . Her postoperative course and an overall unremarkable. She was evaluated by therapy again and deemed appropriate for an ARU stay. Past Medical History:  has a past medical history of Arthritis, Cancer ([address] and Hyperlipidemia. Past Surgical History:  has a past surgical history that includes Abdomen surgery; Colonoscopy; fracture surgery; Hysterectomy; Tonsillectomy; Breast reduction surgery (); eye surgery (); Cystoscopy (N/A, 2021); Wrist fracture surgery (Right, 2023); and Leg Surgery (Right, 2023).   Discharge Recommendations: Home with [address] [place] [address] Rd and family assist PRN  DME Required For Discharge: wheelchair, platform walker, ramp  Precautions/Restrictions: high fall risk, weight bearing  Weight Bearing Restrictions: (WB status upgraded on 2/1/2023)  WBAT (R) wrist with splint donned  TTWB (R) LE - ROM allowed per ortho note  Required Braces/Orthotics: Wrist splint - no longer required to wear KO when OOB (upgraded on 2/1/2023)                   [x] Right            [] Left  Positional Restrictions:no positional restrictions    Pre-Admission Information   Lives With: spouse                  Type of Home: condo  Home Layout: one level  Home Access:  1 + 1 NORA with (R) grab bar  Bathroom Layout: walk in shower  Bathroom Equipment:  owns grab bars but not installed  Toilet Height: standard height  Home Equipment: no prior equipment  Transfer Assistance: Independent without use of device  Ambulation Assistance:Independent without use of device  ADL Assistance: independent with all ADL's  IADL Assistance: requires assistance with all homemaking tasks  Active :        [x] Yes                 [] No  Hand Dominance: [] Left                 [x] Right  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 Examination   Vision:   Vision Gross Assessment: WFL  Hearing:   WFL      Subjective  General: Patient supine in bed with HOB elevated upon arrival, agreeable to therapy. Pain: 6/10. Location: (R) and (L) LE, (R) UE, (L) chest   Pain Interventions: pain medication in place prior to arrival       Functional Mobility  Bed Mobility  Supine to Sit: modified independent  Sit to Supine: modified independent  Scooting: modified independent  Comments: All performed on hospital bed with HOB slightly elevated and use of HR.    Transfers  Sit to stand transfer: contact guard assistance  Stand to sit transfer: stand by assistance  Stand pivot transfer: contact guard assistance  Comments: All performed with use of (R) platform walker  Ambulation  Surface:level surface  Assistive Device: platform walker (R)  Assistance: stand by assistance  Distance: 31 ft  Gait Mechanics: Patient hops on (L) LE with decreased but improving (L) step length and increased use of UE support  Comments:   Stair Mobility  Stair mobility not completed on this date. Comments:  Wheelchair Mobility:  Chair: manual  Surface: level surface  Method: (R) UE, (L) UE, and (L) LE  Distance: 70 + 80 ft  Assistance: modified independent  Comments:   Balance  Static Sitting Balance: good: independent with functional balance in unsupported position  Dynamic Sitting Balance: good: independent with functional balance in unsupported position  Static Standing Balance: fair (-): maintains balance at SBA with use of UE support  Dynamic Standing Balance: fair (-): maintains balance at CGA with use of UE support  Comments:     Other Therapeutic Interventions   First session:   See functional mobility above. Session began with donning of (L) shoe secondary to time constraints. In addition to functional mobility, patient performed seated (R) heel slides to improve (R) knee AROM 3 x 5 with 5 sec hold. AAROM into (R) knee flexion performed 2 x 5. Educated patient on performing AAROM stretch into knee flex in her room as HEP as well as appropriate response to the exercise. Patient requesting to return to bed at end of session in order to rest for home evaluation this afternoon. (R) seated knee flex PROM: 65*     Second session:    Home evaluation completed. See separate documentation for details on home evaluation. Within home eval patient complete all transfers including car transfer at [facility] with use of platform RW. Bed mobility at independent level. W/c mobility on level surface at modified independent level, min (A) for ascending/descending ramp surface.        Functional Outcomes Cognition  WFL  Orientation:    alert and oriented x 4  Command Following:   [facility]    Education  Barriers To Learning: none  Patient Education: patient educated on goals, PT role and benefits, plan of care, precautions, weight-bearing education, HEP, general safety, functional mobility training, proper use of assistive device/equipment, transfer training  Learning Assessment:  patient verbalizes and demonstrates understanding    Assessment  Activity Tolerance: Good. C/o pain in the (L) knee and (L) chest with mobility. Adequate rest breaks provided for recovery. Impairments Requiring Therapeutic Intervention: decreased functional mobility, decreased ADL status, decreased ROM, decreased strength, decreased endurance, decreased balance, increased pain, decreased posture  Prognosis: good  Clinical Assessment: Patient progressing from CGA to SBA with ambulation this date demonstrating improvements in stability with functional mobility. In addition, patient able to achieve further ranges of (R) knee flexion ROM this date. Educated patient on HEP to promote carryover between sessions. Patient would benefit from skilled therapy services to promote functional independence with all mobility, decrease pain, and reduce caregiver burden upon return to home. Safety Interventions: patient left in chair, chair alarm in place, call light within reach, and gait belt    Plan  Frequency: 5 x/week, 90 min/day  Current Treatment Recommendations: strengthening, ROM, balance training, functional mobility training, transfer training, gait training, stair training, endurance training, neuromuscular re-education, modalities, patient/caregiver education, and ADL/self-care training    Goals  Patient Goals:  To return home   Short Term Goals:  Time Frame: 10-14 days  Patient will complete transfers at [facility] independent   Patient will ambulate 50 ft with use of platform walker (R) at supervision  Patient will ascend/descend 1 + 1 stairs with (R) ascending handrail at supervision  Patient will complete car transfer at modified independent  Patient will complete manual w/c propulsion 270 ft at modified independent including ramp management  Patient will demonstrate improved (R) knee flexion ROM = to or > 90* in preparation for gait mechanics    Therapy Session Time      Individual Group Co-treatment   Time In  1029       Time Out  1059       Minutes  30           Second Session Therapy Time:   Individual Concurrent Group Co-treatment   Time In       1245   Time Out       1438 (-23 minutes for transportation time. Minutes       90     Timed Code Treatment Minutes:  30 + 90 minutes  Total Treatment Minutes:  120 minutes     Electronically Signed By:     SHERLYN Verma  Therapist observed and directed the patient's plan of care.   Co-signed and supervised by: Ariane Norris PT, DPT - [id number]

## 2023-02-02 NOTE — PROGRESS NOTES
[facility]  2/2/2023  [id number]    Chief Complaint: Multiple fractures    Subjective:   No overnight events. No current complaints. Participating well in therapy. Ortho advanced RUE WB to as tolerated with splint. ROS: No CP, SOB, dyspnea    Objective:  Patient Vitals for the past 24 hrs:   BP Temp Temp src Pulse Resp SpO2 Weight   02/02/23 0750 112/62 98.1 °F (36.7 °C) Oral 73 18 96 % --   02/02/23 0601 -- -- -- -- -- -- 156 lb 1.6 oz (70.8 kg)   02/01/23 2016 (!) 100/48 98.3 °F (36.8 °C) Oral 88 18 96 % --       Gen: No distress, pleasant. Resting in bed  HEENT: Normocephalic, atraumatic. CV: Regular rate and rhythm. No MRG   Resp: No respiratory distress. CTAB   Abd: Soft, nontender nondistended  Ext: No edema. RLE incision well approximated. RUE in splint  Neuro: Alert, oriented, appropriately interactive. Laboratory data: Available via EMR. Therapy progress:    PT    Supine to Sit: Independent  Sit to Supine: Independent   Sit to Stand: Supervision or touching assistance  Chair/Bed to Chair Transfer: Supervision or touching assistance  Car Transfer:    Ambulation 10 ft: Supervision or touching assistance  Ambulation 50 ft:    Ambulation 150 ft:    Stairs - 1 Step: Partial/moderate assistance  Stairs - 4 Step:    Stairs - 12 Step:      OT    Eating: Independent  Oral Hygiene: Independent  Bathing: Partial/moderate assistance  Upper Body Dressing: Setup or clean-up assistance  Lower Body Dressing: Partial/moderate assistance  Toilet Transfer: Supervision or touching assistance  Toilet Hygiene: Supervision or touching assistance    Speech Therapy         Body mass index is 25.98 kg/m².     Assessment:  Patient Active Problem List   Diagnosis    Clot retention of urine    Multiple fractures    Closed fracture of right distal radius    Closed fracture of right tibial plateau    S/P ORIF (open reduction internal fixation) fracture    Fracture       Plan:   Multiple fractures: s/p ORIF of R radius and tibia 1/19. WBAT in splint RUE. TTWB RLE. Pain control. PT/OT. Ecoli UTI: Completed Cipro, Cx sensitive     HLD: Lipitor 40     Depression: Lexapro 10 HS, Wellbutrin 300     Hx bladder cancer: Flomax     MSK chest pain: lidoderm patch to chest     Iron deficiency anemia: fergon       Bowels: Per protocol  Bladder: Per protocol   Sleep: Trazodone 100 scheduled. Pain: resume robaxin scheduled, resume tasai PRN   DVT PPx: heparin   LEE: 2/7    Cirilo Lima MD 2/2/2023, 8:41 AM    * This document was created using dictation software. While all precautions were taken to ensure accuracy, errors may have occurred. Please disregard any typographical errors.

## 2023-02-02 NOTE — PROGRESS NOTES
[facility] [address]   Phone: [phone]    Occupational Therapy    [] Initial Evaluation            [x] Daily Treatment Note         [] Discharge Summary      Patient: Radha Adames   : 1950   MRN: [id number]   Date of Service:  2023    Admitting Diagnosis:  Multiple fractures  Current Admission Summary: 73-year-old female with a history of hyperlipidemia and bladder cancer who was admitted to [facility] on  with right arm and leg pain after a fall from a ladder. She reports she was on the last step and thought she was on the ground and fell 1 step to the ground. Work-up revealed a right radius fracture and a right tibial fracture. CT of the knee revealed an intra-articular tibial fracture involving the spine. Ortho evaluated and suggested nonweightbearing nonoperative management with a knee immobilizer on the right lower extremity. Her hospital course was complicated by urinary retention requiring catheterization. Her catheter was removed prior to transfer to this facility. She was admitted to our facility on 1/all and requested to transition her orthopedic providers to [address] [place] [address]. Ortho evaluated and suggested surgical intervention on both fractures in both right upper extremity and right lower extremities. She was transferred to surgery on . Her postoperative course and an overall unremarkable. She was bilaterally by therapy again and deemed appropriate for an ARU stay. Past Medical History:  has a past medical history of Arthritis, Cancer ([address] and Hyperlipidemia. Past Surgical History:  has a past surgical history that includes Abdomen surgery; Colonoscopy; fracture surgery; Hysterectomy; Tonsillectomy; Breast reduction surgery (); eye surgery (); Cystoscopy (N/A, 2021); Wrist fracture surgery (Right, 2023); and Leg Surgery (Right, 2023).     Discharge Recommendations: [facility] OT S3    DME Required For Discharge: wheelchair, shower chair with back, grab bars - shower, reacher    Precautions/Restrictions: high fall risk, weight bearing, ROM restrictions  Weight Bearing Restrictions: WBAT (R) wrist with splint donned; okay to remove splint for bathing per ortho; TTWB R LE. [x] Right Upper Extremity  [] Left Upper Extremity [x] Right Lower Extremity  [] Left Lower Extremity       Required Braces/Orthotics: R wrist splint; KI discontinued on 2/1   [x] Right  [] Left    Positional Restrictions: no wrist ROM per ortho; ROM on R knee okay     Pre-Admission Information   Lives With: spouse                  Type of Home: [place]  Home Layout: one level  Home Access:  1 + 1 NORA with (R) grab bar  Bathroom Layout: walk in shower  Bathroom Equipment:  owns grab bars but not installed  Toilet Height: standard height  Home Equipment: no prior equipment  Transfer Assistance: Independent without use of device  Ambulation Assistance:Independent without use of device  ADL Assistance: independent with all ADL's  IADL Assistance: requires assistance with all homemaking tasks- shared roles with spouse  Active :        [x] Yes                 [] No  Hand Dominance: [] Left                 [x] Right  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 Subjective  General: Pt in recliner at entry, agreeable to OT session. Endorses significant fatigue  Pain: Patient does not rate upon questioning- reporting intermittent chest pain during repositioning and mobility  Pain Interventions: patient denies pain interventions and repositioned - stating she'll ask for pain meds prior to PT session.          Activities of Daily Living  Basic Activities of Daily Living  Grooming: modified independent  Grooming Comments: w/c level oral care and combing hair    Dressing Comments: pt doffed/donned sock seated w/c with increased time- Carlene; assist to tie shoe only  General Comments: Pt declined all other ADL needs    Instrumental Activities of Daily Living  No IADL completed on this date. Functional Mobility  Bed Mobility  Sit to Supine: supervision  Comments: HOB flat, use of HR  Transfers  Sit to stand transfer:contact guard assistance  Stand to sit transfer: contact guard assistance  Stand pivot transfer: contact guard assistance  Comments: platform RW used for all transfers  Functional Mobility:  Sitting Balance: supervision. Standing Balance: contact guard assistance. Functional Mobility: .  contact guard assistance  Functional Mobility Activity: recliner>> w/c ~5ft   Functional Mobility Device Use: platform walker (R)  Functional Mobility Comment: increased time, pt able to manage strap IND   W/c mobility 150+ft for strengthening and functional mobility IND: use of L UE/LE, pt able to manage breaks, complete short distance backward propulsion, navigate obstacles in room, and complete turns w/o assist- Saman. Assist required to place and remove leg rest.      Other Therapeutic Interventions  UB AROM/TE for strengthening and overall increased activity tolerance: unilateral L UE shoulde extension with medium band x15, scap retractions on R with light band ~10reps (short AROM to  pain tolerance); (B) shoulder ab/adduction + shoulder flex combo x15; 15 reps forward/backward arm circles       Second Session:  Home evaluation completed- see separate document for details. Pt completed multiple transfers/mobility in home including car, ramp use, commode, shower, and recliner- CGA with use of RW. Saman w/c mobility in home on level surfaces. Lissett required for w/c mobility on ramp. Recommend further problem solving for TTB in pt's walk-in shower during pt's inpatient stay for d/c planning/preparation. Pt returned to room at [facility]- left in bed, bed alarm on, call light and needs in reach. RN notified.        Cognition  Overall Cognitive Status: [facility]  Following Commands: follows multi step commands with repetition  Attention Span: attends with cues to redirect  Sequencing: requires cues for some  Comments: cog overall WFL  Orientation:    alert and oriented x 4  Command Following:   [facility]     Education  Barriers To Learning: physical  Patient Education: patient educated on goals, OT role and benefits, plan of care, precautions, ADL adaptive strategies, weight-bearing education, adaptive device training, energy conservation, transfer training, discharge recommendations  Learning Assessment:  patient verbalizes understanding, would benefit from continued reinforcement    Assessment  Activity Tolerance: fair tolerance; limited by pain and fatigue this date  Impairments Requiring Therapeutic Intervention: decreased functional mobility, decreased ADL status, decreased strength, decreased endurance, decreased balance, decreased IADL, increased pain  Prognosis: good  Clinical Assessment: Pt is a 71 yr old female who presents with the above deficits impacting her occupational performance. Pt is below her baseline level of function d/t s/p ORIF of R radius fx and R tibial fx. Pt now TTWB in R LE - KI discontinued, R wrist WBAT while splint is donned. Pt is progressing with transfers with CGA consistently with increased ability to manage platform strap and sequence transfers. Pt IND with w/c mobility is progressing. Continues to demo overall decreased pain during sessions but noted to have increased fatigue this date compared to prior dates. Pt able to participate well within session. Demonstrates hesitation for R knee ROM and wrist WBAT. Pt demo's good abilitiy to complete light IADLs w/c level. Skilled OT services warranted in order to maximize her IND and safety with all occupational pursuits.      Safety Interventions: patient left in bed, bed alarm in place, call light within reach, gait belt, and patient at risk for falls    Plan  Frequency: 5 x/week, 90 min/day  Current Treatment Recommendations: strengthening, balance training, functional mobility training, transfer training, endurance training, wheelchair mobility training, patient/caregiver education, ADL/self-care training, IADL training, home management training, pain management, safety education, and equipment evaluation/education    Goals  Patient Goals: regain IND with mobility and \"taking care of self\"    Short Term Goals:  Time Frame: 14 days  Patient will complete upper body bathing and dressing at [facility]   Patient will complete lower body bathing and dressing at [facility]   Patient will complete toileting at modified independent   Patient will complete functional transfers at [facility]   Patient will complete functional mobility at modified independent   Patient to gather and transport IADL items at modified independent   Patient will independently don/doff KI and/or independently verbalize brace management to caregiver with 100% accuracy-- discontinue goal- pt no longer requires KI brace 2/2    Goals not yet met, progressing 2/2    Therapy Session Time     Individual Group Co-treatment   Time In 0835      Time Out 0905      Minutes 30         Second Session    Individual Concurrent Group Co-treatment   Time In      1245   Time Out      1438 (-23 minutes for transportation time.    Minutes      90        Timed Code Treatment Minutes:30 + 90   Total Treatment Minutes:  706        Electronically Signed By:  Bladimir Collins OTR/L #[id number]

## 2023-02-02 NOTE — PROGRESS NOTES
Pt evening shift assessment complete. VSS and medication given as ordered. Brace to R wrist in place. Pt assessed for comfort needs, given, added water to home CPAP. Pt does not express any additional needs at this time, resting comfortably in bed.

## 2023-02-02 NOTE — PROGRESS NOTES
Occupational/Physical Therapy    Date: 2/2/2023  Patient: Ron Valiente  MRN: [id number]  Primary Diagnosis: Multiple fractures                                                         Home Evaluation Summary    Type of residence:   [x] House  [] [address] [] A[address] [] Condo []Assisted Living []Other:     OUTSIDE  Location of parking: [x] Garage [] [address] [] Street [] Parking Lot [] Driveway  Car transfer- Level of assist  [] Independent/Modified independent  [x]  CGA Assist  (with use of platform RW)  Walking surface: [] Sidewalk  [] Grass [x] Level [] Un-level   Steps to enter-   2steps : 7 inches  each  Railing: [x]Right side []Left side [] Both [] No rails              [x]  Ramp   []   Elevator  []  Stair lift - (Requires Lissett to assist in propulsion up ramp and control of descent down ramp)      INSIDE  Number of floors [x] One [] Two [] Bi level [x] Basement [] Other:   Recommendations for stairs/level of assist: N/A- ramped entry to one level home (pt plans to remain on 1st level)       Bedroom  Door Accessibility? [x]Yes []No     Width/comments: Door: 29.5 inches;  hallway: 35 inches  Bed type : []Full []Queen [x]Parker []Twin []Hospital   Approach []: Right [x] Left  Bed accessibility comments: 29 inch bed height   Closets and drawers accessible? [x] Yes  []No    Light switch accessible? [x] Yes [] No  Maneuverability in room/Safety recommendations: night stand and bed adjusted to increase space for bathroom door entry; remove throw rugs; remove bathroom door on (R) side to increase width/ease of entry w/c level       Bathroom  Door Accessibility?   [x]Yes []No     Width/comments:  29.5 inches   Toilet: [x]Standard []Handicapped height [] Toilet riser []Safety frame               []Bedside commode  [] Grab bar:  [x] Other: 16 inch toilet height; half wall to the left of commode   Tub/shower:  [x] Walk in shower []Tub/shower unit  [] Tub [] Curtain [x] Doors   [] Tub bench [] Shower chair [x] Hand held shower []Grab bar:    [x] Other: step to enter shower- 3 x 5.5. inches- 26.5 entry width   Light switch accessible? [x] Yes [] No  Maneuverability in room/Safety recommendations: remove throw rugs; elevated commode to increase ease of toilet transfer; recommend tub transfer bench for shower       Kitchen  Door Accessibility? [x]Yes []No     Stove accessible? [x] Yes [] No  Sink accessible? [x]Yes [] No  Refrigerator/freezer accessible? [x] Yes [] No  Countertops accessible? [x] Yes [] No  Dining table accessible? [x] Yes [] No  Maneuverability in room/Safety recommendations: remove throw rugs, remove dining room chair for w/c level access to table top; remove leg rest of w/c to increase access and proximity to counter and appliances       Main Living  Door Accessibility? [x]Yes []No     Width/comments: 31.5 inches - kitchen into dining/living area   Light switch/outlets accessible? [x] Yes [] No  Maneuverability in room/Safety recommendations: moved rocking chair to increase space to reach [address] accessibility? [x]Yes []No       Washer/dryer: in basement of home- patient does not need to access    Equipment recommendations  [] Hospital bed [x] Tub bench  [] Shower chair [x] Hand held shower (Pt owns)  [x] Raised toilet seat [] Toilet safety frame [] Bedside commode: __________  [x] Wheelchair: Recommend both lightweight wheelchair for household mobility and transport w/c for use with vehicle secondary to spouse history of back pain. [x] Walker: With (R) platform attachment   [x] Gait belt  (Pt owns)  [] Sliding board   [] Alternate seating/furniture    Additional safety recommendations:   Tub transfer bench for shower- recommend patient complete stand-pivot with rolling walker in order to sit back onto bench and then scoot back/across into shower. Use half wall near commode for support to achieve transfer to/ from commode.   Secure ramp to step surface to prevent shifting/moving during use. Remove all throw rugs to reduce trip hazard and reduce risk of falling. Consider transport chair for community based mobility in order to ease burden of loading and unloading chair to/from car. Recommend use of manual wheel chair for use of ramp when entering and exiting home in order for patient to assist caregiver, wheel chair can then be left in garage for use upon return from community.            Session time: 1756- 1438   (- 23 min non-billable drive time)  Billable Minutes: [address] [facility], OTR/L #[id number]   Daniel Nguyen PT, DPT - [id number], 2/2/2023 2:57 PM

## 2023-02-02 NOTE — PLAN OF CARE
Problem: Discharge Planning  Goal: Discharge to home or other facility with appropriate resources  Outcome: Progressing  Flowsheets (Taken 2/1/2023 2017)  Discharge to home or other facility with appropriate resources: Identify barriers to discharge with patient and caregiver     Problem: Pain  Goal: Verbalizes/displays adequate comfort level or baseline comfort level  2/1/2023 2144 by Danna Lyle RN  Outcome: Progressing  Flowsheets (Taken 2/1/2023 2016)  Verbalizes/displays adequate comfort level or baseline comfort level: Encourage patient to monitor pain and request assistance  2/1/2023 1508 by Humphrey Hernandez RN  Outcome: Progressing  Flowsheets (Taken 2/1/2023 1508)  Verbalizes/displays adequate comfort level or baseline comfort level:   Encourage patient to monitor pain and request assistance   Assess pain using appropriate pain scale   Administer analgesics based on type and severity of pain and evaluate response   Implement non-pharmacological measures as appropriate and evaluate response   Consider cultural and social influences on pain and pain management   Notify Licensed Independent Practitioner if interventions unsuccessful or patient reports new pain     Problem: Safety - Adult  Goal: Free from fall injury  Outcome: Progressing  Flowsheets (Taken 2/1/2023 2017)  Free From Fall Injury: Instruct family/caregiver on patient safety

## 2023-02-02 NOTE — PROGRESS NOTES
Morning assessment completed, vss, schedule meds given, The care plan and education has been reviewed and mutually agreed upon with the patient. Pt remains free from falls. Fall precautions in place--bed in lowest position, call light within reach, bed alarm in use. Pt aware to call for assistance before getting up.

## 2023-02-02 NOTE — PLAN OF CARE
Problem: Discharge Planning  Goal: Discharge to home or other facility with appropriate resources  2/2/2023 0923 by Humberto Anna RN  Outcome: Progressing     Problem: Pain  Goal: Verbalizes/displays adequate comfort level or baseline comfort level  2/2/2023 0923 by Humberto Anna RN  Outcome: Progressing     Problem: Safety - Adult  Goal: Free from fall injury  2/2/2023 0923 by Humberto Anna RN  Outcome: Progressing

## 2023-02-03 PROCEDURE — 6370000000 HC RX 637 (ALT 250 FOR IP): Performed by: PHYSICAL MEDICINE & REHABILITATION

## 2023-02-03 PROCEDURE — 1280000000 HC REHAB R&B

## 2023-02-03 PROCEDURE — 97116 GAIT TRAINING THERAPY: CPT

## 2023-02-03 PROCEDURE — 6360000002 HC RX W HCPCS: Performed by: PHYSICAL MEDICINE & REHABILITATION

## 2023-02-03 PROCEDURE — 97530 THERAPEUTIC ACTIVITIES: CPT

## 2023-02-03 PROCEDURE — 97110 THERAPEUTIC EXERCISES: CPT

## 2023-02-03 PROCEDURE — 94761 N-INVAS EAR/PLS OXIMETRY MLT: CPT

## 2023-02-03 PROCEDURE — 97535 SELF CARE MNGMENT TRAINING: CPT

## 2023-02-03 PROCEDURE — 94760 N-INVAS EAR/PLS OXIMETRY 1: CPT

## 2023-02-03 RX ADMIN — METHOCARBAMOL TABLETS 750 MG: 750 TABLET, COATED ORAL at 07:37

## 2023-02-03 RX ADMIN — ACETAMINOPHEN 1000 MG: 500 TABLET ORAL at 14:33

## 2023-02-03 RX ADMIN — ACETAMINOPHEN 1000 MG: 500 TABLET ORAL at 20:26

## 2023-02-03 RX ADMIN — HEPARIN SODIUM 5000 UNITS: 5000 INJECTION INTRAVENOUS; SUBCUTANEOUS at 14:34

## 2023-02-03 RX ADMIN — Medication 1000 MG: at 07:41

## 2023-02-03 RX ADMIN — DICLOFENAC SODIUM 2 G: 10 GEL TOPICAL at 07:02

## 2023-02-03 RX ADMIN — METHOCARBAMOL TABLETS 750 MG: 750 TABLET, COATED ORAL at 18:51

## 2023-02-03 RX ADMIN — HEPARIN SODIUM 5000 UNITS: 5000 INJECTION INTRAVENOUS; SUBCUTANEOUS at 20:27

## 2023-02-03 RX ADMIN — Medication 1000 MG: at 20:33

## 2023-02-03 RX ADMIN — METHOCARBAMOL TABLETS 750 MG: 750 TABLET, COATED ORAL at 14:33

## 2023-02-03 RX ADMIN — OXYCODONE HYDROCHLORIDE 5 MG: 5 TABLET ORAL at 20:26

## 2023-02-03 RX ADMIN — STANDARDIZED SENNA CONCENTRATE AND DOCUSATE SODIUM 1 TABLET: 8.6; 5 TABLET ORAL at 20:26

## 2023-02-03 RX ADMIN — ESCITALOPRAM OXALATE 10 MG: 10 TABLET, FILM COATED ORAL at 07:37

## 2023-02-03 RX ADMIN — Medication 2000 UNITS: at 18:51

## 2023-02-03 RX ADMIN — METHOCARBAMOL TABLETS 750 MG: 750 TABLET, COATED ORAL at 20:26

## 2023-02-03 RX ADMIN — BUPROPION HYDROCHLORIDE 300 MG: 150 TABLET, EXTENDED RELEASE ORAL at 07:38

## 2023-02-03 RX ADMIN — FERROUS SULFATE TAB 325 MG (65 MG ELEMENTAL FE) 325 MG: 325 (65 FE) TAB at 07:37

## 2023-02-03 RX ADMIN — TRAZODONE HYDROCHLORIDE 100 MG: 50 TABLET ORAL at 20:25

## 2023-02-03 RX ADMIN — ARMODAFINIL 250 MG: 250 TABLET ORAL at 07:38

## 2023-02-03 RX ADMIN — STANDARDIZED SENNA CONCENTRATE AND DOCUSATE SODIUM 1 TABLET: 8.6; 5 TABLET ORAL at 07:38

## 2023-02-03 RX ADMIN — HEPARIN SODIUM 5000 UNITS: 5000 INJECTION INTRAVENOUS; SUBCUTANEOUS at 05:59

## 2023-02-03 RX ADMIN — ATORVASTATIN CALCIUM 40 MG: 40 TABLET, FILM COATED ORAL at 20:26

## 2023-02-03 RX ADMIN — ACETAMINOPHEN 1000 MG: 500 TABLET ORAL at 07:37

## 2023-02-03 ASSESSMENT — PAIN SCALES - GENERAL
PAINLEVEL_OUTOF10: 5
PAINLEVEL_OUTOF10: 6
PAINLEVEL_OUTOF10: 2
PAINLEVEL_OUTOF10: 4
PAINLEVEL_OUTOF10: 2
PAINLEVEL_OUTOF10: 5
PAINLEVEL_OUTOF10: 3
PAINLEVEL_OUTOF10: 0

## 2023-02-03 ASSESSMENT — PAIN DESCRIPTION - ONSET
ONSET: ON-GOING

## 2023-02-03 ASSESSMENT — PAIN DESCRIPTION - LOCATION
LOCATION: WRIST
LOCATION: WRIST
LOCATION: LEG;WRIST
LOCATION: LEG;WRIST
LOCATION_2: CHEST
LOCATION_2: CHEST

## 2023-02-03 ASSESSMENT — PAIN SCALES - WONG BAKER
WONGBAKER_NUMERICALRESPONSE: 0

## 2023-02-03 ASSESSMENT — PAIN DESCRIPTION - DESCRIPTORS
DESCRIPTORS: PRESSURE
DESCRIPTORS: ACHING
DESCRIPTORS_2: OTHER (COMMENT)
DESCRIPTORS_2: OTHER (COMMENT)
DESCRIPTORS: PRESSURE

## 2023-02-03 ASSESSMENT — PAIN - FUNCTIONAL ASSESSMENT
PAIN_FUNCTIONAL_ASSESSMENT: ACTIVITIES ARE NOT PREVENTED
PAIN_FUNCTIONAL_ASSESSMENT_SITE2: ACTIVITIES ARE NOT PREVENTED
PAIN_FUNCTIONAL_ASSESSMENT_SITE2: ACTIVITIES ARE NOT PREVENTED
PAIN_FUNCTIONAL_ASSESSMENT: ACTIVITIES ARE NOT PREVENTED
PAIN_FUNCTIONAL_ASSESSMENT: ACTIVITIES ARE NOT PREVENTED

## 2023-02-03 ASSESSMENT — PAIN DESCRIPTION - INTENSITY
RATING_2: 4
RATING_2: 5
RATING_2: 6

## 2023-02-03 ASSESSMENT — PAIN DESCRIPTION - ORIENTATION
ORIENTATION: RIGHT
ORIENTATION_2: RIGHT;LEFT;UPPER
ORIENTATION: RIGHT
ORIENTATION_2: RIGHT;LEFT;UPPER
ORIENTATION: RIGHT
ORIENTATION: RIGHT

## 2023-02-03 ASSESSMENT — PAIN DESCRIPTION - FREQUENCY
FREQUENCY: INTERMITTENT

## 2023-02-03 ASSESSMENT — PAIN DESCRIPTION - PAIN TYPE
TYPE: ACUTE PAIN

## 2023-02-03 NOTE — CARE COORDINATION
Social Work Admission Assessment     Objective:  Met with pt to complete initial assessment and review role of  in rehab process. Pt oriented to unit. Pt states understanding of this. Patient was re-admitted to ARU on 1/26/2023. Current Home Situation:  Patient lives at home with her spouse. They reside in a [facility]. [address]. Spouse cannot physically assist patinet. 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 Accessibility to community resources/transportation:  None./Family will provide transportation at discharge. Has pt experienced a recent loss or signigicant life event that would impact their care or ability to participate? _x_No  __Yes - Explain     Has pt ever been treated for emotional disorders? _x_No  __Yes--How does that affect current situation: Takes medication for anxiety. How does pt and family cope with stressful events and this hospitalization? Patient appears to be coping well with hospitalization. Special Problem Areas:  None     Discharge Plan: To Be determined with progress. Impression/Plan: Kerri Alberts (patient )is a 71year old female that has been admitted to ARU. Provided patient with this SW's contact information to contact as needed. Will continue to follow for support and discharge planning.

## 2023-02-03 NOTE — PLAN OF CARE
Problem: Discharge Planning  Goal: Discharge to home or other facility with appropriate resources  2/2/2023 2147 by Karl Campos RN  Outcome: Progressing  Flowsheets (Taken 2/2/2023 2036)  Discharge to home or other facility with appropriate resources: Identify barriers to discharge with patient and caregiver  2/2/2023 0923 by Taiwo Rosen RN  Outcome: Progressing     Problem: Pain  Goal: Verbalizes/displays adequate comfort level or baseline comfort level  2/2/2023 2147 by Karl Campos RN  Outcome: Progressing  Flowsheets (Taken 2/2/2023 2035)  Verbalizes/displays adequate comfort level or baseline comfort level: Encourage patient to monitor pain and request assistance  2/2/2023 0923 by Taiwo Rosen RN  Outcome: Progressing     Problem: Safety - Adult  Goal: Free from fall injury  2/2/2023 2147 by Karl Campos RN  Outcome: Dawson Sprawls (Taken 2/2/2023 2036)  Free From Fall Injury: Instruct family/caregiver on patient safety  2/2/2023 0923 by Taiwo Rosen RN  Outcome: Progressing

## 2023-02-03 NOTE — PROGRESS NOTES
Morning assessment completed, vss, lidocaine patch applied for chest pain, The care plan and education has been reviewed and mutually agreed upon with the patient. Pt remains free from falls. Fall precautions in place--bed in lowest position, call light within reach, bed alarm in use. Pt aware to call for assistance before getting up.

## 2023-02-03 NOTE — PROGRESS NOTES
Natalie [facility]aud[facility]ry [address] Department   Phone: [phone]    Physical Therapy    [] Initial Evaluation            [x] Daily Treatment Note         [] Discharge Summary      Patient: Laura Cox   : 1950   MRN: [id number]   Date of Service:  2/3/2023  Admitting Diagnosis: Multiple fractures  Current Admission Summary: 71-year-old female with a history of hyperlipidemia and bladder cancer who was admitted to [facility] on  with right arm and leg pain after a fall from a ladder. She reports she was on the last step and thought she was on the ground and fell 1 step to the ground. Work-up revealed a right radius fracture and a right tibial fracture. CT of the knee revealed an intra-articular tibial fracture involving the spine. Ortho evaluated and suggested nonweightbearing nonoperative management with a knee immobilizer on the right lower extremity. Her hospital course was complicated by urinary retention requiring catheterization. Her catheter was removed prior to transfer to this facility. She was admitted to our facility on 1/all and requested to transition her orthopedic providers to [facility]. Ortho evaluated and suggested surgical intervention on both fractures in both right upper extremity and right lower extremities. She was transferred to surgery on . Her postoperative course and an overall unremarkable. She was evaluated by therapy again and deemed appropriate for an ARU stay. Past Medical History:  has a past medical history of Arthritis, Cancer ([address] and Hyperlipidemia. Past Surgical History:  has a past surgical history that includes Abdomen surgery; Colonoscopy; fracture surgery; Hysterectomy; Tonsillectomy; Breast reduction surgery (); eye surgery (); Cystoscopy (N/A, 2021); Wrist fracture surgery (Right, 2023); and Leg Surgery (Right, 2023).   Discharge Recommendations: Home with [address] [place] [address] and family assist PRN  DME Required For Discharge: wheelchair, platform walker, ramp (owns), wheelchair cushion  Precautions/Restrictions: high fall risk, weight bearing  Weight Bearing Restrictions: (WB status upgraded on 2/1/2023)  WBAT (R) wrist with splint donned  TTWB (R) LE - ROM allowed per ortho note  Required Braces/Orthotics: Wrist splint - no longer required to wear KO when OOB (upgraded on 2/1/2023)                   [x] Right            [] Left  Positional Restrictions:no positional restrictions    Pre-Admission Information   Lives With: spouse                  Type of Home: condo  Home Layout: one level  Home Access:  1 + 1 NORA with (R) grab bar  Bathroom Layout: walk in shower  Bathroom Equipment:  owns grab bars but not installed  Toilet Height: standard height  Home Equipment: no prior equipment  Transfer Assistance: Independent without use of device  Ambulation Assistance:Independent without use of device  ADL Assistance: independent with all ADL's  IADL Assistance: requires assistance with all homemaking tasks  Active :        [x] Yes                 [] No  Hand Dominance: [] Left                 [x] Right  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 Examination   Vision:   Vision Gross Assessment: WFL  Hearing:   WFL      Subjective  General: Patient seated in recliner upon entry, agreeable to therapy. Pain: 4/10. Location: (R) and (L) LE, (L) chest, (R) UE  Pain Interventions: patient denies pain interventions       Functional Mobility  Bed Mobility  Bed mobility not completed on this date. Comments:  Transfers  Sit to stand transfer: stand by assistance  Stand to sit transfer: stand by assistance  Stand pivot transfer: stand by assistance  Comments:  All performed with use of (R) platform walker  Ambulation  Surface:level surface  Assistive Device: platform walker (R)  Assistance: stand by assistance  Distance: 43 ft  Gait Mechanics: Patient hops on (L) LE with decreased but improving (L) step length and increased use of UE support  Comments:   Stair Mobility  Stair mobility not completed on this date. Comments:  Wheelchair Mobility:  Chair: manual  Surface: level surface  Method: (R) UE, (L) UE, and (L) LE  Distance: 70 ft  Assistance: modified independent  Comments:   Balance  Static Sitting Balance: good: independent with functional balance in unsupported position  Dynamic Sitting Balance: good: independent with functional balance in unsupported position  Static Standing Balance: fair (-): maintains balance at SBA with use of UE support  Dynamic Standing Balance: fair (-): maintains balance at SBA with use of UE support  Comments:     Other Therapeutic Interventions   First session:   See functional mobility above. Start of session began with educating patient on equipment needed for d/c to home including transport chair vs. Lightweight w/c and platform walker. In addition, patient performed STS from traditional bed with use of RW 1 x 4 at [facility] to improve ability to rise from lower and unstable surfaces upon return to home. Second session:    Patient seated in recliner upon entry, agreeable to therapy. Does not rate pain upon questioning. Patient performed all transfers this session at [facility] with use of platform walker. Patient ambulated 20 ft with platform walker at [facility]. In addition, lateral hopping both ways performed EOM 2 x 10' at [facility] to improve ability to get in and out of bed at home. STS from elevated therapy mat (28 in) performed at [facility]. Supine AAROM heel slides with belt performed 2 x 5 with 5 sec hold. Lateral and backwards scooting performed on mat table x 2 at mod (I) for bed mobility at home. W/c navigation performed up/down ramp x 2 at min A. VC provided to encourage (R) UE use with w/c mobility. Patient propelled herself back to her room in w/c at mod (I).  Patient requesting to return to bed at end of session. Performed sit => supine on elevated flat hospital bed to mimic home setup at mod (I) with use of HR. Patient left in bed with bed alarm on and call light within reach. (R) knee PROM supine: 66*, seated: 65*    Functional Outcomes                 Cognition  WFL  Orientation:    alert and oriented x 4  Command Following:   [facility]    Education  Barriers To Learning: none  Patient Education: patient educated on goals, PT role and benefits, plan of care, precautions, weight-bearing education, HEP, general safety, functional mobility training, proper use of assistive device/equipment, transfer training, education on differences between transport and lightweight w/c for  placement into vehicle  Learning Assessment:  patient verbalizes and demonstrates understanding    Assessment  Activity Tolerance: Good. Minimal c/o pain in the (L) knee and (L) chest with mobility. Adequate rest breaks provided for recovery. Impairments Requiring Therapeutic Intervention: decreased functional mobility, decreased ADL status, decreased ROM, decreased strength, decreased endurance, decreased balance, increased pain, decreased posture  Prognosis: good  Clinical Assessment: Patient progressing from CGA to SBA with all transfers this date including rising from lower, unstable surfaces demonstrating improved functional mobility. In addition, patient able to ambulate further distances with less c/o (L) chest pain. Patient would benefit from skilled therapy services to promote functional independence with all mobility, decrease pain, and reduce caregiver burden upon return to home.    Safety Interventions: patient left in chair, chair alarm in place, call light within reach, and gait belt    Plan  Frequency: 5 x/week, 90 min/day  Current Treatment Recommendations: strengthening, ROM, balance training, functional mobility training, transfer training, gait training, stair training, endurance training, neuromuscular re-education, modalities, patient/caregiver education, and ADL/self-care training    Goals  Patient Goals: To return home   Short Term Goals:  Time Frame: 10-14 days  Patient will complete transfers at [facility]   Patient will ambulate 50 ft with use of platform walker (R) at supervision  Patient will ascend/descend 1 + 1 stairs with (R) ascending handrail at supervision  Patient will complete car transfer at [facility]  Patient will complete manual w/c propulsion 270 ft at [facility] including ramp management  Patient will demonstrate improved (R) knee flexion ROM = to or > 90* in preparation for gait mechanics    Therapy Session Time      Individual Group Co-treatment   Time In  1033       Time Out  1105       Minutes  32           Second Session Therapy Time:   Individual Concurrent Group Co-treatment   Time In 1330         Time Out 1430         Minutes 60           Timed Code Treatment Minutes:  32 + 60 minutes  Total Treatment Minutes:  92 minutes     Electronically Signed By:     SHERLYN Lyle  Therapist observed and directed the patient's plan of care.   Co-signed and supervised by: Ian Colunga PT, DPT - [id number]

## 2023-02-03 NOTE — PLAN OF CARE
Problem: Discharge Planning  Goal: Discharge to home or other facility with appropriate resources  2/3/2023 0956 by Kiara Rothman RN  Outcome: Progressing     Problem: Pain  Goal: Verbalizes/displays adequate comfort level or baseline comfort level  2/3/2023 0956 by Kiara Rothman RN  Outcome: Progressing     Problem: Safety - Adult  Goal: Free from fall injury  2/3/2023 0956 by Kiara Rothman RN  Outcome: Progressing

## 2023-02-03 NOTE — PROGRESS NOTES
Kadi Muniz  2/3/2023  [id number]    Chief Complaint: Multiple fractures    Subjective:   No overnight events. No current complaints. Participating well in therapy. Home visit went well yesterday. ROS: No CP, SOB, dyspnea    Objective:  Patient Vitals for the past 24 hrs:   BP Temp Temp src Pulse Resp SpO2 Weight   02/03/23 1101 -- -- -- -- 16 96 % --   02/03/23 0735 (!) 115/57 98.1 °F (36.7 °C) Oral 70 18 95 % --   02/03/23 0603 -- -- -- -- -- -- 154 lb 4.8 oz (70 kg)   02/03/23 0135 -- -- -- 74 16 97 % --   02/02/23 2035 (!) 103/52 98.2 °F (36.8 °C) Oral 74 16 97 % --       Gen: No distress, pleasant. Resting in bed  HEENT: Normocephalic, atraumatic. CV: Regular rate and rhythm. No MRG   Resp: No respiratory distress. CTAB   Abd: Soft, nontender nondistended  Ext: No edema. RLE incision well approximated. RUE in splint  Neuro: Alert, oriented, appropriately interactive. Laboratory data: Available via EMR. Therapy progress:    PT    Supine to Sit: Independent  Sit to Supine: Independent   Sit to Stand: Supervision or touching assistance  Chair/Bed to Chair Transfer: Supervision or touching assistance  Car Transfer: Supervision or touching assistance  Ambulation 10 ft: Supervision or touching assistance  Ambulation 50 ft:    Ambulation 150 ft:    Stairs - 1 Step: Partial/moderate assistance  Stairs - 4 Step:    Stairs - 12 Step:      OT    Eating: Independent  Oral Hygiene: Independent  Bathing: Supervision or touching assistance  Upper Body Dressing: Independent  Lower Body Dressing: Supervision or touching assistance  Toilet Transfer: Supervision or touching assistance  Toilet Hygiene: Supervision or touching assistance    Speech Therapy         Body mass index is 25.68 kg/m².     Assessment:  Patient Active Problem List   Diagnosis    Clot retention of urine    Multiple fractures    Closed fracture of right distal radius    Closed fracture of right tibial plateau    S/P ORIF (open reduction internal fixation) fracture    Fracture       Plan:   Multiple fractures: s/p ORIF of R radius and tibia 1/19. WBAT in splint RUE. TTWB RLE. Pain control. PT/OT. Ecoli UTI: Completed Cipro, Cx sensitive     HLD: Lipitor 40     Depression: Lexapro 10 HS, Wellbutrin 300     Hx bladder cancer: Flomax     MSK chest pain: lidoderm patch to chest     Iron deficiency anemia: fergon       Bowels: Per protocol  Bladder: Per protocol   Sleep: Trazodone 100 scheduled. Pain: resume robaxin scheduled, resume tasia PRN   DVT PPx: heparin   LEE: 2/7    Rose Ybarra MD 2/3/2023, 2:50 PM    * This document was created using dictation software. While all precautions were taken to ensure accuracy, errors may have occurred. Please disregard any typographical errors.

## 2023-02-03 NOTE — PROGRESS NOTES
Natalie [facility] [address]   Phone: [phone]    Occupational Therapy    [] Initial Evaluation            [x] Daily Treatment Note         [] Discharge Summary      Patient: Roslyn Steel   : 1950   MRN: [id number]   Date of Service:  2/3/2023    Admitting Diagnosis:  Multiple fractures  Current Admission Summary: 81-year-old female with a history of hyperlipidemia and bladder cancer who was admitted to [facility] on  with right arm and leg pain after a fall from a ladder. She reports she was on the last step and thought she was on the ground and fell 1 step to the ground. Work-up revealed a right radius fracture and a right tibial fracture. CT of the knee revealed an intra-articular tibial fracture involving the spine. Ortho evaluated and suggested nonweightbearing nonoperative management with a knee immobilizer on the right lower extremity. Her hospital course was complicated by urinary retention requiring catheterization. Her catheter was removed prior to transfer to this facility. She was admitted to our facility on 1/all and requested to transition her orthopedic providers to [address] [place] [address]. Ortho evaluated and suggested surgical intervention on both fractures in both right upper extremity and right lower extremities. She was transferred to surgery on . Her postoperative course and an overall unremarkable. She was bilaterally by therapy again and deemed appropriate for an ARU stay. Past Medical History:  has a past medical history of Arthritis, Cancer ([address] and Hyperlipidemia. Past Surgical History:  has a past surgical history that includes Abdomen surgery; Colonoscopy; fracture surgery; Hysterectomy; Tonsillectomy; Breast reduction surgery (); eye surgery (); Cystoscopy (N/A, 2021); Wrist fracture surgery (Right, 2023); and Leg Surgery (Right, 2023).     Discharge Recommendations: [facility] OT S3    DME Required For Discharge: wheelchair, tub transfer bench, grab bars - shower, reacher, elevated commode     Precautions/Restrictions: high fall risk, weight bearing, ROM restrictions  Weight Bearing Restrictions: WBAT (R) wrist with splint donned; okay to remove splint for bathing per ortho; TTWB R LE. [x] Right Upper Extremity  [] Left Upper Extremity [x] Right Lower Extremity  [] Left Lower Extremity       Required Braces/Orthotics: R wrist splint; KI discontinued on 2/1   [x] Right  [] Left    Positional Restrictions: no wrist ROM per ortho; ROM on R knee okay     Pre-Admission Information   Lives With: spouse                  Type of Home: [facility]  Home Layout: one level  Home Access:  1 + 1 NORA with (R) grab bar  Bathroom Layout: walk in shower  Bathroom Equipment:  owns grab bars but not installed  Toilet Height: standard height  Home Equipment: no prior equipment  Transfer Assistance: Independent without use of device  Ambulation Assistance:Independent without use of device  ADL Assistance: independent with all ADL's  IADL Assistance: requires assistance with all homemaking tasks- shared roles with spouse  Active :        [x] Yes                 [] No  Hand Dominance: [] Left                 [x] Right  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        Subjective  General: Pt upright in bed upon entry- agreeable to OT session- reports no pain  Pain: N/A  Pain Interventions: N/A         Activities of Daily Living  Basic Activities of Daily Living  Grooming: modified independent  Grooming Comments: w/c level oral care and combing hair    Upper Extremity Bathing: stand by assistance  Lower Extremity Bathing: minimal assistance Comment: Lissett for drying ryan area- CGA in stance to don LB clothing    Bathing Equipment: long handled sponge  Upper Extremity Dressing: stand by assistance  Lower Extremity Dressing: contact guard assistance Comment: CGA for stance to pull pants/briefs over hips- Figure 4 technique for L LE to don socks- Pt could reach to don sock on R LE   Dressing Equipment: none  Dressing Comments: completed seated on TTB in shower- stances to RW for clothing over hips  General comment:  bathing completed seated on TTB with completion of lateral leans as needed    Instrumental Activities of Daily Living  No IADL completed on this date. Functional Mobility  Bed Mobility  Scooting: stand by assistance  Comments:   Transfers  Sit to stand transfer:contact guard assistance  Stand to sit transfer: contact guard assistance  Comments: Lissett VC for bringing effected arm towards body before sitting. Functional Mobility:  Sitting Balance: supervision. Standing Balance: contact guard assistance. Functional Mobility: .  contact guard assistance  Functional Mobility Device Use: platform walker (R)  Comments:  EOB >> shower + ~10ft w/c >> recliner     Other Therapeutic Interventions        Second Session:  Pt in recliner at entry, agreeable to OT session, denied pain, reporting fatigue. Pt completed sit<>stand transfers SBA RW to/from recliner and w/c. Pt propelled w/c to/from therapy gym with Carlene. Dry shower transfer with TTB completed to simulate pt's home layout and method for d/c planning: pt ambulated to TTB placed along wall with platform RW at SBA- stand>sit onto bench over 5inch \"lip/step of shower\" at [facility] with VC for hand placement- pt then scooted backward on bench and able to clear B LE within \"doorway\"/\"shower entry\" with increased time and VC for positioning/sequence- sit>stand at [facility] to RW-- pt demo'd good balance and safety throughout, verbalized understanding and able to assist in problem solving transfer technique. Pt encouraged to complete frst shower transfer at [facility] to endure safety in home- pt verbalized understanding.     Pt requesting to use commode at EOS w/c>> commode stand step at SBA. Toileting completed at [facility] with increased time for clothing over hips. Pt then ambulated with platform RW SBA commode>> recliner. Pt left in chair, chair alarm on, call light and needs in reach. Cognition  Overall Cognitive Status: WFL  Following Commands: follows multi step commands with repetition  Attention Span: attends with cues to redirect  Sequencing: requires cues for some  Comments: cog overall WFL  Orientation:    alert and oriented x 4  Command Following:   [facility]     Education  Barriers To Learning: physical  Patient Education: patient educated on goals, OT role and benefits, plan of care, precautions, ADL adaptive strategies, weight-bearing education, adaptive device training, energy conservation, transfer training, discharge recommendations, R wrist splint management and skin break/integrity   Learning Assessment:  patient verbalizes understanding, would benefit from continued reinforcement    Assessment  Activity Tolerance: fair tolerance; limited by pain and fatigue this date  Impairments Requiring Therapeutic Intervention: decreased functional mobility, decreased ADL status, decreased strength, decreased endurance, decreased balance, decreased IADL, increased pain  Prognosis: good  Clinical Assessment: Pt is a 71 yr old female who presents with the above deficits impacting her occupational performance. Pt is below her baseline level of function d/t s/p ORIF of R radius fx and R tibial fx. Pt now TTWB in R LE - KI discontinued, R wrist WBAT while splint is donned. Pt is progressing with transfers with CGA consistently with increased ability to manage platform strap and sequence transfers. Pt demo's good adherence to precautions. Pt is IND with w/c mobility on level surfaces. Continues to demo overall decreased pain during sessions. Demonstrates hesitation for R knee ROM and R wrist WBAT.  Pt demo's good insight and safety awareness regarding abilities an deficits- anticipate successful d/c to home with spouse- home eval on 2/2 completed. Pt participates well within all sessions, is motivated for therapy and return home soon. Skilled OT services warranted in order to maximize her IND and safety with all occupational pursuits. Safety Interventions: patient left in chair, chair alarm in place, call light within reach, patient at risk for falls, and all needs met.      Plan  Frequency: 5 x/week, 90 min/day  Current Treatment Recommendations: strengthening, balance training, functional mobility training, transfer training, endurance training, wheelchair mobility training, patient/caregiver education, ADL/self-care training, IADL training, home management training, pain management, safety education, and equipment evaluation/education    Goals  Patient Goals: regain IND with mobility and \"taking care of self\"    Short Term Goals:  Time Frame: 14 days  Patient will complete upper body bathing and dressing at [facility]   Patient will complete lower body bathing and dressing at [facility]   Patient will complete toileting at modified independent   Patient will complete functional transfers at [facility]   Patient will complete functional mobility at modified independent   Patient to gather and transport IADL items at modified independent   Patient will independently don/doff KI and/or independently verbalize brace management to caregiver with 100% accuracy-- discontinue goal- pt no longer requires KI brace 2/2    Goals not yet met, progressing 2/3    Therapy Session Time     Individual Group Co-treatment   Time In 0847      Time Out 0947      Minutes 60         Second Session    Individual Concurrent Group Co-treatment   Time In  1135      Time Out  1205      Minutes  30           Timed Code Treatment Minutes: 60 + 30  Total Treatment Minutes:  90        Electronically Signed By:  First session: CHE Moreland   Directed and supervised by Tawanda Ramirez, JENNAR/L #[id number]      2nd Session: Tawanda Ramirez, JENNAR/L #[id number]

## 2023-02-04 PROCEDURE — 6370000000 HC RX 637 (ALT 250 FOR IP): Performed by: PHYSICAL MEDICINE & REHABILITATION

## 2023-02-04 PROCEDURE — 94761 N-INVAS EAR/PLS OXIMETRY MLT: CPT

## 2023-02-04 PROCEDURE — 6360000002 HC RX W HCPCS: Performed by: PHYSICAL MEDICINE & REHABILITATION

## 2023-02-04 PROCEDURE — 1280000000 HC REHAB R&B

## 2023-02-04 RX ADMIN — ATORVASTATIN CALCIUM 40 MG: 40 TABLET, FILM COATED ORAL at 20:09

## 2023-02-04 RX ADMIN — ARMODAFINIL 250 MG: 250 TABLET ORAL at 11:49

## 2023-02-04 RX ADMIN — DICLOFENAC SODIUM 2 G: 10 GEL TOPICAL at 12:48

## 2023-02-04 RX ADMIN — BUPROPION HYDROCHLORIDE 300 MG: 150 TABLET, EXTENDED RELEASE ORAL at 08:36

## 2023-02-04 RX ADMIN — ACETAMINOPHEN 1000 MG: 500 TABLET ORAL at 16:02

## 2023-02-04 RX ADMIN — STANDARDIZED SENNA CONCENTRATE AND DOCUSATE SODIUM 1 TABLET: 8.6; 5 TABLET ORAL at 20:09

## 2023-02-04 RX ADMIN — ESCITALOPRAM OXALATE 10 MG: 10 TABLET, FILM COATED ORAL at 08:36

## 2023-02-04 RX ADMIN — METHOCARBAMOL TABLETS 750 MG: 750 TABLET, COATED ORAL at 20:09

## 2023-02-04 RX ADMIN — ACETAMINOPHEN 1000 MG: 500 TABLET ORAL at 20:08

## 2023-02-04 RX ADMIN — METHOCARBAMOL TABLETS 750 MG: 750 TABLET, COATED ORAL at 13:34

## 2023-02-04 RX ADMIN — HEPARIN SODIUM 5000 UNITS: 5000 INJECTION INTRAVENOUS; SUBCUTANEOUS at 13:34

## 2023-02-04 RX ADMIN — ACETAMINOPHEN 1000 MG: 500 TABLET ORAL at 08:35

## 2023-02-04 RX ADMIN — Medication 2000 UNITS: at 18:47

## 2023-02-04 RX ADMIN — STANDARDIZED SENNA CONCENTRATE AND DOCUSATE SODIUM 1 TABLET: 8.6; 5 TABLET ORAL at 08:36

## 2023-02-04 RX ADMIN — FERROUS SULFATE TAB 325 MG (65 MG ELEMENTAL FE) 325 MG: 325 (65 FE) TAB at 08:36

## 2023-02-04 RX ADMIN — Medication 1000 MG: at 20:11

## 2023-02-04 RX ADMIN — HEPARIN SODIUM 5000 UNITS: 5000 INJECTION INTRAVENOUS; SUBCUTANEOUS at 20:09

## 2023-02-04 RX ADMIN — METHOCARBAMOL TABLETS 750 MG: 750 TABLET, COATED ORAL at 16:02

## 2023-02-04 RX ADMIN — Medication 1000 MG: at 08:35

## 2023-02-04 RX ADMIN — METHOCARBAMOL TABLETS 750 MG: 750 TABLET, COATED ORAL at 08:36

## 2023-02-04 RX ADMIN — TRAZODONE HYDROCHLORIDE 100 MG: 50 TABLET ORAL at 20:09

## 2023-02-04 RX ADMIN — HEPARIN SODIUM 5000 UNITS: 5000 INJECTION INTRAVENOUS; SUBCUTANEOUS at 05:57

## 2023-02-04 ASSESSMENT — PAIN DESCRIPTION - ORIENTATION
ORIENTATION: RIGHT
ORIENTATION: RIGHT

## 2023-02-04 ASSESSMENT — PAIN DESCRIPTION - DESCRIPTORS
DESCRIPTORS: ACHING
DESCRIPTORS: ACHING

## 2023-02-04 ASSESSMENT — PAIN SCALES - GENERAL
PAINLEVEL_OUTOF10: 3
PAINLEVEL_OUTOF10: 0
PAINLEVEL_OUTOF10: 4

## 2023-02-04 ASSESSMENT — PAIN DESCRIPTION - LOCATION
LOCATION: ARM;CHEST
LOCATION: WRIST;KNEE

## 2023-02-04 NOTE — PLAN OF CARE
Problem: Safety - Adult  Goal: Free from fall injury  2/4/2023 1018 by Jigar Chaves RN  Outcome: Progressing  Note: Pt remains free from falls. Safety precautions in place. Bed in lowest position, bed/chair wheels locked, call light with in reach, bedside table in reach, bed/chair alarm on, fall risk wrist band on, SAFE outside of doorway. Will continue to monitor.

## 2023-02-04 NOTE — PLAN OF CARE
Problem: Pain  Goal: Verbalizes/displays adequate comfort level or baseline comfort level  2/3/2023 2250 by Tommy RN  Outcome: Progressing     Problem: Safety - Adult  Goal: Free from fall injury  2/3/2023 2250 by Tommy, RN  Outcome: Progressing

## 2023-02-05 VITALS
HEIGHT: 65 IN | BODY MASS INDEX: 25.66 KG/M2 | WEIGHT: 154 LBS | RESPIRATION RATE: 16 BRPM | SYSTOLIC BLOOD PRESSURE: 110 MMHG | OXYGEN SATURATION: 96 % | HEART RATE: 75 BPM | TEMPERATURE: 98.2 F | DIASTOLIC BLOOD PRESSURE: 60 MMHG

## 2023-02-05 PROCEDURE — 6370000000 HC RX 637 (ALT 250 FOR IP): Performed by: PHYSICAL MEDICINE & REHABILITATION

## 2023-02-05 PROCEDURE — 6360000002 HC RX W HCPCS: Performed by: PHYSICAL MEDICINE & REHABILITATION

## 2023-02-05 PROCEDURE — 1280000000 HC REHAB R&B

## 2023-02-05 RX ADMIN — TRAZODONE HYDROCHLORIDE 100 MG: 50 TABLET ORAL at 22:00

## 2023-02-05 RX ADMIN — ATORVASTATIN CALCIUM 40 MG: 40 TABLET, FILM COATED ORAL at 22:00

## 2023-02-05 RX ADMIN — METHOCARBAMOL TABLETS 750 MG: 750 TABLET, COATED ORAL at 19:21

## 2023-02-05 RX ADMIN — Medication 2000 UNITS: at 19:21

## 2023-02-05 RX ADMIN — FERROUS SULFATE TAB 325 MG (65 MG ELEMENTAL FE) 325 MG: 325 (65 FE) TAB at 08:59

## 2023-02-05 RX ADMIN — BUPROPION HYDROCHLORIDE 300 MG: 150 TABLET, EXTENDED RELEASE ORAL at 08:59

## 2023-02-05 RX ADMIN — HEPARIN SODIUM 5000 UNITS: 5000 INJECTION INTRAVENOUS; SUBCUTANEOUS at 06:03

## 2023-02-05 RX ADMIN — Medication 1000 MG: at 08:59

## 2023-02-05 RX ADMIN — METHOCARBAMOL TABLETS 750 MG: 750 TABLET, COATED ORAL at 14:39

## 2023-02-05 RX ADMIN — OXYCODONE HYDROCHLORIDE 5 MG: 5 TABLET ORAL at 08:59

## 2023-02-05 RX ADMIN — HEPARIN SODIUM 5000 UNITS: 5000 INJECTION INTRAVENOUS; SUBCUTANEOUS at 14:40

## 2023-02-05 RX ADMIN — ARMODAFINIL 250 MG: 250 TABLET ORAL at 08:59

## 2023-02-05 RX ADMIN — Medication 1000 MG: at 22:00

## 2023-02-05 RX ADMIN — ACETAMINOPHEN 1000 MG: 500 TABLET ORAL at 14:39

## 2023-02-05 RX ADMIN — ACETAMINOPHEN 1000 MG: 500 TABLET ORAL at 08:59

## 2023-02-05 RX ADMIN — STANDARDIZED SENNA CONCENTRATE AND DOCUSATE SODIUM 1 TABLET: 8.6; 5 TABLET ORAL at 22:00

## 2023-02-05 RX ADMIN — ACETAMINOPHEN 1000 MG: 500 TABLET ORAL at 22:00

## 2023-02-05 RX ADMIN — ESCITALOPRAM OXALATE 10 MG: 10 TABLET, FILM COATED ORAL at 08:59

## 2023-02-05 RX ADMIN — METHOCARBAMOL TABLETS 750 MG: 750 TABLET, COATED ORAL at 08:59

## 2023-02-05 RX ADMIN — ONDANSETRON 4 MG: 4 TABLET, ORALLY DISINTEGRATING ORAL at 09:00

## 2023-02-05 RX ADMIN — STANDARDIZED SENNA CONCENTRATE AND DOCUSATE SODIUM 1 TABLET: 8.6; 5 TABLET ORAL at 08:59

## 2023-02-05 RX ADMIN — HEPARIN SODIUM 5000 UNITS: 5000 INJECTION INTRAVENOUS; SUBCUTANEOUS at 22:00

## 2023-02-05 ASSESSMENT — PAIN DESCRIPTION - LOCATION
LOCATION: HEAD
LOCATION: HEAD
LOCATION: WRIST

## 2023-02-05 ASSESSMENT — PAIN SCALES - GENERAL
PAINLEVEL_OUTOF10: 5
PAINLEVEL_OUTOF10: 2
PAINLEVEL_OUTOF10: 0
PAINLEVEL_OUTOF10: 5

## 2023-02-05 ASSESSMENT — PAIN DESCRIPTION - DESCRIPTORS
DESCRIPTORS: PRESSURE
DESCRIPTORS: ACHING
DESCRIPTORS: ACHING

## 2023-02-05 ASSESSMENT — PAIN DESCRIPTION - FREQUENCY: FREQUENCY: INTERMITTENT

## 2023-02-05 ASSESSMENT — PAIN DESCRIPTION - ORIENTATION: ORIENTATION: RIGHT

## 2023-02-05 ASSESSMENT — PAIN DESCRIPTION - ONSET: ONSET: ON-GOING

## 2023-02-05 ASSESSMENT — PAIN DESCRIPTION - PAIN TYPE: TYPE: ACUTE PAIN

## 2023-02-05 ASSESSMENT — PAIN - FUNCTIONAL ASSESSMENT: PAIN_FUNCTIONAL_ASSESSMENT: ACTIVITIES ARE NOT PREVENTED

## 2023-02-05 NOTE — PLAN OF CARE
Problem: Pain  Goal: Verbalizes/displays adequate comfort level or baseline comfort level  Outcome: Progressing     Problem: Safety - Adult  Goal: Free from fall injury  2/4/2023 2152 by KIRK Sanders  Outcome: Progressing     Problem: ABCDS Injury Assessment  Goal: Absence of physical injury  Outcome: Progressing

## 2023-02-05 NOTE — PLAN OF CARE
Problem: Safety - Adult  Goal: Free from fall injury  2/5/2023 1041 by Milo Holloway RN  Outcome: Progressing  Note: Pt remains free from falls. Safety precautions in place. Bed in lowest position, bed/chair wheels locked, call light with in reach, bedside table in reach, bed/chair alarm on, fall risk wrist band on, SAFE outside of doorway. Will continue to monitor.

## 2023-02-06 LAB
ANION GAP SERPL CALCULATED.3IONS-SCNC: 7 MMOL/L (ref 3–16)
BUN BLDV-MCNC: 13 MG/DL (ref 7–20)
CALCIUM SERPL-MCNC: 9.7 MG/DL (ref 8.3–10.6)
CHLORIDE BLD-SCNC: 100 MMOL/L (ref 99–110)
CO2: 31 MMOL/L (ref 21–32)
CREAT SERPL-MCNC: <0.5 MG/DL (ref 0.6–1.2)
GFR SERPL CREATININE-BSD FRML MDRD: >60 ML/MIN/{1.73_M2}
GLUCOSE BLD-MCNC: 98 MG/DL (ref 70–99)
HCT VFR BLD CALC: 34.5 % (ref 36–48)
HEMOGLOBIN: 11.1 G/DL (ref 12–16)
MCH RBC QN AUTO: 30.6 PG (ref 26–34)
MCHC RBC AUTO-ENTMCNC: 32.1 G/DL (ref 31–36)
MCV RBC AUTO: 95.2 FL (ref 80–100)
PDW BLD-RTO: 13.7 % (ref 12.4–15.4)
PLATELET # BLD: 263 K/UL (ref 135–450)
PMV BLD AUTO: 7.7 FL (ref 5–10.5)
POTASSIUM SERPL-SCNC: 4.3 MMOL/L (ref 3.5–5.1)
RBC # BLD: 3.63 M/UL (ref 4–5.2)
SODIUM BLD-SCNC: 138 MMOL/L (ref 136–145)
WBC # BLD: 5 K/UL (ref 4–11)

## 2023-02-06 PROCEDURE — 6370000000 HC RX 637 (ALT 250 FOR IP): Performed by: PHYSICAL MEDICINE & REHABILITATION

## 2023-02-06 PROCEDURE — 97116 GAIT TRAINING THERAPY: CPT

## 2023-02-06 PROCEDURE — 1280000000 HC REHAB R&B

## 2023-02-06 PROCEDURE — 6360000002 HC RX W HCPCS: Performed by: PHYSICAL MEDICINE & REHABILITATION

## 2023-02-06 PROCEDURE — 97110 THERAPEUTIC EXERCISES: CPT

## 2023-02-06 PROCEDURE — 85027 COMPLETE CBC AUTOMATED: CPT

## 2023-02-06 PROCEDURE — 36415 COLL VENOUS BLD VENIPUNCTURE: CPT

## 2023-02-06 PROCEDURE — 80048 BASIC METABOLIC PNL TOTAL CA: CPT

## 2023-02-06 PROCEDURE — 97530 THERAPEUTIC ACTIVITIES: CPT

## 2023-02-06 PROCEDURE — 97535 SELF CARE MNGMENT TRAINING: CPT

## 2023-02-06 RX ADMIN — FERROUS SULFATE TAB 325 MG (65 MG ELEMENTAL FE) 325 MG: 325 (65 FE) TAB at 08:43

## 2023-02-06 RX ADMIN — HEPARIN SODIUM 5000 UNITS: 5000 INJECTION INTRAVENOUS; SUBCUTANEOUS at 06:38

## 2023-02-06 RX ADMIN — METHOCARBAMOL TABLETS 750 MG: 750 TABLET, COATED ORAL at 18:22

## 2023-02-06 RX ADMIN — ACETAMINOPHEN 1000 MG: 500 TABLET ORAL at 08:44

## 2023-02-06 RX ADMIN — ARMODAFINIL 250 MG: 250 TABLET ORAL at 08:43

## 2023-02-06 RX ADMIN — METHOCARBAMOL TABLETS 750 MG: 750 TABLET, COATED ORAL at 13:58

## 2023-02-06 RX ADMIN — TRAZODONE HYDROCHLORIDE 100 MG: 50 TABLET ORAL at 21:45

## 2023-02-06 RX ADMIN — STANDARDIZED SENNA CONCENTRATE AND DOCUSATE SODIUM 1 TABLET: 8.6; 5 TABLET ORAL at 21:45

## 2023-02-06 RX ADMIN — Medication 2000 UNITS: at 18:22

## 2023-02-06 RX ADMIN — Medication 1000 MG: at 21:49

## 2023-02-06 RX ADMIN — STANDARDIZED SENNA CONCENTRATE AND DOCUSATE SODIUM 1 TABLET: 8.6; 5 TABLET ORAL at 08:44

## 2023-02-06 RX ADMIN — ACETAMINOPHEN 1000 MG: 500 TABLET ORAL at 21:45

## 2023-02-06 RX ADMIN — HEPARIN SODIUM 5000 UNITS: 5000 INJECTION INTRAVENOUS; SUBCUTANEOUS at 21:45

## 2023-02-06 RX ADMIN — ATORVASTATIN CALCIUM 40 MG: 40 TABLET, FILM COATED ORAL at 21:45

## 2023-02-06 RX ADMIN — Medication 1000 MG: at 08:44

## 2023-02-06 RX ADMIN — HEPARIN SODIUM 5000 UNITS: 5000 INJECTION INTRAVENOUS; SUBCUTANEOUS at 13:58

## 2023-02-06 RX ADMIN — ACETAMINOPHEN 1000 MG: 500 TABLET ORAL at 13:58

## 2023-02-06 RX ADMIN — ESCITALOPRAM OXALATE 10 MG: 10 TABLET, FILM COATED ORAL at 08:43

## 2023-02-06 RX ADMIN — DICLOFENAC SODIUM 2 G: 10 GEL TOPICAL at 08:46

## 2023-02-06 RX ADMIN — DICLOFENAC SODIUM 2 G: 10 GEL TOPICAL at 21:46

## 2023-02-06 RX ADMIN — METHOCARBAMOL TABLETS 750 MG: 750 TABLET, COATED ORAL at 06:38

## 2023-02-06 RX ADMIN — BUPROPION HYDROCHLORIDE 300 MG: 150 TABLET, EXTENDED RELEASE ORAL at 08:44

## 2023-02-06 RX ADMIN — DICLOFENAC SODIUM 2 G: 10 GEL TOPICAL at 13:59

## 2023-02-06 ASSESSMENT — PAIN DESCRIPTION - DESCRIPTORS
DESCRIPTORS: PRESSURE;ACHING
DESCRIPTORS: PRESSURE;ACHING

## 2023-02-06 ASSESSMENT — PAIN DESCRIPTION - ONSET: ONSET: ON-GOING

## 2023-02-06 ASSESSMENT — PAIN DESCRIPTION - ORIENTATION
ORIENTATION: RIGHT
ORIENTATION: OTHER (COMMENT)

## 2023-02-06 ASSESSMENT — PAIN DESCRIPTION - PAIN TYPE: TYPE: ACUTE PAIN

## 2023-02-06 ASSESSMENT — PAIN SCALES - GENERAL
PAINLEVEL_OUTOF10: 4
PAINLEVEL_OUTOF10: 4
PAINLEVEL_OUTOF10: 0

## 2023-02-06 ASSESSMENT — PAIN DESCRIPTION - FREQUENCY: FREQUENCY: CONTINUOUS

## 2023-02-06 ASSESSMENT — PAIN DESCRIPTION - LOCATION: LOCATION: KNEE;WRIST

## 2023-02-06 NOTE — PROGRESS NOTES
Natalie Chaudhry [address]   Phone: [phone]    Occupational Therapy    [] Initial Evaluation            [x] Daily Treatment Note         [] Discharge Summary      Patient: Cee Dan   : 1950   MRN: [id number]   Date of Service:  2023    Admitting Diagnosis:  Multiple fractures  Current Admission Summary: 69-year-old female with a history of hyperlipidemia and bladder cancer who was admitted to [facility] on  with right arm and leg pain after a fall from a ladder. She reports she was on the last step and thought she was on the ground and fell 1 step to the ground. Work-up revealed a right radius fracture and a right tibial fracture. CT of the knee revealed an intra-articular tibial fracture involving the spine. Ortho evaluated and suggested nonweightbearing nonoperative management with a knee immobilizer on the right lower extremity. Her hospital course was complicated by urinary retention requiring catheterization. Her catheter was removed prior to transfer to this facility. She was admitted to our facility on 1/all and requested to transition her orthopedic providers to [address] [place] [address]. Ortho evaluated and suggested surgical intervention on both fractures in both right upper extremity and right lower extremities. She was transferred to surgery on . Her postoperative course and an overall unremarkable. She was bilaterally by therapy again and deemed appropriate for an ARU stay. Past Medical History:  has a past medical history of Arthritis, Cancer ([address] and Hyperlipidemia. Past Surgical History:  has a past surgical history that includes Abdomen surgery; Colonoscopy; fracture surgery; Hysterectomy; Tonsillectomy; Breast reduction surgery (); eye surgery (); Cystoscopy (N/A, 2021); Wrist fracture surgery (Right, 2023); and Leg Surgery (Right, 2023).     Discharge Recommendations: [facility] OT S3    DME Required For Discharge: wheelchair, tub transfer bench, grab bars - shower, reacher, elevated commode     Precautions/Restrictions: high fall risk, weight bearing, ROM restrictions  Weight Bearing Restrictions: WBAT (R) wrist with splint donned; okay to remove splint for bathing per ortho; TTWB R LE.   [x] Right Upper Extremity  [] Left Upper Extremity [x] Right Lower Extremity  [] Left Lower Extremity       Required Braces/Orthotics: R wrist splint; KI discontinued on 2/1   [x] Right  [] Left    Positional Restrictions: no wrist ROM per ortho; ROM on R knee okay     Pre-Admission Information   Lives With: spouse                  Type of Home: cond  Home Layout: one level  Home Access:  1 + 1 NORA with (R) grab bar  Bathroom Layout: walk in shower  Bathroom Equipment:  owns grab bars but not installed  Toilet Height: standard height  Home Equipment: no prior equipment  Transfer Assistance: Independent without use of device  Ambulation Assistance:Independent without use of device  ADL Assistance: independent with all ADL's  IADL Assistance: requires assistance with all homemaking tasks- shared roles with spouse  Active :        [x] Yes                 [] No  Hand Dominance: [] Left                 [x] Right  Current Employment: part time employment.  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       Subjective  General: Pt supine in bed upon entry- agreeable to OT session- reports no pain  Pain: N/A  Pain Interventions: N/A         Activities of Daily Living  Basic Activities of Daily Living  Feeding: setup assistance  Grooming: modified independent  Grooming Comments: w/c level oral care and combing hair    Upper Extremity Bathing: modified independent  Lower Extremity Bathing: modified independent   Bathing Equipment: long handled sponge  Upper Extremity Dressing: modified independent  Lower Extremity Dressing: modified independent  Dressing Equipment: reacher, sock  aide  Dressing Comments: completed seated on TTB in shower- stances to RW for clothing over hips- used sock aid for R foot d/t swelling    General comment:  bathing completed seated on TTB with completion of lateral leans as needed    Instrumental Activities of Daily Living  No IADL completed on this date. Light Housekeeping: modified independent. Light Housekeeping Equipment: reacher  Light Housekeeping Comments: Addressed w/c management, problem solving- Pt instructed to organize dirty/clean clothes in different areas of the room- Pt completed task in w/c- Mod I for IADL task- Pt demo'd good w/c management/safety. 2nd IADL activity; to address UE ROM, endurance, strength- Pt instructed to change 7 dirty pillow cases with clean ones- Pt completed task in w/c- Mod I-                 Functional Mobility  Bed Mobility  Supine to Sit: modified independent  Scooting: modified independent  Comments:   Transfers  Sit to stand transfer:modified independent  Stand to sit transfer: modified independent  Shower transfer: modified independent  Shower transfer equipment: tub transfer bench, grab bars, walker  Comments:   Functional Mobility:  Sitting Balance: modified independent. Standing Balance: modified independent. Functional Mobility: .  modified independent  Functional Mobility Device Use: platform walker (R)  Comments:     Other Therapeutic Interventions        Second Session:    Pt upright in chair upon arrival- agreeable to OT session- reported no pain    Toileting/toilet transfer: Chair <> bathroom Mod I- Sit <> stand Mod I- Yoe/donning clothing over hips, Mod I    Light Housekeeping; to address R  strength, ROM- Pt seated edge of chair- Pt instructed to clean dirty table with R hand- Pt supported R arm with L hand during task- states she has pain/discomfort when moving her hand too fast- Pt encouraged to make show movements during activity and take breaks as needed.  Min a to clean table, activity terminated d/t time restraints. TE; to address UE ROM, endurance, and strength. R wrist plint donned throughout. All activities modified to pt needs- Pt seated edge of chair- Pt completed 2 reps x15 of each TE- Shoulder flexions w/ theraband, front arm rows, horizontal arm pulls. Pt linda'd increased endurance in R UE, states that she is \"surprised\" by how much she can do with her R UE/hand     Pt left in chair, chair alarm on, call light in reach, and all needs met. Cognition  Overall Cognitive Status: WFL  Following Commands: follows multi step commands with repetition  Attention Span: attends with cues to redirect  Sequencing: requires cues for some  Comments: cog overall WFL  Orientation:    alert and oriented x 4  Command Following:   [facility]     Education  Barriers To Learning: physical  Patient Education: patient educated on goals, OT role and benefits, plan of care, precautions, ADL adaptive strategies, weight-bearing education, adaptive device training, energy conservation, transfer training, discharge recommendations, R wrist splint management and skin break/integrity   Learning Assessment:  patient verbalizes understanding, would benefit from continued reinforcement    Assessment  Activity Tolerance: fair tolerance; limited by pain and fatigue this date  Impairments Requiring Therapeutic Intervention: decreased functional mobility, decreased ADL status, decreased strength, decreased endurance, decreased balance, decreased IADL, increased pain  Prognosis: good  Clinical Assessment: Pt is a 71 yr old female who presents with the above deficits impacting her occupational performance. Pt is below her baseline level of function d/t s/p ORIF of R radius fx and R tibial fx. Pt now TTWB in R LE - KI discontinued, R wrist WBAT while splint is donned. Pt is progressing with transfers with Mod I consistently with increased ability to manage platform strap and sequence transfers.  Pt demo's good adherence to precautions. Pt is IND with w/c mobility on level surfaces. Continues to demo overall decreased pain during sessions. Demonstrates hesitation to WB on R wrist with splint d/t pain. Pt demo's good insight and safety awareness regarding abilities an deficits- anticipate successful d/c to home with spouse- home eval on 2/2 completed. Pt participates well within all sessions, is motivated for therapy and return home soon. Safety Interventions: patient left in chair, chair alarm in place, call light within reach, patient at risk for falls, and all needs met.      Plan  Frequency: 5 x/week, 90 min/day  Current Treatment Recommendations: strengthening, balance training, functional mobility training, transfer training, endurance training, wheelchair mobility training, patient/caregiver education, ADL/self-care training, IADL training, home management training, pain management, safety education, and equipment evaluation/education    Goals  Patient Goals: regain IND with mobility and \"taking care of self\"    Short Term Goals:  Time Frame: 14 days  Patient will complete upper body bathing and dressing at modified independent - [facility] 2/6  Patient will complete lower body bathing and dressing at modified independent [facility] 2/6  Patient will complete toileting at modified independent [facility] 2/6  Patient will complete functional transfers at modified independent - met 2/6  Patient will complete functional mobility at modified independent -met 2/6  Patient to gather and transport IADL items at [facility] 2/6  Patient will independently don/doff KI and/or independently verbalize brace management to caregiver with 100% accuracy-- discontinue goal- pt no longer requires KI brace 2/2        Therapy Session Time     Individual Group Co-treatment   Time In 0730      Time Out 0830      Minutes 60         Second Session    Individual Concurrent Group Co-treatment   Time In 1115      Time Out 1150      Minutes 35 Timed Code Treatment Minutes: 60 + 35  Total Treatment Minutes:  95        Electronically Signed By:   CHE Moore   Directed and supervised by JOSE Vidal/ANNEL #[id number]

## 2023-02-06 NOTE — PROGRESS NOTES
Stephanie Iqbal  2/6/2023  [id number]    Chief Complaint: Multiple fractures    Subjective:   No significant weekend events. No current complaints. Labs reviewed. ROS: No CP, SOB, dyspnea    Objective:  Patient Vitals for the past 24 hrs:   BP Temp Temp src Pulse Resp SpO2 Weight   02/06/23 0821 136/76 97.3 °F (36.3 °C) Oral 77 18 92 % --   02/06/23 0638 -- -- -- -- -- -- 155 lb 3.2 oz (70.4 kg)   02/06/23 0245 -- -- -- 64 -- 96 % --   02/05/23 2142 110/60 98.2 °F (36.8 °C) Oral 75 16 96 % --   02/05/23 0845 (!) 143/69 98 °F (36.7 °C) Oral 78 16 96 % --       Gen: No distress, pleasant. Resting in WC  HEENT: Normocephalic, atraumatic. CV: Regular rate and rhythm. No MRG   Resp: No respiratory distress. CTAB   Abd: Soft, nontender nondistended  Ext: No edema. RLE incision well approximated. RUE in splint  Neuro: Alert, oriented, appropriately interactive. Stephanie Iqbal was evaluated today and a DME order was entered for a lightweight wheelchair because she requires this to successfully complete daily living tasks of personal cares and ambulating. A lightweight wheelchair is necessary due to the patient's impaired ambulation and mobility restrictions. The patient would not be able to resolve these daily living tasks using a cane or walker. The patient can self-propel a lightweight wheelchair safely in their home and can maneuver within their home with adequate access. The patient cannot self-propel in a standard wheelchair. The need for this equipment was discussed with the patient and she understands, is in agreement, and has not expressed an unwillingness to use the wheelchair. Laboratory data: Available via EMR.      Therapy progress:    PT    Supine to Sit: Independent  Sit to Supine: Independent   Sit to Stand: Supervision or touching assistance  Chair/Bed to Chair Transfer: Supervision or touching assistance  Car Transfer: Supervision or touching assistance  Ambulation 10 ft: Supervision or touching assistance  Ambulation 50 ft:    Ambulation 150 ft:    Stairs - 1 Step: Partial/moderate assistance  Stairs - 4 Step:    Stairs - 12 Step:      OT    Eating: Independent  Oral Hygiene: Independent  Bathing: Supervision or touching assistance  Upper Body Dressing: Independent  Lower Body Dressing: Supervision or touching assistance  Toilet Transfer: Supervision or touching assistance  Toilet Hygiene: Supervision or touching assistance    Speech Therapy         Body mass index is 25.83 kg/m². Assessment:  Patient Active Problem List   Diagnosis    Clot retention of urine    Multiple fractures    Closed fracture of right distal radius    Closed fracture of right tibial plateau    S/P ORIF (open reduction internal fixation) fracture    Fracture       Plan:   Multiple fractures: s/p ORIF of R radius and tibia 1/19. WBAT in splint RUE. TTWB RLE. Pain control. PT/OT. Ecoli UTI: Completed Cipro, Cx sensitive     HLD: Lipitor 40     Depression: Lexapro 10 HS, Wellbutrin 300     Hx bladder cancer: Flomax     MSK chest pain: lidoderm patch to chest     Iron deficiency anemia: fergon       Bowels: Per protocol  Bladder: Per protocol   Sleep: Trazodone 100 scheduled. Pain: resume robaxin scheduled, resume tasia PRN   DVT PPx: heparin   LEE: 2/7    Dispo: Prep d/c for tomorrow. Maykel Orta MD 2/6/2023, 8:36 AM    * This document was created using dictation software. While all precautions were taken to ensure accuracy, errors may have occurred. Please disregard any typographical errors.

## 2023-02-06 NOTE — PROGRESS NOTES
Assessment complete. Alert, oriented x4. Reports mild pain to right wrist. Given scheduled Tylenol. Splint in place to right wrist. Per patient, she wears this at all times right now. Dressing to right leg incision clean, dry, intact. Ambulated to bathroom using walker with right arm platform and contact guard assistance. Voided. Continent. Complaining of itching on buttocks secondary to wrinkles on pull-up and bed pad. Buttocks presents with redness from patient's scratching. Bed pad straightened. Pull-up removed; mesh panties in place. Lotion applied to bilateral buttocks. The care plan and education has been reviewed and mutually agreed upon with the patient. In bed, alarm on, bed in lowest position, call light and table within reach. No further needs expressed at this time.

## 2023-02-06 NOTE — PLAN OF CARE
Problem: Discharge Planning  Goal: Discharge to home or other facility with appropriate resources  2/6/2023 0929 by Nova Palencia RN  Outcome: Progressing     Problem: Pain  Goal: Verbalizes/displays adequate comfort level or baseline comfort level  2/6/2023 0929 by Nova Palencia RN  Outcome: Progressing     Problem: Safety - Adult  Goal: Free from fall injury  2/6/2023 0929 by Nova Palencia RN  Outcome: Progressing

## 2023-02-06 NOTE — CARE COORDINATION
02/06/23 1714   IMM Letter   IMM Letter given to Patient/Family/Significant other/Guardian/POA/by: 2nd IMM Letter given to patient by BECKY Bernardo-PAN/AILYN.    IMM Letter date given: 02/06/23   IMM Letter time given: 0412

## 2023-02-06 NOTE — CARE COORDINATION
[facility] Received home care referral. Will follow.

## 2023-02-06 NOTE — DISCHARGE INSTR - COC
Continuity of Care Form    Patient Name: Gale Hook   :  1950  MRN:  [id number]    Admit date:  2023  Discharge date:  2023    Code Status Order: Full Code   Advance Directives:     Admitting Physician:  Pedro Trammell MD  PCP: Angel Carnes MD    Discharging Nurse: Bishop Frazier RN, BSN. [address] Unit/Room#: EXQ-8498/5765-52  Discharging Unit Phone Number: [phone]    Emergency Contact:   Extended Emergency Contact Information  Primary Emergency Contact: [address] [place] [address] Phone: [phone]  Relation: Spouse  Preferred language: English   needed? No    Past Surgical History:  Past Surgical History:   Procedure Laterality Date    ABDOMEN SURGERY      gastri bypass     BREAST REDUCTION SURGERY      COLONOSCOPY      CYSTOSCOPY N/A 2021    CYSTOSCOPY EVACUATION OF CLOTS FULGURATION performed by Jama Loera MD at [address] [facility] [address] (CERVIX STATUS UNKNOWN)      LEG SURGERY Right 2023    RIGHT KNEE OPEN REDUCTION INTERNAL FIXATION TIBIAL PLATEAU performed by Lisseth Dailey MD at [address] Right 2023    OPEN REDUCTION INTERNAL FIXATION RIGHT DISTAL RADIUS - MADISYN performed by Lisseth Dailey MD at [facility]       Immunization History:   Immunization History   Administered Date(s) Administered    COVID-19, PFIZER Bivalent BOOSTER, DO NOT Dilute, (age 12y+), IM, 30 mcg/0.3 mL 2022    COVID-19, PFIZER GRAY top, DO NOT Dilute, (age 15 y+), IM, 30 mcg/0.3 mL 2022    COVID-19, PFIZER PURPLE top, DILUTE for use, (age 15 y+), 30mcg/0.3mL 2021, 03/10/2021, 2021       Active Problems:  Patient Active Problem List   Diagnosis Code    Clot retention of urine R33.8    Multiple fractures T07. XXXA    Closed fracture of right distal radius S52.501A    Closed fracture of right tibial plateau W33.446Q    S/P ORIF (open reduction internal fixation) fracture Z98.890, Z87.81    Fracture T14. 8XXA       Isolation/Infection:   Isolation            No Isolation          Patient Infection Status       Infection Onset Added Last Indicated Last Indicated By Review Planned Expiration Resolved Resolved By    None active    Resolved    COVID-19 (Rule Out) 08/01/21 08/01/21 08/01/21 COVID-19, Rapid (Ordered)   08/01/21 Rule-Out Test Resulted            Nurse Assessment:  Last Vital Signs: /72   Pulse 69   Temp 97.3 °F (36.3 °C) (Oral)   Resp 18   Ht 5' 5\" (1.651 m)   Wt 155 lb 3.2 oz (70.4 kg)   SpO2 95%   BMI 25.83 kg/m²     Last documented pain score (0-10 scale): Pain Level: 0  Last Weight:   Wt Readings from Last 1 Encounters:   02/06/23 155 lb 3.2 oz (70.4 kg)     Mental Status:  oriented and alert    IV Access:  - None    Nursing Mobility/ADLs:  Walking   Assisted  Transfer  Assisted  Bathing  Assisted  Dressing  Assisted  Toileting  Assisted  Feeding  Independent  Med Admin  Independent  Med Delivery   whole    Wound Care Documentation and Therapy:  Incision 01/19/23 Radial Distal;Right (Active)   Dressing Status Clean;Dry; Intact 02/06/23 0821   Dressing/Treatment Splint; Foam 02/06/23 0821   Closure Other (Comment) 02/06/23 0821   Incision Assessment Other (Comment) 02/06/23 0821   Drainage Amount None 02/06/23 0821   Odor None 02/06/23 0821   Sarah-incision Assessment Other (Comment) 02/06/23 0821   Number of days: 18       Incision 01/19/23 Knee Anterior;Right (Active)   Dressing Status Clean;Dry; Intact 02/06/23 0821   Dressing/Treatment Dry dressing 02/06/23 0821   Closure Other (Comment) 02/06/23 0821   Margins Other (Comment) 02/06/23 0821   Incision Assessment Other (Comment) 02/06/23 0821   Drainage Amount None 02/06/23 0821   Odor None 02/06/23 0821   Sarah-incision Assessment Intact 02/06/23 0821   Number of days: 18        Elimination:  Continence:    Bowel: Yes  Bladder: Yes  Urinary Catheter: None   Colostomy/Ileostomy/Ileal Conduit: No       Date of Last BM: 2/7/2023    Intake/Output Summary (Last 24 hours) at 2/6/2023 1436  Last data filed at 2/6/2023 1236  Gross per 24 hour   Intake 840 ml   Output --   Net 840 ml     I/O last 3 completed shifts: In: 360 [P.O.:360]  Out: -     Safety Concerns:     None    Impairments/Disabilities:      None    Nutrition Therapy:  Current Nutrition Therapy:   - Oral Diet:  General    Routes of Feeding: Oral  Liquids: Thin Liquids  Daily Fluid Restriction: no  Last Modified Barium Swallow with Video (Video Swallowing Test): not done    Treatments at the Time of Hospital Discharge:   Respiratory Treatments: N/A  Oxygen Therapy:  is not on home oxygen therapy.   Ventilator:    - No ventilator support    Rehab Therapies: SN,PT,OT,HHA  Weight Bearing Status/Restrictions: No weight bearing restrictions  Other Medical Equipment (for information only, NOT a DME order):  walker  Other Treatments:HOME HEALTH CARE: LEVEL 3 SAFETY       -Initial home health evaluation to occur within 24-48 hours, in patient home   -Home health agency to establish plan of care for patient over 60 day period   -Medication Reconciliation   -PT/OTevaluations in home within 24-48 hours of discharge; including  -DME and home safety   -Frontload therapy 5 days, then 3x a week   -OT to evaluate if patient has [address] needs for personal care   -PCP Visit scheduled within three to seven days of discharge      Patient's personal belongings (please select all that are sent with patient):  None    RN SIGNATURE:  Electronically signed by Jacob Henning RN on 2/7/23 at 9:57 AM EST    CASE MANAGEMENT/SOCIAL WORK SECTION    Inpatient Status Date: 1/26/2023    Readmission Risk Assessment Score: 15%  Readmission Risk              Risk of Unplanned Readmission:  14           Discharging to Facility/ Agency   Name: Shelly Mcdonough will call for Appointment  Phone: [phone]  Fax:[phone]    Discharging to Facility/ Agency Name: Glendy [facility]ARTINE[facility]  Phone: [phone]    / signature: Electronically signed by BECKY Montano on 2/6/2023 at 4:34 PM     PHYSICIAN SECTION    Prognosis: Good    Condition at Discharge: Stable    Rehab Potential (if transferring to Rehab): Good    Recommended Labs or Other Treatments After Discharge: PT/OT/RN/Aide    Physician Certification: I certify the above information and transfer of Madalyn Johnston  is necessary for the continuing treatment of the diagnosis listed and that she requires Home Care for less 30 days.      Update Admission H&P: No change in H&P    PHYSICIAN SIGNATURE:  Electronically signed by Hugo Byers MD on 2/7/23 at 8:49 AM EST

## 2023-02-06 NOTE — PATIENT CARE CONFERENCE
[facility]  Inpatient Rehabilitation  Weekly Team Conference Note    Patient Name: Omar Landry        MRN: [id number]    : 1950  (76 y.o.)  Gender: female           The team conference for this patient was held on 2023 at 9:00am and led by:  Ana Maria Soriano MD    CASE MANAGEMENT:  Assessment:   Patient is a 71year old female who was admitted to ARU on 2023 with the admitting diagnosis of . Multiple fractures  Patient resides at home with spouse and has support in the home. Patient has benefited from skilled ARU PT/OT. These therapies have increased patient's safety awareness and independence in order for her to return to her prior level of functioning. Patient is discharging to home with home health care services and recommended DME. PHYSICAL THERAPY:    Bed Mobility  Supine to Sit: Independent  Sit to Supine: Independent  Rolling Left: Independent  Rolling Right: Independent  Scooting: Independent  Comments: All performed on traditional, flat bed without use of HR. Transfers  Sit to stand transfer: modified independent  Stand to sit transfer: modified independent  Stand pivot transfer: modified independent  Car transfer: modified independent, (completed during second session)  Comments:  All performed with use of (R) platform walker  Ambulation  Surface:level surface  Assistive Device: platform walker (R)  Assistance: modified independent  Distance: 50 ft  Gait Mechanics: Patient hops on (L) LE with decreased but improving (L) step length and increased use of UE support  Comments: ** Ambulation is max distance and unable to complete multiple times resulting in need for w/c use primarily in home environment **  Ambulation Trial 2  Surface:carpet  Assistive Device: platform walker (R)  Assistance: modified independent  Distance: 20 ft  Gait Mechanics: Unchanged from level surface  Comments:    Stair Mobility  Number of Steps: 1 + 1  Step Height: 4 inch  Hand Rails: None  Device: platform walker (R)  Assistance: contact guard assistance  Comments: Patient ascended steps backwards. VC provided for appropriate sequencing and AD management. Patient attempted forward ascent but unable to complete secondary to reports of feeling unstable.    Wheelchair Mobility:  Chair: manual  Surface: level surface  Method: (R) UE, (L) UE, and (L) LE  Distance: 365 ft  Assistance: modified independent    QM:  Roll Left and Right  Assistance Needed: Independent  CARE Score: 6  Discharge Goal: Independent  Sit to Lying  Assistance Needed: Independent  CARE Score: 6  Discharge Goal: Independent  Lying to Sitting on Side of Bed  Assistance Needed: Independent  CARE Score: 6  Discharge Goal: Independent  Sit to Stand  Assistance Needed: Supervision or touching assistance  CARE Score: 4  Discharge Goal: Independent  Chair/Bed-to-Chair Transfer  Assistance Needed: Supervision or touching assistance  CARE Score: 4  Discharge Goal: Independent  Car Transfer  Assistance Needed: Supervision or touching assistance  CARE Score: 4  Discharge Goal: Independent  Walk 10 Feet  Assistance Needed: Supervision or touching assistance  CARE Score: 4  Discharge Goal: Independent  Walk 50 Feet with Two Turns  Reason if not Attempted: Not attempted due to medical condition or safety concerns  CARE Score: 88  Discharge Goal: Independent  Walk 150 Feet  Reason if not Attempted: Not attempted due to medical condition or safety concerns  CARE Score: 88  Discharge Goal: Independent  Walking 10 Feet on Uneven Surfaces  Reason if not Attempted: Not attempted due to medical condition or safety concerns  CARE Score: 88  Discharge Goal: Independent  1 Step (Curb)  Assistance Needed: Partial/moderate assistance  Reason if not Attempted: Not attempted due to medical condition or safety concerns  CARE Score: 3  Discharge Goal: Supervision or touching assistance  4 Steps  Reason if not Attempted: Not attempted due to medical condition or safety concerns  CARE Score: 88  Discharge Goal: Supervision or touching assistance  12 Steps  Reason if not Attempted: Not attempted due to medical condition or safety concerns  CARE Score: 88  Discharge Goal: Supervision or touching assistance  Picking Up Object  Assistance Needed: Partial/moderate assistance  CARE Score: 3  Discharge Goal: Independent  Wheelchair Ability  Uses a Wheelchair and/or Scooter?: Yes    Goals:                   Patient Goals: To return home   Short Term Goals:  Time Frame: 10-14 days  Patient will complete transfers at modified independent - goal met 2/6/2023  Patient will ambulate 50 ft with use of platform walker (R) at supervision - goal met 2/6/2023  Patient will ascend/descend 1 + 1 stairs with (R) ascending handrail at supervision - goal not met 2/6/2023  Patient will complete car transfer at modified independent - goal met 2/6/2023  Patient will complete manual w/c propulsion 270 ft at modified independent including ramp management - goal partially met 2/6/2023  - mod (I) on level surfaces, min A on ramp   Patient will demonstrate improved (R) knee flexion ROM = to or > 90* in preparation for gait mechanics - goal not met 2/6/2023               These goals were reviewed with this patient at the time of assessment and Madalyn Johnston is in agreement.      Plan of Care: Pt to be seen 5 out of 7 days per week per ARU protocol ( 90 minutes with PT)            OCCUPATIONAL THERAPY:    ADL:   Feeding: setup assistance  Grooming: modified independent  Grooming Comments: w/c level oral care and combing hair    Upper Extremity Bathing: modified independent  Lower Extremity Bathing: modified independent   Bathing Equipment: long handled sponge  Upper Extremity Dressing: modified independent  Lower Extremity Dressing: modified independent  Dressing Equipment: reacher, sock aide  Dressing Comments: completed seated on TTB in shower- stances to RW for clothing over hips- used sock aid for R foot d/t swelling    General comment:  bathing completed seated on TTB with completion of lateral leans as needed    Toilet Transfers:   Carlene w/ platform RW to/from elevated commode     Tub/ShowerTransfers:   Carlene with platform RW to/from TTB in shower    QM:  Eating  Assistance Needed: Independent  CARE Score: 6  Discharge Goal: Independent  Oral Hygiene  Assistance Needed: Independent  CARE Score: 6  Discharge Goal: [address] needed: Supervision or touching assistance  CARE Score: 4  Discharge Goal: Independent  Toilet Transfer  Assistance needed: Supervision or touching assistance  CARE Score: 4  Discharge Goal: Independent  Shower/Bathe Self  Assistance Needed: Supervision or touching assistance  CARE Score: 4  Discharge Goal: Independent  Upper Body Dressing  Assistance Needed: Independent  CARE Score: 6  Discharge Goal: Independent  Lower Body Dressing  Assistance Needed: Supervision or touching assistance  CARE Score: 4  Discharge Goal: Independent  Putting On/Taking Off Footwear  Assistance Needed: Supervision or touching assistance  CARE Score: 4  Discharge Goal: Independent    Goals:               :Short Term Goals:  Time Frame: 14 days  Patient will complete upper body bathing and dressing at modified independent - [facility] 2/6  Patient will complete lower body bathing and dressing at modified independent [facility] 2/6  Patient will complete toileting at modified independent [facility] 2/6  Patient will complete functional transfers at modified independent - met 2/6  Patient will complete functional mobility at modified independent -met 2/6  Patient to gather and transport IADL items at modified independent - met 2/6     These goals were reviewed with this patient at the time of assessment and Anna Rodas is in agreement    Plan of Care:  Pt to be seen 5 out of 7 days per week per ARU protocol ( 90 minutes with OT)     NUTRITION:  Weight: 155 lb 3.2 oz (70.4 kg) / Body mass index is 25.83 kg/m². Diet Order:    Supplements:    Please see nutrition note for details. NURSING:    Risk for Readmission: 15%    Briones Fall Risk Score: 65  Wounds/Incisions/Ulcers: Incisions to right wrist and right leg  Medication Review: Reviewed daily with patient  Pain: Managed with and without medications  Consultations/Labs/X-rays:   Labs: BMP & CBC every Monday & Thursday   X-rays: Right wrist & right knee (2/1)    Patient/Family Education provided by team:    Discharge Plan   Estimated Length of Stay:0 days  Destination: Home  Pass:No  Services at Discharge: [facility] OT/PT S3  Equipment at Discharge: w/c, platform RW, TTB, elevated commode   Factors facilitating achievement of predicted outcomes: Motivated and Cooperative, good insight into deficits   Barriers to the achievement of predicted outcomes: WB status of R UE/LE, pain    Patient Goals:  regain IND with mobility and \"taking care of self\", go home    Interdisciplinary Individualized Plan of Care Review of Previous Week:    Medical and functional progress towards goals:  Labs and vitals stable, pt ready for discharge, doing well medically. Pt progressed to being Independent or Mod I with all required mobility and activities to discharge home safely. Barriers towards progress:  WBing status, Pain  Interventions to address Barriers:  Continue PT/OT to facilitate further adaptation to [address] status, pain meds ordered  Goals still appropriate:  Yes  Modifications to goals: None  Continue Current Plan of Care:  Yes  Modifications to Plan of Care: None    Rehab Team Members in attendance for Team Conference:  Bishnu Reyna, MSW, LSW    Gaye Dove, RD, LD    Mau Morrow, OTR/L    Candida Perez, PT, DPT    Leane Gibbons, RN Lindia Spatz PT, DPT,     I approve the established interdisciplinary plan of care as documented within the medical record of Iván Araya.     Haile Cotton MD     Team conference was held today on the patient and discussed directly with the patient utilizing their entire treatment team. Please see separate team note for details. Total treatment time for today's care >35 min.

## 2023-02-06 NOTE — CARE COORDINATION
PAN/CM met with patient regarding her discharge plan. Patient requesting to be referred to [facility]. PAN/CM referred patient and [facility] agreed to accept patient @ discharge. SONNY will follow.     Electronically signed by BECKY Figueroa on 2/6/2023 at 3:04 PM

## 2023-02-06 NOTE — PROGRESS NOTES
Nutrition Note    RECOMMENDATIONS  No new nutrition rec's @ this time. NUTRITION ASSESSMENT   Pt remains on a regular diet & denies appetite issues. PO intake recently 26-50% of meals, which is typical as pt has hx gastric bypass surgery. Also taking % Glucerna supplements. Wt is down 5% in past month per bedscale wts. Will con't to monitor & encourage adequate po intake to promote healing & strength. Nutrition Related Findings: LBM 2/5; +1-+2 pitting edema BLE; labs reviewed  Wounds: Surgical Incision  Nutrition Education:  Education not indicated   Nutrition Goals: PO intake 75% or greater     MALNUTRITION ASSESSMENT      Malnutrition Status: No malnutrition    NUTRITION DIAGNOSIS   Increased nutrient needs related to increase demand for energy/nutrients as evidenced by other (comment) (increased protein for healing & strengthening)      CURRENT NUTRITION THERAPIES  ADULT DIET; Regular  ADULT ORAL NUTRITION SUPPLEMENT; Breakfast; Diabetic Oral Supplement     PO Intake: 26-50%   PO Supplement Intake:%    ANTHROPOMETRICS  Current Height: 5' 5\" (165.1 cm)  Current Weight: 155 lb 3.2 oz (70.4 kg)    Ideal Body Weight (IBW): 125 lbs  (57 kg)        BMI: 25.8    The patient will be monitored per nutrition standards of care. Consult dietitian if additional nutrition interventions are needed prior to RD reassessment.      Harvey Hansen RD, LD    Contact: [phone]

## 2023-02-06 NOTE — PROGRESS NOTES
[facility]   Phone: [phone]    Physical Therapy    [] Initial Evaluation            [x] Daily Treatment Note         [x] Discharge Summary      Patient: Madalyn Johnston   : 1950   MRN: [id number]   Date of Service:  2023  Admitting Diagnosis: Multiple fractures  Current Admission Summary: 31-year-old female with a history of hyperlipidemia and bladder cancer who was admitted to [facility] on  with right arm and leg pain after a fall from a ladder. She reports she was on the last step and thought she was on the ground and fell 1 step to the ground. Work-up revealed a right radius fracture and a right tibial fracture. CT of the knee revealed an intra-articular tibial fracture involving the spine. Ortho evaluated and suggested nonweightbearing nonoperative management with a knee immobilizer on the right lower extremity. Her hospital course was complicated by urinary retention requiring catheterization. Her catheter was removed prior to transfer to this facility. She was admitted to our facility on 1/all and requested to transition her orthopedic providers to [facility]. Ortho evaluated and suggested surgical intervention on both fractures in both right upper extremity and right lower extremities. She was transferred to surgery on . Her postoperative course and an overall unremarkable. She was evaluated by therapy again and deemed appropriate for an ARU stay. Past Medical History:  has a past medical history of Arthritis, Cancer ([address] and Hyperlipidemia. Past Surgical History:  has a past surgical history that includes Abdomen surgery; Colonoscopy; fracture surgery; Hysterectomy; Tonsillectomy; Breast reduction surgery (); eye surgery (); Cystoscopy (N/A, 2021); Wrist fracture surgery (Right, 2023); and Leg Surgery (Right, 2023).   Discharge Recommendations: Home with Home Health Physical Therapy and family assist PRN  DME Required For Discharge: wheelchair, platform walker, ramp (owns), wheelchair cushion  Precautions/Restrictions: high fall risk, weight bearing  Weight Bearing Restrictions: (WB status upgraded on 2/1/2023)  WBAT (R) wrist with splint donned  TTWB (R) LE - ROM allowed per ortho note  Required Braces/Orthotics: Wrist splint - no longer required to wear KO when OOB (upgraded on 2/1/2023)                   [x] Right            [] Left  Positional Restrictions:no positional restrictions    Pre-Admission Information   Lives With: spouse                  Type of Home: condo  Home Layout: one level  Home Access:  1 + 1 NORA with (R) grab bar  Bathroom Layout: walk in shower  Bathroom Equipment:  owns grab bars but not installed  Toilet Height: standard height  Home Equipment: no prior equipment  Transfer Assistance: Independent without use of device  Ambulation Assistance:Independent without use of device  ADL Assistance: independent with all ADL's  IADL Assistance: requires assistance with all homemaking tasks  Active :        [x] Yes                 [] No  Hand Dominance: [] Left                 [x] Right  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 Examination   Vision:   Vision Gross Assessment: WFL  Hearing:   WFL      Subjective  General: Patient seated in recliner upon entry, agreeable to therapy. Pain: Patient does not rate upon questioning  Pain Interventions: patient denies pain interventions       Functional Mobility  Bed Mobility  Supine to Sit: Independent  Sit to Supine: Independent  Rolling Left: Independent  Rolling Right: Independent  Scooting: Independent  Comments: All performed on traditional, flat bed without use of HR.   Transfers  Sit to stand transfer: modified independent  Stand to sit transfer: modified independent  Stand pivot transfer: modified independent  Car transfer: modified independent, (completed during second session)  Comments: All performed with use of (R) platform walker  Ambulation  Surface:level surface  Assistive Device: platform walker (R)  Assistance: modified independent  Distance: 50 ft  Gait Mechanics: Patient hops on (L) LE with decreased but improving (L) step length and increased use of UE support  Comments: ** Ambulation is max distance and unable to complete multiple times resulting in need for w/c use primarily in home environment **  Ambulation Trial 2 - completed during second session  Surface:carpet  Assistive Device: platform walker (R)  Assistance: modified independent  Distance: 20 ft  Gait Mechanics: Unchanged from level surface  Comments:    Stair Mobility - completed during second session  Number of Steps: 1 + 1  Step Height: 4 inch  Hand Rails: None  Device: platform walker (R)  Assistance: contact guard assistance  Comments: Patient ascended steps backwards. VC provided for appropriate sequencing and AD management. Patient attempted forward ascent but unable to complete secondary to reports of feeling unstable. Wheelchair Mobility:  Chair: manual  Surface: level surface  Method: (R) UE, (L) UE, and (L) LE  Distance: 365 ft  Assistance: modified independent  Comments:   Balance  Static Sitting Balance: good: independent with functional balance in unsupported position  Dynamic Sitting Balance: good: independent with functional balance in unsupported position  Static Standing Balance: fair (-): maintains balance at Mod (I) with use of UE support  Dynamic Standing Balance: fair (-): maintains balance at Mod (I) with use of UE support  Patient completes object retrieval from floor in standing position at modified independent, with use of reacher and (R) UE on platform walker  Comments:     Other Therapeutic Interventions   First session:   See functional mobility above.       Second session:    Patient seated in recliner upon entry, agreeable to therapy. Does not rate pain upon questioning. Patient performed all transfers this session at mod (I). In addition, car transfer, ambulation on carpet, and stair navigation performed. See above for further details. Seated active assisted heel slides performed to improve (R) knee flexion ROM 1 x 15. Patient's personal walker adjusted during session to ensure appropriate fit for use at home. Patient returned to recliner with chair alarm in place and call light within reach. (R) knee flexion PROM: 80*    Functional Outcomes                 Cognition  WFL  Orientation:    alert and oriented x 4  Command Following:   [facility]    Education  Barriers To Learning: none  Patient Education: patient educated on goals, PT role and benefits, plan of care, precautions, weight-bearing education, HEP, general safety, functional mobility training, proper use of assistive device/equipment, transfer training, discharge recommendations  Learning Assessment:  patient verbalizes and demonstrates understanding    Assessment  Activity Tolerance: Good. Minimal c/o pain in the (L) knee and (L) chest with mobility. Adequate rest breaks provided for recovery. Impairments Requiring Therapeutic Intervention: decreased functional mobility, decreased ADL status, decreased ROM, decreased strength, decreased endurance, decreased balance, increased pain, decreased posture  Prognosis: good  Clinical Assessment: Patient has achieved three goals this date showing improvements in functional mobility. Patient able to ambulate max distance of 50 ft this date with less assistance. However, continues to be limited by fatigue and limitations in (L) chest pain with ambulation. As a result of this, patient would be unsafe to attempt this distance multiple times at home requiring w/c for main source of household mobility to promote increased safety and stability.   Family training and home evaluation completed during therapy stay, both patient and spouse comfortable with d/c to home. Per report ramp has been fixated to step surface following home evaluation. At this time, it is recommended patient to be discharged home with Home Health Physical Therapy in order to continue to promote functional independence and reduce caregiver burden. Safety Interventions: patient left in chair, chair alarm in place, call light within reach, and gait belt    Plan  Frequency: 5 x/week, 90 min/day  Current Treatment Recommendations: strengthening, ROM, balance training, functional mobility training, transfer training, gait training, stair training, endurance training, neuromuscular re-education, modalities, patient/caregiver education, and ADL/self-care training    Goals  Patient Goals: To return home   Short Term Goals:  Time Frame: 10-14 days  Patient will complete transfers at modified independent - goal met 2/6/2023  Patient will ambulate 50 ft with use of platform walker (R) at supervision - goal met 2/6/2023  Patient will ascend/descend 1 + 1 stairs with (R) ascending handrail at supervision - goal not met 2/6/2023  Patient will complete car transfer at modified independent - goal met 2/6/2023  Patient will complete manual w/c propulsion 270 ft at modified independent including ramp management - goal partially met 2/6/2023  - mod (I) on level surfaces, min A on ramp   Patient will demonstrate improved (R) knee flexion ROM = to or > 90* in preparation for gait mechanics - goal not met 2/6/2023    Therapy Session Time      Individual Group Co-treatment   Time In  0947       Time Out  1017       Minutes  30           Second Session Therapy Time:   Individual Concurrent Group Co-treatment   Time In 1245         Time Out 1352         Minutes 67           Timed Code Treatment Minutes:  30 + 67 minutes  Total Treatment Minutes:  97 minutes     Electronically Signed By:     SHERLYN Huggins  Therapist observed and directed the patient's plan of care.   Co-signed and supervised by: Graciela Jennings PT, DPT - [id number]

## 2023-02-06 NOTE — PROGRESS NOTES
Morning assessment completed, vss, denies pain at this time, schedule meds given, alert and oriented, The care plan and education has been reviewed and mutually agreed upon with the patient. Pt remains free from falls. Fall precautions in place--bed in lowest position, call light within reach, bed alarm in use. Pt aware to call for assistance before getting up.

## 2023-02-06 NOTE — PLAN OF CARE
Problem: Discharge Planning  Goal: Discharge to home or other facility with appropriate resources  Outcome: Progressing  Flowsheets (Taken 2/5/2023 2142)  Discharge to home or other facility with appropriate resources: Identify barriers to discharge with patient and caregiver     Problem: Pain  Goal: Verbalizes/displays adequate comfort level or baseline comfort level  Outcome: Progressing  Flowsheets (Taken 2/5/2023 2142)  Verbalizes/displays adequate comfort level or baseline comfort level: Encourage patient to monitor pain and request assistance     Problem: Safety - Adult  Goal: Free from fall injury  2/5/2023 2354 by Derek Larose RN  Outcome: Progressing  Flowsheets (Taken 2/5/2023 2353)  Free From Fall Injury: Instruct family/caregiver on patient safety  2/5/2023 1041 by Charley Sheets RN  Outcome: Progressing  Note: Pt remains free from falls. Safety precautions in place. Bed in lowest position, bed/chair wheels locked, call light with in reach, bedside table in reach, bed/chair alarm on, fall risk wrist band on, SAFE outside of doorway. Will continue to monitor.         Problem: ABCDS Injury Assessment  Goal: Absence of physical injury  Outcome: Progressing  Flowsheets (Taken 2/5/2023 2353)  Absence of Physical Injury: Implement safety measures based on patient assessment

## 2023-02-07 VITALS
OXYGEN SATURATION: 94 % | RESPIRATION RATE: 18 BRPM | TEMPERATURE: 97.6 F | WEIGHT: 154.3 LBS | HEART RATE: 69 BPM | BODY MASS INDEX: 25.71 KG/M2 | SYSTOLIC BLOOD PRESSURE: 111 MMHG | DIASTOLIC BLOOD PRESSURE: 71 MMHG | HEIGHT: 65 IN

## 2023-02-07 PROCEDURE — 6370000000 HC RX 637 (ALT 250 FOR IP): Performed by: PHYSICAL MEDICINE & REHABILITATION

## 2023-02-07 PROCEDURE — 6360000002 HC RX W HCPCS: Performed by: PHYSICAL MEDICINE & REHABILITATION

## 2023-02-07 RX ORDER — TRAMADOL HYDROCHLORIDE 50 MG/1
50 TABLET ORAL EVERY 6 HOURS PRN
Qty: 25 TABLET | Refills: 0 | Status: SHIPPED | OUTPATIENT
Start: 2023-02-07 | End: 2023-02-14

## 2023-02-07 RX ORDER — METHOCARBAMOL 750 MG/1
750 TABLET, FILM COATED ORAL 3 TIMES DAILY PRN
Qty: 90 TABLET | Refills: 0 | Status: SHIPPED | OUTPATIENT
Start: 2023-02-07 | End: 2023-03-09

## 2023-02-07 RX ADMIN — HEPARIN SODIUM 5000 UNITS: 5000 INJECTION INTRAVENOUS; SUBCUTANEOUS at 07:18

## 2023-02-07 RX ADMIN — ACETAMINOPHEN 1000 MG: 500 TABLET ORAL at 08:30

## 2023-02-07 RX ADMIN — FERROUS SULFATE TAB 325 MG (65 MG ELEMENTAL FE) 325 MG: 325 (65 FE) TAB at 08:30

## 2023-02-07 RX ADMIN — ARMODAFINIL 250 MG: 250 TABLET ORAL at 09:27

## 2023-02-07 RX ADMIN — METHOCARBAMOL TABLETS 750 MG: 750 TABLET, COATED ORAL at 07:18

## 2023-02-07 RX ADMIN — STANDARDIZED SENNA CONCENTRATE AND DOCUSATE SODIUM 1 TABLET: 8.6; 5 TABLET ORAL at 08:30

## 2023-02-07 RX ADMIN — BUPROPION HYDROCHLORIDE 300 MG: 150 TABLET, EXTENDED RELEASE ORAL at 08:30

## 2023-02-07 RX ADMIN — Medication 1200 MG: at 09:27

## 2023-02-07 RX ADMIN — ESCITALOPRAM OXALATE 10 MG: 10 TABLET, FILM COATED ORAL at 08:30

## 2023-02-07 ASSESSMENT — PAIN SCALES - GENERAL
PAINLEVEL_OUTOF10: 0
PAINLEVEL_OUTOF10: 4
PAINLEVEL_OUTOF10: 0

## 2023-02-07 ASSESSMENT — PAIN - FUNCTIONAL ASSESSMENT: PAIN_FUNCTIONAL_ASSESSMENT: ACTIVITIES ARE NOT PREVENTED

## 2023-02-07 ASSESSMENT — PAIN SCALES - WONG BAKER
WONGBAKER_NUMERICALRESPONSE: 0

## 2023-02-07 ASSESSMENT — PAIN DESCRIPTION - LOCATION: LOCATION: KNEE;WRIST

## 2023-02-07 ASSESSMENT — PAIN DESCRIPTION - DESCRIPTORS: DESCRIPTORS: ACHING

## 2023-02-07 ASSESSMENT — PAIN DESCRIPTION - ORIENTATION: ORIENTATION: RIGHT

## 2023-02-07 NOTE — PROGRESS NOTES
Morning assessment completed, vss, denies pain, alert and oriented, The care plan and education has been reviewed and mutually agreed upon with the patient. Fall precautions in place, hourly rounding, call light and belongings in reach, bed in lowest position, wheels locked in place, side rails up x 2, walkways free of clutter Pt remains free from falls. Fall precautions in place--bed in lowest position, call light within reach, bed alarm in use. Pt aware to call for assistance before getting up.

## 2023-02-07 NOTE — PLAN OF CARE
Problem: Discharge Planning  Goal: Discharge to home or other facility with appropriate resources  2/7/2023 0853 by Brooklyn Lin RN  Outcome: Progressing     Problem: Pain  Goal: Verbalizes/displays adequate comfort level or baseline comfort level  2/7/2023 0853 by Brooklyn Lin RN  Outcome: Progressing     Problem: Safety - Adult  Goal: Free from fall injury  2/7/2023 0853 by Brooklyn Lin RN  Outcome: Progressing

## 2023-02-07 NOTE — PLAN OF CARE
Problem: Discharge Planning  Goal: Discharge to home or other facility with appropriate resources  Outcome: Progressing  Flowsheets (Taken 2/6/2023 2127)  Discharge to home or other facility with appropriate resources: Identify barriers to discharge with patient and caregiver     Problem: Pain  Goal: Verbalizes/displays adequate comfort level or baseline comfort level  Outcome: Progressing  Flowsheets (Taken 2/6/2023 2127)  Verbalizes/displays adequate comfort level or baseline comfort level: Encourage patient to monitor pain and request assistance     Problem: Safety - Adult  Goal: Free from fall injury  Outcome: Progressing  Flowsheets (Taken 2/7/2023 0300)  Free From Fall Injury: Instruct family/caregiver on patient safety     Problem: ABCDS Injury Assessment  Goal: Absence of physical injury  Outcome: Progressing  Flowsheets (Taken 2/7/2023 0300)  Absence of Physical Injury: Implement safety measures based on patient assessment

## 2023-02-07 NOTE — PROGRESS NOTES
Assessment complete. Alert, oriented x4. Looking forward to discharge tomorrow. Reports pain to right wrist, right knee, and bilateral chest. Per patient, chest pain started several days ago after straining while using the bathroom. Given scheduled Tylenol. Voltaren gel applied to chest. If patient is sleeping when Robaxin is due at 0130, patient requesting to not be awoken. Splint in place to right wrist. Incision to right leg clean, dry, intact. CPAP at bedside, ready for patient use. Ambulated to bathroom using walker and standby assistance. Voided. Continent. The care plan and education has been reviewed and mutually agreed upon with the patient. In bed, alarm on, bed in lowest position, call light and table within reach. No further needs expressed at this time.

## 2023-02-07 NOTE — DISCHARGE SUMMARY
Physical Medicine & Rehabilitation  Discharge Summary     Patient Identification:  Anna Rodas  : 1950  Admit date: 2023  Discharge date: 23  Attending provider: Rae Marion MD        Primary care provider: wKesi Jay MD     Discharge Diagnoses:   Patient Active Problem List   Diagnosis    Clot retention of urine    Multiple fractures    Closed fracture of right distal radius    Closed fracture of right tibial plateau    S/P ORIF (open reduction internal fixation) fracture    Fracture       Discharge Functional Status:      Physical therapy:  Supine to Sit: Independent  Sit to Supine: Independent      Sit to Stand: Independent  Chair/Bed to Chair Transfer: Independent  Car Transfer: Independent     Ambulation 10 ft: Independent  Ambulation 50 ft: Independent  Ambulation 150 ft:    Stairs - 1 Step: Supervision or touching assistance  Stairs - 4 Step:    Stairs - 12 Step:      Occupational therapy:  Eating: Independent  Oral Hygiene: Independent  Bathing: Supervision or touching assistance  Upper Body Dressing: Independent  Lower Body Dressing: Supervision or touching assistance     Toilet Transfer: Supervision or touching assistance  Toilet Hygiene: Supervision or touching assistance    Speech therapy:         Inpatient Rehabilitation Course:   Anna Rodas is a 71 y.o. female admitted to inpatient rehabilitation on 2023 s/p Multiple fractures. The patient participated in an aggressive multidisciplinary inpatient rehabilitation program involving 3 hours of therapy per day, at least 5 days per week. She progressed well in therapy and was able to be discharged to home with home health care. Her medical rehab course was significant for below:    Multiple fractures: s/p ORIF of R radius and tibia . WBAT in splint RUE. TTWB RLE. Pain control. PT/OT. Pain controlled with tylenol, robaxin, and tramadol. Prescriptions provided.       Ecoli UTI: Completed Cipro, Cx sensitive HLD: Lipitor 40     Depression: Lexapro 10 HS, Wellbutrin 300 per home regimen     Hx bladder cancer: Flomax     MSK chest pain: lidoderm patch to chest. Improved at discharge. Iron deficiency anemia: fergon. Improving at discharge. Continue medication     Discharge Exam:  Patient Vitals for the past 24 hrs:   BP Temp Temp src Pulse Resp SpO2 Height Weight   02/07/23 0828 111/71 97.6 °F (36.4 °C) Oral 69 18 94 % -- --   02/07/23 0718 -- -- -- -- -- -- -- 154 lb 4.8 oz (70 kg)   02/06/23 2127 113/64 98 °F (36.7 °C) Oral 72 16 97 % -- --   02/06/23 0952 -- -- -- -- -- -- 5' 5\" (1.651 m) --     Gen: No distress, pleasant. Resting in bed  HEENT: Normocephalic, atraumatic. CV: Regular rate and rhythm. No MRG   Resp: No respiratory distress. CTAB   Abd: Soft, nontender nondistended  Ext: No edema. RLE incision well approximated. RUE in splint  Neuro: Alert, oriented, appropriately interactive. Janki Lamp was evaluated today and a DME order was entered for a wheeled walker because she requires this to successfully complete daily living tasks of personal cares and ambulating. A wheeled walker is necessary due to the patient's unsteady gait, upper body weakness, and inability to  an ambulation device; and she can ambulate only by pushing a walker instead of a lesser assistive device such as a cane, crutch, or standard walker. The need for this equipment was discussed with the patient and she understands and is in agreement. Significant Diagnostics:   Lab Results   Component Value Date    CREATININE <0.5 (L) 02/06/2023    BUN 13 02/06/2023     02/06/2023    K 4.3 02/06/2023     02/06/2023    CO2 31 02/06/2023       Lab Results   Component Value Date    WBC 5.0 02/06/2023    HGB 11.1 (L) 02/06/2023    HCT 34.5 (L) 02/06/2023    MCV 95.2 02/06/2023     02/06/2023       Disposition:  Home in stable condition.     Follow-up:  See after visit summary from hospitalization    Discharge Medications:     Medication List        START taking these medications      methocarbamol 750 MG tablet  Commonly known as: ROBAXIN  Take 1 tablet by mouth 3 times daily as needed (spasms)     traMADol 50 MG tablet  Commonly known as: Ultram  Take 1 tablet by mouth every 6 hours as needed for Pain for up to 7 days. Intended supply: 5 days. Take lowest dose possible to manage pain Max Daily Amount: 200 mg            CONTINUE taking these medications      Armodafinil 250 MG Tabs  Notes to patient: Uses: treat a lot of sleepiness that may happen with sleep apnea, narcolepsy, or shift work problems  Side Effects: dizziness, headache, upset stomach, trouble sleeping, dry mouth     aspirin 81 MG EC tablet  Take 1 tablet by mouth in the morning and at bedtime  Notes to patient: Uses: blood thinning properties, heart health, & mild pain  Side Effects: nausea, constipation, diarrhea, headache     atorvastatin 40 MG tablet  Commonly known as: LIPITOR  Notes to patient: Uses: treatment of high cholesterol  Side Effects: muscle pain, nausea, vomiting, weakness, dizziness     Biotin 5000 MCG Caps  Notes to patient: Uses: supplement in the vitamin B family  Side effects: nausea     buPROPion 300 MG extended release tablet  Commonly known as: WELLBUTRIN XL  Notes to patient: Uses:  Antidepressant  Side Effects: Nausea, vomiting, dry mouth, headache, constipation     calcium carbonate 600 MG Tabs tablet  Notes to patient: Uses: treatment of heartburn, upset stomach & indigestion  Side Effects: gas, nausea, constipation     diclofenac sodium 1 % Gel  Commonly known as: VOLTAREN     escitalopram 10 MG tablet  Commonly known as: LEXAPRO  Notes to patient: Uses:anti depressant  Side Effects: diarrhea, drowsiness, headache, insomnia     estradiol 0.1 MG/GM vaginal cream  Commonly known as: ESTRACE  Notes to patient: Uses: treat vaginal irritation and dryness caused by menopause  Side Effects: headache, hair loss, tender breasts, upset stomach or throwing up, bloating, back pain, diarrhea     ferrous sulfate 325 (65 Fe) MG tablet  Commonly known as: IRON 325  Notes to patient: Uses: Iron supplement   Side Effects: constipation, stomach upset, stomach cramps     Omega-3 500 MG Caps  Notes to patient: Uses: to lower triglycerides (fats); dietary supplement  Side Effects: burping, change in taste, upset stomach     traZODone 100 MG tablet  Commonly known as: DESYREL  Notes to patient: Uses: treat depression, anxiety, sleep disorders   Side Effects: may cause suicidal thoughts (call your doctor right away if so; you may just need a medication change), may cause dry mouth and decreased libido      Vitamin D3 50 MCG (2000 UT) Caps  Notes to patient: Uses: supplement  Side Effects: nausea, vomiting, diarrhea            STOP taking these medications      therapeutic multivitamin-minerals tablet     vitamin C 500 MG tablet  Commonly known as: ASCORBIC ACID               Where to Get Your Medications        These medications were sent to [address] [place] [address], [address] [place] [address]      Phone: [phone]   methocarbamol 750 MG tablet  traMADol 50 MG tablet         I spent over 35 minutes on this discharge encounter between counseling, coordination of care, and medication reconciliation. To comply with [facility] bylaw R.II.[id number]:  Discharge order placed in advance to facilitate patient's discharge needs. Hugo Byers MD    * This document was created using dictation software. While all precautions were taken to ensure accuracy, errors may have occurred. Please disregard any typographical errors.

## 2023-02-07 NOTE — CARE COORDINATION
[facility] home care referral. Spoke with pt and re: home care plan of care/services. Agreeable. Demographic's verified. Will follow for home care.

## 2023-02-07 NOTE — PROGRESS NOTES
[facility] Discharge Assessment    Transportation  \"Has lack of transportation kept you from medical appointments, meetings, work, or from getting things needed for daily living? \"Check all that apply:  [] A.  Yes, it has kept me from medical appointments or from getting my medications  [] B.  Yes, it has kept me from non-medical meetings, appointments, work, or from getting things that I need  [x] C.  No  [] X. Patient unable to respond  [] Y. Patient declines to respond    Provision of Current Reconciled Medication List to Subsequent Provider at Discharge  [] No, current reconciled medication list not provided to the subsequent provider. [x] Yes, current reconciled medication list provided to the subsequent provider. (**Select route of transmission below**)   [x] Via Electronic Health Record   [] [employer]  [] Verbal (e.g. in person, telephone, video conferencing)  [] Paper-based (e.g. fax, copies, printouts)   [] Other Methods (e.g. texting, email, CDs)    Provision of Current Reconciled Medication List to Patient at Discharge  [] No, current reconciled medication list not provided to the patient, family and/or caregiver. [x] Yes, current reconciled medication list provided to the patient, family and/or caregiver.  (**Select route of transmission below**)   [] Via Electronic Health Record (e.g., electronic access to patient portal)   [] Via [employer]  [x] Verbal (e.g. in person, telephone, video conferencing)  [x] Paper-based (e.g. fax, copies, printouts)   [] Other Methods (e.g. texting, email, CDs)    Health Literacy  \"How often do you need to have someone help you when you read instructions, pamphlets, or other written material from your doctor or pharmacy? \"  [x]  0. Never  []  1. Rarely  []  2. Sometimes  []  3. Often  []  4. Always  []  8.   Patient unable to respond    BIMS - **Must be completed in the flowsheet at discharge prior to proceeding with Delirium Assessment**  [x] BIMS completed in flowsheet at discharge  [] BIMS not completed due to patient unable to give appropriate related answers, see Staff Assessment in flowsheet    Signs and Symptoms of Delirium  A. Acute Onset Mental Status Change - Is there evidence of an acute change in mental status from the patient's baseline? [x] 0. No  [] 1. Yes    B. Inattention - Did the patient have difficulty focusing attention, for example being easily distractible or having difficulty keeping track of what was being said? [x]  0. Behavior not present  []  1. Behavior continuously present, does not fluctuate  []  2. Behavior present, fluctuates (comes and goes, changes in severity)    C. Disorganized thinking - Was the patient's thinking disorganized or incoherent (rambling or irrelevant conversation, unclear or illogical flow of ideas, or unpredictable switching from subject to subject)? [x]  0. Behavior not present  []  1. Behavior continuously present, does not fluctuate  []  2. Behavior present, fluctuates (comes and goes, changes in severity)    D. Altered level of consciousness - Did the patient have altered level of consciousness as indicated by any of the following criteria? Vigilant - startled easily to any sound or touch  Lethargic - repeatedly dozed off while being asked questions, but responded to voice or touch  Stuporous - very difficulty to arouse and keep aroused for the interview  Comatose - could not be aroused  [x]  0. Behavior not present  []  1. Behavior continuously present, does not fluctuate  []  2. Behavior present, fluctuates (comes and goes, changes in severity)    Mood    \"Over the last 2 weeks, have you been bothered by any of the following problems?\" 1. Symptom Presence    0 = No  1 = Yes  9 = No Response 2.  Symptom Frequency    0 = Never or 1 day  1 = 2-6 days (several days)  2 = 7-11 days (half or more of the days)  3 = 12-14 days (nearly every day)  **Leave blank if 'No Reponse'**      Enter scores in boxes    Column 1 Column 2   Little interest or pleasure in doing things   0 0   Feeling down, depressed, or hopeless   0 0   **If either A or B in column 2 is coded 2 or 3, CONTINUE asking the questions below. If not, END the interview. **     Trouble falling or staying asleep, or sleeping too much       Feeling tired or having little energy       Poor appetite or overeating       Feeling bad about yourself - or that you are a failure or have let yourself or your family down       Trouble concentrating on things, such as reading the newspaper or watching television       Moving or speaking so slowly that other people could have noticed. Or the opposite- being so fidgety or restless that you have been moving around a lot more than usual.       Thoughts that you would be better off dead, or of hurting yourself in some way. Total Severity: Add scores for all frequency responses in column 2 (possible score 0-27, or enter 99 if unable to complete (if symptom frequency (column 2) is blank for 3 or more items). 0     Social Isolation  \"How often do you feel lonely or isolated from those around you? \"  [x] 0. Never  [] 1. Rarely  [] 2. Sometimes  [] 3. Often  [] 4. Always  [] 7. Patient declines to respond  [] 8. Patient unable to respond    Pain Effect on Sleep  \"Over the past 5 days, how much of the time has pain made it hard for you to sleep at night? \"  []  0. Does not apply - I have not had any pain or hurting in the past 5 days  [x]  1. Rarely or not at all  []  2. Occasionally  []  3. Frequently  []  4. Almost constantly  []  8. Unable to answer    **If the patient answers \"0. Does not apply\" to this question, skip the next two \"Pain Effect. Dewight Federico Dewight Federico \" questions**    Pain Interference with Therapy Activities  \"Over the past 5 days, how often have you limited your participation in rehabilitation therapy sessions due to pain? \"  []  0.   Does not apply - I have not received rehabilitation therapy in the past 5 days  [x]  1. Rarely or not at all  []  2. Occasionally  []  3. Frequently  []  4. Almost constantly  []  8. Unable to answer    Pain Interference with Day-to-Day Activities: \"Over the past 5 days, how often have you limited your day-to-day activities (excluding rehabilitation therapy session)? \"  [x]  1. Rarely or not at all  []  2. Occasionally  []  3. Frequently  []  4. Almost constantly  []  8. Unable to answer    Nutritional Approaches  Last 7 Days - Check all of the following nutritional approaches that were received within the last 7 days:  []  A. Parenteral/IV feeding  []  B. Feeding tube (e.g., nasogastric or abdominal (PEG))  []  C. Mechanically altered diet - requires change in texture of food or liquids (e.g., pureed food, thickened liquids)  []  D. Therapeutic diet (e.g., low salt, diabetic, low cholesterol)  [x]  Z. None of the above    At time of Discharge - Check all of the following nutritional approaches that were being received at discharge:  []  A. Parenteral/IV feeding (including IV fluids if needed for hydration, but not as part of dialysis/chemo)  []  B. Feeding tube (e.g., nasogastric or abdominal (PEG))  []  C. Mechanically altered diet - requires change in texture of food or liquids (e.g., pureed food, thickened liquids)  []  D. Therapeutic diet (e.g., low salt, diabetic, low cholesterol  [x]  Z. None of the above    High Risk Drug Classes:  Use and Indication    Is taking: Check if the pt is taking any medications by pharmacological classification, not how it is used, in the following classes  Indication noted:  If column 1 is checked, check if there is an indication noted for all meds in the drug class Is taking  (check all that apply) Indication noted (check all that apply)   Antipsychotic [] []   Anticoagulant [x] [x]   Antibiotic [] []   Opioid [x] [x]   Antiplatelet [] []   Hypoglycemic (including insulin) [] []   None of the above []     Special Treatments, Procedures, and Programs    Check all of the following treatments, procedures, and programs that apply at discharge. At Discharge (check all that apply)   Cancer Treatments   A1. Chemotherapy []           A2. IV []           A3. Oral []           A10. Other []   B1. Radiation []   Respiratory Therapies   C1. Oxygen Therapy []           C2. Continuous (continuously for at least 14 hours per day) []           C3. Intermittent []           C4. High-concentration []   D1. Suctioning (Does not include oral suctioning) []           D2. Scheduled []           D3. As needed []   E1. Tracheostomy Care []   F1. Invasive Mechanical Ventilator (ventilator or respirator) []   G1. Non-invasive Mechanical Ventilator []           G2. BiPAP []           G3. CPAP []   Other   H1. IV Medications (Do not include sub Q pumps, flushes, Dextrose 50% or lactated ringers) []           H2. Vasoactive medications []           H3. Antibiotics []           H4. Anticoagulation []           H10. Other []   I1. Transfusions []   J1. Dialysis []           J2. Hemodialysis []           J3. Peritoneal dialysis []   O1. IV access (including a catheter in a vein) []           O2. Peripheral []           O3. Midline []           O4.  Central (PICC, tunneled, port) []      None of the above (select if no Cancer, Respiratory, or Other boxes are checked) [x]

## 2023-02-09 ENCOUNTER — TELEPHONE (OUTPATIENT)
Dept: ORTHOPEDIC SURGERY | Age: 73
End: 2023-02-09

## 2023-02-09 NOTE — TELEPHONE ENCOUNTER
Medical Facility Question     Facility Name: [facility] Name: Stacie Kenyon Number: [phone]  Request or Information: [facility] ORDERS FOR PT & OT    THEY ARE [address]  OK TO LEAVE DETAILED MESSAGE

## 2023-02-09 NOTE — TELEPHONE ENCOUNTER
Returned call to Campbell Ribera and provided a verbal order that [facility] will follow orders.

## 2023-02-16 ENCOUNTER — OFFICE VISIT (OUTPATIENT)
Dept: ORTHOPEDIC SURGERY | Age: 73
End: 2023-02-16

## 2023-02-16 VITALS — BODY MASS INDEX: 25.66 KG/M2 | WEIGHT: 154 LBS | HEIGHT: 65 IN

## 2023-02-16 DIAGNOSIS — S52.501D CLOSED FRACTURE OF DISTAL END OF RIGHT RADIUS WITH ROUTINE HEALING, UNSPECIFIED FRACTURE MORPHOLOGY, SUBSEQUENT ENCOUNTER: ICD-10-CM

## 2023-02-16 DIAGNOSIS — S82.141D CLOSED FRACTURE OF RIGHT TIBIAL PLATEAU WITH ROUTINE HEALING, SUBSEQUENT ENCOUNTER: Primary | ICD-10-CM

## 2023-02-16 PROCEDURE — APPNB30 APP NON BILLABLE TIME 0-30 MINS: Performed by: NURSE PRACTITIONER

## 2023-02-16 PROCEDURE — 99024 POSTOP FOLLOW-UP VISIT: CPT | Performed by: NURSE PRACTITIONER

## 2023-02-16 NOTE — PROGRESS NOTES
DIAGNOSIS:    1-Right bicondylar tibial plateau fracture, status post ORIF. 2-Right distal radius 2 parts intra-articular displaced fracture, status post ORIF    DATE OF SURGERY: 1/19/2023. HISTORY OF PRESENT ILLNESS: Ms. Carlson Sensor 71 y.o.  female  who came in today for 2 weeks postoperative visit. The patient denies any significant pain in the right knee. She has been working on ROM and non WB. She has been in a right wrist brace. She states she feels very stiff. Rates pain a 2/10 VAS in her knee and a 4/10 VAS in her wrist moderate, aching, intermittent and are improving. Aggravating factors movement. Alleviating factors rest. No numbness or tingling sensation. No fever or Chills. She is now home working with home health PT and OT. PHYSICAL EXAMINATION:  The incision is healed well right knee  No signs of any erythema or drainage. She has mild pain with the active or passive range of motion of the right knee. She has intact sensation, distally, and  is neurovascularly intact. The incision is completely healed right wrist.  No signs of any erythema or drainage. She has mild pain with the active or passive range of motion of the right wrist, but decrease ROM. She  has intact sensation, distally, and she is neurovascularly intact. IMAGING:  Four views right knee, taken today in the office showed anatomic alignment of the bicondylar tibial plateau fracture, medial locking plate in good position, no loosening, or hardware failure. X-rays 3 views of the right wrist taken today in office were reviewed and showed anatomic alignment of the distal radius fracture with plate and screws in good position showing no signs of loosening or hardware failure. IMPRESSION:  2 weeks out from   1-Right bicondylar tibial plateau fracture, status post ORIF.   2-Right distal radius 2 parts intra-articular displaced fracture, status post ORIF      PLAN: For the knee: I have told the patient to work on ROM, as well as strengthening exercises of the right leg and arm. She can start TTWB right leg for 6 weeks. She can use a walker with a platform for her right arm. Right arm brace applied today in office and instructed in care. She can remove her brace for sleep, rest and shower. No heavy impact activities right wrist. Wear the brace with any activity and when she is using her platform walker. Continue OT and PT. The patient will come back for a follow up in 6 weeks. At that time, we will take four views right knee and right distal radius.           Danielle Fuller, BESSY - CNP

## 2023-02-22 ENCOUNTER — TELEPHONE (OUTPATIENT)
Dept: ORTHOPEDIC SURGERY | Age: 73
End: 2023-02-22

## 2023-02-22 NOTE — TELEPHONE ENCOUNTER
Other PATIENT CALLED STATES THAT SHE WOULD LIKE TO SPEAK WITH SOURAV REGARDING CONFUSION WITH HER APPOINTMENT.  [phone]

## 2023-02-22 NOTE — TELEPHONE ENCOUNTER
I spoke with patient and clarified with her that she is to TTWB until March 3. After March 3 patient may slowly begin FWB. Jimbo Juan I did talk with Sary Gupta before relaying this message to the patient. Pt. Schedule for follow up on March 30. Pt. Voiced understanding of instructions.

## 2023-03-30 ENCOUNTER — OFFICE VISIT (OUTPATIENT)
Dept: ORTHOPEDIC SURGERY | Age: 73
End: 2023-03-30

## 2023-03-30 VITALS — WEIGHT: 154 LBS | HEIGHT: 65 IN | BODY MASS INDEX: 25.66 KG/M2

## 2023-03-30 DIAGNOSIS — S52.501D CLOSED FRACTURE OF DISTAL END OF RIGHT RADIUS WITH ROUTINE HEALING, UNSPECIFIED FRACTURE MORPHOLOGY, SUBSEQUENT ENCOUNTER: ICD-10-CM

## 2023-03-30 DIAGNOSIS — S82.141D CLOSED FRACTURE OF RIGHT TIBIAL PLATEAU WITH ROUTINE HEALING, SUBSEQUENT ENCOUNTER: Primary | ICD-10-CM

## 2023-03-31 NOTE — PROGRESS NOTES
WBAT right leg and right wrist. She can discontinue the brace. No heavy impact activities. Continue OT and PT. The patient will come back for a follow up in 6 weeks. At that time, we will take four views right knee and right distal radius.           BESSY Baker - CNP

## 2023-04-05 ENCOUNTER — HOSPITAL ENCOUNTER (OUTPATIENT)
Dept: PHYSICAL THERAPY | Age: 73
Setting detail: THERAPIES SERIES
Discharge: HOME | End: 2023-04-05
Payer: MEDICARE

## 2023-04-05 DIAGNOSIS — M62.81 QUADRICEPS WEAKNESS: ICD-10-CM

## 2023-04-05 DIAGNOSIS — R26.89 ANTALGIC GAIT: ICD-10-CM

## 2023-04-05 DIAGNOSIS — M25.661 DECREASED RANGE OF MOTION (ROM) OF RIGHT KNEE: Primary | ICD-10-CM

## 2023-04-05 PROCEDURE — 97530 THERAPEUTIC ACTIVITIES: CPT

## 2023-04-05 PROCEDURE — 97161 PT EVAL LOW COMPLEX 20 MIN: CPT

## 2023-04-05 PROCEDURE — 97110 THERAPEUTIC EXERCISES: CPT

## 2023-04-05 NOTE — PLAN OF CARE
function. []Signs/symptoms consistent with joint sprain/strain   []Signs/symptoms consistent with patella-femoral syndrome   []Signs/symptoms consistent with knee OA/hip OA   []Signs/symptoms consistent with internal derangement of knee/Hip   []Signs/symptoms consistent with functional hip weakness/NMR control      []Signs/symptoms consistent with tendinitis/tendinosis    []signs/symptoms consistent with pathology which may benefit from Dry needling      []other:      Prognosis/Rehab Potential:      [x]Excellent   []Good    []Fair   []Poor    Tolerance of evaluation/treatment:    [x]Excellent   []Good    []Fair   []Poor    Physical Therapy Evaluation Complexity Justification  [x] A history of present problem with:  [] no personal factors and/or comorbidities that impact the plan of care;  [x]1-2 personal factors and/or comorbidities that impact the plan of care  []3 personal factors and/or comorbidities that impact the plan of care  [x] An examination of body systems using standardized tests and measures addressing any of the following: body structures and functions (impairments), activity limitations, and/or participation restrictions;:  [x] a total of 1-2 or more elements   [] a total of 3 or more elements   [] a total of 4 or more elements   [x] A clinical presentation with:  [x] stable and/or uncomplicated characteristics   [] evolving clinical presentation with changing characteristics  [] unstable and unpredictable characteristics;   [x] Clinical decision making of [x] low, [] moderate, [] high complexity using standardized patient assessment instrument and/or measurable assessment of functional outcome.     [x] EVAL (LOW) 02146 (typically 30 minutes face-to-face)  [] EVAL (MOD) 75681 (typically 30 minutes face-to-face)  [] EVAL (HIGH) 21618 (typically 45 minutes face-to-face)  [] RE-EVAL     PLAN:   Frequency/Duration:  1-2 days per week for 12 Weeks:  Interventions:  [x]  Therapeutic exercise including:

## 2023-04-05 NOTE — FLOWSHEET NOTE
[facility]    Physical Therapy Treatment Note/ Progress Report:     Date:  2023    Patient Name:  Nicole Fuller    :  1950  MRN: [id number]  Medical Diagnosis:  Closed fracture of right tibial plateau with routine healing, subsequent encounter [S82.141D]  Closed fracture of distal end of right radius with routine healing, unspecified fracture morphology, subsequent encounter [S52.501D]  Treatment Diagnosis:    ICD-10-CM    1. Decreased range of motion (ROM) of right knee  M25.661       2. Quadriceps weakness  M62.81       3. Antalgic gait  R26.89         Insurance/Certification information:  PT Insurance Information: Ele Salgadoer / Pierre JOYA / Mickey Lynch / Sushil Waller / Nirav Wynne / Matt Chan NOT BILLABLE  Physician Information:  Jarad Jeffers MD   Plan of care signed (Y/N): []  Yes [x]  No  Date sent: 23    Date of Patient follow up with Physician:      Progress Report: []  Yes [x]  No     Functional Scale:   Date assessed:  LEFS  23    Date Range for reporting period:  Beginnin23  Ending:      Progress report due (10 Rx/or 30 days whichever is less):      Recertification due (POC duration/ or 90 days whichever is less): 23     Visit # Insurance Allowable Auth required?  Date Range   1 BOMN []  Yes[x]  No      Pain level:  7-8/10     SUBJECTIVE:  See eval    OBJECTIVE: See eval  Observation:   Test measurements:      RESTRICTIONS/PRECAUTIONS: R tibial plateau ORIF    Exercises/Interventions:   Therapeutic Ex  17' Resistance Sets/sec Reps Notes          Quad set  1 x20    Heel slides   5\" x15    Strap calf stretch w/ TKE OP  30\" x1    SLR  1 x10    Standing marching  1 x10                                                       Therapeutic Activities  8'       Education as provided below  8'                                                      Manual Intervention       Knee mobs/PROM       Tib/Fem Mobs

## 2023-04-07 ENCOUNTER — HOSPITAL ENCOUNTER (OUTPATIENT)
Dept: PHYSICAL THERAPY | Age: 73
Setting detail: THERAPIES SERIES
Discharge: HOME | End: 2023-04-07
Payer: MEDICARE

## 2023-04-07 DIAGNOSIS — M25.461 EFFUSION OF RIGHT KNEE: Primary | ICD-10-CM

## 2023-04-07 PROCEDURE — 97016 VASOPNEUMATIC DEVICE THERAPY: CPT

## 2023-04-07 PROCEDURE — 97110 THERAPEUTIC EXERCISES: CPT

## 2023-04-07 PROCEDURE — 97530 THERAPEUTIC ACTIVITIES: CPT

## 2023-04-07 PROCEDURE — 97140 MANUAL THERAPY 1/> REGIONS: CPT

## 2023-04-07 NOTE — FLOWSHEET NOTE
ambulation.  [] (98854) Provided verbal/tactile cueing for activities related to improving balance, coordination, kinesthetic sense, posture, motor skill, proprioception  to assist with LE, proximal hip, and core control in self care, mobility, lifting, ambulation and eccentric single leg control. NMR and Therapeutic Activities:    [x] (17765 or 13147) Provided verbal/tactile cueing for activities related to improving balance, coordination, kinesthetic sense, posture, motor skill, proprioception and motor activation to allow for proper function of core, proximal hip and LE with self care and ADLs  [] (75222) Gait Re-education- Provided training and instruction to the patient for proper LE, core and proximal hip recruitment and positioning and eccentric body weight control with ambulation re-education including up and down stairs     Home Exercise Program:    [x] (45546) Reviewed/Progressed HEP activities related to strengthening, flexibility, endurance, ROM of core, proximal hip and LE for functional self-care, mobility, lifting and ambulation/stair navigation   [] (44715)Reviewed/Progressed HEP activities related to improving balance, coordination, kinesthetic sense, posture, motor skill, proprioception of core, proximal hip and LE for self care, mobility, lifting, and ambulation/stair navigation      Manual Treatments:  PROM / STM / Oscillations-Mobs:  G-I, II, III, IV (PA's, Inf., Post.)  [] (99350) Provided manual therapy to mobilize LE, proximal hip and/or LS spine soft tissue/joints for the purpose of modulating pain, promoting relaxation,  increasing ROM, reducing/eliminating soft tissue swelling/inflammation/restriction, improving soft tissue extensibility and allowing for proper ROM for normal function with self care, mobility, lifting and ambulation.      Modalities:      Girth (cm) L - pre vaso / post vaso R - pre-vaso / post-vaso   Mid-patellar 41 42.0 / 41.5   Suprapatellar 44.5 46.0 / 45

## 2023-04-17 ENCOUNTER — HOSPITAL ENCOUNTER (OUTPATIENT)
Dept: PHYSICAL THERAPY | Age: 73
Setting detail: THERAPIES SERIES
Discharge: HOME | End: 2023-04-17
Payer: MEDICARE

## 2023-04-17 PROCEDURE — 97140 MANUAL THERAPY 1/> REGIONS: CPT

## 2023-04-17 PROCEDURE — 97016 VASOPNEUMATIC DEVICE THERAPY: CPT

## 2023-04-17 PROCEDURE — 97530 THERAPEUTIC ACTIVITIES: CPT

## 2023-04-17 PROCEDURE — 97112 NEUROMUSCULAR REEDUCATION: CPT

## 2023-04-17 PROCEDURE — 97110 THERAPEUTIC EXERCISES: CPT

## 2023-04-17 NOTE — FLOWSHEET NOTE
4/10/23 4/17/23        PROM  AROM 0-4-118 0-4-115  0-1-110              RESTRICTIONS/PRECAUTIONS: R tibial plateau ORIF    Exercises/Interventions:   Therapeutic Ex  27' Resistance Sets/sec Reps Notes   Recumbent bike  8'  Seat 5   Quad set w/ weighted OP 10# 5'     Heel slides   5\" x15    Strap calf stretch w/ TKE OP  30\" x1    Calf stretch on slant board  1' x2    SAQ 5# 5\" x20    SLR  1 x10 Heavy cueing to correct groin compensation    Standing marching --> SLS  30\" x3 Worked on UE support but maintaining SLS with knee and hip extended. Tandem stance  30\" x3    CC TKE 20# 1 x20 Paired with weight shifting   Education per below  8'     Hamstring sets  5\" 10 R   Glut set  5' 10ea B, SL   Prone hamstring curls 2# 1 15 R   Bridges w/ BS  1 15    TKE red 5\" 15    Supine marching    NV            Therapeutic Activities                                                              Manual Intervention  25'       Knee mobs/PROM  3'  Passive knee flexion and extension. Worked into HS stretch and provided end range extension OP   Tib/Fem Mobs    PA and AP glides to improve joint mobility and improve end range stretching tolerance. Patella Mobs  2'     Lumbopelvic MT  25'  Pubic shotgun, supine lumbopelvic mobilization to R Iliac Gr I-II, SL proximal crest inferior thrust, R glut MET/L hip flexor MET correction, left SL L5/S1 MET w/ end range rotation stretching. Prone CPA/UPA L4-S1 mobs Gr II-III, LTJ CPA mobs    Education provided about manual therapy provided and structures being addressed              NMR re-education 8'       Gait training 8'   Wider LIUDMILA, knee extension, heel strike, walker/cane, M/L weight shifting                                                             Pt. Education:  4/5/23: Patient education regarding PT assessment and plan of care.  Discussed any post operative precautions or guidelines at this time (if appropriate for patient diagnosis); discussed activity modification, while providing

## 2023-04-20 ENCOUNTER — HOSPITAL ENCOUNTER (OUTPATIENT)
Dept: PHYSICAL THERAPY | Age: 73
Setting detail: THERAPIES SERIES
Discharge: HOME | End: 2023-04-20
Payer: MEDICARE

## 2023-04-20 PROCEDURE — 97110 THERAPEUTIC EXERCISES: CPT

## 2023-04-20 PROCEDURE — 97530 THERAPEUTIC ACTIVITIES: CPT

## 2023-04-20 PROCEDURE — 97016 VASOPNEUMATIC DEVICE THERAPY: CPT

## 2023-04-20 NOTE — FLOWSHEET NOTE
[facility] [employer] [facility] [employer]    Physical Therapy Treatment Note/ Progress Report:     Date:  2023    Patient Name:  Alec Mcleod    :  1950  MRN: [id number]  Medical Diagnosis:  Closed fracture of right tibial plateau with routine healing, subsequent encounter [S82.141D]  Closed fracture of distal end of right radius with routine healing, unspecified fracture morphology, subsequent encounter [S52.501D]  Treatment Diagnosis:    ICD-10-CM   1. Decreased range of motion (ROM) of right knee  M25.661      2. Quadriceps weakness  M62.81      3. Antalgic gait  R26.89      4. Effusion of right knee                                            M25.461       Insurance/Certification information:  PT Insurance Information: Faviola Robert / Anayeli JOYA / Bev Hennessy / Doreen Nassar / Pat Sheppard / Carlos Regalado NOT BILLABLE  Physician Information:  Abisai Burgess MD   Plan of care signed (Y/N): []  Yes [x]  No  Date sent: 23    Date of Patient follow up with Physician:      Progress Report: []  Yes [x]  No     Functional Scale:   Date assessed:  LEFS  23    Date Range for reporting period:  Beginnin23  Ending:      Progress report due (10 Rx/or 30 days whichever is less): 6/3/66     Recertification due (POC duration/ or 90 days whichever is less): 23     Visit # Insurance Allowable Auth required? Date Range   6 BOMN []  Yes[x]  No      Pain level:  2/10     SUBJECTIVE:  Patient presents to therapy with significant complaints of left knee pain. States she is unsure what she did but pain happened when       OBJECTIVE: Skilled care provided in flow sheet below  Observation:   23: R iliac shear, R innominate anterior rotation, right L5/S1 hypomobility, LTJ hypomobility. 4/10/23: Patient continues to have great difficulty with quad activation without use of glute compensation.    Test measurements:  23: Patient present with (+) MCL testing at 0 and

## 2023-04-24 ENCOUNTER — HOSPITAL ENCOUNTER (OUTPATIENT)
Dept: PHYSICAL THERAPY | Age: 73
Setting detail: THERAPIES SERIES
Discharge: HOME | End: 2023-04-24
Payer: MEDICARE

## 2023-04-24 PROCEDURE — 97016 VASOPNEUMATIC DEVICE THERAPY: CPT

## 2023-04-24 PROCEDURE — 97110 THERAPEUTIC EXERCISES: CPT

## 2023-04-24 PROCEDURE — 97530 THERAPEUTIC ACTIVITIES: CPT

## 2023-04-24 NOTE — FLOWSHEET NOTE
[facility] [employer] [facility]    Physical Therapy Treatment Note/ Progress Report:     Date:  2023    Patient Name:  Lisa Salas    :  1950  MRN: [id number]  Medical Diagnosis:  Closed fracture of right tibial plateau with routine healing, subsequent encounter [S82.141D]  Closed fracture of distal end of right radius with routine healing, unspecified fracture morphology, subsequent encounter [S52.501D]  Treatment Diagnosis:    ICD-10-CM   1. Decreased range of motion (ROM) of right knee  M25.661      2. Quadriceps weakness  M62.81      3. Antalgic gait  R26.89      4. Effusion of right knee                                            M25.461       Insurance/Certification information:  PT Insurance Information: Primo Cortes / Susie JOYA / Ronald Kapoor / Rekha Cevallos / Jing Marsh / Kelli Conner NOT BILLABLE  Physician Information:  Praveena Dowling MD   Plan of care signed (Y/N): [x]  Yes []  No  Date sent: 23    Date of Patient follow up with Physician:      Progress Report: []  Yes [x]  No     Functional Scale:   Date assessed:  LEFS  23    Date Range for reporting period:  Beginnin23  Ending:      Progress report due (10 Rx/or 30 days whichever is less): 3/6/29     Recertification due (POC duration/ or 90 days whichever is less): 23     Visit # Insurance Allowable Auth required? Date Range   7 BOMN []  Yes[x]  No      Pain level: 0/10, stiffness     SUBJECTIVE: Patient report she is doing very well today. Enters therapy with walking cane. States she has been icing and using Ibuprofen to improve left knee symptoms. States bother her knees feel stiff. OBJECTIVE: Skilled care provided in flow sheet below  Observation:   23: more normal gait pattern with hurrycane, still has decreased stride length. More appropriate quad set with good activation.      23: R iliac shear, R innominate anterior rotation, right L5/S1 hypomobility,

## 2023-04-26 ENCOUNTER — HOSPITAL ENCOUNTER (OUTPATIENT)
Dept: PHYSICAL THERAPY | Age: 73
Setting detail: THERAPIES SERIES
Discharge: HOME | End: 2023-04-26
Payer: MEDICARE

## 2023-04-26 PROCEDURE — 97110 THERAPEUTIC EXERCISES: CPT

## 2023-04-26 NOTE — FLOWSHEET NOTE
[facility]    Physical Therapy Treatment Note/ Progress Report:     Date:  2023    Patient Name:  Elisa Traore    :  1950  MRN: [id number]  Medical Diagnosis:  Closed fracture of right tibial plateau with routine healing, subsequent encounter [S82.141D]  Closed fracture of distal end of right radius with routine healing, unspecified fracture morphology, subsequent encounter [S52.251D]  Treatment Diagnosis:    ICD-10-CM   1. Decreased range of motion (ROM) of right knee  M25.661      2. Quadriceps weakness  M62.81      3. Antalgic gait  R26.89      4. Effusion of right knee                                            M25.461       Insurance/Certification information:  PT Insurance Information: Sushila Shoulder / Jake Precise MET / Milena Crockett / Angela Hansen / Anai Perry / Silva Abrams NOT BILLABLE  Physician Information:  Dilcia Lucio MD   Plan of care signed (Y/N): [x]  Yes []  No  Date sent: 23    Date of Patient follow up with Physician:      Progress Report: []  Yes [x]  No     Functional Scale:   Date assessed:  LEFS  23    Date Range for reporting period:  Beginnin23  Ending:      Progress report due (10 Rx/or 30 days whichever is less): 3/9/76     Recertification due (POC duration/ or 90 days whichever is less): 23     Visit # Insurance Allowable Auth required? Date Range   8 BOMN []  Yes[x]  No      Pain level: 0/10, stiffness     SUBJECTIVE: Patient report she is doing very well today. Enters therapy without walking cane. Patient stated she went to [place] with friends yesterday and walked large distance over duration of day, without cane, but stated knee felt fine afterwards. Noted she held off on exercises yesterday. .       OBJECTIVE: Skilled care provided in flow sheet below  Observation:   23: more normal gait pattern with hurrycane, still has decreased stride length.  More appropriate quad set with

## 2023-04-27 ENCOUNTER — APPOINTMENT (OUTPATIENT)
Dept: PHYSICAL THERAPY | Age: 73
End: 2023-04-27
Payer: MEDICARE

## 2023-05-01 ENCOUNTER — HOSPITAL ENCOUNTER (OUTPATIENT)
Dept: PHYSICAL THERAPY | Age: 73
Setting detail: THERAPIES SERIES
Discharge: HOME | End: 2023-05-01
Payer: MEDICARE

## 2023-05-01 PROCEDURE — 97110 THERAPEUTIC EXERCISES: CPT

## 2023-05-01 NOTE — FLOWSHEET NOTE
Manual Treatments:  PROM / STM / Oscillations-Mobs:  G-I, II, III, IV (PA's, Inf., Post.)  [] (64766) Provided manual therapy to mobilize LE, proximal hip and/or LS spine soft tissue/joints for the purpose of modulating pain, promoting relaxation,  increasing ROM, reducing/eliminating soft tissue swelling/inflammation/restriction, improving soft tissue extensibility and allowing for proper ROM for normal function with self care, mobility, lifting and ambulation. Modalities:       Charges:  Timed Code Treatment Minutes: 39'   Total Treatment Minutes: 39'       [] EVAL (LOW) 455 1011 (typically 20 minutes face-to-face)  [] EVAL (MOD) 94217 (typically 30 minutes face-to-face)  [] EVAL (HIGH) 84860 (typically 45 minutes face-to-face)  [] RE-EVAL     [x] MH(82662) x  3  [] IONTO (26707)  [] NMR (50971) x     [] VASO (40957)  [] Manual (10186) x     [] Other:  [] TA (67037) x     [] Mech Traction (07324)  [] ES(attended) (80917)     [] ES (un) (18005):     GOALS:  Patient stated goal: \"Return to normal function\"  [x] Progressing: [] Met: [] Not Met: [] Adjusted     Therapist goals for Patient:   Short Term Goals: To be achieved in: 2 weeks  1. Independent in HEP and progression per patient tolerance, in order to prevent re-injury. [] Progressing: [x] Met: [] Not Met: [] Adjusted  2. Patient will have a decrease in pain to facilitate improvement in movement, function, and ADLs as indicated by Functional Deficits. [] Progressing: [x] Met: [] Not Met: [] Adjusted     Long Term Goals: To be achieved in: 6-12 weeks  1. Patient will reach LEFS score of less than or equal to 50/80 to assist with reaching prior level of function. [x] Progressing: [] Met: [] Not Met: [] Adjusted  2. Patient will demonstrate increased AROM to within 95% of contralateral LE to allow for proper joint functioning as indicated by patients functional deficits. [x] Progressing: [] Met: [] Not Met: [] Adjusted  3.  Patient will demonstrate an

## 2023-05-04 ENCOUNTER — HOSPITAL ENCOUNTER (OUTPATIENT)
Dept: PHYSICAL THERAPY | Age: 73
Setting detail: THERAPIES SERIES
Discharge: HOME | End: 2023-05-04
Payer: MEDICARE

## 2023-05-04 PROCEDURE — 97110 THERAPEUTIC EXERCISES: CPT

## 2023-05-04 PROCEDURE — 97530 THERAPEUTIC ACTIVITIES: CPT

## 2023-05-04 NOTE — PROGRESS NOTES
held remaining reps. Manual Intervention  5'       Knee mobs/PROM    Passive knee flexion and extension. Worked into HS stretch and provided end range extension OP   Tib/Fem Mobs    PA and AP glides to improve joint mobility and improve end range stretching tolerance. Prolonged holds into knee extension   Patella Mobs       KT taping  5'  Use of edema techniques to better manage on going swelling          NMR re-education          Gait training    Wider LIUDMILA, knee extension, heel strike, walker/cane, M/L weight shifting                                                             Pt. Education:  4/5/23: Patient education regarding PT assessment and plan of care. Discussed any post operative precautions or guidelines at this time (if appropriate for patient diagnosis); discussed activity modification, while providing explanation in regards to anatomical structures involved, healing time frames and relevant joint mechanics. Educated on swelling management and reactive swelling. Discussed an appropriate return to daily activities and working off the walker. Provided patient time for questions with answers provided when possible. 4/7/23: continued to discuss importance of icing and when to ice, educated on delayed on set of muscular soreness and appropriate tissue responses after therapy sessions or HEP. Further discussed appropriate activity modification and steady return of function. Discussed management of anterior ankle pain and cause for symptoms following surgery. 4/10/23: Discussed progression of marching to SLS with UE support at home to promote improvement in tolerance to single leg loading. Encouraged to progress walking at home with use of countertop; slowly performing left swing to increase time on RLE. Heavily review home exercises especially SLR to avoid groin compensations which is likely cause for groin cramping and soreness.    4/26/23: Discussed with therapist options to

## 2023-05-08 ENCOUNTER — HOSPITAL ENCOUNTER (OUTPATIENT)
Dept: PHYSICAL THERAPY | Age: 73
Setting detail: THERAPIES SERIES
Discharge: HOME | End: 2023-05-08
Payer: MEDICARE

## 2023-05-08 PROCEDURE — 97530 THERAPEUTIC ACTIVITIES: CPT

## 2023-05-08 PROCEDURE — 97140 MANUAL THERAPY 1/> REGIONS: CPT

## 2023-05-08 NOTE — FLOWSHEET NOTE
of modulating pain, promoting relaxation,  increasing ROM, reducing/eliminating soft tissue swelling/inflammation/restriction, improving soft tissue extensibility and allowing for proper ROM for normal function with self care, mobility, lifting and ambulation. Modalities:       Charges:  Timed Code Treatment Minutes: 40'   Total Treatment Minutes: 36'       [] EVAL (LOW) 455 1011 (typically 20 minutes face-to-face)  [] EVAL (MOD) 61737 (typically 30 minutes face-to-face)  [] EVAL (HIGH) 39618 (typically 45 minutes face-to-face)  [] RE-EVAL     [] YQ(87458) x    [] IONTO (91699)  [] NMR (90447) x     [] VASO (57877)  [x] Manual (15067) x  1   [] Other:  [x] TA (97963) x   2  [] Mech Traction (20032)  [] ES(attended) (51169)     [] ES (un) (21601):     GOALS:  Patient stated goal: \"Return to normal function\"  [x] Progressing: [] Met: [] Not Met: [] Adjusted     Therapist goals for Patient:   Short Term Goals: To be achieved in: 2 weeks  1. Independent in HEP and progression per patient tolerance, in order to prevent re-injury. [] Progressing: [x] Met: [] Not Met: [] Adjusted  2. Patient will have a decrease in pain to facilitate improvement in movement, function, and ADLs as indicated by Functional Deficits. [] Progressing: [x] Met: [] Not Met: [] Adjusted     Long Term Goals: To be achieved in: 6-12 weeks  1. Patient will reach LEFS score of less than or equal to 50/80 to assist with reaching prior level of function. [x] Progressing: [] Met: [] Not Met: [] Adjusted  2. Patient will demonstrate increased AROM to within 95% of contralateral LE to allow for proper joint functioning as indicated by patients functional deficits. [] Progressing: [x] Met: [] Not Met: [] Adjusted  3. Patient will demonstrate an increase in Strength to good proximal hip strength and control, within 5lb HHD in LE to allow for proper functional mobility as indicated by patients Functional Deficits.    [x] Progressing: [] Met: [] Not Met:

## 2023-05-10 ENCOUNTER — HOSPITAL ENCOUNTER (OUTPATIENT)
Dept: PHYSICAL THERAPY | Age: 73
Setting detail: THERAPIES SERIES
Discharge: HOME | End: 2023-05-10
Payer: MEDICARE

## 2023-05-10 PROCEDURE — 97140 MANUAL THERAPY 1/> REGIONS: CPT

## 2023-05-10 PROCEDURE — 97110 THERAPEUTIC EXERCISES: CPT

## 2023-05-10 NOTE — FLOWSHEET NOTE
[facility]    Physical Therapy Treatment Note/ Progress Report:    REVIEW HOME EXERCISES. LOOK FOR EXERCISE RESULTING IN EXCESSIVE DF OR LOWER LEG PAIN. Date:  05/10/2023    Patient Name:  Ruma Rae    :  1950  MRN: [id number]  Medical Diagnosis:  Closed fracture of right tibial plateau with routine healing, subsequent encounter [S82.141D]  Closed fracture of distal end of right radius with routine healing, unspecified fracture morphology, subsequent encounter [S52.501D]    Treatment Diagnosis:    ICD-10-CM   1. Decreased range of motion (ROM) of right knee  M25.661      2. Quadriceps weakness  M62.81      3. Antalgic gait  R26.89      4. Effusion of right knee                                            M25.461       Insurance/Certification information:  PT Insurance Information: Wally Zamudio / [place] JOYA / Celeste Toney / Donya Onofre / Ana Rosa Mason / Bernadette Files NOT BILLABLE  Physician Information:  Chip Francisco MD   Plan of care signed (Y/N): [x]  Yes []  No  Date sent: 23    Date of Patient follow up with Physician:      Progress Report: [x]  Yes []  No     Functional Scale:   Date assessed:  LEFS  2723  LEFS  35/80   23    Date Range for reporting period:  Beginnin23  Ending:      Progress report due (10 Rx/or 30 days whichever is less): 3/3/14     Recertification due (POC duration/ or 90 days whichever is less): 23     Visit # Insurance Allowable Auth required? Date Range   12 BOMN []  Yes[x]  No      Pain level: 5/10, stiffness     SUBJECTIVE: Patient reports slight improvement in pain in the anterior ankle. Voiced increased shin pain following treatment last session. Patient presents with more questions concerning her activity levels.        OBJECTIVE: Skilled care provided in flow sheet below  Observation:   23: Patient with significant hypertonicity and sensitivity to the anterior tibialis and

## 2023-05-11 ENCOUNTER — OFFICE VISIT (OUTPATIENT)
Dept: ORTHOPEDIC SURGERY | Age: 73
End: 2023-05-11

## 2023-05-11 VITALS — HEIGHT: 65 IN | BODY MASS INDEX: 25.66 KG/M2 | WEIGHT: 154 LBS

## 2023-05-11 DIAGNOSIS — S52.501D CLOSED FRACTURE OF DISTAL END OF RIGHT RADIUS WITH ROUTINE HEALING, UNSPECIFIED FRACTURE MORPHOLOGY, SUBSEQUENT ENCOUNTER: Primary | ICD-10-CM

## 2023-05-11 DIAGNOSIS — S82.141D CLOSED FRACTURE OF RIGHT TIBIAL PLATEAU WITH ROUTINE HEALING, SUBSEQUENT ENCOUNTER: ICD-10-CM

## 2023-05-11 NOTE — PROGRESS NOTES
course of OT for further strengthening and stretching. An Rx for OT was given to the patient. She can go back to normal activity with no restrictions. I told the patient that it is not unusual to have some achy pain and swelling for up to a year after a fracture. NSAIDs OTC. The patient will come back for a follow up in 3 months. At that time, we will take four views right knee and right distal radius.       Elma Hernández MD

## 2023-05-16 ENCOUNTER — HOSPITAL ENCOUNTER (OUTPATIENT)
Dept: PHYSICAL THERAPY | Age: 73
Setting detail: THERAPIES SERIES
Discharge: HOME | End: 2023-05-16
Payer: MEDICARE

## 2023-05-16 PROCEDURE — 97140 MANUAL THERAPY 1/> REGIONS: CPT

## 2023-05-16 PROCEDURE — 97530 THERAPEUTIC ACTIVITIES: CPT

## 2023-05-16 PROCEDURE — 97110 THERAPEUTIC EXERCISES: CPT

## 2023-05-16 NOTE — FLOWSHEET NOTE
is not progressing as expected and requires additional follow up with physician  [] Other    Prognosis for POC: [x] Good [] Fair  [] Poor    Patient requires continued skilled intervention: [x] Yes  [] No    PLAN: Progress RLE strengthening, use of manual therapy to address anterior TC joint pain. [x] Continue per plan of care [] Alter current plan (see comments)  [] Plan of care initiated [] Hold pending MD visit [] Discharge    Electronically signed by: Arthuro Horacio, PT  Therapist was present, directed the patient's care, made skilled judgement, and was responsible for assessment and treatment of the patient. Elio Escalera SPT    Note: If patient does not return for scheduled/recommended follow up visits, this note will serve as a discharge from care along with the most recent update on progress.

## 2023-05-22 ENCOUNTER — HOSPITAL ENCOUNTER (OUTPATIENT)
Dept: PHYSICAL THERAPY | Age: 73
Setting detail: THERAPIES SERIES
Discharge: HOME | End: 2023-05-22
Payer: MEDICARE

## 2023-05-22 PROCEDURE — 97110 THERAPEUTIC EXERCISES: CPT

## 2023-05-22 PROCEDURE — 97530 THERAPEUTIC ACTIVITIES: CPT

## 2023-05-22 PROCEDURE — 97140 MANUAL THERAPY 1/> REGIONS: CPT

## 2023-05-22 NOTE — FLOWSHEET NOTE
[place], [facility]    Physical Therapy Treatment Note/ Progress Report:    Date:  2023    Patient Name:  Keegan Gleason    :  1950  MRN: [id number]  Medical Diagnosis:  Closed fracture of right tibial plateau with routine healing, subsequent encounter [S82.141D]  Closed fracture of distal end of right radius with routine healing, unspecified fracture morphology, subsequent encounter [S52.501D]    Treatment Diagnosis:    ICD-10-CM   1. Decreased range of motion (ROM) of right knee  M25.661      2. Quadriceps weakness  M62.81      3. Antalgic gait  R26.89      4. Effusion of right knee                                            M25.461       Insurance/Certification information:  PT Insurance Information: Maribell Chahal / Yudelka JOYA / Filiberto Saleem / Janneth Blackwood / Lai May / Cecile Lanes NOT BILLABLE  Physician Information:  Marichuy Hernandez MD   Plan of care signed (Y/N): [x]  Yes []  No  Date sent: 23    Date of Patient follow up with Physician:      Progress Report: []  Yes [x]  No     Functional Scale:   Date assessed:  LEFS  2723  LEFS  35/80   23    Date Range for reporting period:  Beginnin23  Ending:      Progress report due (10 Rx/or 30 days whichever is less): 3/3/40     Recertification due (POC duration/ or 90 days whichever is less): 23     Visit # Insurance Allowable Auth required? Date Range   14 BOMN []  Yes [x]  No      Pain level: 3-4/10, soreness not pain. SUBJECTIVE: Patient reports she tolerated her quilt retreat. States she did not have any pain but endorses soreness along the anterior shin. States she has been able to better do stairs when using both hands. States she is sore today along the anterior ankle but not as bad as it has been in the past. Followed up with Dr. Arlen Felix last week which went well.        OBJECTIVE: Skilled care provided in flow sheet below  Observation:   23: increased knee pain

## 2023-05-24 NOTE — PLAN OF CARE
[address], [address] [facility], [place], [address]  Phone: [phone]   Fax: [phone]                                                     Occupational Therapy Certification    Dear Dr. Dariel Krishnamurthy,    We had the pleasure of evaluating the following patient for occupational therapy services at [facility]. A summary of our findings can be found in the initial assessment below. This includes our plan of care. If you have any questions or concerns regarding these findings, please do not hesitate to contact me at the office phone number above. Thank you for the referral.       Referring Practitioner Signature:_______________________________Date:__________________  By signing above (or electronic signature), therapists plan is approved by the referring practitioner      Patient: Anaya Cohn   : 1950   MRN: [id number]  Referring Practitioner: Dr. Dariel Krishnamurthy    Evaluation Date: 2023      Medical Diagnosis Information:s54.501D distal radius fracture    WRIST FRACTURE SURGERY Right 2023     OPEN REDUCTION INTERNAL FIXATION RIGHT DISTAL RADIUS - MADISYN performed by   Casted for 4 weeks and then splint for 4 weeks                                              Insurance information: Jeremy liriano precert     Precautions/ Contra-indications: WBAT    Latex Allergy:  [x]NO      []YES  Preferred Language for Healthcare:   []English       []Other:    C-SSRS Triggered by Intake questionnaire (Past 2 wk assessment ):   [x] No, Questionnaire did not trigger screening.   [] Yes, Patient intake triggered C-SSRS Screening     [] Completed, no further action required.    [] Completed, PCP notified via [place]  Reason for Seeking OT Services and Patient Goals: decreased wrist extension     Personal Interests and Values: sewing , read, gardening, she has not gone shopping but will soon    Occupational History: works part

## 2023-05-25 ENCOUNTER — HOSPITAL ENCOUNTER (OUTPATIENT)
Dept: PHYSICAL THERAPY | Age: 73
Setting detail: THERAPIES SERIES
Discharge: HOME | End: 2023-05-25
Payer: MEDICARE

## 2023-05-25 ENCOUNTER — HOSPITAL ENCOUNTER (OUTPATIENT)
Dept: OCCUPATIONAL THERAPY | Age: 73
Setting detail: THERAPIES SERIES
Discharge: HOME | End: 2023-05-25
Payer: MEDICARE

## 2023-05-25 PROCEDURE — 20560 NDL INSJ W/O NJX 1 OR 2 MUSC: CPT

## 2023-05-25 PROCEDURE — 97018 PARAFFIN BATH THERAPY: CPT

## 2023-05-25 PROCEDURE — 97140 MANUAL THERAPY 1/> REGIONS: CPT

## 2023-05-25 PROCEDURE — 97110 THERAPEUTIC EXERCISES: CPT

## 2023-05-25 PROCEDURE — 97165 OT EVAL LOW COMPLEX 30 MIN: CPT

## 2023-05-25 PROCEDURE — 97032 APPL MODALITY 1+ESTIM EA 15: CPT

## 2023-05-25 NOTE — FLOWSHEET NOTE
[x] Manual (18405) x     [] US (98075)  [] TA (20209) x     [x] Paraffin (43261)  [] ADL  (89331) x    [] Splint/L code:    [] Estim (unattended) (99804)  [] Fluidotherapy (29954)  [] Other:    GOALS:  GOALS:  Patient stated goal: increase rom and strength   [x] Progressing: [] Met: [] Not Met: [] Adjusted     Therapist goals for Patient:   Short Term Goals: To be achieved in: 30 days  1. Pt will increase right hand  [] Progressing: [] Met: [] Not Met: [] Adjusted  2. Pt will increase right hand  strength by 10 pounds  3. Patient will increase weight bearing tolerance on right hand to 30 pounds   [] Progressing: [] Met: [] Not Met: [] Adjusted     Long Term Goals: To be achieved by discharge  1. Pt will report a QuickDASH Symptom Severity Scale score of 15% or less indicating increased safety and functional independence in desired occupational pursuits by discharge. [] Progressing: [] Met: [] Not Met: [] Adjusted       Progression Towards Functional goals:  [] Patient is progressing as expected towards functional goals listed. [] Progression is slowed due to complexities listed. [] Progression has been slowed due to co-morbidities. [x] Plan just implemented, too soon to assess goals progression  [] All goals are met  [] Other:     ASSESSMENT:  See eval    Treatment/Activity Tolerance:  [x] Patient tolerated treatment well [] Patient limited by fatigue  [] Patient limited by pain  [] Patient limited by other medical complications  [] Other:     Prognosis: [x] Good [] Fair  [] Poor    Patient Requires Follow-up: [x] Yes  [] No    PLAN: See eval  [] Continue per plan of care [] Alter current plan (see comments)  [x] Plan of care initiated [] Hold pending MD visit [] Discharge    Electronically signed by:         Note: If patient does not return for scheduled/ recommended follow up visits, this note will serve as a discharge from care along with most recent update on progress.

## 2023-05-25 NOTE — FLOWSHEET NOTE
[place], [facility]    Physical Therapy Treatment Note/ Progress Report:    Date:  2023    Patient Name:  Ricki Miller    :  1950  MRN: [id number]  Medical Diagnosis:  Closed fracture of right tibial plateau with routine healing, subsequent encounter [S82.141D]  Closed fracture of distal end of right radius with routine healing, unspecified fracture morphology, subsequent encounter [S52.501D]    Treatment Diagnosis:    ICD-10-CM   1. Decreased range of motion (ROM) of right knee  M25.661      2. Quadriceps weakness  M62.81      3. Antalgic gait  R26.89      4. Effusion of right knee                                            M25.461       Insurance/Certification information:  PT Insurance Information: Tesha Young / Sandy JOYA / Jocy Pappas / Keli Ayala / Margarito Moses / Izabela Pacheco NOT BILLABLE  Physician Information:  Abdirizak Kim MD   Plan of care signed (Y/N): [x]  Yes []  No  Date sent: 23    Date of Patient follow up with Physician:      Progress Report: []  Yes [x]  No     Functional Scale:   Date assessed:  LEFS  27/80   23  LEFS  35/80   23    Date Range for reporting period:  Beginnin23  Ending:     Progress report due (10 Rx/or 30 days whichever is less):      Recertification due (POC duration/ or 90 days whichever is less): 23     Visit # Insurance Allowable Auth required? Date Range   15 BOMN []  Yes [x]  No      Pain level: 3-4/10, soreness not pain. SUBJECTIVE: Patient reports that since her first treatment with dry needles she has seen an improvement in her anterior ankle pain. States her symptoms are more of a \"discomfort\" now in the same area. States swelling is normally present in the lower leg after walking longer distances or days of increased activity. States he knee has been doing well. Still struggling with steps.        OBJECTIVE: Skilled care provided in flow sheet below  Observation:

## 2023-05-29 ENCOUNTER — APPOINTMENT (OUTPATIENT)
Dept: PHYSICAL THERAPY | Age: 73
End: 2023-05-29
Payer: MEDICARE

## 2023-05-30 ENCOUNTER — HOSPITAL ENCOUNTER (OUTPATIENT)
Dept: PHYSICAL THERAPY | Age: 73
Setting detail: THERAPIES SERIES
Discharge: HOME | End: 2023-05-30
Payer: MEDICARE

## 2023-05-30 PROCEDURE — 97140 MANUAL THERAPY 1/> REGIONS: CPT

## 2023-05-30 PROCEDURE — 97530 THERAPEUTIC ACTIVITIES: CPT

## 2023-05-30 PROCEDURE — 97110 THERAPEUTIC EXERCISES: CPT

## 2023-05-30 NOTE — FLOWSHEET NOTE
return to daily activities and working off the walker. Provided patient time for questions with answers provided when possible. 4/7/23: continued to discuss importance of icing and when to ice, educated on delayed on set of muscular soreness and appropriate tissue responses after therapy sessions or HEP. Further discussed appropriate activity modification and steady return of function. Discussed management of anterior ankle pain and cause for symptoms following surgery. 4/10/23: Discussed progression of marching to SLS with UE support at home to promote improvement in tolerance to single leg loading. Encouraged to progress walking at home with use of countertop; slowly performing left swing to increase time on RLE. Heavily review home exercises especially SLR to avoid groin compensations which is likely cause for groin cramping and soreness. 4/26/23: Discussed with therapist options to garden without being on knees  5/4/23: Educated patient on PT assessment in regards to deficits noted. Discussed relationship of lower ankle pain to gait abnormalities, weakness and ROM deficits still present. Discussed management of symptom in the clinic and home. Continues to discussed swelling management and progression of activity. 5/8/23: Patient presents with several questions concerning her ankle and shin pain which seems to only be worsening. Patient heavily educated on importance of activity modification or limitation. Patient voiced several times that she does not believe she is doing much. However, challenged patient to stringently look at activity she performs throughout each day and how delayed on set muscle soreness can influence fatigue 12-24 hours following increased activity. Discussed ankle and lower leg compensation likely being performed following increased activity secondary to her on going weakness and functional deficits. Discussed questions around returning to part time work.    5/16/23: Patient presents

## 2023-05-30 NOTE — FLOWSHEET NOTE
168 [facility] Occupational Therapy  [address] [place] [address]  Phone: [phone]   Fax: [phone]      Occupational Therapy Daily Treatment Note  Date:  2023    Patient: Bety Sethi   : 1950   MRN: [id number]  Referring Physician: Dr. May Castillo    Date of Injury:2023  Date of Surgery:2023                                      Insurance information: medicare    Plan of care sent to provider:      []Faxed   []Co-signature    (attempts: 1[] 2[] 3[])       Progress Report: []  Yes  [x]  No     Date Range for reporting period:  Beginnin2023  Endin2023    Progress report due (10 Rx/or 30 days whichever is less): visit #10 or  (date)     Recertification due (POC duration/ or 90 days whichever is less): visit # or 6 (date)     Visit # Insurance Allowable Auth required? Date Range    BMN []  Yes  [x]  No 2023         Latex Allergy:  [x]No      []Yes  Pacemaker:  [x] No       [] Yes     Preferred Language for Healthcare:   [x]English       []other:    Pain level:  0/10 Occassional shooting pain ulnar aspect of hand with weight bearing and rotatoin    SUBJECTIVE:  See eval    Functional Disability Index:  Quick DASH: total score- Quick DASH: total score-  22, disability score- 25%    OBJECTIVE: See eval      RESTRICTIONS/PRECAUTIONS:     Exercises/Interventions: Treatment focusing on soft tissue mobilization, rom and functional strength. Warm up with fluidotherapy for 15 minutes for soft tissue benefits. Patient instructed to work on tendon gliding following first five minutes of relaxation. Patient then rec'd soft tissue mobilization of forearm to decrease extrinsic tightness in hand. Patient educated in self soft tissue mobilization with use of tennis ball over extensor wad. Patient then worked on forearm casting motion for forearm dart thrower motion times 10 reps.   Short arc supination with verbal cues

## 2023-05-31 ENCOUNTER — HOSPITAL ENCOUNTER (OUTPATIENT)
Dept: OCCUPATIONAL THERAPY | Age: 73
Setting detail: THERAPIES SERIES
Discharge: HOME | End: 2023-05-31
Payer: MEDICARE

## 2023-05-31 PROCEDURE — 97530 THERAPEUTIC ACTIVITIES: CPT

## 2023-05-31 PROCEDURE — 97140 MANUAL THERAPY 1/> REGIONS: CPT

## 2023-05-31 PROCEDURE — 97110 THERAPEUTIC EXERCISES: CPT

## 2023-06-01 ENCOUNTER — HOSPITAL ENCOUNTER (OUTPATIENT)
Dept: PHYSICAL THERAPY | Age: 73
Setting detail: THERAPIES SERIES
Discharge: HOME | End: 2023-06-01
Payer: MEDICARE

## 2023-06-01 PROCEDURE — 97530 THERAPEUTIC ACTIVITIES: CPT

## 2023-06-01 PROCEDURE — 97110 THERAPEUTIC EXERCISES: CPT

## 2023-06-01 NOTE — FLOWSHEET NOTE
[place], [employer]    Physical Therapy Treatment Note/ Progress Report:    Date:  2023    Patient Name:  Lee Cr    :  1950  MRN: [id number]  Medical Diagnosis:  Closed fracture of right tibial plateau with routine healing, subsequent encounter [S82.141D]  Closed fracture of distal end of right radius with routine healing, unspecified fracture morphology, subsequent encounter [S52.501D]    Treatment Diagnosis:    ICD-10-CM   1. Decreased range of motion (ROM) of right knee  M25.661      2. Quadriceps weakness  M62.81      3. Antalgic gait  R26.89      4. Effusion of right knee                                            M25.461       Insurance/Certification information:  PT Insurance Information: Leotis Boas / Lucero Harper MET / Mickeal Handler / Ofe Izquierdo / Chiki Sanabria / Israel Ta NOT BILLABLE  Physician Information:  Balbina Hung MD   Plan of care signed (Y/N): [x]  Yes []  No  Date sent: 23    Date of Patient follow up with Physician:      Progress Report: []  Yes [x]  No     Functional Scale:   Date assessed:  LEFS  27/80   23  LEFS  35/80   23    Date Range for reporting period:  Beginnin23  Ending:     Progress report due (10 Rx/or 30 days whichever is less): 07     Recertification due (POC duration/ or 90 days whichever is less): 23     Visit # Insurance Allowable Auth required? Date Range   17 BOMN []  Yes [x]  No      Pain level: 3-4/10, soreness not pain     SUBJECTIVE: Patient reports soreness significantly increased after session on Tuesday. Felt like step ups were cause of soreness/discomfort. Pain has gone away on top of foot but returned to extensor tendons of ankle. Notes some discomfort in medial knee around incision site. OBJECTIVE: Skilled care provided in flow sheet below  Observation:   23: Patient continues to have knee pain during loaded knee extension.  Following STM to the VL and ITB,

## 2023-06-05 ENCOUNTER — APPOINTMENT (OUTPATIENT)
Dept: PHYSICAL THERAPY | Age: 73
End: 2023-06-05
Payer: MEDICARE

## 2023-06-06 ENCOUNTER — HOSPITAL ENCOUNTER (OUTPATIENT)
Dept: PHYSICAL THERAPY | Age: 73
Setting detail: THERAPIES SERIES
Discharge: HOME | End: 2023-06-06
Payer: MEDICARE

## 2023-06-06 PROCEDURE — 97110 THERAPEUTIC EXERCISES: CPT

## 2023-06-06 PROCEDURE — 97530 THERAPEUTIC ACTIVITIES: CPT

## 2023-06-06 NOTE — PROGRESS NOTES
[place], [facility]    Physical Therapy Treatment Note/ Progress Report:    Date:  2023    Patient Name:  Elisa Traore    :  1950  MRN: [id number]  Medical Diagnosis:  Closed fracture of right tibial plateau with routine healing, subsequent encounter [S82.141D]  Closed fracture of distal end of right radius with routine healing, unspecified fracture morphology, subsequent encounter [S52.501D]    Treatment Diagnosis:    ICD-10-CM   1. Decreased range of motion (ROM) of right knee  M25.661      2. Quadriceps weakness  M62.81      3. Antalgic gait  R26.89      4. Effusion of right knee                                            M25.461       Insurance/Certification information:  PT Insurance Information: Sushila Shoulder / Jake Precise MET / Milena Crockett / Angela Hansen / Anai Perry / Silva Abrams NOT BILLABLE  Physician Information:  Dilcia Lucio MD   Plan of care signed (Y/N): [x]  Yes []  No  Date sent: 23    Date of Patient follow up with Physician:      Progress Report: [x]  Yes []  No     Functional Scale:   Date assessed:  LEFS  2723  LEFS  35/80   23  LEFS  55/80   23    Date Range for reporting period:  Beginnin23  Endin23    Progress report due (10 Rx/or 30 days whichever is less): 2/3/67     Recertification due (POC duration/ or 90 days whichever is less): 23     Visit # Insurance Allowable Auth required? Date Range   18 BOMN []  Yes [x]  No      Pain level: 3-4/10, soreness not pain     SUBJECTIVE: Patient reports overall knee pain, at the moment, feels \"fine. \" In the morning, she notes intense stiffness that takes half of her day to work through before she feels she has normal motion, in comparison to her L knee. She has increased her WB activity and will still feel occasional twinges in anterior portion of knee.  She has remained additionally cautious when ambulating, especially when outside, as she doesn't

## 2023-06-07 ENCOUNTER — HOSPITAL ENCOUNTER (OUTPATIENT)
Dept: OCCUPATIONAL THERAPY | Age: 73
Setting detail: THERAPIES SERIES
Discharge: HOME | End: 2023-06-07
Payer: MEDICARE

## 2023-06-07 PROCEDURE — 97110 THERAPEUTIC EXERCISES: CPT

## 2023-06-07 PROCEDURE — 97140 MANUAL THERAPY 1/> REGIONS: CPT

## 2023-06-07 NOTE — FLOWSHEET NOTE
168 [facility] Occupational Therapy  [address] [place] [address], [address] [place] [address]  Phone: [phone]   Fax: [phone]      Occupational Therapy Daily Treatment Note  Date:  2023    Patient: Nicole Fuller   : 1950   MRN: [id number]  Referring Physician: Dr. Heriberto Hidalgo    Date of Injury:2023  Date of Surgery:2023                                      Insurance information: medicare    Plan of care sent to provider:      []Faxed   []Co-signature    (attempts: 1[] 2[] 3[])       Progress Report: []  Yes  [x]  No     Date Range for reporting period:  Beginnin2023  Endin2023    Progress report due (10 Rx/or 30 days whichever is less): visit #10 or  (date)     Recertification due (POC duration/ or 90 days whichever is less): visit # or 6 (date)     Visit # Insurance Allowable Auth required? Date Range   3/6 BMN []  Yes  [x]  No 2023         Latex Allergy:  [x]No      []Yes  Pacemaker:  [x] No       [] Yes     Preferred Language for Healthcare:   [x]English       []other:    Pain level:  0/10      SUBJECTIVE:   patient reports anything requiring a flat hand and pushing up from surface is still difficult but overall stiffness improves, picking up small circumference objects is difficult. Functional Disability Index:  Quick DASH: total score- Quick DASH: total score-  22, disability score- 25%    OBJECTIVE: See eval      RESTRICTIONS/PRECAUTIONS:   Warm Exercises/Interventions: Treatment focusing on soft tissue mobilization, rom and functional strength. Patient then rec'd soft tissue mobilization of forearm to decrease extrinsic tightness in hand. Patient educated in self soft tissue mobilization with use of tennis ball over extensor wad. Patient then worked on forearm casting motion for forearm dart thrower motion times 10 reps. Short arc supination with verbal cues to relax shoulder times ten reps.   Tendon gliding with focus on

## 2023-06-08 ENCOUNTER — HOSPITAL ENCOUNTER (OUTPATIENT)
Dept: PHYSICAL THERAPY | Age: 73
Setting detail: THERAPIES SERIES
Discharge: HOME | End: 2023-06-08
Payer: MEDICARE

## 2023-06-08 PROCEDURE — 97110 THERAPEUTIC EXERCISES: CPT

## 2023-06-08 PROCEDURE — 97530 THERAPEUTIC ACTIVITIES: CPT

## 2023-06-08 NOTE — FLOWSHEET NOTE
Total Treatment Minutes: 40'       [] EVAL (LOW) 24526 (typically 20 minutes face-to-face)  [] EVAL (MOD) 94678 (typically 30 minutes face-to-face)  [] EVAL (HIGH) 05480 (typically 45 minutes face-to-face)  [] RE-EVAL     [x] GR(79020) x 2  [] IONTO (22086)  [] NMR (86793) x     [] VASO (42040)  [] Manual (95743) x    [] Other: DN (78075/0)  [x] TA (78510) x 1    [] Mech Traction (76165)  [] ES(attended) (68493)     [] ES (un) (79785):     GOALS:  Patient stated goal: \"Return to normal function\"  [x] Progressing: [] Met: [] Not Met: [] Adjusted     Therapist goals for Patient:   Short Term Goals: To be achieved in: 2 weeks  1. Independent in HEP and progression per patient tolerance, in order to prevent re-injury. [] Progressing: [x] Met: [] Not Met: [] Adjusted  2. Patient will have a decrease in pain to facilitate improvement in movement, function, and ADLs as indicated by Functional Deficits. [] Progressing: [x] Met: [] Not Met: [] Adjusted     Long Term Goals: To be achieved in: 6-12 weeks  1. Patient will reach LEFS score of less than or equal to 50/80 to assist with reaching prior level of function. [x] Progressing: [] Met: [] Not Met: [] Adjusted  2. Patient will demonstrate increased AROM to within 95% of contralateral LE to allow for proper joint functioning as indicated by patients functional deficits. [] Progressing: [x] Met: [] Not Met: [] Adjusted  3. Patient will demonstrate an increase in Strength to good proximal hip strength and control, within 5lb HHD in LE to allow for proper functional mobility as indicated by patients Functional Deficits. [x] Progressing: [] Met: [] Not Met: [] Adjusted  4. Patient will return to transferring on/off the commode without need for assistance or use of elevated seat. [x] Progressing: [] Met: [] Not Met: [] Adjusted  5.  Patient able to stand for 3 hours to be able to return to work at [employer]. (patient specific functional goal)    [x] Progressing:

## 2023-06-20 ENCOUNTER — HOSPITAL ENCOUNTER (OUTPATIENT)
Dept: PHYSICAL THERAPY | Age: 73
Setting detail: THERAPIES SERIES
Discharge: HOME | End: 2023-06-20
Payer: MEDICARE

## 2023-06-20 PROCEDURE — 97530 THERAPEUTIC ACTIVITIES: CPT

## 2023-06-20 PROCEDURE — 97110 THERAPEUTIC EXERCISES: CPT

## 2023-06-20 PROCEDURE — 97140 MANUAL THERAPY 1/> REGIONS: CPT

## 2023-06-20 NOTE — FLOWSHEET NOTE
[place], [facility]    Physical Therapy Treatment Note/ Progress Report:    Date:  2023    Patient Name:  Angel Reyna    :  1950  MRN: [id number]  Medical Diagnosis:  Closed fracture of right tibial plateau with routine healing, subsequent encounter [S82.141D]  Closed fracture of distal end of right radius with routine healing, unspecified fracture morphology, subsequent encounter [S52.501D]    Treatment Diagnosis:    ICD-10-CM   1. Decreased range of motion (ROM) of right knee  M25.661      2. Quadriceps weakness  M62.81      3. Antalgic gait  R26.89      4. Effusion of right knee                                            M25.461       Insurance/Certification information:  PT Insurance Information: Jose Luciano / Hue JOYA / David Leigh / Sandie Mars / Della Hernandez / Adolfo Gant NOT BILLABLE  Physician Information:  Jennifer Jane MD   Plan of care signed (Y/N): [x]  Yes []  No  Date sent: 23    Date of Patient follow up with Physician:      Progress Report: []  Yes [x]  No     Functional Scale:   Date assessed:  LEFS  27/80   23  LEFS  35/80   23  LEFS  55/80   23    Date Range for reporting period:  Beginnin23  Ending:     Progress report due (10 Rx/or 30 days whichever is less): 3/2/71     Recertification due (POC duration/ or 90 days whichever is less): 23     Visit # Insurance Allowable Auth required? Date Range   22 BOMN []  Yes [x]  No      Pain level: 1-210    SUBJECTIVE: Patient reports she began back at work today. States she was sitting predominately learning the new computer system. Has been working to improve push off during toe off. OBJECTIVE: Skilled care provided in flow sheet below  Observation:   23: Patient with hypersensitivity and pain to patellar tendon.    23: Patient demonstrates poor push-off at toe off phase of gait  23: Struggles to maintain neutral pelvis during lateral heel

## 2023-06-21 ENCOUNTER — HOSPITAL ENCOUNTER (OUTPATIENT)
Dept: OCCUPATIONAL THERAPY | Age: 73
Setting detail: THERAPIES SERIES
Discharge: HOME | End: 2023-06-21
Payer: MEDICARE

## 2023-06-21 PROCEDURE — 97110 THERAPEUTIC EXERCISES: CPT

## 2023-06-21 PROCEDURE — 97018 PARAFFIN BATH THERAPY: CPT

## 2023-06-21 NOTE — FLOWSHEET NOTE
Progressing: [] Met: [] Not Met: [] Adjusted  2. Pt will increase right hand  strength by 10 pounds  3. Patient will increase weight bearing tolerance on right hand to 30 pounds   [x] Progressing: [] Met: [] Not Met: [] Adjusted     Long Term Goals: To be achieved by discharge  1. Pt will report a QuickDASH Symptom Severity Scale score of 15% or less indicating increased safety and functional independence in desired occupational pursuits by discharge. [x] Progressing: [] Met: [] Not Met: [] Adjusted       Progression Towards Functional goals:  [x] Patient is progressing as expected towards functional goals listed. [] Progression is slowed due to complexities listed. [] Progression has been slowed due to co-morbidities. [] Plan just implemented, too soon to assess goals progression  [] All goals are met  [] Other:     ASSESSMENT:  See eval    Treatment/Activity Tolerance:  [x] Patient tolerated treatment well [] Patient limited by fatigue  [] Patient limited by pain  [] Patient limited by other medical complications  [] Other:     Prognosis: [x] Good [] Fair  [] Poor    Patient Requires Follow-up: [x] Yes  [] No    PLAN:  Re assess next visit  [] Continue per plan of care [] Alter current plan (see comments)  [x] Plan of care initiated [] Hold pending MD visit [] Discharge    Electronically signed by:         Note: If patient does not return for scheduled/ recommended follow up visits, this note will serve as a discharge from care along with most recent update on progress.

## 2023-06-26 ENCOUNTER — HOSPITAL ENCOUNTER (OUTPATIENT)
Dept: PHYSICAL THERAPY | Age: 73
Setting detail: THERAPIES SERIES
Discharge: HOME | End: 2023-06-26
Payer: MEDICARE

## 2023-06-26 PROCEDURE — 97110 THERAPEUTIC EXERCISES: CPT

## 2023-06-26 PROCEDURE — 97530 THERAPEUTIC ACTIVITIES: CPT

## 2023-06-28 ENCOUNTER — HOSPITAL ENCOUNTER (OUTPATIENT)
Dept: OCCUPATIONAL THERAPY | Age: 73
Setting detail: THERAPIES SERIES
Discharge: HOME | End: 2023-06-28
Payer: MEDICARE

## 2023-06-28 PROCEDURE — 97530 THERAPEUTIC ACTIVITIES: CPT

## 2023-06-28 PROCEDURE — 97110 THERAPEUTIC EXERCISES: CPT

## 2023-06-29 ENCOUNTER — HOSPITAL ENCOUNTER (OUTPATIENT)
Dept: PHYSICAL THERAPY | Age: 73
Setting detail: THERAPIES SERIES
Discharge: HOME | End: 2023-06-29
Payer: MEDICARE

## 2023-06-29 PROCEDURE — 97110 THERAPEUTIC EXERCISES: CPT

## 2023-06-29 PROCEDURE — 97140 MANUAL THERAPY 1/> REGIONS: CPT

## 2023-07-03 ENCOUNTER — HOSPITAL ENCOUNTER (OUTPATIENT)
Dept: PHYSICAL THERAPY | Age: 73
Setting detail: THERAPIES SERIES
Discharge: HOME | End: 2023-07-03
Payer: MEDICARE

## 2023-07-03 PROCEDURE — 97110 THERAPEUTIC EXERCISES: CPT

## 2023-07-03 PROCEDURE — 97530 THERAPEUTIC ACTIVITIES: CPT

## 2023-07-03 NOTE — FLOWSHEET NOTE
[address] [facility]    Physical Therapy Treatment Note/ Progress Report:    Date:  2023    Patient Name:  Medina Marcos    :  1950  MRN: [id number]  Medical Diagnosis:  Closed fracture of right tibial plateau with routine healing, subsequent encounter [S82.141D]  Closed fracture of distal end of right radius with routine healing, unspecified fracture morphology, subsequent encounter [S52.501D]    Treatment Diagnosis:    ICD-10-CM   1. Decreased range of motion (ROM) of right knee  M25.661      2. Quadriceps weakness  M62.81      3. Antalgic gait  R26.89      4. Effusion of right knee                                            M25.461       Insurance/Certification information:  PT Insurance Information: Gorge Camp / Yan JOYA / Mary Kay Alarcon / Christian Lindsay / Mary Kate Alonso / Lanie Luke NOT BILLABLE  Physician Information:  Trina Nicholson MD   Plan of care signed (Y/N): [x]  Yes []  No  Date sent: 23    Date of Patient follow up with Physician:      Progress Report: []  Yes [x]  No     Functional Scale:   Date assessed:  LEFS  2723  LEFS  35/80   23  LEFS  55/80   23    Date Range for reporting period:  Beginnin23  Ending:     Progress report due (10 Rx/or 30 days whichever is less): 55     Recertification due (POC duration/ or 90 days whichever is less): 23     Visit # Insurance Allowable Auth required? Date Range   22 BOMN []  Yes [x]  No      Pain level: 1-2/10    SUBJECTIVE: Patient reports she is doing well today and continue to work on improving her stair ambulation. States she does still get some instances of the knee wanting to give out when changing directions quickly. OBJECTIVE: Skilled care provided in flow sheet below  Observation:   23: Soleus restriction noted inhibiting anterior tibial anterior translation  23: Patient with hypersensitivity and pain to patellar tendon.    23: Patient

## 2023-07-05 ENCOUNTER — HOSPITAL ENCOUNTER (OUTPATIENT)
Dept: OCCUPATIONAL THERAPY | Age: 73
Setting detail: THERAPIES SERIES
Discharge: HOME | End: 2023-07-05
Payer: MEDICARE

## 2023-07-05 PROCEDURE — 97112 NEUROMUSCULAR REEDUCATION: CPT

## 2023-07-05 PROCEDURE — 97110 THERAPEUTIC EXERCISES: CPT

## 2023-07-05 NOTE — FLOWSHEET NOTE
[address] [facility] Occupational Therapy  [address] [facility] [address] [facility]  Phone: [phone]   Fax: [phone]      Occupational Therapy Daily Treatment Note  Date:  2023    Patient: Cristy Rene   : 1950   MRN: [id number]  Referring Physician: Dr. Lupillo Moses    Date of Injury:2023  Date of Surgery:2023                                      Insurance information: medicare    Plan of care sent to provider:      []Faxed   []Co-signature    (attempts: 1[] 2[] 3[])       Progress Report: []  Yes  [x]  No     Date Range for reporting period:  Beginnin2023  Endin2023    Progress report due (10 Rx/or 30 days whichever is less): visit #10 or 2/15/4666 (date)     Recertification due (POC duration/ or 90 days whichever is less): visit # or 6 (date)     Visit # Insurance Allowable Auth required? Date Range    BMN []  Yes  [x]  No 2023         Latex Allergy:  [x]No      []Yes  Pacemaker:  [x] No       [] Yes     Preferred Language for Healthcare:   [x]English       []other:    Pain level:  0/10      SUBJECTIVE:     Patient reports she feels much better, soft tissue has improved. Functional Disability Index:  Quick DASH: total score- Quick DASH: total score-  22, disability score- 25%    OBJECTIVE: See eval  -.5cm flexion lag composite flexion of fingers, noted thickening of flexor tendon sheath digits 3-5. Recommend patient have MD assess on her next visit  30 pounds weight bearing today and 45 pounds group      RESTRICTIONS/PRECAUTIONS:   Warm Exercises/Interventions: Treatment focusing on soft tissue mobilization, rom and functional strength. Noted  significant improvement in soft tissue of flexor wad and extensor wad .    In hand translation with squigs 3 sets of 10       Dart thrower with squigs times  10      Short arc supination with  red flex bar 10      Wrist flexion/ extension red flex bar times  20 reps      Radial deviation

## 2023-07-06 ENCOUNTER — HOSPITAL ENCOUNTER (OUTPATIENT)
Dept: PHYSICAL THERAPY | Age: 73
Setting detail: THERAPIES SERIES
Discharge: HOME | End: 2023-07-06
Payer: MEDICARE

## 2023-07-06 PROCEDURE — 97110 THERAPEUTIC EXERCISES: CPT

## 2023-07-06 PROCEDURE — 97530 THERAPEUTIC ACTIVITIES: CPT

## 2023-07-06 NOTE — PLAN OF CARE
Manual Treatments:  PROM / STM / Oscillations-Mobs:  G-I, II, III, IV (PA's, Inf., Post.)  [x] (91970) Provided manual therapy to mobilize LE, proximal hip and/or LS spine soft tissue/joints for the purpose of modulating pain, promoting relaxation,  increasing ROM, reducing/eliminating soft tissue swelling/inflammation/restriction, improving soft tissue extensibility and allowing for proper ROM for normal function with self care, mobility, lifting and ambulation. Modalities:     Charges:  Timed Code Treatment Minutes: 39'   Total Treatment Minutes: 39'       [] EVAL (LOW) 455 1011 (typically 20 minutes face-to-face)  [] EVAL (MOD) 64836 (typically 30 minutes face-to-face)  [] EVAL (HIGH) 03975 (typically 45 minutes face-to-face)  [] RE-EVAL     [x] EC(82257) x 1  [] IONTO (62993)  [] NMR (67484) x     [] VASO (97059)  [] Manual (68371) x    [] Other: DN (50548/8)  [x] TA (78471) x   2  [] [facility] (31388)  [] ES(attended) (64346)     [] ES (un) (47410):     GOALS:  Patient stated goal: \"Return to normal function\"  [x] Progressing: [] Met: [] Not Met: [] Adjusted     Therapist goals for Patient:   Short Term Goals: To be achieved in: 2 weeks  1. Independent in HEP and progression per patient tolerance, in order to prevent re-injury. [] Progressing: [x] Met: [] Not Met: [] Adjusted  2. Patient will have a decrease in pain to facilitate improvement in movement, function, and ADLs as indicated by Functional Deficits. [] Progressing: [x] Met: [] Not Met: [] Adjusted     Long Term Goals: To be achieved in: 6-12 weeks  1. Patient will reach LEFS score of less than or equal to 50/80 to assist with reaching prior level of function. [] Progressing: [x] Met: [] Not Met: [] Adjusted  2. Patient will demonstrate increased AROM to within 95% of contralateral LE to allow for proper joint functioning as indicated by patients functional deficits. [] Progressing: [x] Met: [] Not Met: [] Adjusted  3.  Patient will

## 2023-07-10 ENCOUNTER — HOSPITAL ENCOUNTER (OUTPATIENT)
Dept: PHYSICAL THERAPY | Age: 73
Setting detail: THERAPIES SERIES
Discharge: HOME | End: 2023-07-10
Payer: MEDICARE

## 2023-07-10 PROCEDURE — 97530 THERAPEUTIC ACTIVITIES: CPT

## 2023-07-10 PROCEDURE — 97110 THERAPEUTIC EXERCISES: CPT

## 2023-07-10 NOTE — FLOWSHEET NOTE
function. [] Progressing: [x] Met: [] Not Met: [] Adjusted  2. Patient will demonstrate increased AROM to within 95% of contralateral LE to allow for proper joint functioning as indicated by patients functional deficits. [] Progressing: [x] Met: [] Not Met: [] Adjusted  3. Patient will demonstrate an increase in Strength to good proximal hip strength and control, within 5lb HHD in LE to allow for proper functional mobility as indicated by patients Functional Deficits. [] Progressing: [x] Met: [] Not Met: [] Adjusted  4. Patient will return to transferring on/off the commode without need for assistance or use of elevated seat. [] Progressing: [x] Met: [] Not Met: [] Adjusted  5. Patient able to stand for 3 hours to be able to work with less limitations at [employer]. (patient specific functional goal)    [x] Progressing: [] Met: [] Not Met: [x] Adjusted       6. Patient able to return to ambulating without the need for AD. [] Progressing: [x] Met: [] Not Met: [] Adjusted       7. Patient able to perform 1 flight of stairs without need for bilateral UE assistance or > 2/10 pain.  (patient specific functional goal)  [x] Progressing: [] Met: [] Not Met: [x] Adjusted           ASSESSMENT: Patient continues to have presence of RLE pain secondary to compensatory patterns secondary to weakness. Patient continues to need additional education on how her physical fitness and health, prior to her injury, directly influences her recovery post operatively. Have also had many session educating her on the effects of her RLE weakness in relationship to compensation and symptoms she continues to have. Addressed patients concerns in regards to her pain and returning to more walking and full time work. Patient would continue to benefit from skilled intervention to improve strength, ROM and balance, all to improve functionality and independence with ADLs.       Treatment/Activity Tolerance:  [x] Patient tolerated treatment

## 2023-07-13 ENCOUNTER — HOSPITAL ENCOUNTER (OUTPATIENT)
Dept: PHYSICAL THERAPY | Age: 73
Setting detail: THERAPIES SERIES
Discharge: HOME | End: 2023-07-13
Payer: MEDICARE

## 2023-07-13 PROCEDURE — 97140 MANUAL THERAPY 1/> REGIONS: CPT

## 2023-07-13 PROCEDURE — 97110 THERAPEUTIC EXERCISES: CPT

## 2023-07-13 NOTE — FLOWSHEET NOTE
weekly - 3 sets - 10 reps  - Standing Bilateral Heel Raise on Step  - 3 x weekly - 3 sets - 10 reps  - Squat with Chair Touch  - 3 x weekly - 3 sets - 10 reps    Access Code: [email or web address] - Date: 05/30/2023  - Heel Slides  - Prone Knee Flexion Stretch    Access Code: [email or web address] - 06/06/23  Exercises  - Supine Active Straight Leg Raise  - 3 x weekly - 2 sets - 10 reps  - Prone Hamstring Curl with Ankle Weight  - 3 x weekly - 3 sets - 10 reps  - Seated Long Arc Quad with Ankle Weight  - 3 x weekly - 3 sets - 10 reps  - Standing Bilateral Heel Raise on Step  - 3 x weekly - 3 sets - 10 reps  - Side Stepping with Resistance at Ankles  - 3 x weekly - 2 sets - 10 reps  - Lateral Step Up  - 3 x weekly - 2 sets - 10 reps  - Step Up  - 3 x weekly - 2 sets - 10 reps  - Marching Bridge  - 3 x weekly - 2 sets - 10 reps    Therapeutic Exercise and NMR EXR  [x] (04569) Provided verbal/tactile cueing for activities related to strengthening, flexibility, endurance, ROM for improvements in LE, proximal hip, and core control with self care, mobility, lifting, ambulation.  [] (42665) Provided verbal/tactile cueing for activities related to improving balance, coordination, kinesthetic sense, posture, motor skill, proprioception  to assist with LE, proximal hip, and core control in self care, mobility, lifting, ambulation and eccentric single leg control.      NMR and Therapeutic Activities:    [x] (29506 or 92478) Provided verbal/tactile cueing for activities related to improving balance, coordination, kinesthetic sense, posture, motor skill, proprioception and motor activation to allow for proper function of core, proximal hip and LE with self care and ADLs  [] (78798) Gait Re-education- Provided training and instruction to the patient for proper LE, core and proximal hip recruitment and positioning and eccentric body weight control with ambulation re-education including up and down stairs     Home Exercise Program:    [] (05513)

## 2023-07-17 ENCOUNTER — APPOINTMENT (OUTPATIENT)
Dept: PHYSICAL THERAPY | Age: 73
End: 2023-07-17
Payer: MEDICARE

## 2023-07-19 ENCOUNTER — HOSPITAL ENCOUNTER (OUTPATIENT)
Dept: PHYSICAL THERAPY | Age: 73
Setting detail: THERAPIES SERIES
Discharge: HOME | End: 2023-07-19
Payer: MEDICARE

## 2023-07-19 PROCEDURE — 97530 THERAPEUTIC ACTIVITIES: CPT

## 2023-07-19 PROCEDURE — 97110 THERAPEUTIC EXERCISES: CPT

## 2023-07-19 PROCEDURE — 97140 MANUAL THERAPY 1/> REGIONS: CPT

## 2023-07-19 NOTE — FLOWSHEET NOTE
[address] [facility]    Physical Therapy Treatment Note/ Progress Report:    Date:  2023    Patient Name:  Myles Aguero    :  1950  MRN: [id number]  Medical Diagnosis:  Closed fracture of right tibial plateau with routine healing, subsequent encounter [S82.141D]  Closed fracture of distal end of right radius with routine healing, unspecified fracture morphology, subsequent encounter [S52.501D]    Treatment Diagnosis:    ICD-10-CM   1. Decreased range of motion (ROM) of right knee  M25.661      2. Quadriceps weakness  M62.81      3. Antalgic gait  R26.89      4. Effusion of right knee                                            M25.461       Insurance/Certification information:  PT Insurance Information: Jesus Ohara / Monik JOYA / Radha Avalos / Lauren Hernandez / Leonardo Smith / Modesto Valencia NOT BILLABLE  Physician Information:  Samanta Torres MD   Plan of care signed (Y/N): [x]  Yes []  No  Date sent: 23    Date of Patient follow up with Physician:      Progress Report: [x]  Yes []  No     Functional Scale:   Date assessed:  LEFS  27/80   23  LEFS  35/80   23  LEFS  55/80   23  LEFS  56/80   23    Date Range for reporting period:  Beginnin23  Ending:     Progress report due (10 Rx/or 30 days whichever is less):      Recertification due (POC duration/ or 90 days whichever is less): 23     Visit # Insurance Allowable Auth required? Date Range   34 BOMN []  Yes [x]  No      Pain level: 1-2/10    SUBJECTIVE: Patient reports on going pain along the dorsum of the foot and anterior ankle. \"I feel like I have turned a corner. \"    OBJECTIVE: Skilled care provided in flow sheet below  Observation:   23: Continued to have great difficulty controlling eccentric quad movements secondary to patellar tendon pain.     23: Soleus restriction noted inhibiting anterior tibial anterior translation  23: Patient with

## 2023-07-20 ENCOUNTER — APPOINTMENT (OUTPATIENT)
Dept: PHYSICAL THERAPY | Age: 73
End: 2023-07-20
Payer: MEDICARE

## 2023-07-21 ENCOUNTER — APPOINTMENT (OUTPATIENT)
Dept: PHYSICAL THERAPY | Age: 73
End: 2023-07-21
Payer: MEDICARE

## 2023-07-24 ENCOUNTER — HOSPITAL ENCOUNTER (OUTPATIENT)
Dept: PHYSICAL THERAPY | Age: 73
Setting detail: THERAPIES SERIES
Discharge: HOME | End: 2023-07-24
Payer: MEDICARE

## 2023-07-24 PROCEDURE — 20561 NDL INSJ W/O NJX 3+ MUSC: CPT

## 2023-07-24 PROCEDURE — 97032 APPL MODALITY 1+ESTIM EA 15: CPT

## 2023-07-24 PROCEDURE — 97110 THERAPEUTIC EXERCISES: CPT

## 2023-07-24 PROCEDURE — 97530 THERAPEUTIC ACTIVITIES: CPT

## 2023-07-24 NOTE — FLOWSHEET NOTE
[address] [facility]    Physical Therapy Treatment Note/ Progress Report:    Date:  2023    Patient Name:  Sam James    :  1950  MRN: [id number]  Medical Diagnosis:  Closed fracture of right tibial plateau with routine healing, subsequent encounter [S82.141D]  Closed fracture of distal end of right radius with routine healing, unspecified fracture morphology, subsequent encounter [S52.501D]    Treatment Diagnosis:    ICD-10-CM   1. Decreased range of motion (ROM) of right knee  M25.661      2. Quadriceps weakness  M62.81      3. Antalgic gait  R26.89      4. Effusion of right knee                                            M25.461       Insurance/Certification information:  PT Insurance Information: Mare Mcgee / Shereen JOYA / Cora Fuller / Jose Valadez / Gavi Cortez / Susan Polanco NOT BILLABLE  Physician Information:  Иван Juárez MD   Plan of care signed (Y/N): [x]  Yes []  No  Date sent: 23    Date of Patient follow up with Physician:      Progress Report: []  Yes [x]  No     Functional Scale:   Date assessed:  LEFS  27/80   23  LEFS  35/80   23  LEFS  55/80   23  LEFS  56/80   23    Date Range for reporting period:  Beginnin23  Ending:     Progress report due (10 Rx/or 30 days whichever is less): 3/5/38     Recertification due (POC duration/ or 90 days whichever is less): 23     Visit # Insurance Allowable Auth required? Date Range   30 BOMN []  Yes [x]  No      Pain level: 1-2/10    SUBJECTIVE: Patient reports large increase in activity level around the house which has resulted in increased knee stiffness. Is also complaining about large amounts of pain in the dorsum of the foot. OBJECTIVE: Skilled care provided in flow sheet below  Observation:   23: Patient with significant increase in peroneal and anterior tibialis tenderness.    23: Continued to have great difficulty controlling eccentric quad

## 2023-07-27 ENCOUNTER — HOSPITAL ENCOUNTER (OUTPATIENT)
Dept: PHYSICAL THERAPY | Age: 73
Setting detail: THERAPIES SERIES
Discharge: HOME | End: 2023-07-27
Payer: MEDICARE

## 2023-07-27 PROCEDURE — 97110 THERAPEUTIC EXERCISES: CPT

## 2023-07-27 PROCEDURE — 97530 THERAPEUTIC ACTIVITIES: CPT

## 2023-07-27 NOTE — FLOWSHEET NOTE
[address] [facility]    Physical Therapy Treatment Note/ Progress Report:    Date:  2023    Patient Name:  Alyssa Briseno    :  1950  MRN: [id number]  Medical Diagnosis:  Closed fracture of right tibial plateau with routine healing, subsequent encounter [S82.141D]  Closed fracture of distal end of right radius with routine healing, unspecified fracture morphology, subsequent encounter [S52.501D]    Treatment Diagnosis:    ICD-10-CM   1. Decreased range of motion (ROM) of right knee  M25.661      2. Quadriceps weakness  M62.81      3. Antalgic gait  R26.89      4. Effusion of right knee                                            M25.461       Insurance/Certification information:  PT Insurance Information: Alex Alex / Herbert JOYA / Monica Gibbs / Rubio Mcgarry / Scott Woo / Teena Barajas NOT BILLABLE  Physician Information:  Kaur Meade MD   Plan of care signed (Y/N): [x]  Yes []  No  Date sent: 23    Date of Patient follow up with Physician:      Progress Report: []  Yes [x]  No     Functional Scale:   Date assessed:  LEFS  2723  LEFS  35/80   23  LEFS  55/80   23  LEFS  56/80   23    Date Range for reporting period:  Beginnin23  Ending:     Progress report due (10 Rx/or 30 days whichever is less): 8/3/29     Recertification due (POC duration/ or 90 days whichever is less): 23     Visit # Insurance Allowable Auth required? Date Range   31 BOMN []  Yes [x]  No      Pain level: 1-2/10    SUBJECTIVE: Patient reports improvement in dorsum foot symptoms following dry needling last session. Continues to voice anterior knee pain and instability with stairs. OBJECTIVE: Skilled care provided in flow sheet below  Observation:   23: Patient with significant increase in peroneal and anterior tibialis tenderness.    23: Continued to have great difficulty controlling eccentric quad movements secondary to patellar tendon

## 2023-08-02 ENCOUNTER — HOSPITAL ENCOUNTER (OUTPATIENT)
Dept: PHYSICAL THERAPY | Age: 73
Setting detail: THERAPIES SERIES
Discharge: HOME | End: 2023-08-02
Payer: MEDICARE

## 2023-08-02 PROCEDURE — 97140 MANUAL THERAPY 1/> REGIONS: CPT

## 2023-08-02 PROCEDURE — 97110 THERAPEUTIC EXERCISES: CPT

## 2023-08-02 NOTE — FLOWSHEET NOTE
4/26/23: Discussed with therapist options to garden without being on knees  5/4/23: Educated patient on PT assessment in regards to deficits noted. Discussed relationship of lower ankle pain to gait abnormalities, weakness and ROM deficits still present. Discussed management of symptom in the clinic and home. Continues to discussed swelling management and progression of activity. 5/8/23: Patient presents with several questions concerning her ankle and shin pain which seems to only be worsening. Patient heavily educated on importance of activity modification or limitation. Patient voiced several times that she does not believe she is doing much. However, challenged patient to stringently look at activity she performs throughout each day and how delayed on set muscle soreness can influence fatigue 12-24 hours following increased activity. Discussed ankle and lower leg compensation likely being performed following increased activity secondary to her on going weakness and functional deficits. Discussed questions around returning to part time work. 5/16/23: Patient presents with questions regarding upcoming sewing retreat and proper body mechanics to limit re-injury. Educated on how to place foot in extended position with limited dorsiflexion or utilizing left foot to press pedal. Discussed importance of moving body after remaining sedentary for few hours to limit swelling build up in R knee. Patient questioned pain in medial knee with chair squats, which was discussed as quadriceps muscles tiring out and causing knees to cave during movement. Educated on cues to keep knees abducted with theraband or limiting depth of squat to focus on isolating quadriceps musculature. Patient continues to need extensive education concerning activity modification and relationship of her activity to symptom presentation when in therapy.   5/22/23: Continued to educate patient on need for her to monitor activity progression and improve

## 2023-08-04 ENCOUNTER — HOSPITAL ENCOUNTER (OUTPATIENT)
Dept: PHYSICAL THERAPY | Age: 73
Setting detail: THERAPIES SERIES
Discharge: HOME | End: 2023-08-04
Payer: MEDICARE

## 2023-08-04 PROCEDURE — 97110 THERAPEUTIC EXERCISES: CPT

## 2023-08-04 PROCEDURE — 97530 THERAPEUTIC ACTIVITIES: CPT

## 2023-08-04 NOTE — FLOWSHEET NOTE
[address] [facility]    Physical Therapy Treatment Note/ Progress Report:    Date:  2023    Patient Name:  Roosevelt Krishnan    :  1950  MRN: [id number]  Medical Diagnosis:  Closed fracture of right tibial plateau with routine healing, subsequent encounter [S82.141D]  Closed fracture of distal end of right radius with routine healing, unspecified fracture morphology, subsequent encounter [S52.501D]    Treatment Diagnosis:    ICD-10-CM   1. Decreased range of motion (ROM) of right knee  M25.661      2. Quadriceps weakness  M62.81      3. Antalgic gait  R26.89      4. Effusion of right knee                                            M25.461       Insurance/Certification information:  PT Insurance Information: Florrie Peabody / Casper JOYA / Valente España / Collins Beebe / Bernard Grossman / Flaco Murray NOT BILLABLE  Physician Information:  Tabatha Feng MD   Plan of care signed (Y/N): [x]  Yes []  No  Date sent: 23    Date of Patient follow up with Physician:      Progress Report: []  Yes [x]  No     Functional Scale:   Date assessed:  LEFS  27/80   23  LEFS  35/80   23  LEFS  55/80   23  LEFS  56/80   23    Date Range for reporting period:  Beginnin23  Ending:     Progress report due (10 Rx/or 30 days whichever is less): 58     Recertification due (POC duration/ or 90 days whichever is less): 23     Visit # Insurance Allowable Auth required? Date Range   35 BOMN, DN NOT COVERAGE []  Yes [x]  No      Pain level: 1-2/10    SUBJECTIVE: Patient reports on going \"tightness\" and fatigue along the anterior tib and peroneals. Feels like the tape helped on the knee last visit. OBJECTIVE: Skilled care provided in flow sheet below  Observation:   23: Patient with improvements in sensitivity in the patellar tendon. 23: Patient with significant increase in peroneal and anterior tibialis tenderness.    23: Continued to have great

## 2023-08-07 ENCOUNTER — HOSPITAL ENCOUNTER (OUTPATIENT)
Dept: PHYSICAL THERAPY | Age: 73
Setting detail: THERAPIES SERIES
Discharge: HOME | End: 2023-08-07
Payer: MEDICARE

## 2023-08-07 PROCEDURE — 97110 THERAPEUTIC EXERCISES: CPT

## 2023-08-07 PROCEDURE — 97530 THERAPEUTIC ACTIVITIES: CPT

## 2023-08-07 NOTE — PROGRESS NOTES
endurance. However, stressed not progressing load or activity to quickly as this will likely result in more compensations. Showed relationship of quad weakness present in regards to functional deficits still being shown. 5/31/23: Educated on need for maintaining neutral knee alignment during stair descend. Used modification of step height and thera-band cues to aid patient in proper knee positioning. Discussed location of knee pain and why pain was occurring secondary to strength deficits. 6/6/23: Discussed gait techniques to limit presence of \"limp\" when ambulating. Educated on importance of strengthening quadriceps musculature to support body weight when ambulating and negotiating stairs. 6/8/23: Educated patient on PT diagnosis of patellar tendinitis. Discussed relationship of symptoms with deficits still being noted. Continued to stress healing time frames and normal strength progression. 7/10/23: Patient continues to need additional education on how her physical fitness and health, prior to her injury, directly influences her recovery post operatively. Have also had many session educating her on the effects of her RLE weakness in relationship to compensation and symptoms she continues to have. Addressed patients concerns in regards to her pain and returning to more walking and full time work. Home Exercise Program:  Access Code: [email or web address]  URL: [email or web address]  Date: 08/07/2023  Prepared by:  Jaimie Rolle    Exercises  - Sitting Heel Slide with Towel  - 1 x daily - 7 x weekly - 3 sets - 1 minute hold  - Prone Quadriceps Stretch with Strap  - 1 x daily - 7 x weekly - 3 sets - 1 minute hold  - Standing Gastroc Stretch on Foam 1/2 Roll  - 1 x daily - 7 x weekly - 3 sets - 1 minutes hold  - Straight Leg Raise with Arm Support  - 3 x weekly - 3 sets  - Prone Hamstring Curl with Ankle Weight  - 3 x weekly - 3 sets - 10 reps  - Seated Long Arc Quad with Ankle Weight  - 3 x weekly - 3 sets - 10

## 2023-08-10 ENCOUNTER — HOSPITAL ENCOUNTER (OUTPATIENT)
Dept: PHYSICAL THERAPY | Age: 73
Setting detail: THERAPIES SERIES
Discharge: HOME | End: 2023-08-10
Payer: MEDICARE

## 2023-08-10 ENCOUNTER — OFFICE VISIT (OUTPATIENT)
Dept: ORTHOPEDIC SURGERY | Age: 73
End: 2023-08-10

## 2023-08-10 VITALS — BODY MASS INDEX: 25.66 KG/M2 | WEIGHT: 154 LBS | HEIGHT: 65 IN

## 2023-08-10 DIAGNOSIS — S52.501D CLOSED FRACTURE OF DISTAL END OF RIGHT RADIUS WITH ROUTINE HEALING, UNSPECIFIED FRACTURE MORPHOLOGY, SUBSEQUENT ENCOUNTER: ICD-10-CM

## 2023-08-10 DIAGNOSIS — S82.141D CLOSED FRACTURE OF RIGHT TIBIAL PLATEAU WITH ROUTINE HEALING, SUBSEQUENT ENCOUNTER: Primary | ICD-10-CM

## 2023-08-10 PROCEDURE — 97110 THERAPEUTIC EXERCISES: CPT

## 2023-08-10 PROCEDURE — 97530 THERAPEUTIC ACTIVITIES: CPT

## 2023-08-10 PROCEDURE — 97112 NEUROMUSCULAR REEDUCATION: CPT

## 2023-08-10 NOTE — FLOWSHEET NOTE
[address] [facility]    Physical Therapy Treatment Note/ Progress Report:    Date:  08/10/2023    Patient Name:  Caio Daniels    :  1950  MRN: [id number]  Medical Diagnosis:  Closed fracture of right tibial plateau with routine healing, subsequent encounter [S82.141D]  Closed fracture of distal end of right radius with routine healing, unspecified fracture morphology, subsequent encounter [S52.501D]    Treatment Diagnosis:    ICD-10-CM   1. Decreased range of motion (ROM) of right knee  M25.661      2. Quadriceps weakness  M62.81      3. Antalgic gait  R26.89      4. Effusion of right knee                                            M25.461       Insurance/Certification information:  PT Insurance Information: Jasper Noriega / Irwin JOYA / Donnell Moeller / Kate Hubbard / Jhoana Collazo / Sari Primrose NOT BILLABLE  Physician Information:  Leonides Gonzales MD   Plan of care signed (Y/N): [x]  Yes []  No  Date sent: 23    Date of Patient follow up with Physician:      Progress Report: [x]  Yes []  No     Functional Scale:   Date assessed:  LEFS  27/80   23  LEFS  35/80   23  LEFS  55/80   23  LEFS  56/80   23  LEFS  60/80   23    Date Range for reporting period:  Beginnin23  Endin23    Progress report due (10 Rx/or 30 days whichever is less): 6/6/15     Recertification due (POC duration/ or 90 days whichever is less): 23     Visit # Insurance Allowable Auth required? Date Range   28 BOMN, DN NOT COVERAGE []  Yes [x]  No      Pain level: 1-2/10    SUBJECTIVE: Patient reports she will be following up with Dr. Sher Negrete later today. States her knee feels like she is doing fine but continues to difficulty with actives around work, walking > 1 mile at a time, squatting down, stooping or stairs.       OBJECTIVE: Skilled care provided in flow sheet below  Observation:   23: Patient with improvements in ascending stairs without use of UE

## 2023-08-14 ENCOUNTER — HOSPITAL ENCOUNTER (OUTPATIENT)
Dept: PHYSICAL THERAPY | Age: 73
Setting detail: THERAPIES SERIES
Discharge: HOME | End: 2023-08-14
Payer: MEDICARE

## 2023-08-14 PROCEDURE — 97530 THERAPEUTIC ACTIVITIES: CPT

## 2023-08-14 PROCEDURE — 97110 THERAPEUTIC EXERCISES: CPT

## 2023-08-14 PROCEDURE — 97112 NEUROMUSCULAR REEDUCATION: CPT

## 2023-08-14 NOTE — FLOWSHEET NOTE
[address] [facility]    Physical Therapy Treatment Note/ Progress Report:    Date:  2023    Patient Name:  Cristy Rene    :  1950  MRN: [id number]  Medical Diagnosis:  Closed fracture of right tibial plateau with routine healing, subsequent encounter [S82.141D]  Closed fracture of distal end of right radius with routine healing, unspecified fracture morphology, subsequent encounter [S52.769D]    Treatment Diagnosis:    ICD-10-CM   1. Decreased range of motion (ROM) of right knee  M25.661      2. Quadriceps weakness  M62.81      3. Antalgic gait  R26.89      4. Effusion of right knee                                            M25.461       Insurance/Certification information:  PT Insurance Information: Vanessa Wheat / Stone JOYA / Faith Lim / Yobani Lucero / [address] / Zahida Labor NOT BILLABLE  Physician Information:  Karlie Winters MD   Plan of care signed (Y/N): [x]  Yes []  No  Date sent: 23      Progress Report: []  Yes [x]  No     Functional Scale:   Date assessed:  LEFS  2723  LEFS  35/80   23  LEFS  55/80   23  LEFS  56/80   23  LEFS  60/80   23    Date Range for reporting period:  Beginnin23  Ending:     Progress report due (10 Rx/or 30 days whichever is less): 50     Recertification due (POC duration/ or 90 days whichever is less): 23     Visit # Insurance Allowable Auth required? Date Range   39 BOMN, DN NOT COVERAGE []  Yes [x]  No      Pain level: 1-2/10    SUBJECTIVE: Patient reports she was \"extremely\" sore following last session. Said she had soreness through the entire weekend and is still having some tenderness through the quads. OBJECTIVE: Skilled care provided in flow sheet below  Observation:   23: needed to reduce compression of BFR to complete all sets for both exercise performed this date. 23: Patient with improvements in ascending stairs without use of UE assistance.

## 2023-08-16 RX ORDER — VALSARTAN 80 MG/1
80 TABLET ORAL DAILY
COMMUNITY

## 2023-08-16 RX ORDER — ASCORBIC ACID 500 MG
500 TABLET ORAL DAILY
COMMUNITY

## 2023-08-16 NOTE — PROGRESS NOTES
[facility] ENDOSCOPY COLONOSCOPY PRE-OPERATIVE INSTRUCTIONS Date of Procedure 08/23/2023_______Arrival time___0930_________        Surgery time____1030________       Clear liquids the day before the procedure. Do not eat or drink anything within 5 hours of your procedure. This includes water chewing gum, mints and ice chips. You may brush your teeth and gargle the morning of your surgery, but do not swallow the water    You may be asked to stop blood thinners such as Coumadin, Plavix, Fragmin, Lovenox, etc., or any anti-inflammatories such as:  Aspirin, Ibuprofen, Advil, Naproxen prior to your procedure. We also ask that you stop any OTC medications such as fish oil, vitamin E, glucosamine, garlic, Multivitamins, COQ 10, etc.    You must make arrangements for a responsible adult to arrive with you and stay in our waiting area during your procedure. They will also need to take you home after your procedure. For your safety you will not be allowed to leave alone or drive yourself home. Also for your safety, it is strongly suggested that someone stay with you the first 24 hours after your procedure. For your comfort, please wear simple loose fitting clothing to the center. Please do not bring valuables. If you have a living will and a durable power of  for healthcare, please bring in a copy.      You will need to bring a photo ID and insurance card    Our goal is to provide you with excellent care so if you have any questions, please contact us at the [facility] at [phone]         Please note these are generalized instructions for all colonoscopy cases, you may be provided with more specific instructions if necessary

## 2023-08-17 ENCOUNTER — HOSPITAL ENCOUNTER (OUTPATIENT)
Dept: PHYSICAL THERAPY | Age: 73
Setting detail: THERAPIES SERIES
Discharge: HOME | End: 2023-08-17
Payer: MEDICARE

## 2023-08-17 PROCEDURE — 97530 THERAPEUTIC ACTIVITIES: CPT

## 2023-08-17 PROCEDURE — 97140 MANUAL THERAPY 1/> REGIONS: CPT

## 2023-08-17 PROCEDURE — 97110 THERAPEUTIC EXERCISES: CPT

## 2023-08-17 NOTE — FLOWSHEET NOTE
Towel  - 1 x daily - 7 x weekly - 3 sets - 1 minute hold  - Prone Quadriceps Stretch with Strap  - 1 x daily - 7 x weekly - 3 sets - 1 minute hold  - Standing Gastroc Stretch on Foam 1/2 Roll  - 1 x daily - 7 x weekly - 3 sets - 1 minutes hold  - Straight Leg Raise with Arm Support  - 3 x weekly - 3 sets  - Prone Hamstring Curl with Ankle Weight  - 3 x weekly - 3 sets - 10 reps  - Seated Long Arc Quad with Ankle Weight  - 3 x weekly - 3 sets - 10 reps  - Standing Bilateral Heel Raise on Step  - 3 x weekly - 3 sets - 10 reps  - Squat with Chair Touch  - 3 x weekly - 3 sets - 10 reps  - Step Up  - 3 x weekly - 10 reps  - Forward Step Down Touch with Heel  - 3 x weekly - 3 sets - 10 reps    Therapeutic Exercise and NMR EXR  [x] (28697) Provided verbal/tactile cueing for activities related to strengthening, flexibility, endurance, ROM for improvements in LE, proximal hip, and core control with self care, mobility, lifting, ambulation.  [] (56200) Provided verbal/tactile cueing for activities related to improving balance, coordination, kinesthetic sense, posture, motor skill, proprioception  to assist with LE, proximal hip, and core control in self care, mobility, lifting, ambulation and eccentric single leg control.      NMR and Therapeutic Activities:    [x] (85634 or 79099) Provided verbal/tactile cueing for activities related to improving balance, coordination, kinesthetic sense, posture, motor skill, proprioception and motor activation to allow for proper function of core, proximal hip and LE with self care and ADLs  [] (79405) Gait Re-education- Provided training and instruction to the patient for proper LE, core and proximal hip recruitment and positioning and eccentric body weight control with ambulation re-education including up and down stairs     Home Exercise Program:    [] (29697) Reviewed/Progressed HEP activities related to strengthening, flexibility, endurance, ROM of core, proximal hip and LE for

## 2023-08-21 ENCOUNTER — HOSPITAL ENCOUNTER (OUTPATIENT)
Dept: PHYSICAL THERAPY | Age: 73
Setting detail: THERAPIES SERIES
Discharge: HOME | End: 2023-08-21
Payer: MEDICARE

## 2023-08-21 PROCEDURE — 97110 THERAPEUTIC EXERCISES: CPT

## 2023-08-21 PROCEDURE — 97112 NEUROMUSCULAR REEDUCATION: CPT

## 2023-08-21 PROCEDURE — 97140 MANUAL THERAPY 1/> REGIONS: CPT

## 2023-08-21 NOTE — FLOWSHEET NOTE
[address] [facility]    Physical Therapy Treatment Note/ Progress Report:    Date:  2023   GO OVER HOME EXERCISES USING HOME WEIGHTS NV    Patient Name:  Cristy Rene    :  1950  MRN: [id number]  Medical Diagnosis:  Closed fracture of right tibial plateau with routine healing, subsequent encounter [S82.141D]  Closed fracture of distal end of right radius with routine healing, unspecified fracture morphology, subsequent encounter [S52.492D]    Treatment Diagnosis:    ICD-10-CM   1. Decreased range of motion (ROM) of right knee  M25.661      2. Quadriceps weakness  M62.81      3. Antalgic gait  R26.89      4. Effusion of right knee                                            M25.461       Insurance/Certification information:  PT Insurance Information: Vanessa Wheat / Stone JOYA / Faith Lim / Yobani Lucero / Jim Godinez / Zahida Jimenez NOT BILLABLE  Physician Information:  Karlie Winters MD   Plan of care signed (Y/N): [x]  Yes []  No  Date sent: 23      Progress Report: []  Yes [x]  No     Functional Scale:   Date assessed:  LEFS  27/80   23  LEFS  35/80   23  LEFS  55/80   23  LEFS  56/80   23  LEFS  60/80   23    Date Range for reporting period:  Beginnin23  Ending:     Progress report due (10 Rx/or 30 days whichever is less):      Recertification due (POC duration/ or 90 days whichever is less): 23     Visit # Insurance Allowable Auth required? Date Range   45 BOMN, DN NOT COVERAGE []  Yes [x]  No      Pain level: 1-2/10    SUBJECTIVE: Patient reports on going presence of \"tightness\" along anterior tibialis and peroneals. \"I feel like I should be better by now and stronger\"      OBJECTIVE: Skilled care provided in flow sheet below  Observation:   23: needed to reduce compression of BFR to complete all sets for both exercise performed this date.    23: Patient with improvements in ascending stairs without use

## 2023-08-22 ENCOUNTER — ANESTHESIA EVENT (OUTPATIENT)
Dept: ENDOSCOPY | Age: 73
End: 2023-08-22
Payer: MEDICARE

## 2023-08-23 ENCOUNTER — ANESTHESIA (OUTPATIENT)
Dept: ENDOSCOPY | Age: 73
End: 2023-08-23
Payer: MEDICARE

## 2023-08-23 ENCOUNTER — HOSPITAL ENCOUNTER (OUTPATIENT)
Age: 73
Setting detail: OUTPATIENT SURGERY
Discharge: HOME | End: 2023-08-23
Attending: INTERNAL MEDICINE | Admitting: INTERNAL MEDICINE
Payer: MEDICARE

## 2023-08-23 VITALS
BODY MASS INDEX: 27.32 KG/M2 | WEIGHT: 164 LBS | HEIGHT: 65 IN | DIASTOLIC BLOOD PRESSURE: 64 MMHG | TEMPERATURE: 96.8 F | SYSTOLIC BLOOD PRESSURE: 127 MMHG | RESPIRATION RATE: 16 BRPM | HEART RATE: 59 BPM | OXYGEN SATURATION: 99 %

## 2023-08-23 DIAGNOSIS — Z80.0 FAMILY HISTORY OF MALIGNANT NEOPLASM OF DIGESTIVE ORGAN: ICD-10-CM

## 2023-08-23 PROBLEM — Z86.010 HISTORY OF COLON POLYPS: Chronic | Status: ACTIVE | Noted: 2023-08-23

## 2023-08-23 PROBLEM — Z86.0100 HISTORY OF COLON POLYPS: Chronic | Status: ACTIVE | Noted: 2023-08-23

## 2023-08-23 PROCEDURE — 2580000003 HC RX 258: Performed by: ANESTHESIOLOGY

## 2023-08-23 PROCEDURE — 88305 TISSUE EXAM BY PATHOLOGIST: CPT

## 2023-08-23 PROCEDURE — 2580000003 HC RX 258: Performed by: NURSE ANESTHETIST, CERTIFIED REGISTERED

## 2023-08-23 PROCEDURE — 7100000010 HC PHASE II RECOVERY - FIRST 15 MIN: Performed by: INTERNAL MEDICINE

## 2023-08-23 PROCEDURE — 3609010600 HC COLONOSCOPY POLYPECTOMY SNARE/COLD BIOPSY: Performed by: INTERNAL MEDICINE

## 2023-08-23 PROCEDURE — 3700000000 HC ANESTHESIA ATTENDED CARE: Performed by: INTERNAL MEDICINE

## 2023-08-23 PROCEDURE — 3700000001 HC ADD 15 MINUTES (ANESTHESIA): Performed by: INTERNAL MEDICINE

## 2023-08-23 PROCEDURE — 7100000011 HC PHASE II RECOVERY - ADDTL 15 MIN: Performed by: INTERNAL MEDICINE

## 2023-08-23 PROCEDURE — 6360000002 HC RX W HCPCS: Performed by: NURSE ANESTHETIST, CERTIFIED REGISTERED

## 2023-08-23 PROCEDURE — 2709999900 HC NON-CHARGEABLE SUPPLY: Performed by: INTERNAL MEDICINE

## 2023-08-23 PROCEDURE — 2720000010 HC SURG SUPPLY STERILE: Performed by: INTERNAL MEDICINE

## 2023-08-23 RX ORDER — SODIUM CHLORIDE 9 MG/ML
INJECTION, SOLUTION INTRAVENOUS CONTINUOUS PRN
Status: DISCONTINUED | OUTPATIENT
Start: 2023-08-23 | End: 2023-08-23 | Stop reason: SDUPTHER

## 2023-08-23 RX ORDER — SODIUM CHLORIDE 9 MG/ML
INJECTION, SOLUTION INTRAVENOUS PRN
Status: DISCONTINUED | OUTPATIENT
Start: 2023-08-23 | End: 2023-08-23 | Stop reason: HOSPADM

## 2023-08-23 RX ORDER — SODIUM CHLORIDE 0.9 % (FLUSH) 0.9 %
5-40 SYRINGE (ML) INJECTION EVERY 12 HOURS SCHEDULED
Status: CANCELLED | OUTPATIENT
Start: 2023-08-23

## 2023-08-23 RX ORDER — SODIUM CHLORIDE 9 MG/ML
INJECTION, SOLUTION INTRAVENOUS PRN
Status: CANCELLED | OUTPATIENT
Start: 2023-08-23

## 2023-08-23 RX ORDER — ONDANSETRON 2 MG/ML
4 INJECTION INTRAMUSCULAR; INTRAVENOUS
Status: CANCELLED | OUTPATIENT
Start: 2023-08-23 | End: 2023-08-24

## 2023-08-23 RX ORDER — SODIUM CHLORIDE 0.9 % (FLUSH) 0.9 %
5-40 SYRINGE (ML) INJECTION EVERY 12 HOURS SCHEDULED
Status: DISCONTINUED | OUTPATIENT
Start: 2023-08-23 | End: 2023-08-23 | Stop reason: HOSPADM

## 2023-08-23 RX ORDER — PROPOFOL 10 MG/ML
INJECTION, EMULSION INTRAVENOUS PRN
Status: DISCONTINUED | OUTPATIENT
Start: 2023-08-23 | End: 2023-08-23 | Stop reason: SDUPTHER

## 2023-08-23 RX ORDER — SODIUM CHLORIDE 0.9 % (FLUSH) 0.9 %
5-40 SYRINGE (ML) INJECTION PRN
Status: CANCELLED | OUTPATIENT
Start: 2023-08-23

## 2023-08-23 RX ORDER — SODIUM CHLORIDE 0.9 % (FLUSH) 0.9 %
5-40 SYRINGE (ML) INJECTION PRN
Status: DISCONTINUED | OUTPATIENT
Start: 2023-08-23 | End: 2023-08-23 | Stop reason: HOSPADM

## 2023-08-23 RX ADMIN — PROPOFOL 50 MG: 10 INJECTION, EMULSION INTRAVENOUS at 11:29

## 2023-08-23 RX ADMIN — SODIUM CHLORIDE: 9 INJECTION, SOLUTION INTRAVENOUS at 10:14

## 2023-08-23 RX ADMIN — PROPOFOL 80 MG: 10 INJECTION, EMULSION INTRAVENOUS at 11:23

## 2023-08-23 RX ADMIN — PROPOFOL 30 MG: 10 INJECTION, EMULSION INTRAVENOUS at 11:56

## 2023-08-23 RX ADMIN — PROPOFOL 30 MG: 10 INJECTION, EMULSION INTRAVENOUS at 11:49

## 2023-08-23 RX ADMIN — PROPOFOL 30 MG: 10 INJECTION, EMULSION INTRAVENOUS at 11:44

## 2023-08-23 RX ADMIN — PROPOFOL 50 MG: 10 INJECTION, EMULSION INTRAVENOUS at 11:33

## 2023-08-23 RX ADMIN — SODIUM CHLORIDE: 9 INJECTION, SOLUTION INTRAVENOUS at 11:20

## 2023-08-23 RX ADMIN — PROPOFOL 50 MG: 10 INJECTION, EMULSION INTRAVENOUS at 11:26

## 2023-08-23 RX ADMIN — PROPOFOL 40 MG: 10 INJECTION, EMULSION INTRAVENOUS at 11:38

## 2023-08-23 ASSESSMENT — ENCOUNTER SYMPTOMS: SHORTNESS OF BREATH: 0

## 2023-08-23 ASSESSMENT — PAIN - FUNCTIONAL ASSESSMENT: PAIN_FUNCTIONAL_ASSESSMENT: 0-10

## 2023-08-23 ASSESSMENT — PAIN SCALES - GENERAL: PAINLEVEL_OUTOF10: 0

## 2023-08-23 NOTE — DISCHARGE INSTRUCTIONS
Discharge Instructions for Colonoscopy     Colonoscopy is a visual exam of the lining of the large intestine, also called the bowel or colon, with a colonoscope. A colonoscope is a flexible tube with a light and a viewing device. It allows the doctor to view the inside of the colon through a tiny video camera. Colonoscopy is performed for many reasons: unexplained anemia , pain, diarrhea , bloody stools, cancer screening, among many other reasons. Complications from a colonoscopy are rare. Some possible serious complications include perforated bowel (which might require surgery) and bleeding (which could require blood transfusion ). Minor complications include bloating, gas, and cramping that can last for 1-2 days after the procedure. Because air is put into your colon during the procedure, it is normal to pass large amounts of air from your rectum. You may not have a bowel movement for 1-3 days after the procedure. What You Will Need:  Someone to drive you home after the procedure     Steps to Take:  [address] when you get home. Because the sedative will make you drowsy, don't drive, operate machinery, or make important decisions the day of the procedure. Feelings of bloating, gas, or cramping may persist for 24 hours. Diet -  Try sips of water first. If tolerated, resume bland food (scrambled eggs, toast, soup) first.  If tolerated, resume regular diet or the diet recommended by your physician. Do not drink alcohol for 24 hours. Physical Activity -  Ask your doctor when you will be able to return to work. Do not drive, operate heavy machinery, or do activities that require coordination or balance for 24 hours. Otherwise, return to your normal routine as soon as you are comfortable to do so, which is usually the next day after the procedure. Medications - When taking medications, it's important to:    Take your medication as directed, not more, not less, not at a different

## 2023-08-23 NOTE — OP NOTE
Colonoscopy Procedure  Note          Patient: Myles Aguero  : 1950  CRN:  @[email or web address]@    Procedure: Colonoscopy with polypectomy (cold snare), and endoclip placement    Date:  2023    Surgeon: Jennifer Kirk MD, MD    Referring Physician:  Reyes Peyer, MD    Preoperative Diagnosis:  Family history of malignant neoplasm of digestive organ [Z80.0]    Postoperative Diagnosis:  * No post-op diagnosis entered *    Anesthesia:  MAC    EBL: Minimal to none. Indications: This is a 68y.o. year old female who presents today with previous adenomatous polyp. Procedure: An informed consent was obtained from the patient after explanation of indications, benefits, possible risks and complications of the procedure. The patient was then taken to the endoscopy suite, placed in the left lateral decubitus position, and the above IV anesthesia was administered. Digital rectal examination was performed. With the patient in the left lateral decubitus position the endoscope was inserted through the anorectal area into the rectum. The scope was then advanced through the length of the colon to the terminal ileum. The quality of preparation was adequate. The scope was carefully withdrawn and the mucosa was fully inspected including retroflexion in the rectum. Findings and interventions are described below. The patient tolerated the procedure well and was taken to the PACU in good condition. There were no immediate complications. Impression:  1. Mild scattered diverticulosis. 2.  Old polypectomy site in the ascending colon without evidence of any polyp recurrence. 3.  1 cm sessile polyp in the ascending colon in the fold. This was removed using cold snare polypectomy in 2 pieces. A single Endo Clip was placed over the mucosal defect with good hemostasis. 4.  Small hemorrhoids in the rectum. Recommendations: Follow-up on biopsy results. High-fiber diet.   Repeat colonoscopy in 3

## 2023-08-23 NOTE — ANESTHESIA POSTPROCEDURE EVALUATION
Department of Anesthesiology  Postprocedure Note    Patient: Faylene Cushing  MRN: [id number]  YOB: 1950  Date of evaluation: 8/23/2023      Procedure Summary     Date: 08/23/23 Room / Location: [address] [place] [address]    Anesthesia Start: 1120 Anesthesia Stop: 8179    Procedure: COLONOSCOPY POLYPECTOMY SNARE/COLD BIOPSY Diagnosis:       Family history of malignant neoplasm of digestive organ      (Family history of malignant neoplasm of digestive organ [Z80.0])    Surgeons: Cipriano Addison MD Responsible Provider: Hilda Yu MD    Anesthesia Type: MAC ASA Status: 3          Anesthesia Type: No value filed.     Jimena Phase I: Jimena Score: 10    Jimena Phase II: Jimena Score: 10      Anesthesia Post Evaluation    Patient location during evaluation: PACU  Patient participation: complete - patient participated  Level of consciousness: awake and alert  Airway patency: patent  Nausea & Vomiting: no nausea and no vomiting  Complications: no  Cardiovascular status: hemodynamically stable  Respiratory status: acceptable  Hydration status: stable  Pain management: adequate

## 2023-08-23 NOTE — H&P
there are any questions about the content of this document, please contact the author as some errors in translation may have occurred.

## 2023-08-24 ENCOUNTER — HOSPITAL ENCOUNTER (OUTPATIENT)
Dept: PHYSICAL THERAPY | Age: 73
Setting detail: THERAPIES SERIES
Discharge: HOME | End: 2023-08-24
Payer: MEDICARE

## 2023-08-24 PROCEDURE — 97110 THERAPEUTIC EXERCISES: CPT

## 2023-08-24 PROCEDURE — 97530 THERAPEUTIC ACTIVITIES: CPT

## 2023-08-24 NOTE — FLOWSHEET NOTE
complexities/Impairments listed. [] Progression has been slowed due to co-morbidities. [] Plan just implemented, too soon to assess goals progression <30days   [] Goals require adjustment due to lack of progress  [] Patient is not progressing as expected and requires additional follow up with physician  [] Other    Prognosis for POC: [x] Good [] Fair  [] Poor    Patient requires continued skilled intervention: [x] Yes  [] No    PLAN: Continue PT 1-2x / wk for 8 weeks. (UPDATED 7/6/23)    [x] Continue per plan of care [] Alter current plan (see comments)  [] Plan of care initiated [] Hold pending MD visit [] Discharge    Electronically signed by: Gaylan Dancer, PT    Note: If patient does not return for scheduled/recommended follow up visits, this note will serve as a discharge from care along with the most recent update on progress.

## 2023-08-28 ENCOUNTER — HOSPITAL ENCOUNTER (OUTPATIENT)
Dept: PHYSICAL THERAPY | Age: 73
Setting detail: THERAPIES SERIES
Discharge: HOME | End: 2023-08-28
Payer: MEDICARE

## 2023-08-28 PROCEDURE — 97140 MANUAL THERAPY 1/> REGIONS: CPT

## 2023-08-28 PROCEDURE — 97112 NEUROMUSCULAR REEDUCATION: CPT

## 2023-08-28 NOTE — FLOWSHEET NOTE
as expected towards functional goals listed. [] Progression is slowed due to complexities/Impairments listed. [] Progression has been slowed due to co-morbidities. [] Plan just implemented, too soon to assess goals progression <30days   [] Goals require adjustment due to lack of progress  [] Patient is not progressing as expected and requires additional follow up with physician  [] Other    Prognosis for POC: [x] Good [] Fair  [] Poor    Patient requires continued skilled intervention: [x] Yes  [] No    PLAN: Continue PT 1-2x / wk for 8 weeks. (UPDATED 7/6/23)    [x] Continue per plan of care [] Alter current plan (see comments)  [] Plan of care initiated [] Hold pending MD visit [] Discharge    Electronically signed by: Juaquin Holguin PT    Note: If patient does not return for scheduled/recommended follow up visits, this note will serve as a discharge from care along with the most recent update on progress.

## 2023-08-30 ENCOUNTER — HOSPITAL ENCOUNTER (OUTPATIENT)
Dept: PHYSICAL THERAPY | Age: 73
Setting detail: THERAPIES SERIES
Discharge: HOME | End: 2023-08-30
Payer: MEDICARE

## 2023-08-30 PROCEDURE — 97110 THERAPEUTIC EXERCISES: CPT

## 2023-08-30 PROCEDURE — 97112 NEUROMUSCULAR REEDUCATION: CPT

## 2023-08-30 NOTE — FLOWSHEET NOTE
Strap  - 1 x daily - 7 x weekly - 3 sets - 1 minute hold  - Standing Gastroc Stretch on Foam 1/2 Roll  - 1 x daily - 7 x weekly - 3 sets - 1 minutes hold  - Straight Leg Raise with Arm Support  - 3 x weekly - 3 sets  - Prone Hamstring Curl with Ankle Weight  - 3 x weekly - 3 sets - 10 reps  - Seated Long Arc Quad with Ankle Weight  - 3 x weekly - 3 sets - 10 reps  - Standing Bilateral Heel Raise on Step  - 3 x weekly - 3 sets - 10 reps  - Squat with Chair Touch  - 3 x weekly - 3 sets - 10 reps  - Step Up  - 3 x weekly - 10 reps  - Forward Step Down Touch with Heel  - 3 x weekly - 3 sets - 10 reps    Therapeutic Exercise and NMR EXR  [x] (28509) Provided verbal/tactile cueing for activities related to strengthening, flexibility, endurance, ROM for improvements in LE, proximal hip, and core control with self care, mobility, lifting, ambulation.  [] (16754) Provided verbal/tactile cueing for activities related to improving balance, coordination, kinesthetic sense, posture, motor skill, proprioception  to assist with LE, proximal hip, and core control in self care, mobility, lifting, ambulation and eccentric single leg control.      NMR and Therapeutic Activities:    [x] (73353 or 03967) Provided verbal/tactile cueing for activities related to improving balance, coordination, kinesthetic sense, posture, motor skill, proprioception and motor activation to allow for proper function of core, proximal hip and LE with self care and ADLs  [] (22655) Gait Re-education- Provided training and instruction to the patient for proper LE, core and proximal hip recruitment and positioning and eccentric body weight control with ambulation re-education including up and down stairs     Home Exercise Program:    [] (58639) Reviewed/Progressed HEP activities related to strengthening, flexibility, endurance, ROM of core, proximal hip and LE for functional self-care, mobility, lifting and ambulation/stair navigation   []

## 2023-09-06 ENCOUNTER — HOSPITAL ENCOUNTER (OUTPATIENT)
Dept: PHYSICAL THERAPY | Age: 73
Setting detail: THERAPIES SERIES
Discharge: HOME | End: 2023-09-06
Payer: MEDICARE

## 2023-09-06 NOTE — FLOWSHEET NOTE
[address] [facility]    Physical Therapy  Cancellation/No-show Note  Patient Name:  Ayala Mcdermott  :  1950   Date:  2023  Cancelled visits to date: 1  No-shows to date: 0    For today's appointment patient:  []  Cancelled  []  Rescheduled appointment  []  No-show     Reason given by patient:  []  Patient ill  []  Conflicting appointment  []  No transportation    []  Conflict with work  []  No reason given  [x]  Other:     Comments:   is in ER    Phone call information:   []  Phone call made today to patient at _ time at number provided:      []  Patient answered, conversation as follows:    []  Patient did not answer, message left as follows:  []  Phone call not made today  [x]  Phone call not needed - pt contacted us to cancel and provided reason for cancellation. Electronically signed by:   Cameron Middleton, PT, DPT

## 2023-09-08 ENCOUNTER — HOSPITAL ENCOUNTER (OUTPATIENT)
Dept: PHYSICAL THERAPY | Age: 73
Setting detail: THERAPIES SERIES
Discharge: HOME | End: 2023-09-08
Payer: MEDICARE

## 2023-09-08 PROCEDURE — 97530 THERAPEUTIC ACTIVITIES: CPT

## 2023-09-08 PROCEDURE — 97110 THERAPEUTIC EXERCISES: CPT

## 2023-09-08 NOTE — PLAN OF CARE
listed. [] Progression has been slowed due to co-morbidities. [] Plan just implemented, too soon to assess goals progression <30days   [] Goals require adjustment due to lack of progress  [] Patient is not progressing as expected and requires additional follow up with physician  [] Other    Prognosis for POC: [x] Good [] Fair  [] Poor    Patient requires continued skilled intervention: [x] Yes  [] No    PLAN: Continue PT 1-2x / wk for 8 weeks. (UPDATED 9/8/23)    [x] Continue per plan of care [] Alter current plan (see comments)  [] Plan of care initiated [] Hold pending MD visit [] Discharge    Electronically signed by: Pastor Valero, PT    Note: If patient does not return for scheduled/recommended follow up visits, this note will serve as a discharge from care along with the most recent update on progress.

## 2023-09-11 ENCOUNTER — HOSPITAL ENCOUNTER (OUTPATIENT)
Dept: PHYSICAL THERAPY | Age: 73
Setting detail: THERAPIES SERIES
Discharge: HOME | End: 2023-09-11
Payer: MEDICARE

## 2023-09-11 PROCEDURE — 97110 THERAPEUTIC EXERCISES: CPT

## 2023-09-11 PROCEDURE — 97112 NEUROMUSCULAR REEDUCATION: CPT

## 2023-09-11 NOTE — FLOWSHEET NOTE
ASSESSMENT: Patient very challenged by BFR strengthening today. Several modification were required to complete BFR exercises. Held on several progressions and decreased weight to allow patient to complete task. Patient extremely fatigue by end of session. I would like to continue with care to further address these deficits with BFR training and guided strengthening, as she often needs cueing to correct each movment to maximize quality of life and return to PLOF with ADLs. Treatment/Activity Tolerance:  [x] Patient tolerated treatment well [] Patient limited by fatique  [] Patient limited by pain  [] Patient limited by other medical complications  [] Other:     Overall Progression Towards Functional goals/ Treatment Progress Update:  [x] Patient is progressing as expected towards functional goals listed. [] Progression is slowed due to complexities/Impairments listed. [] Progression has been slowed due to co-morbidities. [] Plan just implemented, too soon to assess goals progression <30days   [] Goals require adjustment due to lack of progress  [] Patient is not progressing as expected and requires additional follow up with physician  [] Other    Prognosis for POC: [x] Good [] Fair  [] Poor    Patient requires continued skilled intervention: [x] Yes  [] No    PLAN: Continue PT 1-2x / wk for 8 weeks. (UPDATED 9/8/23)    [x] Continue per plan of care [] Alter current plan (see comments)  [] Plan of care initiated [] Hold pending MD visit [] Discharge    Electronically signed by: Roseanna Shaikh PT    Note: If patient does not return for scheduled/recommended follow up visits, this note will serve as a discharge from care along with the most recent update on progress.

## 2023-09-12 SDOH — HEALTH STABILITY: PHYSICAL HEALTH: ON AVERAGE, HOW MANY DAYS PER WEEK DO YOU ENGAGE IN MODERATE TO STRENUOUS EXERCISE (LIKE A BRISK WALK)?: 3 DAYS

## 2023-09-12 SDOH — HEALTH STABILITY: PHYSICAL HEALTH: ON AVERAGE, HOW MANY MINUTES DO YOU ENGAGE IN EXERCISE AT THIS LEVEL?: 40 MIN

## 2023-09-13 ENCOUNTER — OFFICE VISIT (OUTPATIENT)
Dept: ORTHOPEDIC SURGERY | Age: 73
End: 2023-09-13

## 2023-09-13 VITALS — HEIGHT: 65 IN | WEIGHT: 164 LBS | BODY MASS INDEX: 27.32 KG/M2 | RESPIRATION RATE: 16 BRPM

## 2023-09-13 DIAGNOSIS — M72.0 DUPUYTREN'S CONTRACTURE: Primary | ICD-10-CM

## 2023-09-13 NOTE — PROGRESS NOTES
Ms. Myles Aguero is a 68 y.o. left handed woman  who is seen today in Hand Surgical Consultation at the request of Reyes Peyer, MD.    She presents today regarding right hand abnormality which has been present for approximately 8 weeks. A history of antecedent trauma or injury is Present with a distal radial fracture in 1/2023 treated with ORIF . She reports a thickening or mass on the palm without extension onto the Middle Finger and Ring Finger. The size of the mass has been stable with time. She has not noted any limitation of motion of the Middle Finger and Ring Finger;  she does not report any discomfort associated with her presenting concern. She does not report any Family History of similar hand conditions. Previous treatment has included No prior treatment to the right Middle Finger and Ring Finger. She does not claim relation of her symptoms to her required work activities. She has undergone any form of testing. I have today reviewed with Myles Aguero the clinically relevant, past medical history, medications, allergies,  family history, social history, and Review Of Systems & I have documented any details relevant to today's presenting complaints in my history above. Ms. Keily Howard self-reported past medical history, medications, allergies,  family history, social history, and Review Of Systems have been scanned into the chart under the \"Media\" tab. Physical Exam:  Ms. Keily Howard most recent vitals:  Vitals  Respirations: 16  Height: 5' 5\" (165.1 cm)  Weight - Scale: 164 lb (74.4 kg)    She is well nourished, oriented to person, place & time. She demonstrates appropriate mood and affect as well as normal gait and station.     Skin: Normal Color, Normal Texture, and Normal color, free of lesions excepting Clinical evidence of Dupuytren's Contracture Bilaterally   There is a solitary nodule on the palm,bilaterally in the axis of the Middle Finger and Ring Finger without

## 2023-09-13 NOTE — PATIENT INSTRUCTIONS
Screening Evaluation for progression of Dupuytren's Contracture    Every 3 - 6 months, perform the \"TableTop Test\". (Illustrated below)    If you are able to place your hand flat on hard surface (table, counter, etc) then there is no treatment needed. If you find that you are unable to place your hand flat on hard surface, that usually indicates a 30 degree contracture of a finger. When TableTop test turns \"positive\" (can not lay hand flat anymore) this is the time to think about treatment of Dupuytren's Contracture. I recommend scheduling a follow-up appointment if/when your TableTop Test turns positive or if you think you range of motion is continuing to worsen. Call the office at [phone] or [phone] to schedule a follow-up appointment for your Dupuytren's Contracture.

## 2023-09-14 ENCOUNTER — HOSPITAL ENCOUNTER (OUTPATIENT)
Dept: PHYSICAL THERAPY | Age: 73
Setting detail: THERAPIES SERIES
Discharge: HOME | End: 2023-09-14
Payer: MEDICARE

## 2023-09-14 PROCEDURE — 97530 THERAPEUTIC ACTIVITIES: CPT

## 2023-09-14 PROCEDURE — 97110 THERAPEUTIC EXERCISES: CPT

## 2023-09-14 NOTE — FLOWSHEET NOTE
[address] [facility]     Physical Therapy Treatment Note/ Progress Report:    Date:  2023     Patient Name:  Myles Aguero    :  1950  MRN: [id number]  Medical Diagnosis:  Closed fracture of right tibial plateau with routine healing, subsequent encounter [S82.141D]  Closed fracture of distal end of right radius with routine healing, unspecified fracture morphology, subsequent encounter [S52.501D]    Treatment Diagnosis:    ICD-10-CM   1. Decreased range of motion (ROM) of right knee  M25.661      2. Quadriceps weakness  M62.81      3. Antalgic gait  R26.89      4. Effusion of right knee                                            M25.461       Insurance/Certification information:  PT Insurance Information: Jesus Ohara / Monik JOYA / Radha Avalos / Lauren Hernandez / Leonardo Smith / Modesto Valencia NOT BILLABLE  Physician Information:  Samanta Torres MD   Plan of care signed (Y/N): [x]  Yes []  No  Date sent: 23      Progress Report: []  Yes [x]  No     Functional Scale:   Date assessed:  LEFS  27/80   23  LEFS  35/80   23  LEFS  55/80   23  LEFS  56/80   23  LEFS  60/80   23  LEFS  61/80   23    Date Range for reporting period:  Beginnin23  Ending:     Progress report due (10 Rx/or 30 days whichever is less):      Recertification due (POC duration/ or 90 days whichever is less): 23     Visit # Insurance Allowable Auth required? Date Range   40 BOMN, DN NOT COVERAGE []  Yes [x]  No      Pain level: 1-2/10    SUBJECTIVE: Patient reports she worked yesterday for the first time in a month. States she felt stronger yesterday and less tired while she was working. Has noted she has not had the presence of anterior knee pain for a while. OBJECTIVE: Skilled care provided in flow sheet below  Observation:  23: needed to reduce compression of BFR to complete all sets for both exercise performed this date.    23:

## 2023-09-18 ENCOUNTER — HOSPITAL ENCOUNTER (OUTPATIENT)
Dept: PHYSICAL THERAPY | Age: 73
Setting detail: THERAPIES SERIES
Discharge: HOME | End: 2023-09-18
Payer: MEDICARE

## 2023-09-18 PROCEDURE — 97110 THERAPEUTIC EXERCISES: CPT

## 2023-09-18 PROCEDURE — 97112 NEUROMUSCULAR REEDUCATION: CPT

## 2023-09-18 PROCEDURE — 97530 THERAPEUTIC ACTIVITIES: CPT

## 2023-09-18 NOTE — FLOWSHEET NOTE
hours to limit swelling build up in R knee. Patient questioned pain in medial knee with chair squats, which was discussed as quadriceps muscles tiring out and causing knees to cave during movement. Educated on cues to keep knees abducted with theraband or limiting depth of squat to focus on isolating quadriceps musculature. Patient continues to need extensive education concerning activity modification and relationship of her activity to symptom presentation when in therapy. 5/22/23: Continued to educate patient on need for her to monitor activity progression and improve endurance. However, stressed not progressing load or activity to quickly as this will likely result in more compensations. Showed relationship of quad weakness present in regards to functional deficits still being shown. 5/31/23: Educated on need for maintaining neutral knee alignment during stair descend. Used modification of step height and thera-band cues to aid patient in proper knee positioning. Discussed location of knee pain and why pain was occurring secondary to strength deficits. 6/6/23: Discussed gait techniques to limit presence of \"limp\" when ambulating. Educated on importance of strengthening quadriceps musculature to support body weight when ambulating and negotiating stairs. 6/8/23: Educated patient on PT diagnosis of patellar tendinitis. Discussed relationship of symptoms with deficits still being noted. Continued to stress healing time frames and normal strength progression. 7/10/23: Patient continues to need additional education on how her physical fitness and health, prior to her injury, directly influences her recovery post operatively. Have also had many session educating her on the effects of her RLE weakness in relationship to compensation and symptoms she continues to have. Addressed patients concerns in regards to her pain and returning to more walking and full time work.    8/17/23: Continued to educate patient on

## 2023-09-21 ENCOUNTER — APPOINTMENT (OUTPATIENT)
Dept: PHYSICAL THERAPY | Age: 73
End: 2023-09-21
Payer: MEDICARE

## 2023-09-21 ENCOUNTER — HOSPITAL ENCOUNTER (OUTPATIENT)
Dept: PHYSICAL THERAPY | Age: 73
Setting detail: THERAPIES SERIES
Discharge: HOME | End: 2023-09-21
Payer: MEDICARE

## 2023-09-21 PROCEDURE — 97110 THERAPEUTIC EXERCISES: CPT

## 2023-09-21 PROCEDURE — 97530 THERAPEUTIC ACTIVITIES: CPT

## 2023-09-21 NOTE — FLOWSHEET NOTE
stairs without use of UE assistance. Heavy focus on HEP progression   8/2/23: Patient with improvements in sensitivity in the patellar tendon. 7/24/23: Patient with significant increase in peroneal and anterior tibialis tenderness. 7/13/23: Continued to have great difficulty controlling eccentric quad movements secondary to patellar tendon pain. 6/29/23: Soleus restriction noted inhibiting anterior tibial anterior translation  6/20/23: Patient with hypersensitivity and pain to patellar tendon. 6/16/23: Patient demonstrates poor push-off at toe off phase of gait  6/13/23: Struggles to maintain neutral pelvis during lateral heel taps, limiting knee flexion. 6/8/23: Improved SLS with minimal UE support during balance task, great difficulty translating weight onto RLE during CC reverse walking. 6/1/23: Patient continues to have knee pain during loaded knee extension. Following STM to the VL and ITB, symptoms improved. 5/31/23: Increased difficulty with 2in step & 1 UE support. Pain in distal hamstring & proximal calf. Pain improved with varus pull from band with descend. 5/22/23: increased knee pain with 7.5lb ankle weight with LAQ, Still having great tenderness to anterior tibialis and extensor digitorum  5/8/23: Patient with significant hypertonicity and sensitivity to the anterior tibialis and peroneals. Patient was unable to tolerated any form of SLS today. 5/4/23: Patient with Fair (+) quad set  4/24/23: more normal gait pattern with hurrycane, still has decreased stride length. More appropriate quad set with good activation. 4/13/23: R iliac shear, R innominate anterior rotation, right L5/S1 hypomobility, LTJ hypomobility. 4/10/23: Patient continues to have great difficulty with quad activation without use of glute compensation.      Test measurements: 9/8/23  L knee ROM: 0-0-140, normal gait patterning, difficulty with performing anterior step down / descending stair without UE

## 2023-09-25 ENCOUNTER — HOSPITAL ENCOUNTER (OUTPATIENT)
Dept: PHYSICAL THERAPY | Age: 73
Setting detail: THERAPIES SERIES
Discharge: HOME | End: 2023-09-25
Payer: MEDICARE

## 2023-09-25 PROCEDURE — 97110 THERAPEUTIC EXERCISES: CPT

## 2023-09-25 NOTE — FLOWSHEET NOTE
questions with answers provided when possible. 4/7/23: continued to discuss importance of icing and when to ice, educated on delayed on set of muscular soreness and appropriate tissue responses after therapy sessions or HEP. Further discussed appropriate activity modification and steady return of function. Discussed management of anterior ankle pain and cause for symptoms following surgery. 4/10/23: Discussed progression of marching to SLS with UE support at home to promote improvement in tolerance to single leg loading. Encouraged to progress walking at home with use of countertop; slowly performing left swing to increase time on RLE. Heavily review home exercises especially SLR to avoid groin compensations which is likely cause for groin cramping and soreness. 4/26/23: Discussed with therapist options to garden without being on knees  5/4/23: Educated patient on PT assessment in regards to deficits noted. Discussed relationship of lower ankle pain to gait abnormalities, weakness and ROM deficits still present. Discussed management of symptom in the clinic and home. Continues to discussed swelling management and progression of activity. 5/8/23: Patient presents with several questions concerning her ankle and shin pain which seems to only be worsening. Patient heavily educated on importance of activity modification or limitation. Patient voiced several times that she does not believe she is doing much. However, challenged patient to stringently look at activity she performs throughout each day and how delayed on set muscle soreness can influence fatigue 12-24 hours following increased activity. Discussed ankle and lower leg compensation likely being performed following increased activity secondary to her on going weakness and functional deficits. Discussed questions around returning to part time work.    5/16/23: Patient presents with questions regarding upcoming sewing retreat and proper body mechanics to

## 2023-09-28 ENCOUNTER — APPOINTMENT (OUTPATIENT)
Dept: PHYSICAL THERAPY | Age: 73
End: 2023-09-28
Payer: MEDICARE

## 2023-12-27 ENCOUNTER — TELEPHONE (OUTPATIENT)
Dept: ORTHOPEDIC SURGERY | Age: 73
End: 2023-12-27

## 2023-12-27 NOTE — TELEPHONE ENCOUNTER
Left voicemail for patient to return call to move her appointment.   Upon return call, Ok to schedule patient on 1/2/24 or 1/3/24 on YUSEF Mccain's schedule in an open slot in the afternoon.

## 2023-12-27 NOTE — TELEPHONE ENCOUNTER
Appointment Request     Patient requesting earlier appointment: Yes  Appointment offered to patient: 1/5/2024  Patient Contact Number: [phone]

## 2024-01-10 ENCOUNTER — OFFICE VISIT (OUTPATIENT)
Dept: ORTHOPEDIC SURGERY | Age: 74
End: 2024-01-10

## 2024-01-10 VITALS — HEIGHT: 65 IN | BODY MASS INDEX: 27.32 KG/M2 | WEIGHT: 164 LBS

## 2024-01-10 DIAGNOSIS — M11.261 CHONDROCALCINOSIS OF RIGHT KNEE: ICD-10-CM

## 2024-01-10 DIAGNOSIS — S52.501D CLOSED FRACTURE OF DISTAL END OF RIGHT RADIUS WITH ROUTINE HEALING, UNSPECIFIED FRACTURE MORPHOLOGY, SUBSEQUENT ENCOUNTER: ICD-10-CM

## 2024-01-10 DIAGNOSIS — S82.141D CLOSED FRACTURE OF RIGHT TIBIAL PLATEAU WITH ROUTINE HEALING, SUBSEQUENT ENCOUNTER: Primary | ICD-10-CM

## 2024-01-10 RX ORDER — TRIAMCINOLONE ACETONIDE 40 MG/ML
40 INJECTION, SUSPENSION INTRA-ARTICULAR; INTRAMUSCULAR ONCE
Status: COMPLETED | OUTPATIENT
Start: 2024-01-10 | End: 2024-01-10

## 2024-01-10 RX ORDER — LIDOCAINE HYDROCHLORIDE 10 MG/ML
4 INJECTION, SOLUTION INFILTRATION; PERINEURAL ONCE
Status: COMPLETED | OUTPATIENT
Start: 2024-01-10 | End: 2024-01-10

## 2024-01-10 RX ADMIN — LIDOCAINE HYDROCHLORIDE 4 ML: 10 INJECTION, SOLUTION INFILTRATION; PERINEURAL at 14:27

## 2024-01-10 RX ADMIN — TRIAMCINOLONE ACETONIDE 40 MG: 40 INJECTION, SUSPENSION INTRA-ARTICULAR; INTRAMUSCULAR at 14:28

## 2024-01-10 NOTE — PROGRESS NOTES
DIAGNOSIS:    1-Right bicondylar tibial plateau fracture, status post ORIF.  2-Right distal radius 2 parts intra-articular displaced fracture, status post ORIF  3-Chondrocalcinosis of right knee.    DATE OF SURGERY: 2023.    HISTORY OF PRESENT ILLNESS: Zakiya Charlton 73 y.o.  female  who came in today for one year postoperative visit.  The patient c/o anterior pain in the right knee. She has been working on ROM and WBAT.  She states she feels much better.  Rates pain a 3/10. No numbness or tingling sensation. No fever or Chills.  She is done working with PT.    Past Medical History:   Diagnosis Date    Arthritis     Cancer (HCC) 2021    Malignant neoplasm urinary bladder    Hyperlipidemia     CALLIE on CPAP        Past Surgical History:   Procedure Laterality Date    ABDOMEN SURGERY      gastri bypass     BREAST REDUCTION SURGERY      COLONOSCOPY      COLONOSCOPY N/A 2023    COLONOSCOPY POLYPECTOMY SNARE/COLD BIOPSY performed by Trenton Grady MD at [facility] MOB ENDOSCOPY    CYSTOSCOPY N/A 2021    CYSTOSCOPY EVACUATION OF CLOTS FULGURATION performed by Octavio Zimmerman MD at [facility] OR    EYE SURGERY      FRACTURE SURGERY      HYSTERECTOMY (CERVIX STATUS UNKNOWN)      LEG SURGERY Right 2023    RIGHT KNEE OPEN REDUCTION INTERNAL FIXATION TIBIAL PLATEAU performed by Cleo Gonzalez MD at [facility] OR    TONSILLECTOMY      WRIST FRACTURE SURGERY Right 2023    OPEN REDUCTION INTERNAL FIXATION RIGHT DISTAL RADIUS - MADISYN performed by Cleo Gonzalez MD at [facility] OR       Social History     Socioeconomic History    Marital status:      Spouse name: David Charlton    Number of children: Not on file    Years of education: 12    Highest education level: Not on file   Occupational History    Not on file   Tobacco Use    Smoking status: Former     Current packs/day: 0.00     Types: Cigarettes     Start date: 1955     Quit date: 1989     Years since quittin.0

## 2024-05-08 SDOH — HEALTH STABILITY: PHYSICAL HEALTH: ON AVERAGE, HOW MANY DAYS PER WEEK DO YOU ENGAGE IN MODERATE TO STRENUOUS EXERCISE (LIKE A BRISK WALK)?: 0 DAYS

## 2024-05-09 ENCOUNTER — OFFICE VISIT (OUTPATIENT)
Dept: ORTHOPEDIC SURGERY | Age: 74
End: 2024-05-09

## 2024-05-09 VITALS — HEIGHT: 66 IN | WEIGHT: 165 LBS | BODY MASS INDEX: 26.52 KG/M2

## 2024-05-09 DIAGNOSIS — M25.562 LEFT KNEE PAIN, UNSPECIFIED CHRONICITY: Primary | ICD-10-CM

## 2024-05-09 RX ORDER — METHYLPREDNISOLONE 4 MG/1
TABLET ORAL
Qty: 1 KIT | Refills: 0 | Status: SHIPPED | OUTPATIENT
Start: 2024-05-09

## 2024-05-09 RX ORDER — BUPIVACAINE HYDROCHLORIDE 5 MG/ML
4 INJECTION, SOLUTION PERINEURAL ONCE
Status: COMPLETED | OUTPATIENT
Start: 2024-05-09 | End: 2024-05-09

## 2024-05-09 RX ORDER — METHYLPREDNISOLONE ACETATE 40 MG/ML
40 INJECTION, SUSPENSION INTRA-ARTICULAR; INTRALESIONAL; INTRAMUSCULAR; SOFT TISSUE ONCE
Status: COMPLETED | OUTPATIENT
Start: 2024-05-09 | End: 2024-05-09

## 2024-05-09 RX ADMIN — BUPIVACAINE HYDROCHLORIDE 20 MG: 5 INJECTION, SOLUTION PERINEURAL at 13:23

## 2024-05-09 RX ADMIN — METHYLPREDNISOLONE ACETATE 40 MG: 40 INJECTION, SUSPENSION INTRA-ARTICULAR; INTRALESIONAL; INTRAMUSCULAR; SOFT TISSUE at 13:24

## 2024-05-16 ENCOUNTER — HOSPITAL ENCOUNTER (OUTPATIENT)
Dept: PHYSICAL THERAPY | Age: 74
Setting detail: THERAPIES SERIES
Discharge: HOME | End: 2024-05-16
Attending: ORTHOPAEDIC SURGERY

## 2024-05-16 DIAGNOSIS — G89.29 CHRONIC PAIN OF LEFT KNEE: Primary | ICD-10-CM

## 2024-05-16 DIAGNOSIS — M25.562 CHRONIC PAIN OF LEFT KNEE: Primary | ICD-10-CM

## 2024-05-16 NOTE — PLAN OF CARE
{CSSRS SCREEN:32616::\"Patient answered 'NO' to both behavioral questions on intake.  No further screening warranted\"}    Preferred Language for Healthcare:   [x] English       [] other:    SUBJECTIVE EXAMINATION     Patient stated complaint: ***       Test used Initial score  5/16/24 05/16/2024   Pain Summary VAS ***    Functional questionnaire LEFS ***    Other:              Pain:  Pain location: ***  Patient describes pain to be {descriptor:33100}  Pain decreases with: {Easing Factors:14864}  Pain increases with: {PROVOKING FACTORS:98814}     Relevant Medical History: ***    Living status: ***    Occupation/School:  Work/School Status: {Work/School status:60317}  Job Duties/Demands: {Job Duties/Demands:00078}    Sport/ Recreation/ Leisure/ Hobbies: ***    Review Of Systems (ROS):  [x] Performed Review of systems (Integumentary, CardioPulmonary, Neurological) by intake and observation. Intake form has been scanned into medical record. Patient has been instructed to contact their primary care physician regarding ROS issues if not already being addressed at this time.    [x] Patient history, allergies, meds reviewed. Medical chart reviewed. See intake form.     Co-morbidities/Complexities (which will affect course of rehabilitation):  []None        Arthritic conditions  [] Rheumatoid arthritis (M05.9)  [] Osteoarthritis (M19.91)  [] Gout        Neurological conditions  [] Prior Stroke (I69.30)  [] Parkinson's (G20)  [] Encephalopathy (G93.40)  [] Multiple Sclerosis (G35)  [] Post-polio (G14)  [] Spinal cord injury (SCI)  [] Traumatic brain injury (TBI)  [] ALS    Gastrointestinal conditions   [] Constipation (K59.00)  [] Chron's/ulcerative colitis    Endocrine conditions   [] Hypothyroid (E03.9)  [] Hyperthyroid [] Hx of COVID    Musculoskeletal conditions  [] Disc pathology   [] Congenital spine pathologies   [x] Osteoporosis (M81.8)  [] Osteopenia (M85.8)  [] Scoliosis    Cardio/Pulmonary conditions  [] Asthma

## 2024-05-20 NOTE — PROGRESS NOTES
5/9/2024     Reason for visit:  Left knee pain following injury    History of Present Illness:  The patient is a 73-year-old female who presents for evaluation.  She recently injured her left knee.  She was walking when she felt a popping sensation deep within the knee.  Following that event that she was unable to bear weight.  Since then she has had increasing amounts of catching locking along with swelling of the knee.    Medical History:  Past Medical History:   Diagnosis Date    Arthritis     Cancer (HCC) 07/20/2021    Malignant neoplasm urinary bladder    Hyperlipidemia     CALLIE on CPAP       Past Surgical History:   Procedure Laterality Date    ABDOMEN SURGERY      gastri bypass 2001    BREAST REDUCTION SURGERY  2004    COLONOSCOPY      COLONOSCOPY N/A 8/23/2023    COLONOSCOPY POLYPECTOMY SNARE/COLD BIOPSY performed by Trenton Grady MD at [facility] M[facility] ENDOSCOPY    CYSTOSCOPY N/A 8/1/2021    CYSTOSCOPY EVACUATION OF CLOTS FULGURATION performed by Octavio Zimmerman MD at [facility] OR    EYE SURGERY  1990    FRACTURE SURGERY      HYSTERECTOMY (CERVIX STATUS UNKNOWN)      LEG SURGERY Right 1/19/2023    RIGHT KNEE OPEN REDUCTION INTERNAL FIXATION TIBIAL PLATEAU performed by Cleo Gonzalez MD at [facility] OR    TONSILLECTOMY      WRIST FRACTURE SURGERY Right 1/19/2023    OPEN REDUCTION INTERNAL FIXATION RIGHT DISTAL RADIUS - MADISYN performed by Cleo Gonzalez MD at [facility] OR      Family History   Problem Relation Age of Onset    Hypertension Mother     High Cholesterol Mother     Stroke Father     Heart Disease Father     High Cholesterol Father     Colon Cancer Sister     Diabetes Brother     Stroke Brother       Social History     Socioeconomic History    Marital status:      Spouse name: David Charlton    Number of children: Not on file    Years of education: 12    Highest education level: Not on file   Occupational History    Not on file   Tobacco Use    Smoking status: Former     Current packs/day: 0.00

## 2024-09-13 ENCOUNTER — OFFICE VISIT (OUTPATIENT)
Dept: ORTHOPEDIC SURGERY | Age: 74
End: 2024-09-13

## 2024-09-13 ENCOUNTER — TELEPHONE (OUTPATIENT)
Dept: ORTHOPEDIC SURGERY | Age: 74
End: 2024-09-13

## 2024-09-13 VITALS — WEIGHT: 164.9 LBS | HEIGHT: 66 IN | BODY MASS INDEX: 26.5 KG/M2

## 2024-09-13 DIAGNOSIS — S82.141D CLOSED FRACTURE OF RIGHT TIBIAL PLATEAU WITH ROUTINE HEALING, SUBSEQUENT ENCOUNTER: ICD-10-CM

## 2024-09-13 DIAGNOSIS — S52.501D CLOSED FRACTURE OF DISTAL END OF RIGHT RADIUS WITH ROUTINE HEALING, UNSPECIFIED FRACTURE MORPHOLOGY, SUBSEQUENT ENCOUNTER: ICD-10-CM

## 2024-09-13 DIAGNOSIS — M11.261 CHONDROCALCINOSIS OF RIGHT KNEE: Primary | ICD-10-CM

## 2024-09-13 RX ORDER — PREDNISONE 10 MG/1
TABLET ORAL
Qty: 26 TABLET | Refills: 0 | Status: SHIPPED | OUTPATIENT
Start: 2024-09-13

## 2024-09-13 RX ORDER — CEPHALEXIN 500 MG/1
500 CAPSULE ORAL 4 TIMES DAILY
Qty: 40 CAPSULE | Refills: 0 | Status: SHIPPED | OUTPATIENT
Start: 2024-09-13 | End: 2024-09-23

## 2024-09-13 NOTE — TELEPHONE ENCOUNTER
Prescription Refill     Medication Name: antibiotic for 7 days   Pharmacy: [facility]  [address] in [place] [address] [address].   Patient Contact Number:  [phone]

## 2024-10-17 ENCOUNTER — PATIENT MESSAGE (OUTPATIENT)
Dept: ORTHOPEDIC SURGERY | Age: 74
End: 2024-10-17

## 2024-10-17 NOTE — TELEPHONE ENCOUNTER
Plan:  I discussed the patient the differential diagnosis.  Given the acuity of the injury combined with the difficulty bearing weight and mechanical symptoms we will proceed with an MRI.  She is in agreement.  Following a study she will return to review the results in the office.    WILL RESPOND TO CONFIRM LOCATION TO GET MRI COMPLETED.

## 2024-10-18 DIAGNOSIS — M25.562 LEFT KNEE PAIN, UNSPECIFIED CHRONICITY: Primary | ICD-10-CM

## 2024-10-25 ENCOUNTER — HOSPITAL ENCOUNTER (OUTPATIENT)
Dept: MRI IMAGING | Age: 74
Discharge: HOME | End: 2024-10-25
Attending: ORTHOPAEDIC SURGERY
Payer: MEDICARE

## 2024-10-25 DIAGNOSIS — M25.562 LEFT KNEE PAIN, UNSPECIFIED CHRONICITY: ICD-10-CM

## 2024-10-25 PROCEDURE — 73721 MRI JNT OF LWR EXTRE W/O DYE: CPT

## 2024-10-31 ENCOUNTER — OFFICE VISIT (OUTPATIENT)
Dept: ORTHOPEDIC SURGERY | Age: 74
End: 2024-10-31

## 2024-10-31 ENCOUNTER — TELEPHONE (OUTPATIENT)
Dept: ORTHOPEDIC SURGERY | Age: 74
End: 2024-10-31

## 2024-10-31 ENCOUNTER — PREP FOR PROCEDURE (OUTPATIENT)
Dept: ORTHOPEDIC SURGERY | Age: 74
End: 2024-10-31

## 2024-10-31 VITALS — HEIGHT: 65 IN | BODY MASS INDEX: 27.32 KG/M2 | WEIGHT: 164 LBS

## 2024-10-31 DIAGNOSIS — G89.18 POST-OP PAIN: ICD-10-CM

## 2024-10-31 DIAGNOSIS — S83.242A TEAR OF MEDIAL MENISCUS OF LEFT KNEE, CURRENT, UNSPECIFIED TEAR TYPE, INITIAL ENCOUNTER: ICD-10-CM

## 2024-10-31 DIAGNOSIS — S83.242A TEAR OF MEDIAL MENISCUS OF LEFT KNEE, CURRENT, UNSPECIFIED TEAR TYPE, INITIAL ENCOUNTER: Primary | ICD-10-CM

## 2024-10-31 DIAGNOSIS — M25.562 LEFT KNEE PAIN, UNSPECIFIED CHRONICITY: Primary | ICD-10-CM

## 2024-10-31 RX ORDER — SODIUM CHLORIDE 0.9 % (FLUSH) 0.9 %
5-40 SYRINGE (ML) INJECTION PRN
Status: CANCELLED | OUTPATIENT
Start: 2024-10-31

## 2024-10-31 RX ORDER — ONDANSETRON 4 MG/1
4 TABLET, FILM COATED ORAL EVERY 8 HOURS PRN
Qty: 21 TABLET | Refills: 0 | Status: SHIPPED | OUTPATIENT
Start: 2024-11-01

## 2024-10-31 RX ORDER — ASPIRIN 325 MG
325 TABLET ORAL DAILY
Qty: 14 TABLET | Refills: 0 | Status: SHIPPED | OUTPATIENT
Start: 2024-11-01

## 2024-10-31 RX ORDER — SODIUM CHLORIDE 9 MG/ML
INJECTION, SOLUTION INTRAVENOUS PRN
Status: CANCELLED | OUTPATIENT
Start: 2024-10-31

## 2024-10-31 RX ORDER — SODIUM CHLORIDE 0.9 % (FLUSH) 0.9 %
5-40 SYRINGE (ML) INJECTION EVERY 12 HOURS SCHEDULED
Status: CANCELLED | OUTPATIENT
Start: 2024-10-31

## 2024-10-31 RX ORDER — TRAMADOL HYDROCHLORIDE 50 MG/1
50 TABLET ORAL EVERY 4 HOURS PRN
Qty: 30 TABLET | Refills: 0 | Status: ON HOLD | OUTPATIENT
Start: 2024-11-01 | End: 2024-11-01

## 2024-10-31 NOTE — TELEPHONE ENCOUNTER
Sx approved.  Start at 12:45.  Arrive at 10:45.  Patient working on getting H&P. If cannot get it, Dr. Evans will do H&P in morning prior to sx.

## 2024-10-31 NOTE — PROGRESS NOTES
DATE 11/1/2024___      PLACE ___ [address]                          TIME ___                                             ___x [address]                           Arrival ___                                                                                                                                                                                                        Surgeons office to give arrival time __x___                                                                                                 If you have not received time contact your surgeon.                                                                                                                              Surgery dates,times and arrival times are subject to change.Please check with your surgeon.  Bring a photo I.D.,insurance card and form of payment if you have a co pay.  Plan for someone 18 years or older to stay on site while you are her and to take you home after surgery.You are not permitted to drive yourself home. You cannot leave via Uber, Lyft, Taxi or Medical Transport by yourself. You must have someone with you. It is strongly suggested that someone stay with you the first 24 hours after anesthesia. Your surgery will be cancelled if you do not have a ride home. A parent or legal guardian guardian must stay with a child until the child is discharged. Please do not bring other children.  A History/Physical is required to be completed and dated within 30 days of your surgery. To be done by PCP __ Surgeon x___or Hospitalist ___  You may be required to get labs __ EKG __ or a chest Xray ___ prior to surgery. To be done by ____  Nothing to eat or drink after midnight the evening prior to surgery.  If your surgeon requires a bowel prep, follow their instructions.  You may brush your teeth.  Bring a complete list of your medications including vitamins and supplement with the name,dose and frequency.  Take the following  medications with a sip of water the AM of surgery __bupropion__________________________________   DR Lauren patients do not take any medications the AM of surgery.  If you can not be reached by phone to give specific medication instructions,you may use your inhalers,take your heart, blood pressure,seizure and thyroid medications with a sip of water the AM of surgery. Bring your rescue inhaler with you if you use one.  DO NOT take blood pressure medications ending in \"pril\"or \"sartan\" the evening prior or morning of surgery (such as Lisinopril or Losartan).  For blood thinners such as Plavix, Eliquis, Effient, Pradaxa, Xarelto or antiplatelets such as Pletal get instructions on if you need to stop prior to surgery and for how long.  Has instructions ___  to get instructions from surgeon and prescribing doctor and surgeon ___.  Aspirin,Ibuprofen,Advil,Aleve and any anti inflammatories along with vitamins should be stopped 5-7 days before surgery or as directed by surgeon. Tylenol is okay to take until the evening prior to surgery.  If you take a long-acting insulin at night,cut the dose in half the evening prior to surgery.  If you take a GLP-1 receptor (such as Trulicity,Mounjaro, Ozempic weekly),do not take 7 days prior to surgery. If you take a daily dose such as Rybelsus you must stop at least 24 hours prior to surgery.  If your blood sugar is low and you are symptomatic the AM of surgery you may take sips of a sugary clear liquid.  If you take any medication for weight loss(such as Adipex Naltrexone,Phentermine) you should not take for a minium of 72 hours prior to surgery.  Follow your surgeons instructions on showering prior to surgery.If you were not given specific instructions ,shower with an antibacterial soap such as dial the morning of surgery.  Wear clean loose fitting clothing.  Remove all piercings,rings and jewelry and leave at home.  Glasses,hearing aides and contacts will be removed prior to

## 2024-10-31 NOTE — PROGRESS NOTES
10/31/2024     Reason for visit:  Left knee pain following injury    History of Present Illness:  The patient is a 74-year-old female who presents for evaluation.  She injured her left knee earlier in the year.  She was walking she felt a popping sensation.  Ever since then she has had progressive worsening of her symptoms.  She does reports catching and locking symptoms on a regular basis along with the knee giving out.  Intermittent swelling.  She has been treated with activity modification, anti-inflammatory medications, home physical therapy, as well as a cortisone injection but has remained symptomatic.    Objective:  Ht 1.651 m (5' 5\")   Wt 74.4 kg (164 lb)   BMI 27.29 kg/m²      Physical Exam:  The patient is well-appearing and in no apparent distress  Examination of the left knee   There is a small effusion, no gross deformity or skin changes  Range of motion reveals 0 to 120  Neg lachman, negative posterior drawer, no pain or laxity with varus or valgus stress at 0 degrees and 30 degrees of flexion  + medial joint line tenderness  5 out of 5 strength throughout distal muscle groups  Sensation is intact to light touch throughout all distributions  There is no calf swelling or tenderness  Palpable DP pulse, brisk cap refill, 2+ symmetric reflexes     Imagin view x-rays of the left knee were obtained the office today on 2024 reviewed.  There is no fracture or dislocation.  No other abnormality.  MRI of the left knee was reviewed.  Early chondral degeneration particularly involving the patella and to a lesser extent the medial lateral compartments.  Complex tearing of the posterior horn medial meniscus with horizontal, vertical, and oblique components with suspected unstable flaps.      Assessment:  Left knee pain following injury.  MRI reveals complex medial meniscus tear with early chondral degeneration of knee    Plan:  I had a long discussion with the patient.  Spent time reviewing imaging

## 2024-11-01 ENCOUNTER — ANESTHESIA (OUTPATIENT)
Dept: OPERATING ROOM | Age: 74
End: 2024-11-01
Payer: MEDICARE

## 2024-11-01 ENCOUNTER — HOSPITAL ENCOUNTER (OUTPATIENT)
Age: 74
Setting detail: OUTPATIENT SURGERY
Discharge: HOME | End: 2024-11-01
Attending: ORTHOPAEDIC SURGERY | Admitting: ORTHOPAEDIC SURGERY
Payer: MEDICARE

## 2024-11-01 ENCOUNTER — ANESTHESIA EVENT (OUTPATIENT)
Dept: OPERATING ROOM | Age: 74
End: 2024-11-01
Payer: MEDICARE

## 2024-11-01 VITALS
DIASTOLIC BLOOD PRESSURE: 53 MMHG | BODY MASS INDEX: 28.04 KG/M2 | WEIGHT: 168.3 LBS | HEIGHT: 65 IN | RESPIRATION RATE: 18 BRPM | SYSTOLIC BLOOD PRESSURE: 115 MMHG | OXYGEN SATURATION: 96 % | TEMPERATURE: 97.6 F | HEART RATE: 65 BPM

## 2024-11-01 DIAGNOSIS — S83.242A TEAR OF MEDIAL MENISCUS OF LEFT KNEE, UNSPECIFIED TEAR TYPE, UNSPECIFIED WHETHER OLD OR CURRENT TEAR, INITIAL ENCOUNTER: Primary | ICD-10-CM

## 2024-11-01 PROCEDURE — 6360000002 HC RX W HCPCS: Performed by: ANESTHESIOLOGY

## 2024-11-01 PROCEDURE — 2580000003 HC RX 258: Performed by: ORTHOPAEDIC SURGERY

## 2024-11-01 PROCEDURE — 3600000004 HC SURGERY LEVEL 4 BASE: Performed by: ORTHOPAEDIC SURGERY

## 2024-11-01 PROCEDURE — 6360000002 HC RX W HCPCS: Performed by: ORTHOPAEDIC SURGERY

## 2024-11-01 PROCEDURE — 7100000001 HC PACU RECOVERY - ADDTL 15 MIN: Performed by: ORTHOPAEDIC SURGERY

## 2024-11-01 PROCEDURE — 6360000002 HC RX W HCPCS: Performed by: REGISTERED NURSE

## 2024-11-01 PROCEDURE — 2500000003 HC RX 250 WO HCPCS: Performed by: REGISTERED NURSE

## 2024-11-01 PROCEDURE — 3600000014 HC SURGERY LEVEL 4 ADDTL 15MIN: Performed by: ORTHOPAEDIC SURGERY

## 2024-11-01 PROCEDURE — 7100000011 HC PHASE II RECOVERY - ADDTL 15 MIN: Performed by: ORTHOPAEDIC SURGERY

## 2024-11-01 PROCEDURE — 7100000000 HC PACU RECOVERY - FIRST 15 MIN: Performed by: ORTHOPAEDIC SURGERY

## 2024-11-01 PROCEDURE — 3700000001 HC ADD 15 MINUTES (ANESTHESIA): Performed by: ORTHOPAEDIC SURGERY

## 2024-11-01 PROCEDURE — 2709999900 HC NON-CHARGEABLE SUPPLY: Performed by: ORTHOPAEDIC SURGERY

## 2024-11-01 PROCEDURE — 3700000000 HC ANESTHESIA ATTENDED CARE: Performed by: ORTHOPAEDIC SURGERY

## 2024-11-01 PROCEDURE — 6370000000 HC RX 637 (ALT 250 FOR IP): Performed by: ANESTHESIOLOGY

## 2024-11-01 PROCEDURE — 2580000003 HC RX 258: Performed by: REGISTERED NURSE

## 2024-11-01 PROCEDURE — 7100000010 HC PHASE II RECOVERY - FIRST 15 MIN: Performed by: ORTHOPAEDIC SURGERY

## 2024-11-01 RX ORDER — MEPERIDINE HYDROCHLORIDE 25 MG/ML
12.5 INJECTION INTRAMUSCULAR; INTRAVENOUS; SUBCUTANEOUS EVERY 5 MIN PRN
Status: DISCONTINUED | OUTPATIENT
Start: 2024-11-01 | End: 2024-11-01 | Stop reason: HOSPADM

## 2024-11-01 RX ORDER — SODIUM CHLORIDE 0.9 % (FLUSH) 0.9 %
5-40 SYRINGE (ML) INJECTION EVERY 12 HOURS SCHEDULED
Status: DISCONTINUED | OUTPATIENT
Start: 2024-11-01 | End: 2024-11-01 | Stop reason: HOSPADM

## 2024-11-01 RX ORDER — SODIUM CHLORIDE 9 MG/ML
INJECTION, SOLUTION INTRAVENOUS
Status: DISCONTINUED | OUTPATIENT
Start: 2024-11-01 | End: 2024-11-01 | Stop reason: SDUPTHER

## 2024-11-01 RX ORDER — FENTANYL CITRATE 50 UG/ML
INJECTION, SOLUTION INTRAMUSCULAR; INTRAVENOUS
Status: DISCONTINUED | OUTPATIENT
Start: 2024-11-01 | End: 2024-11-01 | Stop reason: SDUPTHER

## 2024-11-01 RX ORDER — PROCHLORPERAZINE EDISYLATE 5 MG/ML
5 INJECTION INTRAMUSCULAR; INTRAVENOUS
Status: DISCONTINUED | OUTPATIENT
Start: 2024-11-01 | End: 2024-11-01 | Stop reason: HOSPADM

## 2024-11-01 RX ORDER — HYDROMORPHONE HYDROCHLORIDE 2 MG/ML
0.5 INJECTION, SOLUTION INTRAMUSCULAR; INTRAVENOUS; SUBCUTANEOUS EVERY 5 MIN PRN
Status: COMPLETED | OUTPATIENT
Start: 2024-11-01 | End: 2024-11-01

## 2024-11-01 RX ORDER — SODIUM CHLORIDE 0.9 % (FLUSH) 0.9 %
5-40 SYRINGE (ML) INJECTION PRN
Status: DISCONTINUED | OUTPATIENT
Start: 2024-11-01 | End: 2024-11-01 | Stop reason: HOSPADM

## 2024-11-01 RX ORDER — PROPOFOL 10 MG/ML
INJECTION, EMULSION INTRAVENOUS
Status: DISCONTINUED | OUTPATIENT
Start: 2024-11-01 | End: 2024-11-01 | Stop reason: SDUPTHER

## 2024-11-01 RX ORDER — LIDOCAINE HYDROCHLORIDE 20 MG/ML
INJECTION, SOLUTION EPIDURAL; INFILTRATION; INTRACAUDAL; PERINEURAL
Status: DISCONTINUED | OUTPATIENT
Start: 2024-11-01 | End: 2024-11-01 | Stop reason: SDUPTHER

## 2024-11-01 RX ORDER — HYDRALAZINE HYDROCHLORIDE 20 MG/ML
10 INJECTION INTRAMUSCULAR; INTRAVENOUS
Status: DISCONTINUED | OUTPATIENT
Start: 2024-11-01 | End: 2024-11-01 | Stop reason: HOSPADM

## 2024-11-01 RX ORDER — BUPIVACAINE HYDROCHLORIDE 5 MG/ML
INJECTION, SOLUTION EPIDURAL; INTRACAUDAL
Status: COMPLETED | OUTPATIENT
Start: 2024-11-01 | End: 2024-11-01

## 2024-11-01 RX ORDER — NALOXONE HYDROCHLORIDE 0.4 MG/ML
INJECTION, SOLUTION INTRAMUSCULAR; INTRAVENOUS; SUBCUTANEOUS PRN
Status: DISCONTINUED | OUTPATIENT
Start: 2024-11-01 | End: 2024-11-01 | Stop reason: HOSPADM

## 2024-11-01 RX ORDER — OXYCODONE HYDROCHLORIDE 5 MG/1
5 TABLET ORAL
Status: COMPLETED | OUTPATIENT
Start: 2024-11-01 | End: 2024-11-01

## 2024-11-01 RX ORDER — DEXAMETHASONE SODIUM PHOSPHATE 4 MG/ML
INJECTION, SOLUTION INTRA-ARTICULAR; INTRALESIONAL; INTRAMUSCULAR; INTRAVENOUS; SOFT TISSUE
Status: DISCONTINUED | OUTPATIENT
Start: 2024-11-01 | End: 2024-11-01 | Stop reason: SDUPTHER

## 2024-11-01 RX ORDER — HYDROCODONE BITARTRATE AND ACETAMINOPHEN 5; 325 MG/1; MG/1
1 TABLET ORAL EVERY 4 HOURS PRN
Qty: 24 TABLET | Refills: 0 | Status: SHIPPED | OUTPATIENT
Start: 2024-11-01 | End: 2024-11-06

## 2024-11-01 RX ORDER — LABETALOL HYDROCHLORIDE 5 MG/ML
10 INJECTION, SOLUTION INTRAVENOUS
Status: DISCONTINUED | OUTPATIENT
Start: 2024-11-01 | End: 2024-11-01 | Stop reason: HOSPADM

## 2024-11-01 RX ORDER — ONDANSETRON 2 MG/ML
4 INJECTION INTRAMUSCULAR; INTRAVENOUS
Status: DISCONTINUED | OUTPATIENT
Start: 2024-11-01 | End: 2024-11-01 | Stop reason: HOSPADM

## 2024-11-01 RX ORDER — ONDANSETRON 2 MG/ML
INJECTION INTRAMUSCULAR; INTRAVENOUS
Status: DISCONTINUED | OUTPATIENT
Start: 2024-11-01 | End: 2024-11-01 | Stop reason: SDUPTHER

## 2024-11-01 RX ORDER — SODIUM CHLORIDE 9 MG/ML
INJECTION, SOLUTION INTRAVENOUS PRN
Status: DISCONTINUED | OUTPATIENT
Start: 2024-11-01 | End: 2024-11-01 | Stop reason: HOSPADM

## 2024-11-01 RX ADMIN — FENTANYL CITRATE 50 MCG: 50 INJECTION, SOLUTION INTRAMUSCULAR; INTRAVENOUS at 12:21

## 2024-11-01 RX ADMIN — HYDROMORPHONE HYDROCHLORIDE 0.5 MG: 2 INJECTION, SOLUTION INTRAMUSCULAR; INTRAVENOUS; SUBCUTANEOUS at 13:39

## 2024-11-01 RX ADMIN — FENTANYL CITRATE 50 MCG: 50 INJECTION, SOLUTION INTRAMUSCULAR; INTRAVENOUS at 12:32

## 2024-11-01 RX ADMIN — HYDROMORPHONE HYDROCHLORIDE 0.5 MG: 2 INJECTION, SOLUTION INTRAMUSCULAR; INTRAVENOUS; SUBCUTANEOUS at 13:28

## 2024-11-01 RX ADMIN — CEFAZOLIN 2000 MG: 2 INJECTION, POWDER, FOR SOLUTION INTRAMUSCULAR; INTRAVENOUS at 12:30

## 2024-11-01 RX ADMIN — DEXAMETHASONE SODIUM PHOSPHATE 4 MG: 4 INJECTION, SOLUTION INTRAMUSCULAR; INTRAVENOUS at 12:28

## 2024-11-01 RX ADMIN — SODIUM CHLORIDE: 9 INJECTION, SOLUTION INTRAVENOUS at 12:21

## 2024-11-01 RX ADMIN — HYDROMORPHONE HYDROCHLORIDE 0.5 MG: 2 INJECTION, SOLUTION INTRAMUSCULAR; INTRAVENOUS; SUBCUTANEOUS at 13:23

## 2024-11-01 RX ADMIN — OXYCODONE 5 MG: 5 TABLET ORAL at 13:45

## 2024-11-01 RX ADMIN — ONDANSETRON 4 MG: 2 INJECTION INTRAMUSCULAR; INTRAVENOUS at 12:28

## 2024-11-01 RX ADMIN — PROPOFOL 150 MG: 10 INJECTION, EMULSION INTRAVENOUS at 12:21

## 2024-11-01 RX ADMIN — SODIUM CHLORIDE: 9 INJECTION, SOLUTION INTRAVENOUS at 11:19

## 2024-11-01 RX ADMIN — LIDOCAINE HYDROCHLORIDE 100 MG: 20 INJECTION, SOLUTION EPIDURAL; INFILTRATION; INTRACAUDAL; PERINEURAL at 12:21

## 2024-11-01 RX ADMIN — HYDROMORPHONE HYDROCHLORIDE 0.5 MG: 2 INJECTION, SOLUTION INTRAMUSCULAR; INTRAVENOUS; SUBCUTANEOUS at 13:34

## 2024-11-01 ASSESSMENT — PAIN - FUNCTIONAL ASSESSMENT
PAIN_FUNCTIONAL_ASSESSMENT: 0-10
PAIN_FUNCTIONAL_ASSESSMENT: WONG-BAKER FACES
PAIN_FUNCTIONAL_ASSESSMENT: 0-10
PAIN_FUNCTIONAL_ASSESSMENT: PREVENTS OR INTERFERES SOME ACTIVE ACTIVITIES AND ADLS

## 2024-11-01 ASSESSMENT — PAIN SCALES - GENERAL
PAINLEVEL_OUTOF10: 6
PAINLEVEL_OUTOF10: 8
PAINLEVEL_OUTOF10: 7
PAINLEVEL_OUTOF10: 5

## 2024-11-01 ASSESSMENT — PAIN DESCRIPTION - LOCATION: LOCATION: KNEE

## 2024-11-01 ASSESSMENT — PAIN DESCRIPTION - ORIENTATION: ORIENTATION: LEFT

## 2024-11-01 ASSESSMENT — PAIN DESCRIPTION - DESCRIPTORS
DESCRIPTORS: DISCOMFORT;ACHING
DESCRIPTORS: ACHING;DISCOMFORT;DULL

## 2024-11-01 NOTE — DISCHARGE INSTRUCTIONS
Orthopedic Surgery Discharge Instructions    Medications:   A pain medication has been prescribed for you.  Please take this as instructed by the pharmacist.  An anti-nausea medication has been prescribed for you to use as needed for nausea.  Either aspirin or a blood thinner medication may have been prescribed for you.  Please take this as instructed.    Activity:  Weightbearing as tolerated with crutches as needed.    Incision/dressings:  You may remove your dressing in 72 hours.  Until then the dressing needs to remain clean and dry.  Following removal dressing please apply dry dressing daily.  You may shower in 72 hours and allow the water to run over the incision.  However no submerging underwater or getting in pools.    Follow-up:  If you do not already have a postoperative follow-up appointment scheduled you should call to schedule an appointment within 10 to 14 days of surgery.    Emergency or after hours questions:  Please call the main call center at [phone] for all questions or concerns during evening and weekend hours.  The call center will direct your call to the on-call physician to answer your questions and concerns. During weekly office hours you may call my assistant, Edita, at [phone].                           Ar Evans MD            Orthopaedic Surgery Sports Medicine and Arthroscopy            [facility]            Team Physician [facility])      ORTHOPEDIC DISCHARGE INSTRUCTIONS    Follow your surgeons instructions.  Make follow-up appointment.  Observe operative area for signs of excessive bleeding such as a slow general ooze that saturates the dressing or bright red bleeding. In either case, apply pressure to the area and elevate if possible and call your surgeon right away.  Observe the affected extremity for circulation or nerve impairment such as a change in color, numbness, tingling, coldness or  increased pain. If any of these symptoms are present call your surgeon.  Observe operative site for any signs of infection such as increased pain, redness, fever greater than 101 degrees, swelling, foul odor or drainage. Contact surgeon if any of these symptoms are present.  If you become short of breath call your surgeon or go to the nearest emergency room.  Remove dressing if directed by surgeon. Leave steristips or sutures or staples in place.  You may loosen your ace wrap if it feels too tight, or if you have severe pain, or if it has swelling.  Elevate extremity as directed by surgeon.  You may shower when directed by surgeon.  Use ice pack as directed by surgeon. Do not use heat.  Avoid stress to suture line such as pulling, pushing or tugging.  Use crutches/walker as directed by surgeon  Take medications as ordered.Take pain medication with food.Do not drive or operate machinery while taking narcotics.  Call your surgeon for any questions or problems.     ANESTHESIA DISCHARGE INSTRUCTIONS    Wear your seatbelt home.  You are under the influence of drugs-do not drink alcohol, drive, operate machinery, make any important decisions or sign any legal documents for 24 hours.  A responsible adult needs to be with you for 24 hours.  You may experience lightheadedness, dizziness, or sleepiness following surgery.  Rest at home today- increase activity as tolerated.  Progress slowly to a regular diet unless your physician has instructed you otherwise. Drink plenty of water.  If persistent nausea and vomiting becomes a problem, call your physician.  Coughing, sore throat and muscle aches are other side effects of anesthesia, and should improve with time.  Do not drive or operate machinery while taking narcotics.

## 2024-11-01 NOTE — ANESTHESIA PRE PROCEDURE
05:37 AM    HGB 11.1 02/06/2023 05:37 AM    HCT 34.5 02/06/2023 05:37 AM    MCV 95.2 02/06/2023 05:37 AM    RDW 13.7 02/06/2023 05:37 AM     02/06/2023 05:37 AM       CMP:   Lab Results   Component Value Date/Time     02/06/2023 05:37 AM    K 4.3 02/06/2023 05:37 AM    K 4.3 01/24/2023 04:45 AM     02/06/2023 05:37 AM    CO2 31 02/06/2023 05:37 AM    BUN 13 02/06/2023 05:37 AM    CREATININE <0.5 02/06/2023 05:37 AM    GFRAA >60 08/02/2021 05:35 AM    AGRATIO 1.6 08/01/2021 04:50 PM    LABGLOM >60 02/06/2023 05:37 AM    GLUCOSE 98 02/06/2023 05:37 AM    CALCIUM 9.7 02/06/2023 05:37 AM    BILITOT 0.4 08/01/2021 04:50 PM    ALKPHOS 89 08/01/2021 04:50 PM    AST 38 08/01/2021 04:50 PM    ALT 40 08/01/2021 04:50 PM       POC Tests: No results for input(s): \"POCGLU\", \"POCNA\", \"POCK\", \"POCCL\", \"POCBUN\", \"POCHEMO\", \"POCHCT\" in the last 72 hours.    Coags:   Lab Results   Component Value Date/Time    PROTIME 13.3 08/02/2021 05:34 AM    INR 1.17 08/02/2021 05:34 AM    APTT 28.1 08/02/2021 05:34 AM       HCG (If Applicable): No results found for: \"PREGTESTUR\", \"PREGSERUM\", \"HCG\", \"HCGQUANT\"     ABGs: No results found for: \"PHART\", \"PO2ART\", \"GLU7LTI\", \"GJM5UJB\", \"BEART\", \"U2DCRQOR\"     Type & Screen (If Applicable):  Lab Results   Component Value Date    ABORH O POS 01/19/2023    LABANTI NEG 01/19/2023       Drug/Infectious Status (If Applicable):  No results found for: \"HIV\", \"HEPCAB\"    COVID-19 Screening (If Applicable):   Lab Results   Component Value Date/Time    COVID19 Not Detected 08/01/2021 06:45 PM           Anesthesia Evaluation  Patient summary reviewed and Nursing notes reviewed  Airway: Mallampati: II  TM distance: >3 FB   Neck ROM: full  Mouth opening: > = 3 FB   Dental: normal exam         Pulmonary:normal exam    (+)     sleep apnea:                                  Cardiovascular:Negative CV ROS                      Neuro/Psych:   Negative Neuro/Psych ROS              GI/Hepatic/Renal: Neg

## 2024-11-01 NOTE — ANESTHESIA POSTPROCEDURE EVALUATION
Department of Anesthesiology  Postprocedure Note    Patient: Zakiya Charlton  MRN: [id number]  YOB: 1950  Date of evaluation: 11/1/2024    Procedure Summary       Date: 11/01/24 Room / Location: [address]    Anesthesia Start: 1221 Anesthesia Stop: 1306    Procedure: LEFT KNEE ARTHROSCOPY, PARTIAL MEDIAL AND LATERAL MENISECTOMY (Left: Knee) Diagnosis:       Tear of medial meniscus of left knee      (Tear of medial meniscus of left knee [S83.242A])    Surgeons: Ar Evans MD Responsible Provider: Alejandro Calderón MD    Anesthesia Type: general ASA Status: 2            Anesthesia Type: No value filed.    Jimena Phase I: Jimena Score: 10    Jimena Phase II:      Anesthesia Post Evaluation    Patient location during evaluation: PACU  Patient participation: complete - patient participated  Level of consciousness: awake  Airway patency: patent  Nausea & Vomiting: no nausea and no vomiting  Cardiovascular status: blood pressure returned to baseline and hemodynamically stable  Respiratory status: acceptable  Hydration status: euvolemic  Multimodal analgesia pain management approach  Pain management: adequate    No notable events documented.

## 2024-11-01 NOTE — PROGRESS NOTES
Pt arrived from OR to PACU bay 2. Report received from OR staff. Pt not yet arousable to voice. Surgical incisions dressings in place to L knee. Pt on RA, NSR, and VSS. Will continue to monitor. Ice applied.

## 2024-11-01 NOTE — H&P
History of Present Illness:  The patient is a 74-year-old female who presents for evaluation.  She injured her left knee earlier in the year.  She was walking she felt a popping sensation.  Ever since then she has had progressive worsening of her symptoms.  She does reports catching and locking symptoms on a regular basis along with the knee giving out.  Intermittent swelling.  She has been treated with activity modification, anti-inflammatory medications, home physical therapy, as well as a cortisone injection but has remained symptomatic.    Past Medical History:   Diagnosis Date    Arthritis     Cancer (HCC) 07/20/2021    Malignant neoplasm urinary bladder    Hyperlipidemia     CALLIE on CPAP       Past Surgical History:   Procedure Laterality Date    ABDOMEN SURGERY      gastri bypass 2001    BREAST REDUCTION SURGERY  2004    COLONOSCOPY      COLONOSCOPY N/A 8/23/2023    COLONOSCOPY POLYPECTOMY SNARE/COLD BIOPSY performed by Trenton Grady MD at [facility]    CYSTOSCOPY N/A 8/1/2021    CYSTOSCOPY EVACUATION OF CLOTS FULGURATION performed by Octavio Zimmerman MD at [facility] OR    EYE SURGERY  1990    FRACTURE SURGERY      HYSTERECTOMY (CERVIX STATUS UNKNOWN)      LEG SURGERY Right 1/19/2023    RIGHT KNEE OPEN REDUCTION INTERNAL FIXATION TIBIAL PLATEAU performed by Cleo Gonzalez MD at [facility] OR    TONSILLECTOMY      WRIST FRACTURE SURGERY Right 1/19/2023    OPEN REDUCTION INTERNAL FIXATION RIGHT DISTAL RADIUS - MADISYN performed by Cleo Gonzalez MD at [facility] OR         Objective:  Ht 1.651 m (5' 5\")   Wt 74.4 kg (164 lb)   BMI 27.29 kg/m²       Physical Exam:  The patient is well-appearing and in no apparent distress  CV-RRR  Pulm-CTAB    Examination of the left knee   There is a small effusion, no gross deformity or skin changes  Range of motion reveals 0 to 120  Neg lachman, negative posterior drawer, no pain or laxity with varus or valgus stress at 0 degrees and 30 degrees of flexion  + medial joint line  tenderness  5 out of 5 strength throughout distal muscle groups  Sensation is intact to light touch throughout all distributions  There is no calf swelling or tenderness  Palpable DP pulse, brisk cap refill, 2+ symmetric reflexes      Imagin view x-rays of the left knee were obtained the office today on 2024 reviewed.  There is no fracture or dislocation.  No other abnormality.  MRI of the left knee was reviewed.  Early chondral degeneration particularly involving the patella and to a lesser extent the medial lateral compartments.  Complex tearing of the posterior horn medial meniscus with horizontal, vertical, and oblique components with suspected unstable flaps.        Assessment:  Left knee pain following injury.  MRI reveals complex medial meniscus tear with early chondral degeneration of knee     Plan:  I had a long discussion with the patient.  Spent time reviewing imaging findings.  Unfortunately she has remained symptomatic despite conservative measures.  I do feel that she would be a candidate for a left knee arthroscopy with partial medial meniscectomy.  I did tell the patient that the surgery would be geared towards eliminating mechanical symptoms from the meniscus.  Ultimate prognosis would partly depend on the amount of chondral degeneration in the knee as well as how well the body tolerates meniscal deficiency.  She does understand this.  Risk benefits were thoroughly discussed.  She does understand elects proceed.

## 2024-11-01 NOTE — PROGRESS NOTES
Discharge instructions reviewed with patient and pts  Kwadwo. All home medications have been reviewed, pt v/u. Pt discharged via wheelchair. Pt discharged with prescriptions and all belongings.  Kwadwo taking stable pt home. Walker in car.

## 2024-11-01 NOTE — PROGRESS NOTES
Pt awake and alert at this time. Pt on RA, and VSS. Pt medicated for pain and denies nausea, tolerating PO.  Skin warm and dry, palpable pulses and able to wiggle toes. Pt meets criteria to be discharged from Phase 1.

## 2024-11-04 ENCOUNTER — TELEPHONE (OUTPATIENT)
Dept: ORTHOPEDIC SURGERY | Age: 74
End: 2024-11-04

## 2024-11-05 NOTE — OP NOTE
was visualized.  Medial compartment is entered.  Anterior inferior medial incision was made.  Resection of the infrapatellar fat pad was performed for visualization purposes.  The medial compartment is entered.  No chondral abnormality was present.  Meniscus did demonstrate diffuse chronic degenerative tearing involving the inner 1/3-2/3 of the posterior horn which extended within 1 to 2 mm of the capsule within the body region.  We did utilize an arthroscopic biter followed by shaver to resect unstable components.  This did require resection of approximately 80 to 90% of the width of the meniscus at the level of the body.  We did retain as much meniscus as possible.  There was no chondral changes of the medial compartment.     ACL and PCL is intact.  Lateral compartment is entered.  There was no evidence of chondral abnormality.  Subtle degenerative fraying and tearing involving the inner one third of the lateral meniscus.  Arthroscopic shaver was performed to debride this area to complete a partial lateral meniscectomy.     Knee was then thoroughly irrigated and all instruments were removed.      Skin was closed using 4-0 Monocryl.  Sterile dressings were provided in form of Xeroform, 4 x 4's, ABD, Ace.  We did place a hinged knee brace.  Successfully waking from anesthesia and taken recovery room in excellent condition.  At the end of procedure all counts were correct.  I was present for all aspects of the case.    Electronically signed by Ar Evans MD on 11/5/2024 at 12:20 PM

## 2024-11-14 ENCOUNTER — OFFICE VISIT (OUTPATIENT)
Dept: ORTHOPEDIC SURGERY | Age: 74
End: 2024-11-14

## 2024-11-14 VITALS — WEIGHT: 168 LBS | HEIGHT: 65 IN | BODY MASS INDEX: 27.99 KG/M2

## 2024-11-14 DIAGNOSIS — S83.242A TEAR OF MEDIAL MENISCUS OF LEFT KNEE, CURRENT, UNSPECIFIED TEAR TYPE, INITIAL ENCOUNTER: Primary | ICD-10-CM

## 2024-11-14 PROCEDURE — 99024 POSTOP FOLLOW-UP VISIT: CPT | Performed by: ORTHOPAEDIC SURGERY

## 2024-11-25 ENCOUNTER — HOSPITAL ENCOUNTER (OUTPATIENT)
Dept: PHYSICAL THERAPY | Age: 74
Setting detail: THERAPIES SERIES
Discharge: HOME | End: 2024-11-25
Payer: MEDICARE

## 2024-11-25 DIAGNOSIS — M62.559 ATROPHY OF QUADRICEPS FEMORIS MUSCLE: Primary | ICD-10-CM

## 2024-11-25 DIAGNOSIS — R29.898 WEAKNESS OF LEFT HIP: ICD-10-CM

## 2024-11-25 DIAGNOSIS — M62.81 WEAKNESS OF LEFT QUADRICEPS MUSCLE: ICD-10-CM

## 2024-11-25 DIAGNOSIS — R26.81 GAIT INSTABILITY: ICD-10-CM

## 2024-11-25 PROCEDURE — 97016 VASOPNEUMATIC DEVICE THERAPY: CPT

## 2024-11-25 PROCEDURE — 97161 PT EVAL LOW COMPLEX 20 MIN: CPT

## 2024-11-25 PROCEDURE — 97530 THERAPEUTIC ACTIVITIES: CPT

## 2024-11-25 NOTE — PLAN OF CARE
[facility] - Outpatient Rehabilitation and Therapy [address], [place] [address] office: [phone] fax: [phone]     Physical Therapy Initial Evaluation Certification    Dear Ar Evans MD ,    We had the pleasure of evaluating the following patient for physical therapy services at [facility].  A summary of our findings can be found in the initial assessment below.  This includes our plan of care.  If you have any questions or concerns regarding these findings, please do not hesitate to contact me at the office phone number listed above.  Thank you for the referral.     Physician Signature:_______________________________Date:__________________  By signing above (or electronic signature), therapist’s plan is approved by physician       Physical Therapy: TREATMENT/PROGRESS NOTE   Patient: Zakiya Charlton (74 y.o. female)   Examination Date: 2024   :  1950 MRN: [id number]   Visit #: 1   Insurance Allowable Auth Needed   BOMN []Yes    [x]No    Insurance: Payor: [facility] MEDICARE / Plan: ANTHEM MEDIBLUE ESSENTIAL/PLUS / Product Type: *No Product type* /   Insurance ID: [id number] - (Medicare Managed)  Secondary Insurance (if applicable):    Treatment Diagnosis:     ICD-10-CM    1. Atrophy of quadriceps femoris muscle  M62.559       2. Weakness of left quadriceps muscle  M62.81       3. Weakness of left hip  R29.898       4. Gait instability  R26.81          Medical Diagnosis:  Tear of medial meniscus of left knee, current, unspecified tear type, initial encounter [S83.242A]   Referring Physician: Ar Evans MD  PCP: Og Bower MD     Plan of care signed (Y/N):     Date of Patient follow up with Physician:      Plan of Care Report: EVAL today  POC update due: (10 visits /OR AUTH LIMITS, whichever is less)  2024                                             Medical

## 2024-11-25 NOTE — PROGRESS NOTES
11/14/2024     Reason for visit:  Status post left knee arthroscopy with partial medial and lateral meniscectomy on 11/1/2024    History of Present Illness:  Patient presents for postop evaluation.  Overall she is doing fairly well.  Has some mild soreness and swelling but otherwise is doing well.    Objective:  Ht 1.651 m (5' 5\")   Wt 76.2 kg (168 lb)   BMI 27.96 kg/m²      Physical Exam:  The patient is well-appearing and in no apparent distress  Examination of the left knee   There is a small effusion, no gross deformity or skin changes  Range of motion reveals 0 to 120  Neg lachman, negative posterior drawer, no pain or laxity with varus or valgus stress at 0 degrees and 30 degrees of flexion  + medial joint line tenderness  5 out of 5 strength throughout distal muscle groups  Sensation is intact to light touch throughout all distributions  There is no calf swelling or tenderness  Palpable DP pulse, brisk cap refill, 2+ symmetric reflexes       Assessment:  Status post left knee arthroscopy with partial medial and lateral meniscectomy on 11/1/2024    Plan:  At this point we will initiate physical therapy.  The patient return in 4 weeks for repeat evaluation.                 Ar Evans MD            Orthopaedic Surgery Sports Medicine and Arthroscopy            [facility]            Team Physician [facility]      Disclaimer:  This note was dictated with voice recognition software.  Though review and correction are routine, we apologize for any errors.

## 2024-11-27 ENCOUNTER — HOSPITAL ENCOUNTER (OUTPATIENT)
Dept: PHYSICAL THERAPY | Age: 74
Setting detail: THERAPIES SERIES
Discharge: HOME | End: 2024-11-27
Payer: MEDICARE

## 2024-11-27 PROCEDURE — 97110 THERAPEUTIC EXERCISES: CPT

## 2024-11-27 PROCEDURE — 97016 VASOPNEUMATIC DEVICE THERAPY: CPT

## 2024-11-27 PROCEDURE — 97140 MANUAL THERAPY 1/> REGIONS: CPT

## 2024-11-27 NOTE — FLOWSHEET NOTE
[facility] - Outpatient Rehabilitation and Therapy [address], [place] [address] office: [phone] fax: [phone]    Physical Therapy: TREATMENT/PROGRESS NOTE   Patient: Zakiya Charlton (74 y.o. female)   Examination Date: 2024   :  1950 MRN: [id number]   Visit #: 2   Insurance Allowable Auth Needed   BOMN []Yes    [x]No    Insurance: Payor: [employer] MEDICARE / Plan: ANTHEM MEDIBLUE ESSENTIAL/PLUS / Product Type: *No Product type* /   Insurance ID: [id number] - (Medicare Managed)  Secondary Insurance (if applicable):    Treatment Diagnosis:     ICD-10-CM    1. Atrophy of quadriceps femoris muscle  M62.559       2. Weakness of left quadriceps muscle  M62.81       3. Weakness of left hip  R29.898       4. Gait instability  R26.81          Medical Diagnosis:  Tear of medial meniscus of left knee, current, unspecified tear type, initial encounter [S83.242A]   Referring Physician: Ar Evans MD  PCP: Og Bower MD     Plan of care signed (Y/N): Y    Date of Patient follow up with Physician: PRN     Plan of Care Report: NO  POC update due: (10 visits /OR AUTH LIMITS, whichever is less)  2024                                             Medical History:  Comorbidities:  Cancer/Tumor  Hypertension  Osteoporosis/Osteopenia  Depression  Relevant Medical History: Right knee ORIF ()                                         Precautions/ Contra-indications:           Latex allergy:  NO  Pacemaker:    NO  Contraindications for Manipulation: None and unhealthy/ multiple comorbidities   Date of Surgery: 24 - Medial and Lateral partial Menisectomy    Red Flags:  None    Suicide Screening:   The patient did not verbalize a primary behavioral concern, suicidal ideation, suicidal intent, or demonstrate suicidal behaviors.    Preferred Language for Healthcare:   [x] English       [] other:    SUBJECTIVE EXAMINATION     Patient stated complaint: Patient

## 2024-12-04 ENCOUNTER — HOSPITAL ENCOUNTER (OUTPATIENT)
Dept: PHYSICAL THERAPY | Age: 74
Setting detail: THERAPIES SERIES
Discharge: HOME | End: 2024-12-04
Payer: MEDICARE

## 2024-12-04 PROCEDURE — 97110 THERAPEUTIC EXERCISES: CPT

## 2024-12-04 PROCEDURE — 97140 MANUAL THERAPY 1/> REGIONS: CPT

## 2024-12-04 PROCEDURE — 97016 VASOPNEUMATIC DEVICE THERAPY: CPT

## 2024-12-04 NOTE — FLOWSHEET NOTE
improving soft tissue extensibility and allowing for proper ROM for normal function with self care, mobility, lifting and ambulation  (04012) VASOPNEUMATIC    GOALS     Therapist goals for Patient:   Short Term Goals: To be achieved in: 2-4 weeks  1. Independent in HEP and progression per patient tolerance, in order to prevent re-injury.   [] Progressing: [] Met: [] Not Met: [] Adjusted  2. Patient will have a decrease in pain to <2/10 to facilitate improvement in movement, function, and ADLs as indicated by Functional Deficits.  [] Progressing: [] Met: [] Not Met: [] Adjusted    Long Term Goals: To be achieved in: 8-10 weeks  1. Disability index score of 15% or less for the WOMAC to assist with reaching prior level of function with activities such as working a full day of work.  [] Progressing: [] Met: [] Not Met: [] Adjusted  2. Patient will demonstrate symmetrical knee flexion to be able to squat down to the commode.   [] Progressing: [] Met: [] Not Met: [] Adjusted  3. Patient will demonstrate increased Strength of quads to within 5lb with HHD of contralateral limb to allow for proper functional mobility to enable patient to return to kneeling down.   [] Progressing: [] Met: [] Not Met: [] Adjusted  4. Patient will return to walking 1 mile without increased symptoms or restriction.   [] Progressing: [] Met: [] Not Met: [] Adjusted  5. Patient will demonstrate decreased edema of midpatellar to 41.5 cm to allow for proper functional mobility and muscle recruitment to enable patient to return to safely perform stairs.   [] Progressing: [] Met: [] Not Met: [] Adjusted       Overall Progression Towards Functional goals/ Treatment Progress Update:  [] Patient is progressing as expected towards functional goals listed.    [] Progression is slowed due to complexities/Impairments listed.  [] Progression has been slowed due to co-morbidities.  [x] Plan just implemented, too soon (<30days) to assess goals progression   []

## 2024-12-06 ENCOUNTER — HOSPITAL ENCOUNTER (OUTPATIENT)
Dept: PHYSICAL THERAPY | Age: 74
Setting detail: THERAPIES SERIES
Discharge: HOME | End: 2024-12-06
Payer: MEDICARE

## 2024-12-06 PROCEDURE — 97016 VASOPNEUMATIC DEVICE THERAPY: CPT

## 2024-12-06 PROCEDURE — 97110 THERAPEUTIC EXERCISES: CPT

## 2024-12-06 NOTE — FLOWSHEET NOTE
[facility] - Outpatient Rehabilitation and Therapy [address], [place] [address] office: [phone] fax: [phone]    Physical Therapy: TREATMENT/PROGRESS NOTE   Patient: Zakiya Charlton (74 y.o. female)   Examination Date: 2024   :  1950 MRN: [id number]   Visit #: 4   Insurance Allowable Auth Needed   BOMN []Yes    [x]No    Insurance: Payor: [employer] MEDICARE / Plan: ANTHEM MEDIBLUE ESSENTIAL/PLUS / Product Type: *No Product type* /   Insurance ID: [id number] - (Medicare Managed)  Secondary Insurance (if applicable):    Treatment Diagnosis:     ICD-10-CM    1. Atrophy of quadriceps femoris muscle  M62.559       2. Weakness of left quadriceps muscle  M62.81       3. Weakness of left hip  R29.898       4. Gait instability  R26.81          Medical Diagnosis:  Tear of medial meniscus of left knee, current, unspecified tear type, initial encounter [S83.242A]   Referring Physician: Ar Evans MD  PCP: Og Bower MD     Plan of care signed (Y/N): Y    Date of Patient follow up with Physician: PRN     Plan of Care Report: NO  POC update due: (10 visits /OR AUTH LIMITS, whichever is less)  2024                                             Medical History:  Comorbidities:  Cancer/Tumor  Hypertension  Osteoporosis/Osteopenia  Depression  Relevant Medical History: Right knee ORIF ()                                         Precautions/ Contra-indications:           Latex allergy:  NO  Pacemaker:    NO  Contraindications for Manipulation: None and unhealthy/ multiple comorbidities   Date of Surgery: 24 - Medial and Lateral partial Menisectomy    Red Flags:  None    Suicide Screening:   The patient did not verbalize a primary behavioral concern, suicidal ideation, suicidal intent, or demonstrate suicidal behaviors.    Preferred Language for Healthcare:   [x] English       [] other:    SUBJECTIVE EXAMINATION     Patient stated complaint: Patient

## 2024-12-09 ENCOUNTER — HOSPITAL ENCOUNTER (OUTPATIENT)
Dept: PHYSICAL THERAPY | Age: 74
Setting detail: THERAPIES SERIES
Discharge: HOME | End: 2024-12-09
Payer: MEDICARE

## 2024-12-09 PROCEDURE — 97530 THERAPEUTIC ACTIVITIES: CPT

## 2024-12-09 PROCEDURE — 97016 VASOPNEUMATIC DEVICE THERAPY: CPT

## 2024-12-09 PROCEDURE — 97110 THERAPEUTIC EXERCISES: CPT

## 2024-12-09 NOTE — FLOWSHEET NOTE
KT tapping  5'  Octopus for swelling management                        NMR re-education (75403) resistance Sets/time Reps CUES NEEDED                                      Therapeutic Activity (03655)  8'  Sets/time     FSU and over 4\" 2 x10    LSU 4\" 3 x10    Anterior                        Modalities:    Vasopneumatic Compression (Game Ready): Applied to Knee Left for significant edema, swelling, pain control for 10 minutes.  Relevant ICD-10 R60.9 Edema unspecified.   Girth Left Right Post Vaso   Knee: Midpatella 44.5 41 44.0   Knee: 5 cm above 47.5 46 47.3           Education  11/25/24: Evaluation - Patient education regarding PT assessment and plan of care. Discussed possible courses of treatment which included but not limited to progressive resistive exercise, dry needling, stabilization exercises and manual/manipulation therapy. Discussed activity modification, while providing explanation in regards to anatomical structures involved, healing time frames and relevant joint mechanics. Provided patient time for questions with answers provided when possible.    Home Exercise Program:   Access Code: [email or web address]  URL: [email or web address]  Date: 11/25/2024  Prepared by: Cliff Pollack    Exercises  - Supine Heel Slide with Strap  - 1 x daily - 10 reps - 10 seconds hold  - Active Straight Leg Raise with Quad Set  - 1 x daily - 2 sets - 15 reps  - Seated Long Arc Quad with Ankle Weight  - 1 x daily - 3 sets - 15 reps  - Prone Hamstring Curl with Ankle Weight  - 1 x daily - 3 sets - 10 reps    ASSESSMENT       Today's Assessment: Patient continues to demonstrates proximal hip and quad strength resulting in compensatory gait, transfers and curb/stairs ambulation.       Medical Necessity Documentation:  I certify that this patient meets the below criteria necessary for medical necessity for care and/or justification of therapy services:  The patient has functional impairments and/or activity limitations and would

## 2024-12-12 ENCOUNTER — OFFICE VISIT (OUTPATIENT)
Dept: ORTHOPEDIC SURGERY | Age: 74
End: 2024-12-12

## 2024-12-12 ENCOUNTER — HOSPITAL ENCOUNTER (OUTPATIENT)
Dept: PHYSICAL THERAPY | Age: 74
Setting detail: THERAPIES SERIES
Discharge: HOME | End: 2024-12-12
Payer: MEDICARE

## 2024-12-12 VITALS — HEIGHT: 65 IN | BODY MASS INDEX: 27.99 KG/M2 | WEIGHT: 168 LBS

## 2024-12-12 DIAGNOSIS — S83.242A TEAR OF MEDIAL MENISCUS OF LEFT KNEE, CURRENT, UNSPECIFIED TEAR TYPE, INITIAL ENCOUNTER: Primary | ICD-10-CM

## 2024-12-12 PROCEDURE — 99024 POSTOP FOLLOW-UP VISIT: CPT | Performed by: ORTHOPAEDIC SURGERY

## 2024-12-12 PROCEDURE — 97110 THERAPEUTIC EXERCISES: CPT

## 2024-12-12 PROCEDURE — 97530 THERAPEUTIC ACTIVITIES: CPT

## 2024-12-12 PROCEDURE — 97016 VASOPNEUMATIC DEVICE THERAPY: CPT

## 2024-12-12 NOTE — FLOWSHEET NOTE
the purpose of modulating pain, promoting relaxation,  increasing ROM, reducing/eliminating soft tissue swelling/inflammation/restriction, improving soft tissue extensibility and allowing for proper ROM for normal function with self care, mobility, lifting and ambulation  (88485) VASOPNEUMATIC    GOALS     Therapist goals for Patient:   Short Term Goals: To be achieved in: 2-4 weeks  1. Independent in HEP and progression per patient tolerance, in order to prevent re-injury.   [] Progressing: [x] Met: [] Not Met: [] Adjusted  2. Patient will have a decrease in pain to <2/10 to facilitate improvement in movement, function, and ADLs as indicated by Functional Deficits.  [] Progressing: [x] Met: [] Not Met: [] Adjusted    Long Term Goals: To be achieved in: 8-10 weeks  1. Disability index score of 15% or less for the WOMAC to assist with reaching prior level of function with activities such as working a full day of work.  [x] Progressing: [] Met: [] Not Met: [] Adjusted  2. Patient will demonstrate symmetrical knee flexion to be able to squat down to the commode.   [x] Progressing: [] Met: [] Not Met: [] Adjusted  3. Patient will demonstrate increased Strength of quads to within 5lb with HHD of contralateral limb to allow for proper functional mobility to enable patient to return to kneeling down.   [x] Progressing: [] Met: [] Not Met: [] Adjusted  4. Patient will return to walking 1 mile without increased symptoms or restriction.   [x] Progressing: [] Met: [] Not Met: [] Adjusted  5. Patient will demonstrate decreased edema of midpatellar to 41.5 cm to allow for proper functional mobility and muscle recruitment to enable patient to return to safely perform stairs.   [x] Progressing: [] Met: [] Not Met: [] Adjusted       Overall Progression Towards Functional goals/ Treatment Progress Update:  [x] Patient is progressing as expected towards functional goals listed.    [] Progression is slowed due to

## 2024-12-16 ENCOUNTER — APPOINTMENT (OUTPATIENT)
Dept: PHYSICAL THERAPY | Age: 74
End: 2024-12-16
Payer: MEDICARE

## 2024-12-18 NOTE — PROGRESS NOTES
12/12/2024     Reason for visit:  Status post left knee arthroscopy with partial medial and lateral meniscectomy on 11/1/2024    History of Present Illness:  Patient presents for postop evaluation.  Overall she is doing fairly well.  She does report continued improvements of the knee but still has occasional soreness and catching sensation.    Objective:  Ht 1.651 m (5' 5\")   Wt 76.2 kg (168 lb)   BMI 27.96 kg/m²      Physical Exam:  The patient is well-appearing and in no apparent distress  Examination of the left knee   There is a small effusion, no gross deformity or skin changes  Range of motion reveals 0 to 120  Neg lachman, negative posterior drawer, no pain or laxity with varus or valgus stress at 0 degrees and 30 degrees of flexion  + medial joint line tenderness  5 out of 5 strength throughout distal muscle groups  Sensation is intact to light touch throughout all distributions  There is no calf swelling or tenderness  Palpable DP pulse, brisk cap refill, 2+ symmetric reflexes       Assessment:  Status post left knee arthroscopy with partial medial and lateral meniscectomy on 11/1/2024    Plan:  Patient is continuing to improve.  She will return to see me 1 last time in 6 weeks.  In the future if she continues to have any continued symptoms we can always consider cortisone injection.                 Ar Evans MD            Orthopaedic Surgery Sports Medicine and Arthroscopy            [facility]            Team Physician [facility] ([place])      Disclaimer:  This note was dictated with voice recognition software.  Though review and correction are routine, we apologize for any errors.

## 2024-12-19 ENCOUNTER — HOSPITAL ENCOUNTER (OUTPATIENT)
Dept: PHYSICAL THERAPY | Age: 74
Setting detail: THERAPIES SERIES
Discharge: HOME | End: 2024-12-19
Payer: MEDICARE

## 2024-12-19 PROCEDURE — 97016 VASOPNEUMATIC DEVICE THERAPY: CPT

## 2024-12-19 PROCEDURE — 97530 THERAPEUTIC ACTIVITIES: CPT

## 2024-12-19 PROCEDURE — 97110 THERAPEUTIC EXERCISES: CPT

## 2024-12-19 NOTE — FLOWSHEET NOTE
[facility] - Outpatient Rehabilitation and Therapy [address], [place] [address] office: [phone] fax: [phone]    Physical Therapy: TREATMENT/PROGRESS NOTE   Patient: Zakiya Charlton (74 y.o. female)   Examination Date: 2024   :  1950 MRN: [id number]   Visit #: 7   Insurance Allowable Auth Needed   BOMN []Yes    [x]No    Insurance: Payor: [employer] MEDICARE / Plan: ANTHEM MEDIBLUE ESSENTIAL/PLUS / Product Type: *No Product type* /   Insurance ID: [id number] - (Medicare Managed)  Secondary Insurance (if applicable):    Treatment Diagnosis:     ICD-10-CM    1. Atrophy of quadriceps femoris muscle  M62.559       2. Weakness of left quadriceps muscle  M62.81       3. Weakness of left hip  R29.898       4. Gait instability  R26.81          Medical Diagnosis:  Tear of medial meniscus of left knee, current, unspecified tear type, initial encounter [S83.242A]   Referring Physician: Ar Evans MD  PCP: Og Bower MD     Plan of care signed (Y/N): Y    Date of Patient follow up with Physician: PRN     Plan of Care Report: NO  POC update due: (10 visits /OR AUTH LIMITS, whichever is less)  2024                                             Medical History:  Comorbidities:  Cancer/Tumor  Hypertension  Osteoporosis/Osteopenia  Depression  Relevant Medical History: Right knee ORIF ()                                         Precautions/ Contra-indications:           Latex allergy:  NO  Pacemaker:    NO  Contraindications for Manipulation: None and unhealthy/ multiple comorbidities   Date of Surgery: 24 - Medial and Lateral partial Menisectomy    Red Flags:  None    Suicide Screening:   The patient did not verbalize a primary behavioral concern, suicidal ideation, suicidal intent, or demonstrate suicidal behaviors.    Preferred Language for Healthcare:   [x] English       [] other:    SUBJECTIVE EXAMINATION     Patient stated complaint: Patient

## 2024-12-26 ENCOUNTER — HOSPITAL ENCOUNTER (OUTPATIENT)
Dept: PHYSICAL THERAPY | Age: 74
Setting detail: THERAPIES SERIES
Discharge: HOME | End: 2024-12-26
Payer: MEDICARE

## 2024-12-26 PROCEDURE — 97110 THERAPEUTIC EXERCISES: CPT

## 2024-12-26 PROCEDURE — 97140 MANUAL THERAPY 1/> REGIONS: CPT

## 2024-12-26 NOTE — FLOWSHEET NOTE
[facility] - Outpatient Rehabilitation and Therapy [address], [place] [address] office: [phone] fax: [phone]    Physical Therapy: TREATMENT/PROGRESS NOTE   Patient: Zakiya Charlton (74 y.o. female)   Examination Date: 2024   :  1950 MRN: [id number]   Visit #: 8   Insurance Allowable Auth Needed   BOMN []Yes    [x]No    Insurance: Payor: [employer] MEDICARE / Plan: ANTHEM MEDIBLUE ESSENTIAL/PLUS / Product Type: *No Product type* /   Insurance ID: [id number] - (Medicare Managed)  Secondary Insurance (if applicable):    Treatment Diagnosis:     ICD-10-CM    1. Atrophy of quadriceps femoris muscle  M62.559       2. Weakness of left quadriceps muscle  M62.81       3. Weakness of left hip  R29.898       4. Gait instability  R26.81          Medical Diagnosis:  Tear of medial meniscus of left knee, current, unspecified tear type, initial encounter [S83.242A]   Referring Physician: Ar Evans MD  PCP: Og Bower MD     Plan of care signed (Y/N): Y    Date of Patient follow up with Physician: PRN     Plan of Care Report: NO  POC update due: (10 visits /OR AUTH LIMITS, whichever is less)  2024                                             Medical History:  Comorbidities:  Cancer/Tumor  Hypertension  Osteoporosis/Osteopenia  Depression  Relevant Medical History: Right knee ORIF ()                                         Precautions/ Contra-indications:           Latex allergy:  NO  Pacemaker:    NO  Contraindications for Manipulation: None and unhealthy/ multiple comorbidities   Date of Surgery: 24 - Medial and Lateral partial Menisectomy    Red Flags:  None    Suicide Screening:   The patient did not verbalize a primary behavioral concern, suicidal ideation, suicidal intent, or demonstrate suicidal behaviors.    Preferred Language for Healthcare:   [x] English       [] other:    SUBJECTIVE EXAMINATION     Patient stated complaint: Patient

## 2024-12-30 ENCOUNTER — HOSPITAL ENCOUNTER (OUTPATIENT)
Dept: PHYSICAL THERAPY | Age: 74
Setting detail: THERAPIES SERIES
Discharge: HOME | End: 2024-12-30
Payer: MEDICARE

## 2024-12-30 PROCEDURE — 20560 NDL INSJ W/O NJX 1 OR 2 MUSC: CPT

## 2024-12-30 PROCEDURE — 97032 APPL MODALITY 1+ESTIM EA 15: CPT

## 2024-12-30 PROCEDURE — 97112 NEUROMUSCULAR REEDUCATION: CPT

## 2024-12-30 PROCEDURE — 97110 THERAPEUTIC EXERCISES: CPT

## 2024-12-30 NOTE — FLOWSHEET NOTE
loss of range of motion, maintain or improve muscular strength or increase flexibility, following either an injury or surgery.   (02361) MANUAL THERAPY -  Manual therapy techniques, 1 or more regions, each 15 minutes (Mobilization/manipulation, manual lymphatic drainage, manual traction) for the purpose of modulating pain, promoting relaxation,  increasing ROM, reducing/eliminating soft tissue swelling/inflammation/restriction, improving soft tissue extensibility and allowing for proper ROM for normal function with self care, mobility, lifting and ambulation    GOALS     Therapist goals for Patient:   Short Term Goals: To be achieved in: 2-4 weeks  1. Independent in HEP and progression per patient tolerance, in order to prevent re-injury.   [] Progressing: [x] Met: [] Not Met: [] Adjusted  2. Patient will have a decrease in pain to <2/10 to facilitate improvement in movement, function, and ADLs as indicated by Functional Deficits.  [] Progressing: [x] Met: [] Not Met: [] Adjusted    Long Term Goals: To be achieved in: 8-10 weeks  1. Disability index score of 15% or less for the WOMAC to assist with reaching prior level of function with activities such as working a full day of work.  [x] Progressing: [] Met: [] Not Met: [] Adjusted  2. Patient will demonstrate symmetrical knee flexion to be able to squat down to the commode.   [x] Progressing: [] Met: [] Not Met: [] Adjusted  3. Patient will demonstrate increased Strength of quads to within 5lb with HHD of contralateral limb to allow for proper functional mobility to enable patient to return to kneeling down.   [x] Progressing: [] Met: [] Not Met: [] Adjusted  4. Patient will return to walking 1 mile without increased symptoms or restriction.   [x] Progressing: [] Met: [] Not Met: [] Adjusted  5. Patient will demonstrate decreased edema of midpatellar to 41.5 cm to allow for proper functional mobility and muscle recruitment to enable patient to return to safely

## 2025-01-03 ENCOUNTER — HOSPITAL ENCOUNTER (OUTPATIENT)
Dept: PHYSICAL THERAPY | Age: 75
Setting detail: THERAPIES SERIES
Discharge: HOME | End: 2025-01-03
Payer: MEDICARE

## 2025-01-03 ENCOUNTER — APPOINTMENT (OUTPATIENT)
Dept: PHYSICAL THERAPY | Age: 75
End: 2025-01-03
Payer: MEDICARE

## 2025-01-03 PROCEDURE — 97112 NEUROMUSCULAR REEDUCATION: CPT

## 2025-01-03 PROCEDURE — 20560 NDL INSJ W/O NJX 1 OR 2 MUSC: CPT

## 2025-01-03 PROCEDURE — 97032 APPL MODALITY 1+ESTIM EA 15: CPT

## 2025-01-03 NOTE — PLAN OF CARE
care signed (Y/N): Y    Date of Patient follow up with Physician: PRN     Plan of Care Report: YES, Date Range for this report: 11/25/24 to 1/3/25  POC update due: (10 visits /OR AUTH LIMITS, whichever is less)  12/25/2024                                             Medical History:  Comorbidities:  Cancer/Tumor  Hypertension  Osteoporosis/Osteopenia  Depression  Relevant Medical History: Right knee ORIF (2023)                                         Precautions/ Contra-indications:           Latex allergy:  NO  Pacemaker:    NO  Contraindications for Manipulation: None and unhealthy/ multiple comorbidities   Date of Surgery: 11/1/24 - Medial and Lateral partial Menisectomy    Red Flags:  None    Suicide Screening:   The patient did not verbalize a primary behavioral concern, suicidal ideation, suicidal intent, or demonstrate suicidal behaviors.    Preferred Language for Healthcare:   [x] English       [] other:    SUBJECTIVE EXAMINATION     Patient stated complaint: Patient is feeling better. Still voicing issues with stairs, sit-stands and walking for prolonged periods.       Zakiya Charlton is a 74 y.o. female presenting today to outpatient PT with complaints of left knee pain following medial and lateral partial meniscectomy. Patient reports chronic left knee pain. States following up with Dr. Evans back in May which resulted in surgery on 11/1/24. States she was not initially instructed to go to PT but over the last 2 weeks has developed sharp, intense anterior joint line pain. Patient complaints of pain which limits her ability to walk, performs stairs, transfer on/off the toilet and perform household ADLs.       Test used Initial score  11/25/24 01/03/2025   Pain Summary VAS 0-7/10 5/10   Functional questionnaire WOMAC 34/96 23/96   Other:              OBJECTIVE EXAMINATION     Observation:  12/30: Continues to struggle maintaining TKE with SAQ, LAQ and SLR. Use of glute compensation.  12/26: Reduced quad

## 2025-01-06 ENCOUNTER — APPOINTMENT (OUTPATIENT)
Dept: PHYSICAL THERAPY | Age: 75
End: 2025-01-06
Payer: MEDICARE

## 2025-01-08 ENCOUNTER — HOSPITAL ENCOUNTER (OUTPATIENT)
Dept: PHYSICAL THERAPY | Age: 75
Setting detail: THERAPIES SERIES
Discharge: HOME | End: 2025-01-08
Payer: MEDICARE

## 2025-01-08 PROCEDURE — 97140 MANUAL THERAPY 1/> REGIONS: CPT

## 2025-01-08 PROCEDURE — 97112 NEUROMUSCULAR REEDUCATION: CPT

## 2025-01-08 PROCEDURE — 97110 THERAPEUTIC EXERCISES: CPT

## 2025-01-08 NOTE — PLAN OF CARE
3       Total Treatment Minutes 40        Charge Justification:  (65759) THERAPEUTIC EXERCISE - Provided verbal/tactile cueing for activities related to strengthening, flexibility, endurance, ROM performed to prevent loss of range of motion, maintain or improve muscular strength or increase flexibility, following either an injury or surgery.   (20168) MANUAL THERAPY -  Manual therapy techniques, 1 or more regions, each 15 minutes (Mobilization/manipulation, manual lymphatic drainage, manual traction) for the purpose of modulating pain, promoting relaxation,  increasing ROM, reducing/eliminating soft tissue swelling/inflammation/restriction, improving soft tissue extensibility and allowing for proper ROM for normal function with self care, mobility, lifting and ambulation    GOALS     Therapist goals for Patient:   Short Term Goals: To be achieved in: 2-4 weeks  1. Independent in HEP and progression per patient tolerance, in order to prevent re-injury.   [] Progressing: [x] Met: [] Not Met: [] Adjusted  2. Patient will have a decrease in pain to <2/10 to facilitate improvement in movement, function, and ADLs as indicated by Functional Deficits.  [] Progressing: [x] Met: [] Not Met: [] Adjusted    Long Term Goals: To be achieved in: 8-10 weeks  1. Disability index score of 15% or less for the WOMAC to assist with reaching prior level of function with activities such as working a full day of work.  [x] Progressing: [] Met: [] Not Met: [] Adjusted  2. Patient will demonstrate symmetrical knee flexion to be able to squat down to the commode.   [] Progressing: [x] Met: [] Not Met: [] Adjusted  3. Patient will demonstrate increased Strength of quads to within 5lb with HHD of contralateral limb to allow for proper functional mobility to enable patient to return to kneeling down.   [x] Progressing: [] Met: [] Not Met: [] Adjusted  4. Patient will return to walking 1 mile without increased symptoms or restriction.   [x]

## 2025-01-13 ENCOUNTER — HOSPITAL ENCOUNTER (OUTPATIENT)
Dept: PHYSICAL THERAPY | Age: 75
Setting detail: THERAPIES SERIES
Discharge: HOME | End: 2025-01-13
Payer: MEDICARE

## 2025-01-13 PROCEDURE — 97110 THERAPEUTIC EXERCISES: CPT

## 2025-01-13 PROCEDURE — 97112 NEUROMUSCULAR REEDUCATION: CPT

## 2025-01-13 PROCEDURE — 97530 THERAPEUTIC ACTIVITIES: CPT

## 2025-01-13 PROCEDURE — 97016 VASOPNEUMATIC DEVICE THERAPY: CPT

## 2025-01-13 NOTE — FLOWSHEET NOTE
[facility] - Outpatient Rehabilitation and Therapy [address], [place] [address] office: [phone] fax: [phone]    Physical Therapy: TREATMENT/PROGRESS NOTE   Patient: Zakiya Charlton (74 y.o. female)   Examination Date: 2025   :  1950 MRN: [id number]   Visit #: 12   Insurance Allowable Auth Needed   BOMN []Yes    [x]No    Insurance: Payor: [employer] MEDICARE / Plan: ANTHEM MEDIBLUE ESSENTIAL/PLUS / Product Type: *No Product type* /   Insurance ID: [id number] - (Medicare Managed)  Secondary Insurance (if applicable):    Treatment Diagnosis:     ICD-10-CM    1. Atrophy of quadriceps femoris muscle  M62.559       2. Weakness of left quadriceps muscle  M62.81       3. Weakness of left hip  R29.898       4. Gait instability  R26.81          Medical Diagnosis:  Tear of medial meniscus of left knee, current, unspecified tear type, initial encounter [S83.242A]   Referring Physician: Ar Evans MD  PCP: Og Bower MD     Plan of care signed (Y/N): Y    Date of Patient follow up with Physician: PRN     Plan of Care Report: NO  POC update due: (10 visits /OR AUTH LIMITS, whichever is less)  2/3/25                                             Medical History:  Comorbidities:  Cancer/Tumor  Hypertension  Osteoporosis/Osteopenia  Depression  Relevant Medical History: Right knee ORIF ()                                         Precautions/ Contra-indications:           Latex allergy:  NO  Pacemaker:    NO  Contraindications for Manipulation: None and unhealthy/ multiple comorbidities   Date of Surgery: 24 - Medial and Lateral partial Menisectomy    Red Flags:  None    Suicide Screening:   The patient did not verbalize a primary behavioral concern, suicidal ideation, suicidal intent, or demonstrate suicidal behaviors.    Preferred Language for Healthcare:   [x] English       [] other:    SUBJECTIVE EXAMINATION     Patient stated complaint: Patient reports

## 2025-01-16 ENCOUNTER — HOSPITAL ENCOUNTER (OUTPATIENT)
Dept: PHYSICAL THERAPY | Age: 75
Setting detail: THERAPIES SERIES
Discharge: HOME | End: 2025-01-16
Payer: MEDICARE

## 2025-01-16 ENCOUNTER — OFFICE VISIT (OUTPATIENT)
Dept: ORTHOPEDIC SURGERY | Age: 75
End: 2025-01-16
Payer: MEDICARE

## 2025-01-16 VITALS — HEIGHT: 65 IN | BODY MASS INDEX: 27.99 KG/M2 | WEIGHT: 168 LBS

## 2025-01-16 DIAGNOSIS — M25.562 LEFT KNEE PAIN, UNSPECIFIED CHRONICITY: Primary | ICD-10-CM

## 2025-01-16 PROCEDURE — 97016 VASOPNEUMATIC DEVICE THERAPY: CPT

## 2025-01-16 PROCEDURE — 99024 POSTOP FOLLOW-UP VISIT: CPT | Performed by: ORTHOPAEDIC SURGERY

## 2025-01-16 PROCEDURE — 20610 DRAIN/INJ JOINT/BURSA W/O US: CPT | Performed by: ORTHOPAEDIC SURGERY

## 2025-01-16 PROCEDURE — 97112 NEUROMUSCULAR REEDUCATION: CPT

## 2025-01-16 RX ORDER — BUPIVACAINE HYDROCHLORIDE 5 MG/ML
4 INJECTION, SOLUTION PERINEURAL ONCE
Status: COMPLETED | OUTPATIENT
Start: 2025-01-16 | End: 2025-01-16

## 2025-01-16 RX ORDER — METHYLPREDNISOLONE ACETATE 40 MG/ML
40 INJECTION, SUSPENSION INTRA-ARTICULAR; INTRALESIONAL; INTRAMUSCULAR; SOFT TISSUE ONCE
Status: COMPLETED | OUTPATIENT
Start: 2025-01-16 | End: 2025-01-16

## 2025-01-16 RX ADMIN — BUPIVACAINE HYDROCHLORIDE 20 MG: 5 INJECTION, SOLUTION PERINEURAL at 10:36

## 2025-01-16 RX ADMIN — METHYLPREDNISOLONE ACETATE 40 MG: 40 INJECTION, SUSPENSION INTRA-ARTICULAR; INTRALESIONAL; INTRAMUSCULAR; SOFT TISSUE at 10:36

## 2025-01-16 NOTE — FLOWSHEET NOTE
possible courses of treatment which includes but is not limited to progressive resistive exercise, dry needling, stabilization exercises and manual/manipulation therapy. Discussed activity modification, while providing explanation in regards to anatomical structures involved, healing time frames and relevant joint mechanics. Provided patient time for questions with answers provided when possible.       Home Exercise Program:   Access Code: [email or web address]  URL: [email or web address]  Date: 11/25/2024  Prepared by: Cliff Pollack    Exercises  - Supine Heel Slide with Strap  - 1 x daily - 10 reps - 10 seconds hold  - Active Straight Leg Raise with Quad Set  - 1 x daily - 2 sets - 15 reps  - Seated Long Arc Quad with Ankle Weight  - 1 x daily - 3 sets - 15 reps  - Prone Hamstring Curl with Ankle Weight  - 1 x daily - 3 sets - 10 reps    ASSESSMENT       Today's Assessment: Held on majority of exercise as patient received steroid injection earlier this date. Did take today's time to use NMES to further improve quad activation. No significant challenged presented today.        Medical Necessity Documentation:  I certify that this patient meets the below criteria necessary for medical necessity for care and/or justification of therapy services:  The patient has functional impairments and/or activity limitations and would benefit from continued outpatient therapy services to address the deficits outlined in the patients goals    Return to Play: NA    Prognosis for POC: [x] Good [] Fair  [] Poor    Patient requires continued skilled intervention: [x] Yes  [] No      CHARGE CAPTURE     PT CHARGE GRID   CPT Code (TIMED) minutes # CPT Code (UNTIMED) #     Therex (83687)  5 1  EVAL:LOW (25272 - Typically 20 minutes face-to-face)     Neuromusc. Re-ed (36133) 20 1  Re-Eval (07450)     Manual (85050)    Estim Unattended (47415)     Ther. Act (22001)    [facility] Traction (26178)     Gait (38576)    Dry Needle 1-2 muscle (92208)

## 2025-01-20 ENCOUNTER — HOSPITAL ENCOUNTER (OUTPATIENT)
Dept: PHYSICAL THERAPY | Age: 75
Setting detail: THERAPIES SERIES
Discharge: HOME | End: 2025-01-20
Payer: MEDICARE

## 2025-01-20 PROCEDURE — 97110 THERAPEUTIC EXERCISES: CPT

## 2025-01-20 PROCEDURE — 97112 NEUROMUSCULAR REEDUCATION: CPT

## 2025-01-20 NOTE — FLOWSHEET NOTE
Met: [] Not Met: [] Adjusted  5. Patient will demonstrate decreased edema of midpatellar to 41.5 cm to allow for proper functional mobility and muscle recruitment to enable patient to return to safely perform stairs.   [x] Progressing: [] Met: [] Not Met: [] Adjusted       Overall Progression Towards Functional goals/ Treatment Progress Update:  [x] Patient is progressing as expected towards functional goals listed.    [] Progression is slowed due to complexities/Impairments listed.  [] Progression has been slowed due to co-morbidities.  [] Plan just implemented, too soon (<30days) to assess goals progression   [] Goals require adjustment due to lack of progress  [] Patient is not progressing as expected and requires additional follow up with physician  [] Other:     TREATMENT PLAN     Frequency/Duration: 1-2x/week for 8-10 weeks for the following treatment interventions:    Interventions:  Therapeutic Exercise (19978) including: strength training, ROM, and functional mobility  Therapeutic Activities (68149) including: functional mobility training and education.  Neuromuscular Re-education (99492) activation and proprioception, including postural re-education.    Manual Therapy (22854) as indicated to include: Passive Range of Motion, Gr I-IV mobilizations, Grade V Manipulation, Soft Tissue Mobilization, Dry Needling/IASTM, Trigger Point Release, and Myofascial Release  Modalities as needed that may include: Cryotherapy, Electrical Stimulation, and Vasoneumatic Compression  Patient education on joint protection, postural re-education, activity modification, and progression of HEP    Plan: Continue to work on improving stair function and transfers.     Electronically Signed by Cliff Pollack PT  Date: 01/20/2025     Note: Portions of this note have been templated and/or copied from initial evaluation, reassessments and prior notes for documentation efficiency.    Note: If patient does not return for

## 2025-01-23 ENCOUNTER — HOSPITAL ENCOUNTER (OUTPATIENT)
Dept: PHYSICAL THERAPY | Age: 75
Setting detail: THERAPIES SERIES
Discharge: HOME | End: 2025-01-23
Payer: MEDICARE

## 2025-01-23 PROCEDURE — 97110 THERAPEUTIC EXERCISES: CPT

## 2025-01-23 PROCEDURE — 97530 THERAPEUTIC ACTIVITIES: CPT

## 2025-01-23 NOTE — FLOWSHEET NOTE
[facility] - Outpatient Rehabilitation and Therapy [address], [place] [address] office: [phone] fax: [phone]    Physical Therapy: TREATMENT/PROGRESS NOTE   Patient: Zakiya Charlton (74 y.o. female)   Examination Date: 2025   :  1950 MRN: [id number]   Visit #: 15   Insurance Allowable Auth Needed   BOMN []Yes    [x]No    Insurance: Payor: [employer] MEDICARE / Plan: ANTHEM MEDIBLUE ESSENTIAL/PLUS / Product Type: *No Product type* /   Insurance ID: [id number] - (Medicare Managed)  Secondary Insurance (if applicable):    Treatment Diagnosis:     ICD-10-CM    1. Atrophy of quadriceps femoris muscle  M62.559       2. Weakness of left quadriceps muscle  M62.81       3. Weakness of left hip  R29.898       4. Gait instability  R26.81          Medical Diagnosis:  Tear of medial meniscus of left knee, current, unspecified tear type, initial encounter [S83.242A]   Referring Physician: Ar Evans MD  PCP: Og Bower MD     Plan of care signed (Y/N): Y    Date of Patient follow up with Physician: PRN     Plan of Care Report: NO  POC update due: (10 visits /OR AUTH LIMITS, whichever is less)  2/3/25                                             Medical History:  Comorbidities:  Cancer/Tumor  Hypertension  Osteoporosis/Osteopenia  Depression  Relevant Medical History: Right knee ORIF ()                                         Precautions/ Contra-indications:           Latex allergy:  NO  Pacemaker:    NO  Contraindications for Manipulation: None and unhealthy/ multiple comorbidities   Date of Surgery: 24 - Medial and Lateral partial Menisectomy    Red Flags:  None    Suicide Screening:   The patient did not verbalize a primary behavioral concern, suicidal ideation, suicidal intent, or demonstrate suicidal behaviors.    Preferred Language for Healthcare:   [x] English       [] other:    SUBJECTIVE EXAMINATION     Patient stated complaint: Patient reports

## 2025-01-27 ENCOUNTER — HOSPITAL ENCOUNTER (OUTPATIENT)
Dept: PHYSICAL THERAPY | Age: 75
Setting detail: THERAPIES SERIES
Discharge: HOME | End: 2025-01-27
Payer: MEDICARE

## 2025-01-27 PROCEDURE — 97530 THERAPEUTIC ACTIVITIES: CPT

## 2025-01-27 PROCEDURE — 97110 THERAPEUTIC EXERCISES: CPT

## 2025-01-27 NOTE — PROGRESS NOTES
1/16/2025     Reason for visit:  Status post left knee arthroscopy with partial medial and lateral meniscectomy on 11/1/2024    History of Present Illness:  Patient presents for postop evaluation.  Overall she is doing fairly well.  She does report continued improvements of the knee but still has occasional soreness and catching sensation.    Objective:  Ht 1.651 m (5' 5\")   Wt 76.2 kg (168 lb)   BMI 27.96 kg/m²      Physical Exam:  The patient is well-appearing and in no apparent distress  Examination of the left knee   There is a small effusion, no gross deformity or skin changes  Range of motion reveals 0 to 120  Neg lachman, negative posterior drawer, no pain or laxity with varus or valgus stress at 0 degrees and 30 degrees of flexion  + medial joint line tenderness  5 out of 5 strength throughout distal muscle groups  Sensation is intact to light touch throughout all distributions  There is no calf swelling or tenderness  Palpable DP pulse, brisk cap refill, 2+ symmetric reflexes       Assessment:  Status post left knee arthroscopy with partial medial and lateral meniscectomy on 11/1/2024    Plan:  The patient elected proceed with cortisone injection.  Therefore injection was given to the left knee via sterile technique.  The injection consisted of 40 mg of Depo-Medrol combined with 4 mL of 0.5% Marcaine.  The patient tolerated this well.  Patient will follow-up in 6 weeks for repeat evaluation.                 Ar Evans MD            Orthopaedic Surgery Sports Medicine and Arthroscopy            [facility]            Team Physician [facility] ([place])      Disclaimer:  This note was dictated with voice recognition software.  Though review and correction are routine, we apologize for any errors.

## 2025-01-27 NOTE — FLOWSHEET NOTE
[facility] - Outpatient Rehabilitation and Therapy [address], [place] [address] office: [phone] fax: [phone]    Physical Therapy: TREATMENT/PROGRESS NOTE   Patient: Zakiya Charlton (74 y.o. female)   Examination Date: 2025   :  1950 MRN: [id number]   Visit #: 16   Insurance Allowable Auth Needed   BOMN []Yes    [x]No    Insurance: Payor: [employer] MEDICARE / Plan: ANTHEM MEDIBLUE ESSENTIAL/PLUS / Product Type: *No Product type* /   Insurance ID: [id number] - (Medicare Managed)  Secondary Insurance (if applicable):    Treatment Diagnosis:     ICD-10-CM    1. Atrophy of quadriceps femoris muscle  M62.559       2. Weakness of left quadriceps muscle  M62.81       3. Weakness of left hip  R29.898       4. Gait instability  R26.81          Medical Diagnosis:  Tear of medial meniscus of left knee, current, unspecified tear type, initial encounter [S83.242A]   Referring Physician: Ar Evans MD  PCP: Og Bower MD     Plan of care signed (Y/N): Y    Date of Patient follow up with Physician: PRN     Plan of Care Report: NO  POC update due: (10 visits /OR AUTH LIMITS, whichever is less)  2/3/25                                             Medical History:  Comorbidities:  Cancer/Tumor  Hypertension  Osteoporosis/Osteopenia  Depression  Relevant Medical History: Right knee ORIF ()                                         Precautions/ Contra-indications:           Latex allergy:  NO  Pacemaker:    NO  Contraindications for Manipulation: None and unhealthy/ multiple comorbidities   Date of Surgery: 24 - Medial and Lateral partial Menisectomy    Red Flags:  None    Suicide Screening:   The patient did not verbalize a primary behavioral concern, suicidal ideation, suicidal intent, or demonstrate suicidal behaviors.    Preferred Language for Healthcare:   [x] English       [] other:    SUBJECTIVE EXAMINATION     Patient stated complaint: \"I feel pretty

## 2025-01-30 ENCOUNTER — HOSPITAL ENCOUNTER (OUTPATIENT)
Dept: PHYSICAL THERAPY | Age: 75
Setting detail: THERAPIES SERIES
Discharge: HOME | End: 2025-01-30
Payer: MEDICARE

## 2025-01-30 PROCEDURE — 97110 THERAPEUTIC EXERCISES: CPT

## 2025-01-30 NOTE — FLOWSHEET NOTE
[facility] - Outpatient Rehabilitation and Therapy [address], [place] [address] office: [phone] fax: [phone]    Physical Therapy: TREATMENT/PROGRESS NOTE   Patient: Zakiya Charlton (74 y.o. female)   Examination Date: 2025   :  1950 MRN: [id number]   Visit #: 17   Insurance Allowable Auth Needed   BOMN []Yes    [x]No    Insurance: Payor: [employer] MEDIC[employer] / Plan: ANTHEM MEDIBLUE ESSENTIAL/PLUS / Product Type: *No Product type* /   Insurance ID: [id number] - (Medicare Managed)  Secondary Insurance (if applicable):    Treatment Diagnosis:     ICD-10-CM    1. Atrophy of quadriceps femoris muscle  M62.559       2. Weakness of left quadriceps muscle  M62.81       3. Weakness of left hip  R29.898       4. Gait instability  R26.81          Medical Diagnosis:  Tear of medial meniscus of left knee, current, unspecified tear type, initial encounter [S83.242A]   Referring Physician: Ar Evans MD  PCP: Og Bower MD     Plan of care signed (Y/N): Y    Date of Patient follow up with Physician: PRN     Plan of Care Report: NO  POC update due: (10 visits /OR AUTH LIMITS, whichever is less)  2/3/25                                             Medical History:  Comorbidities:  Cancer/Tumor  Hypertension  Osteoporosis/Osteopenia  Depression  Relevant Medical History: Right knee ORIF ()                                         Precautions/ Contra-indications:           Latex allergy:  NO  Pacemaker:    NO  Contraindications for Manipulation: None and unhealthy/ multiple comorbidities   Date of Surgery: 24 - Medial and Lateral partial Menisectomy    Red Flags:  None    Suicide Screening:   The patient did not verbalize a primary behavioral concern, suicidal ideation, suicidal intent, or demonstrate suicidal behaviors.    Preferred Language for Healthcare:   [x] English       [] other:    SUBJECTIVE EXAMINATION     Patient stated complaint: Patient

## 2025-02-05 ENCOUNTER — HOSPITAL ENCOUNTER (OUTPATIENT)
Dept: PHYSICAL THERAPY | Age: 75
Setting detail: THERAPIES SERIES
Discharge: HOME | End: 2025-02-05
Payer: MEDICARE

## 2025-02-05 PROCEDURE — 97140 MANUAL THERAPY 1/> REGIONS: CPT

## 2025-02-05 PROCEDURE — 97110 THERAPEUTIC EXERCISES: CPT

## 2025-02-05 PROCEDURE — 97530 THERAPEUTIC ACTIVITIES: CPT

## 2025-02-05 NOTE — FLOWSHEET NOTE
(92285)     Gait (72955)    Dry Needle 1-2 muscle (64827)     Aquatic Therex (87339)    Dry Needle 3+ muscle (20389)     Iontophoresis (20158)    VASO (73229)     Ultrasound (45026)    Group Therapy (73497)     Estim Attended (28145)    Canalith Repositioning (87605)     Physical Performance Test (88612)    Custom orthotic ()     Other:    Other:    Total Timed Code Tx Minutes 54 3       Total Treatment Minutes 54        Charge Justification:  (91194) THERAPEUTIC EXERCISE - Provided verbal/tactile cueing for activities related to strengthening, flexibility, endurance, ROM performed to prevent loss of range of motion, maintain or improve muscular strength or increase flexibility, following either an injury or surgery.   (40109) MANUAL THERAPY -  Manual therapy techniques, 1 or more regions, each 15 minutes (Mobilization/manipulation, manual lymphatic drainage, manual traction) for the purpose of modulating pain, promoting relaxation,  increasing ROM, reducing/eliminating soft tissue swelling/inflammation/restriction, improving soft tissue extensibility and allowing for proper ROM for normal function with self care, mobility, lifting and ambulation    GOALS     Therapist goals for Patient:   Short Term Goals: To be achieved in: 2-4 weeks  1. Independent in HEP and progression per patient tolerance, in order to prevent re-injury.   [] Progressing: [x] Met: [] Not Met: [] Adjusted  2. Patient will have a decrease in pain to <2/10 to facilitate improvement in movement, function, and ADLs as indicated by Functional Deficits.  [] Progressing: [x] Met: [] Not Met: [] Adjusted    Long Term Goals: To be achieved in: 8-10 weeks  1. Disability index score of 15% or less for the WOMAC to assist with reaching prior level of function with activities such as working a full day of work.  [x] Progressing: [] Met: [] Not Met: [] Adjusted  2. Patient will demonstrate symmetrical knee flexion to be able to squat down to the

## 2025-02-07 ENCOUNTER — HOSPITAL ENCOUNTER (OUTPATIENT)
Dept: PHYSICAL THERAPY | Age: 75
Setting detail: THERAPIES SERIES
Discharge: HOME | End: 2025-02-07
Payer: MEDICARE

## 2025-02-07 PROCEDURE — 97110 THERAPEUTIC EXERCISES: CPT

## 2025-02-07 NOTE — FLOWSHEET NOTE
[facility] - Outpatient Rehabilitation and Therapy [address], [place] [address] office: [phone] fax: [phone]    Physical Therapy: TREATMENT/PROGRESS NOTE   Patient: Zakiya Charlton (74 y.o. female)   Examination Date: 2025   :  1950 MRN: [id number]   Visit #: 19   Insurance Allowable Auth Needed   BOMN []Yes    [x]No    Insurance: Payor: [employer] MEDICARE / Plan: ANTHEM MEDIBLUE ESSENTIAL/PLUS / Product Type: *No Product type* /   Insurance ID: [id number] - (Medicare Managed)  Secondary Insurance (if applicable):    Treatment Diagnosis:     ICD-10-CM    1. Atrophy of quadriceps femoris muscle  M62.559       2. Weakness of left quadriceps muscle  M62.81       3. Weakness of left hip  R29.898       4. Gait instability  R26.81          Medical Diagnosis:  Tear of medial meniscus of left knee, current, unspecified tear type, initial encounter [S83.242A]   Referring Physician: Ar Evans MD  PCP: Og Bower MD     Plan of care signed (Y/N): Y    Date of Patient follow up with Physician: PRN     Plan of Care Report: NO  POC update due: (10 visits /OR AUTH LIMITS, whichever is less)  2/3/25                                             Medical History:  Comorbidities:  Cancer/Tumor  Hypertension  Osteoporosis/Osteopenia  Depression  Relevant Medical History: Right knee ORIF ()                                         Precautions/ Contra-indications:           Latex allergy:  NO  Pacemaker:    NO  Contraindications for Manipulation: None and unhealthy/ multiple comorbidities   Date of Surgery: 24 - Medial and Lateral partial Menisectomy    Red Flags:  None    Suicide Screening:   The patient did not verbalize a primary behavioral concern, suicidal ideation, suicidal intent, or demonstrate suicidal behaviors.    Preferred Language for Healthcare:   [x] English       [] other:    SUBJECTIVE EXAMINATION     Patient stated complaint: Patient reports

## 2025-02-10 ENCOUNTER — HOSPITAL ENCOUNTER (OUTPATIENT)
Dept: PHYSICAL THERAPY | Age: 75
Setting detail: THERAPIES SERIES
Discharge: HOME | End: 2025-02-10
Payer: MEDICARE

## 2025-02-10 PROCEDURE — 97110 THERAPEUTIC EXERCISES: CPT

## 2025-02-10 PROCEDURE — 97530 THERAPEUTIC ACTIVITIES: CPT

## 2025-02-10 NOTE — PLAN OF CARE
surgery. Quad set is still not symmetrical and appears weaker on the left. Stair and squatting are still UE dominated but improving. Patient remains fit candidate for skilled care as frequent cueing or exercise modification has been necessary to patients progression, pain management and overall post operative success.        Medical Necessity Documentation:  I certify that this patient meets the below criteria necessary for medical necessity for care and/or justification of therapy services:  The patient has functional impairments and/or activity limitations and would benefit from continued outpatient therapy services to address the deficits outlined in the patients goals    Return to Play: NA    Prognosis for POC: [x] Good [] Fair  [] Poor    Patient requires continued skilled intervention: [x] Yes  [] No      CHARGE CAPTURE     PT CHARGE GRID   CPT Code (TIMED) minutes # CPT Code (UNTIMED) #     Therex (52731)  20 1  EVAL:LOW (44940 - Typically 20 minutes face-to-face)     Neuromusc. Re-ed (62467)    Re-Eval (93209)     Manual (43770)    Estim Unattended (10154)     Ther. Act (00312) 25 2  [facility] (48771)     Gait (45168)    Dry Needle 1-2 muscle (40320)     Aquatic Therex (63885)    Dry Needle 3+ muscle (46021)     Iontophoresis (08641)    VASO (17592)     Ultrasound (29006)    Group Therapy (89754)     Estim Attended (70458)    Canalith Repositioning (65037)     Physical Performance Test (19876)    Custom orthotic ()     Other:    Other:    Total Timed Code Tx Minutes 25 2       Total Treatment Minutes 25        Charge Justification:  (28575) THERAPEUTIC EXERCISE - Provided verbal/tactile cueing for activities related to strengthening, flexibility, endurance, ROM performed to prevent loss of range of motion, maintain or improve muscular strength or increase flexibility, following either an injury or surgery.     GOALS     Therapist goals for Patient:   Short Term Goals: To be achieved in: 2-4

## 2025-02-12 ENCOUNTER — APPOINTMENT (OUTPATIENT)
Dept: PHYSICAL THERAPY | Age: 75
End: 2025-02-12
Payer: MEDICARE

## 2025-02-14 ENCOUNTER — HOSPITAL ENCOUNTER (OUTPATIENT)
Dept: PHYSICAL THERAPY | Age: 75
Setting detail: THERAPIES SERIES
Discharge: HOME | End: 2025-02-14
Payer: MEDICARE

## 2025-02-14 PROCEDURE — 97110 THERAPEUTIC EXERCISES: CPT

## 2025-02-14 PROCEDURE — 97530 THERAPEUTIC ACTIVITIES: CPT

## 2025-02-14 NOTE — FLOWSHEET NOTE
[facility] - Outpatient Rehabilitation and Therapy [address], [place] [address] office: [phone] fax: [phone]      Physical Therapy: TREATMENT/PROGRESS NOTE   Patient: Zakiya Charlton (74 y.o. female)   Examination Date: 2025   :  1950 MRN: [id number]   Visit #: 21   Insurance Allowable Auth Needed   BOMN []Yes    [x]No    Insurance: Payor: [employer] MEDICARE / Plan: ANTHEM MEDIBLUE ESSENTIAL/PLUS / Product Type: *No Product type* /   Insurance ID: [id number] - (Medicare Managed)  Secondary Insurance (if applicable):    Treatment Diagnosis:     ICD-10-CM    1. Atrophy of quadriceps femoris muscle  M62.559       2. Weakness of left quadriceps muscle  M62.81       3. Weakness of left hip  R29.898       4. Gait instability  R26.81          Medical Diagnosis:  Tear of medial meniscus of left knee, current, unspecified tear type, initial encounter [S83.242A]   Referring Physician: Ar Evans MD  PCP: Og Bower MD     Plan of care signed (Y/N): Y    Date of Patient follow up with Physician: PRN     Plan of Care Report: NO  POC update due: (10 visits /OR AUTH LIMITS, whichever is less)  3/10/25                                             Medical History:  Comorbidities:  Cancer/Tumor  Hypertension  Osteoporosis/Osteopenia  Depression  Relevant Medical History: Right knee ORIF ()                                         Precautions/ Contra-indications:           Latex allergy:  NO  Pacemaker:    NO  Contraindications for Manipulation: None and unhealthy/ multiple comorbidities   Date of Surgery: 24 - Medial and Lateral partial Menisectomy    Red Flags:  None    Suicide Screening:   The patient did not verbalize a primary behavioral concern, suicidal ideation, suicidal intent, or demonstrate suicidal behaviors.    Preferred Language for Healthcare:   [x] English       [] other:    SUBJECTIVE EXAMINATION     Patient stated complaint: Patient states

## 2025-02-17 ENCOUNTER — HOSPITAL ENCOUNTER (OUTPATIENT)
Dept: PHYSICAL THERAPY | Age: 75
Setting detail: THERAPIES SERIES
Discharge: HOME | End: 2025-02-17
Payer: MEDICARE

## 2025-02-17 PROCEDURE — 97110 THERAPEUTIC EXERCISES: CPT

## 2025-02-17 PROCEDURE — 97530 THERAPEUTIC ACTIVITIES: CPT

## 2025-02-17 NOTE — FLOWSHEET NOTE
[facility] - Outpatient Rehabilitation and Therapy [address], [place] [address] office: [phone] fax: [phone]      Physical Therapy: TREATMENT/PROGRESS NOTE   Patient: Zakiya Charlton (74 y.o. female)   Examination Date: 2025   :  1950 MRN: [id number]   Visit #: 22   Insurance Allowable Auth Needed   BOMN []Yes    [x]No    Insurance: Payor: [employer] MEDICARE / Plan: ANTHEM MEDIBLUE ESSENTIAL/PLUS / Product Type: *No Product type* /   Insurance ID: [id number] - (Medicare Managed)  Secondary Insurance (if applicable):    Treatment Diagnosis:     ICD-10-CM    1. Atrophy of quadriceps femoris muscle  M62.559       2. Weakness of left quadriceps muscle  M62.81       3. Weakness of left hip  R29.898       4. Gait instability  R26.81          Medical Diagnosis:  Tear of medial meniscus of left knee, current, unspecified tear type, initial encounter [S83.242A]   Referring Physician: Ar Evans MD  PCP: Og Bower MD     Plan of care signed (Y/N): Y    Date of Patient follow up with Physician: PRN     Plan of Care Report: NO  POC update due: (10 visits /OR AUTH LIMITS, whichever is less)  3/10/25                                             Medical History:  Comorbidities:  Cancer/Tumor  Hypertension  Osteoporosis/Osteopenia  Depression  Relevant Medical History: Right knee ORIF ()                                         Precautions/ Contra-indications:           Latex allergy:  NO  Pacemaker:    NO  Contraindications for Manipulation: None and unhealthy/ multiple comorbidities   Date of Surgery: 24 - Medial and Lateral partial Menisectomy    Red Flags:  None    Suicide Screening:   The patient did not verbalize a primary behavioral concern, suicidal ideation, suicidal intent, or demonstrate suicidal behaviors.    Preferred Language for Healthcare:   [x] English       [] other:    SUBJECTIVE EXAMINATION     Patient stated complaint: Patient

## 2025-02-20 ENCOUNTER — HOSPITAL ENCOUNTER (OUTPATIENT)
Dept: PHYSICAL THERAPY | Age: 75
Setting detail: THERAPIES SERIES
Discharge: HOME | End: 2025-02-20
Payer: MEDICARE

## 2025-02-20 PROCEDURE — 97112 NEUROMUSCULAR REEDUCATION: CPT

## 2025-02-20 PROCEDURE — 97110 THERAPEUTIC EXERCISES: CPT

## 2025-02-20 NOTE — FLOWSHEET NOTE
Strength of quads to within 5lb with HHD of contralateral limb to allow for proper functional mobility to enable patient to return to kneeling down.   [x] Progressing: [] Met: [] Not Met: [] Adjusted  4. Patient will return to walking 1 mile without increased symptoms or restriction.   [x] Progressing: [] Met: [] Not Met: [] Adjusted  5. Patient will demonstrate decreased edema of midpatellar to 41.5 cm to allow for proper functional mobility and muscle recruitment to enable patient to return to safely perform stairs.   [x] Progressing: [] Met: [] Not Met: [] Adjusted       Overall Progression Towards Functional goals/ Treatment Progress Update:  [x] Patient is progressing as expected towards functional goals listed.    [] Progression is slowed due to complexities/Impairments listed.  [] Progression has been slowed due to co-morbidities.  [] Plan just implemented, too soon (<30days) to assess goals progression   [] Goals require adjustment due to lack of progress  [] Patient is not progressing as expected and requires additional follow up with physician  [] Other:     TREATMENT PLAN     Frequency/Duration: 1-2x/week for 4-6 weeks for the following treatment interventions: (UPDATED 2/10/25)    Interventions:  Therapeutic Exercise (71612) including: strength training, ROM, and functional mobility  Therapeutic Activities (93562) including: functional mobility training and education.  Neuromuscular Re-education (25417) activation and proprioception, including postural re-education.    Manual Therapy (41106) as indicated to include: Passive Range of Motion, Gr I-IV mobilizations, Grade V Manipulation, Soft Tissue Mobilization, Dry Needling/IASTM, Trigger Point Release, and Myofascial Release  Modalities as needed that may include: Cryotherapy, Electrical Stimulation, and Vasoneumatic Compression  Patient education on joint protection, postural re-education, activity modification, and progression of HEP    Plan: Continue

## 2025-02-24 ENCOUNTER — APPOINTMENT (OUTPATIENT)
Dept: PHYSICAL THERAPY | Age: 75
End: 2025-02-24
Payer: MEDICARE

## 2025-02-26 ENCOUNTER — HOSPITAL ENCOUNTER (OUTPATIENT)
Dept: PHYSICAL THERAPY | Age: 75
Setting detail: THERAPIES SERIES
Discharge: HOME | End: 2025-02-26
Payer: MEDICARE

## 2025-02-26 PROCEDURE — 97530 THERAPEUTIC ACTIVITIES: CPT

## 2025-02-26 PROCEDURE — 97112 NEUROMUSCULAR REEDUCATION: CPT

## 2025-02-26 PROCEDURE — 97110 THERAPEUTIC EXERCISES: CPT

## 2025-02-26 NOTE — FLOWSHEET NOTE
resistance Sets/time Reps Notes/Cues/Progressions   Recumbent bike Level 8 3'  Then 3' HIIT 30\"/30\"   Heel slides  1' x3    Prone quad stretch  1' x3    Prone HS curls 7.5# 3 x15    SLS  30\" x5    LAQ 15# 3 x10    SLR (+)  3 x10 Leg lift over cone and back   SL hip abd 2# 3 x10    MH flex R: 45#  L: 45# 2 x10ea    MH abd 40# 3 x10 Bilateral   Machine leg ext (ISO)  45\"/15\" x5 On/off   Machine leg ext (DL) 20# 2 x10    Machine HS curl (SL) 20# 3 x10    Machine HS curl (DL) 40# 3 x10    Leg press (DL) 110# 3 x10    Leg press (SL) 65# 3 x10    HR off MH  3 x15                                Manual Intervention (42251)  10'  TIME     STM / iASTM  10'  Patellar tendon   KT tapping  5'     DN   5'  Refer below   Attend E-stim  10'  Refer below   Bracing fitting and discussion on use and best use  8'            NMR re-education (18402)  12' resistance Sets/time Reps CUES NEEDED   NMES w/ quad set Off 1/2 FR 5' 5\"/5\" 15 mA   NMES w/ SAQ 4# 5' 5\"/5\" 17 mA   NMES w/ SLR  5' 4\"/12\" 17 mA   SLS w/ blazepods  30\" x4    SLS w/ air-ex  30\" x5    FSU 6\" 1 x30 Performed in middle of room, PT CGA/min-A for recovery   LSU into SLS w/ air-ex 8\" 3 x10                  Therapeutic Activity (82842)  5'  Sets/time     FSU  8\" 3 x10 Use of LUE for support   LSU 4\" 3 x10 Cueing for knee over toe placement, avoiding knee valgus   Anterior step down 4\" 3 x10 Control down, then forward step up to rest   Resisted side stepping Yellow  5 lap 10ft    TRX squats Bench 3 x10 5\" ECC lowering   Split squat 4\" box + air-ex 2 x10ea    POC, objective measurements, education and discussion of lingering symptoms.  10'       Modalities:      Education  11/25/24: Patient was thoroughly educated on this date regarding rehabilitation goals, importance of PT sessions in improving overall strength and stability and how rehabilitation will facilitate improved outcomes. The patient was educated on and instructed in HEP as listed. The patient was given a detailed

## 2025-02-28 ENCOUNTER — HOSPITAL ENCOUNTER (OUTPATIENT)
Dept: PHYSICAL THERAPY | Age: 75
Setting detail: THERAPIES SERIES
Discharge: HOME | End: 2025-02-28
Payer: MEDICARE

## 2025-02-28 PROCEDURE — 97110 THERAPEUTIC EXERCISES: CPT

## 2025-02-28 PROCEDURE — 97530 THERAPEUTIC ACTIVITIES: CPT

## 2025-02-28 PROCEDURE — 97140 MANUAL THERAPY 1/> REGIONS: CPT

## 2025-02-28 NOTE — FLOWSHEET NOTE
[facility] - Outpatient Rehabilitation and Therapy [address], [place] [address] office: [phone] fax: [phone]    Physical Therapy: TREATMENT/PROGRESS NOTE   Patient: Zakiya Charlton (74 y.o. female)   Examination Date: 2025   :  1950 MRN: [id number]   Visit #: 25   Insurance Allowable Auth Needed   BOMN []Yes    [x]No    Insurance: Payor: [employer] MEDIC[employer] / Plan: ANTHEM MEDIBLUE ESSENTIAL/PLUS / Product Type: *No Product type* /   Insurance ID: [id number] - (Medicare Managed)  Secondary Insurance (if applicable):    Treatment Diagnosis:     ICD-10-CM    1. Atrophy of quadriceps femoris muscle  M62.559       2. Weakness of left quadriceps muscle  M62.81       3. Weakness of left hip  R29.898       4. Gait instability  R26.81          Medical Diagnosis:  Tear of medial meniscus of left knee, current, unspecified tear type, initial encounter [S83.242A]   Referring Physician: Ar Evans MD  PCP: Og Bower MD     Plan of care signed (Y/N): Y    Date of Patient follow up with Physician: PRN     Plan of Care Report: NO  POC update due: (10 visits /OR AUTH LIMITS, whichever is less)  3/10/25                                             Medical History:  Comorbidities:  Cancer/Tumor  Hypertension  Osteoporosis/Osteopenia  Depression  Relevant Medical History: Right knee ORIF ()                                         Precautions/ Contra-indications:           Latex allergy:  NO  Pacemaker:    NO  Contraindications for Manipulation: None and unhealthy/ multiple comorbidities   Date of Surgery: 24 - L knee Medial and Lateral partial Menisectomy    Red Flags:  None    Suicide Screening:   The patient did not verbalize a primary behavioral concern, suicidal ideation, suicidal intent, or demonstrate suicidal behaviors.    Preferred Language for Healthcare:   [x] English       [] other:    SUBJECTIVE EXAMINATION     Patient stated complaint: Patient

## 2025-03-04 ENCOUNTER — HOSPITAL ENCOUNTER (OUTPATIENT)
Dept: PHYSICAL THERAPY | Age: 75
Setting detail: THERAPIES SERIES
End: 2025-03-04
Payer: MEDICARE

## 2025-03-06 ENCOUNTER — HOSPITAL ENCOUNTER (OUTPATIENT)
Dept: PHYSICAL THERAPY | Age: 75
Setting detail: THERAPIES SERIES
Discharge: HOME | End: 2025-03-06
Payer: MEDICARE

## 2025-03-06 ENCOUNTER — APPOINTMENT (OUTPATIENT)
Dept: PHYSICAL THERAPY | Age: 75
End: 2025-03-06
Payer: MEDICARE

## 2025-03-06 PROCEDURE — 97110 THERAPEUTIC EXERCISES: CPT

## 2025-03-06 PROCEDURE — 97530 THERAPEUTIC ACTIVITIES: CPT

## 2025-03-06 PROCEDURE — 97112 NEUROMUSCULAR REEDUCATION: CPT

## 2025-03-06 NOTE — FLOWSHEET NOTE
[facility] - Outpatient Rehabilitation and Therapy [address], [place] [address] office: [phone] fax: [phone]    Physical Therapy: TREATMENT/PROGRESS NOTE   Patient: Zakiya Charlton (74 y.o. female)   Examination Date: 2025   :  1950 MRN: [id number]   Visit #: 26   Insurance Allowable Auth Needed   BOMN []Yes    [x]No    Insurance: Payor: [employer] MEDIC[employer] / Plan: ANTHEM MEDIBLUE ESSENTIAL/PLUS / Product Type: *No Product type* /   Insurance ID: [id number] - (Medicare Managed)  Secondary Insurance (if applicable):    Treatment Diagnosis:     ICD-10-CM    1. Atrophy of quadriceps femoris muscle  M62.559       2. Weakness of left quadriceps muscle  M62.81       3. Weakness of left hip  R29.898       4. Gait instability  R26.81          Medical Diagnosis:  Tear of medial meniscus of left knee, current, unspecified tear type, initial encounter [S83.242A]   Referring Physician: Ar Evans MD  PCP: Og Bower MD     Plan of care signed (Y/N): Y    Date of Patient follow up with Physician: PRN     Plan of Care Report: NO  POC update due: (10 visits /OR AUTH LIMITS, whichever is less)  3/10/25                                             Medical History:  Comorbidities:  Cancer/Tumor  Hypertension  Osteoporosis/Osteopenia  Depression  Relevant Medical History: Right knee ORIF ()                                         Precautions/ Contra-indications:           Latex allergy:  NO  Pacemaker:    NO  Contraindications for Manipulation: None and unhealthy/ multiple comorbidities   Date of Surgery: 24 - L knee Medial and Lateral partial Menisectomy    Red Flags:  None    Suicide Screening:   The patient did not verbalize a primary behavioral concern, suicidal ideation, suicidal intent, or demonstrate suicidal behaviors.    Preferred Language for Healthcare:   [x] English       [] other:    SUBJECTIVE EXAMINATION     Patient stated complaint: Patient

## 2025-03-13 ENCOUNTER — OFFICE VISIT (OUTPATIENT)
Dept: ORTHOPEDIC SURGERY | Age: 75
End: 2025-03-13
Payer: MEDICARE

## 2025-03-13 ENCOUNTER — HOSPITAL ENCOUNTER (OUTPATIENT)
Dept: PHYSICAL THERAPY | Age: 75
Setting detail: THERAPIES SERIES
Discharge: HOME | End: 2025-03-13
Payer: MEDICARE

## 2025-03-13 VITALS — BODY MASS INDEX: 27.99 KG/M2 | HEIGHT: 65 IN | WEIGHT: 168 LBS

## 2025-03-13 DIAGNOSIS — M17.12 PRIMARY OSTEOARTHRITIS OF LEFT KNEE: Primary | ICD-10-CM

## 2025-03-13 PROCEDURE — 97110 THERAPEUTIC EXERCISES: CPT

## 2025-03-13 PROCEDURE — 97530 THERAPEUTIC ACTIVITIES: CPT

## 2025-03-13 PROCEDURE — 99213 OFFICE O/P EST LOW 20 MIN: CPT | Performed by: ORTHOPAEDIC SURGERY

## 2025-03-13 PROCEDURE — 1123F ACP DISCUSS/DSCN MKR DOCD: CPT | Performed by: ORTHOPAEDIC SURGERY

## 2025-03-13 PROCEDURE — 97140 MANUAL THERAPY 1/> REGIONS: CPT

## 2025-03-13 NOTE — FLOWSHEET NOTE
[facility] - Outpatient Rehabilitation and Therapy [address], [place] [address] office: [phone] fax: [phone]    Physical Therapy: TREATMENT/PROGRESS NOTE   Patient: Zakiya Charlton (74 y.o. female)   Examination Date: 2025   :  1950 MRN: [id number]   Visit #: 27   Insurance Allowable Auth Needed   BOMN []Yes    [x]No    Insurance: Payor: [employer] MEDIC[employer] / Plan: [employer] MEDIBLUE ESSENTIAL/PLUS / Product Type: *No Product type* /   Insurance ID: [id number] - (Medicare Managed)  Secondary Insurance (if applicable):    Treatment Diagnosis:     ICD-10-CM    1. Atrophy of quadriceps femoris muscle  M62.559       2. Weakness of left quadriceps muscle  M62.81       3. Weakness of left hip  R29.898       4. Gait instability  R26.81          Medical Diagnosis:  Tear of medial meniscus of left knee, current, unspecified tear type, initial encounter [S83.242A]   Referring Physician: Ar Evans MD  PCP: Og Bower MD     Plan of care signed (Y/N): Y    Date of Patient follow up with Physician: PRN     Plan of Care Report: NO  POC update due: (10 visits /OR AUTH LIMITS, whichever is less)  3/10/25                                             Medical History:  Comorbidities:  Cancer/Tumor  Hypertension  Osteoporosis/Osteopenia  Depression  Relevant Medical History: Right knee ORIF ()                                         Precautions/ Contra-indications:           Latex allergy:  NO  Pacemaker:    NO  Contraindications for Manipulation: None and unhealthy/ multiple comorbidities   Date of Surgery: 24 - L knee Medial and Lateral partial Menisectomy    Red Flags:  None    Suicide Screening:   The patient did not verbalize a primary behavioral concern, suicidal ideation, suicidal intent, or demonstrate suicidal behaviors.    Preferred Language for Healthcare:   [x] English       [] other:    SUBJECTIVE EXAMINATION     Patient stated complaint: Patient

## 2025-03-19 NOTE — PROGRESS NOTES
([place])      Disclaimer:  This note was dictated with voice recognition software.  Though review and correction are routine, we apologize for any errors.

## 2025-03-20 ENCOUNTER — HOSPITAL ENCOUNTER (OUTPATIENT)
Dept: PHYSICAL THERAPY | Age: 75
Setting detail: THERAPIES SERIES
Discharge: HOME | End: 2025-03-20
Payer: MEDICARE

## 2025-03-20 PROCEDURE — 97110 THERAPEUTIC EXERCISES: CPT | Performed by: SPECIALIST/TECHNOLOGIST

## 2025-03-20 PROCEDURE — 97140 MANUAL THERAPY 1/> REGIONS: CPT | Performed by: SPECIALIST/TECHNOLOGIST

## 2025-03-20 NOTE — FLOWSHEET NOTE
Met: [] Not Met: [] Adjusted       Overall Progression Towards Functional goals/ Treatment Progress Update:  [x] Patient is progressing as expected towards functional goals listed.    [] Progression is slowed due to complexities/Impairments listed.  [] Progression has been slowed due to co-morbidities.  [] Plan just implemented, too soon (<30days) to assess goals progression   [] Goals require adjustment due to lack of progress  [] Patient is not progressing as expected and requires additional follow up with physician  [] Other:     TREATMENT PLAN     Frequency/Duration: 1-2x/week for 4-6 weeks for the following treatment interventions: (UPDATED 2/10/25)    Interventions:  Therapeutic Exercise (39079) including: strength training, ROM, and functional mobility  Therapeutic Activities (59965) including: functional mobility training and education.  Neuromuscular Re-education (44106) activation and proprioception, including postural re-education.    Manual Therapy (07102) as indicated to include: Passive Range of Motion, Gr I-IV mobilizations, Grade V Manipulation, Soft Tissue Mobilization, Dry Needling/IASTM, Trigger Point Release, and Myofascial Release  Modalities as needed that may include: Cryotherapy, Electrical Stimulation, and Vasoneumatic Compression  Patient education on joint protection, postural re-education, activity modification, and progression of HEP    Plan: Continue to progress strengthening and balance activities to return patient to PLOF. Utilize functional activities.      Electronically Signed by Verónica Malloy PTA, [id number]    Date: 03/20/2025     Note: Portions of this note have been templated and/or copied from initial evaluation, reassessments and prior notes for documentation efficiency.    Note: If patient does not return for scheduled/recommended follow up visits, this note will serve as a discharge from care along with the most recent update on progress.

## 2025-03-24 ENCOUNTER — HOSPITAL ENCOUNTER (OUTPATIENT)
Dept: PHYSICAL THERAPY | Age: 75
Setting detail: THERAPIES SERIES
Discharge: HOME | End: 2025-03-24
Payer: MEDICARE

## 2025-03-24 ENCOUNTER — TELEPHONE (OUTPATIENT)
Dept: ORTHOPEDIC SURGERY | Age: 75
End: 2025-03-24

## 2025-03-24 PROCEDURE — 97110 THERAPEUTIC EXERCISES: CPT

## 2025-03-24 PROCEDURE — 97530 THERAPEUTIC ACTIVITIES: CPT

## 2025-03-24 NOTE — PLAN OF CARE
[facility] - Outpatient Rehabilitation and Therapy [address], [place] [address] office: [phone] fax: [phone]    Physical Therapy: TREATMENT/PROGRESS NOTE   Patient: Zakiya Charlton (74 y.o. female)   Examination Date: 2025   :  1950 MRN: [id number]   Visit #: 29   Insurance Allowable Auth Needed   BOMN []Yes    [x]No    Insurance: Payor: [employer] MEDICARE / Plan: [employer] MEDIBLUE ESSENTIAL/PLUS / Product Type: *No Product type* /   Insurance ID: [id number] - (Medicare Managed)  Secondary Insurance (if applicable):    Treatment Diagnosis:     ICD-10-CM    1. Atrophy of quadriceps femoris muscle  M62.559       2. Weakness of left quadriceps muscle  M62.81       3. Weakness of left hip  R29.898       4. Gait instability  R26.81          Medical Diagnosis:  Tear of medial meniscus of left knee, current, unspecified tear type, initial encounter [S83.242A]   Referring Physician: Ar Evans MD  PCP: Og Bower MD     Plan of care signed (Y/N): Y    Date of Patient follow up with Physician: PRN     Plan of Care Report: NO  POC update due: (10 visits /OR AUTH LIMITS, whichever is less)  4/10/25                                             Medical History:  Comorbidities:  Cancer/Tumor  Hypertension  Osteoporosis/Osteopenia  Depression  Relevant Medical History: Right knee ORIF ()                                         Precautions/ Contra-indications:           Latex allergy:  NO  Pacemaker:    NO  Contraindications for Manipulation: None and unhealthy/ multiple comorbidities   Date of Surgery: 24 - L knee Medial and Lateral partial Menisectomy    Red Flags:  None    Suicide Screening:   The patient did not verbalize a primary behavioral concern, suicidal ideation, suicidal intent, or demonstrate suicidal behaviors.    Preferred Language for Healthcare:   [x] English       [] other:    SUBJECTIVE EXAMINATION     Patient stated complaint: Patient

## 2025-03-24 NOTE — TELEPHONE ENCOUNTER
Spoke with patient. Informed that Durolane injection for left knee is approved, and scheduled appt for her to come in.

## 2025-04-02 ENCOUNTER — APPOINTMENT (OUTPATIENT)
Dept: PHYSICAL THERAPY | Age: 75
End: 2025-04-02
Payer: MEDICARE

## 2025-04-07 ENCOUNTER — TELEPHONE (OUTPATIENT)
Dept: ORTHOPEDIC SURGERY | Age: 75
End: 2025-04-07

## 2025-04-07 ENCOUNTER — HOSPITAL ENCOUNTER (OUTPATIENT)
Dept: PHYSICAL THERAPY | Age: 75
Setting detail: THERAPIES SERIES
Discharge: HOME | End: 2025-04-07
Payer: MEDICARE

## 2025-04-07 ENCOUNTER — APPOINTMENT (OUTPATIENT)
Dept: PHYSICAL THERAPY | Age: 75
End: 2025-04-07
Payer: MEDICARE

## 2025-04-07 PROCEDURE — 97112 NEUROMUSCULAR REEDUCATION: CPT

## 2025-04-07 PROCEDURE — 97530 THERAPEUTIC ACTIVITIES: CPT

## 2025-04-07 PROCEDURE — 97110 THERAPEUTIC EXERCISES: CPT

## 2025-04-07 NOTE — FLOWSHEET NOTE
[facility] - Outpatient Rehabilitation and Therapy [address], [place] [address] office: [phone] fax: [phone]    Physical Therapy: TREATMENT/PROGRESS NOTE   Patient: Zakiya Charlton (74 y.o. female)   Examination Date: 2025   :  1950 MRN: [id number]   Visit #: 30   Insurance Allowable Auth Needed   BOMN []Yes    [x]No    Insurance: Payor: [facility] MEDICARE / Plan: [facility] MEDICARE / Product Type: *No Product type* /   Insurance ID: [id number] - (Medicare Managed)  Secondary Insurance (if applicable):    Treatment Diagnosis:     ICD-10-CM    1. Atrophy of quadriceps femoris muscle  M62.559       2. Weakness of left quadriceps muscle  M62.81       3. Weakness of left hip  R29.898       4. Gait instability  R26.81          Medical Diagnosis:  Tear of medial meniscus of left knee, current, unspecified tear type, initial encounter [S83.242A]   Referring Physician: Ar Evans MD  PCP: Og Bower MD     Plan of care signed (Y/N): Y    Date of Patient follow up with Physician: PRN     Plan of Care Report: NO  POC update due: (10 visits /OR AUTH LIMITS, whichever is less)  25                                             Medical History:  Comorbidities:  Cancer/Tumor  Hypertension  Osteoporosis/Osteopenia  Depression  Relevant Medical History: Right knee ORIF ()                                         Precautions/ Contra-indications:           Latex allergy:  NO  Pacemaker:    NO  Contraindications for Manipulation: None and unhealthy/ multiple comorbidities   Date of Surgery: 24 - L knee Medial and Lateral partial Menisectomy    Red Flags:  None    Suicide Screening:   The patient did not verbalize a primary behavioral concern, suicidal ideation, suicidal intent, or demonstrate suicidal behaviors.    Preferred Language for Healthcare:   [x] English       [] other:    SUBJECTIVE EXAMINATION     Patient stated complaint: Patient

## 2025-04-10 ENCOUNTER — APPOINTMENT (OUTPATIENT)
Dept: PHYSICAL THERAPY | Age: 75
End: 2025-04-10
Payer: MEDICARE

## 2025-04-11 ENCOUNTER — HOSPITAL ENCOUNTER (OUTPATIENT)
Dept: PHYSICAL THERAPY | Age: 75
Setting detail: THERAPIES SERIES
Discharge: HOME | End: 2025-04-11
Payer: MEDICARE

## 2025-04-11 PROCEDURE — 97530 THERAPEUTIC ACTIVITIES: CPT

## 2025-04-11 PROCEDURE — 97140 MANUAL THERAPY 1/> REGIONS: CPT

## 2025-04-11 PROCEDURE — 97110 THERAPEUTIC EXERCISES: CPT

## 2025-04-11 NOTE — FLOWSHEET NOTE
including postural re-education.    Manual Therapy (50144) as indicated to include: Passive Range of Motion, Gr I-IV mobilizations, Grade V Manipulation, Soft Tissue Mobilization, Dry Needling/IASTM, Trigger Point Release, and Myofascial Release  Modalities as needed that may include: Cryotherapy, Electrical Stimulation, and Vasoneumatic Compression  Patient education on joint protection, postural re-education, activity modification, and progression of HEP    Plan: Progress CKC exercises as patient seems to tolerate better than [place]. Focus on eccentrics and end range quadriceps engagement.      Electronically Signed by SHERLYN Bright    Date: 04/11/2025     Note: Portions of this note have been templated and/or copied from initial evaluation, reassessments and prior notes for documentation efficiency.    Note: If patient does not return for scheduled/recommended follow up visits, this note will serve as a discharge from care along with the most recent update on progress.

## 2025-04-14 ENCOUNTER — HOSPITAL ENCOUNTER (OUTPATIENT)
Dept: PHYSICAL THERAPY | Age: 75
Setting detail: THERAPIES SERIES
Discharge: HOME | End: 2025-04-14
Payer: MEDICARE

## 2025-04-14 PROCEDURE — 97530 THERAPEUTIC ACTIVITIES: CPT

## 2025-04-14 PROCEDURE — 97110 THERAPEUTIC EXERCISES: CPT

## 2025-04-14 NOTE — FLOWSHEET NOTE
[facility] - Outpatient Rehabilitation and Therapy [address], [place] [address] office: [phone] fax: [phone]    Physical Therapy: TREATMENT/PROGRESS NOTE   Patient: Zakiya Charlton (74 y.o. female)   Examination Date: 2025   :  1950 MRN: [id number]   Visit #: 32   Insurance Allowable Auth Needed   BOMN []Yes    [x]No    Insurance: Payor: [facility] MEDICARE / Plan: [facility] MEDICARE / Product Type: *No Product type* /   Insurance ID: [id number] - (Medicare Managed)  Secondary Insurance (if applicable):    Treatment Diagnosis:     ICD-10-CM    1. Atrophy of quadriceps femoris muscle  M62.559       2. Weakness of left quadriceps muscle  M62.81       3. Weakness of left hip  R29.898       4. Gait instability  R26.81          Medical Diagnosis:  Tear of medial meniscus of left knee, current, unspecified tear type, initial encounter [S83.242A]   Referring Physician: Ar Evans MD  PCP: Og Bower MD     Plan of care signed (Y/N): Y    Date of Patient follow up with Physician: PRN     Plan of Care Report: NO  POC update due: (10 visits /OR AUTH LIMITS, whichever is less)  25                                             Medical History:  Comorbidities:  Cancer/Tumor  Hypertension  Osteoporosis/Osteopenia  Depression  Relevant Medical History: Right knee ORIF ()                                         Precautions/ Contra-indications:           Latex allergy:  NO  Pacemaker:    NO  Contraindications for Manipulation: None and unhealthy/ multiple comorbidities   Date of Surgery: 24 - L knee Medial and Lateral partial Menisectomy    Red Flags:  None    Suicide Screening:   The patient did not verbalize a primary behavioral concern, suicidal ideation, suicidal intent, or demonstrate suicidal behaviors.    Preferred Language for Healthcare:   [x] English       [] other:    SUBJECTIVE EXAMINATION     Patient stated complaint: Patient

## 2025-04-15 ENCOUNTER — OFFICE VISIT (OUTPATIENT)
Dept: ORTHOPEDIC SURGERY | Age: 75
End: 2025-04-15
Payer: MEDICARE

## 2025-04-15 VITALS — BODY MASS INDEX: 27.99 KG/M2 | HEIGHT: 65 IN | WEIGHT: 168 LBS

## 2025-04-15 DIAGNOSIS — M17.12 PRIMARY OSTEOARTHRITIS OF LEFT KNEE: Primary | ICD-10-CM

## 2025-04-15 PROCEDURE — 20610 DRAIN/INJ JOINT/BURSA W/O US: CPT | Performed by: ORTHOPAEDIC SURGERY

## 2025-04-15 PROCEDURE — 1125F AMNT PAIN NOTED PAIN PRSNT: CPT | Performed by: ORTHOPAEDIC SURGERY

## 2025-04-15 PROCEDURE — 99213 OFFICE O/P EST LOW 20 MIN: CPT | Performed by: ORTHOPAEDIC SURGERY

## 2025-04-15 PROCEDURE — 1123F ACP DISCUSS/DSCN MKR DOCD: CPT | Performed by: ORTHOPAEDIC SURGERY

## 2025-04-17 ENCOUNTER — HOSPITAL ENCOUNTER (OUTPATIENT)
Dept: PHYSICAL THERAPY | Age: 75
Setting detail: THERAPIES SERIES
Discharge: HOME | End: 2025-04-17
Payer: MEDICARE

## 2025-04-17 PROCEDURE — 97140 MANUAL THERAPY 1/> REGIONS: CPT

## 2025-04-17 PROCEDURE — 97530 THERAPEUTIC ACTIVITIES: CPT

## 2025-04-17 PROCEDURE — 97110 THERAPEUTIC EXERCISES: CPT

## 2025-04-17 NOTE — FLOWSHEET NOTE
progression   [] Goals require adjustment due to lack of progress  [] Patient is not progressing as expected and requires additional follow up with physician  [] Other:     TREATMENT PLAN     Frequency/Duration: 1-2x per week for 4-6 weeks for the following treatment interventions:     Interventions:  Therapeutic Exercise (21924) including: strength training, ROM, and functional mobility  Therapeutic Activities (35139) including: functional mobility training and education.  Neuromuscular Re-education (73387) activation and proprioception, including postural re-education.    Manual Therapy (12401) as indicated to include: Passive Range of Motion, Gr I-IV mobilizations, Grade V Manipulation, Soft Tissue Mobilization, Dry Needling/IASTM, Trigger Point Release, and Myofascial Release  Modalities as needed that may include: Cryotherapy, Electrical Stimulation, and Vasoneumatic Compression  Patient education on joint protection, postural re-education, activity modification, and progression of HEP    Plan: Focus on quadriceps eccentric contractions and end range quadriceps engagement. STM as needed to reduce hypertonicity of quadriceps.    Electronically Signed by SHERLYN Bright    Date: 04/17/2025     Therapist was present, directed the patient's care, made skilled judgement, and was responsible for assessment and treatment of the patient.         Note: Portions of this note have been templated and/or copied from initial evaluation, reassessments and prior notes for documentation efficiency.    Note: If patient does not return for scheduled/recommended follow up visits, this note will serve as a discharge from care along with the most recent update on progress.

## 2025-04-21 ENCOUNTER — HOSPITAL ENCOUNTER (OUTPATIENT)
Dept: PHYSICAL THERAPY | Age: 75
Setting detail: THERAPIES SERIES
Discharge: HOME | End: 2025-04-21
Payer: MEDICARE

## 2025-04-21 PROCEDURE — 97140 MANUAL THERAPY 1/> REGIONS: CPT

## 2025-04-21 PROCEDURE — 97110 THERAPEUTIC EXERCISES: CPT

## 2025-04-21 PROCEDURE — 97530 THERAPEUTIC ACTIVITIES: CPT

## 2025-04-21 NOTE — FLOWSHEET NOTE
minor improvements in L knee pain with gel injection but still has local pain at patella tendon. Otherwise, she is doing okay at this time, pain is not sharp anymore like it once was.    Zakiya Charlton is a 74 y.o. female presenting today to outpatient PT with complaints of left knee pain following medial and lateral partial meniscectomy. Patient reports chronic left knee pain. States following up with Dr. Evans back in May which resulted in surgery on 11/1/24. States she was not initially instructed to go to PT but over the last 2 weeks has developed sharp, intense anterior joint line pain. Patient complaints of pain which limits her ability to walk, performs stairs, transfer on/off the toilet and perform household ADLs.       Test used Initial score  11/25/24 01/03/2025 2/10/2025   3/24/2025   04/21/25    Pain Summary VAS 0-7/10 5/10 2-3/10 0/10 0/10   Functional questionnaire WOMAC 34/96 23/96 20/96    Other:                    OBJECTIVE EXAMINATION     Observation:  4/21: Pt able to tolerate quadriceps concentric contractions after doing isometrics and STM.  4/14: Performs exercises slowly with seeking frequent conversations.  4/11: More difficulty with FSD/LSD d/t pressure in surgery ankle (L).  4/7: improvement in knee symptoms following KT tapping for superior patellar glide support.  3/24: Left knee tightness into end range of extension with poor quad activation. Patient with continued TTP over the medial portion of the patellar tendon and pain with end range extension.   2/28: Poor glute activation. Low energy levels and fatigue entering session.   2/20: Patient required several min-A to maintain LOB when performing FSU drill.  2/5: increased time discussing knee brace and fitting.   12/30: Continues to struggle maintaining TKE with SAQ, LAQ and SLR. Use of glute compensation.  12/26: Reduced quad activation due to increased edema and pain.   12/6: Poor quad contraction, quad lag present with SLR.

## 2025-04-22 NOTE — PROGRESS NOTES
4/15/2025     Reason for visit:  Status post left knee arthroscopy with partial medial and lateral meniscectomy on 11/1/2024    History of Present Illness:  Patient presents for postop evaluation.  Patient overall is doing reasonably well she does report improved symptoms since surgery but still has some discomfort.  We previously tried a cortisone injection however did not provide much relief.    Objective:  Ht 1.651 m (5' 5\")   Wt 76.2 kg (168 lb)   BMI 27.96 kg/m²      Physical Exam:  The patient is well-appearing and in no apparent distress  Examination of the left knee   There is a small effusion, no gross deformity or skin changes  Range of motion reveals 0 to 120  Neg lachman, negative posterior drawer, no pain or laxity with varus or valgus stress at 0 degrees and 30 degrees of flexion  + medial joint line tenderness  5 out of 5 strength throughout distal muscle groups  Sensation is intact to light touch throughout all distributions  There is no calf swelling or tenderness  Palpable DP pulse, brisk cap refill, 2+ symmetric reflexes       Assessment:  Status post left knee arthroscopy with partial medial and lateral meniscectomy on 11/1/2024    Plan:  The patient still has some discomfort within her knee.  Therefore would advocate for ordering hyaluronic acid injection.  Therefore hyaluronic acid injection was given to the left knee with Durolane.  This was done via sterile technique.  She tolerated well.  Patient return as needed.    Greater than 20 minutes were spent with this encounter.  Time spent included evaluating the patient's chart prior to arrival.  Evaluating the patient in the office including history, physical examination, imaging reviewing, and counseling on next steps.  Lastly, time was spent discussing orders with my staff as well as providing documentation in the chart.                Ar Evans MD            Orthopaedic Surgery Sports Medicine and Arthroscopy            [facility]

## 2025-04-24 ENCOUNTER — HOSPITAL ENCOUNTER (OUTPATIENT)
Dept: PHYSICAL THERAPY | Age: 75
Setting detail: THERAPIES SERIES
Discharge: HOME | End: 2025-04-24
Payer: MEDICARE

## 2025-04-24 PROCEDURE — 97140 MANUAL THERAPY 1/> REGIONS: CPT

## 2025-04-24 PROCEDURE — 97110 THERAPEUTIC EXERCISES: CPT

## 2025-04-24 PROCEDURE — 97530 THERAPEUTIC ACTIVITIES: CPT

## 2025-04-24 NOTE — FLOWSHEET NOTE
[facility] - Outpatient Rehabilitation and Therapy [address], [place] [address] office: [phone] fax: [phone]    Physical Therapy: TREATMENT/PROGRESS NOTE   Patient: Zakiya Charlton (74 y.o. female)   Examination Date: 2025   :  1950 MRN: [id number]   Visit #: 35   Insurance Allowable Auth Needed   BOMN []Yes    [x]No    Insurance: Payor: [facility] MEDICARE / Plan: [facility] MEDICARE / Product Type: *No Product type* /   Insurance ID: [id number] - (Medicare Managed)  Secondary Insurance (if applicable):    Treatment Diagnosis:     ICD-10-CM    1. Atrophy of quadriceps femoris muscle  M62.559       2. Weakness of left quadriceps muscle  M62.81       3. Weakness of left hip  R29.898       4. Gait instability  R26.81          Medical Diagnosis:  Tear of medial meniscus of left knee, current, unspecified tear type, initial encounter [S83.242A]   Referring Physician: Ar Evans MD  PCP: Og Bower MD     Plan of care signed (Y/N): Y    Date of Patient follow up with Physician: PRN     Plan of Care Report: NO  POC update due: (10 visits /OR AUTH LIMITS, whichever is less)  25 NPV                                            Medical History:  Comorbidities:  Cancer/Tumor  Hypertension  Osteoporosis/Osteopenia  Depression  Relevant Medical History: Right knee ORIF ()                                         Precautions/ Contra-indications:           Latex allergy:  NO  Pacemaker:    NO  Contraindications for Manipulation: None and unhealthy/ multiple comorbidities   Date of Surgery: 24 - L knee Medial and Lateral partial Menisectomy    Red Flags:  None    Suicide Screening:   The patient did not verbalize a primary behavioral concern, suicidal ideation, suicidal intent, or demonstrate suicidal behaviors.    Preferred Language for Healthcare:   [x] English       [] other:    SUBJECTIVE EXAMINATION     Patient stated complaint: Pain is

## 2025-04-28 ENCOUNTER — APPOINTMENT (OUTPATIENT)
Dept: PHYSICAL THERAPY | Age: 75
End: 2025-04-28
Payer: MEDICARE

## 2025-05-01 ENCOUNTER — HOSPITAL ENCOUNTER (OUTPATIENT)
Dept: PHYSICAL THERAPY | Age: 75
Setting detail: THERAPIES SERIES
Discharge: HOME | End: 2025-05-01
Payer: MEDICARE

## 2025-05-01 PROCEDURE — 97530 THERAPEUTIC ACTIVITIES: CPT

## 2025-05-01 PROCEDURE — 97110 THERAPEUTIC EXERCISES: CPT

## 2025-05-01 NOTE — PLAN OF CARE
x30    SLS w/ air-ex  20\" x5    FSU 6\" 1 x30 Performed in middle of room, PT CGA/min-A for recovery   LSU into SLS w/ air-ex 8\" 3 x10    Obstacle course  5 10ft Aeromat, and 2 Airex pads and stepping over cones in between pads   Holds and Release, Reactive Balance  1 5 Backwards and L lateral          Therapeutic Activity (84642)  35'  Sets/time     Objective measurements  15'     Knee over toes DL leg press 70# 3 x15    FSU  6\" 2 x10 Cued to start on R toe to limit PF push off.   LSU 4\" 3 x10 Cueing for knee over toe placement, avoiding knee valgus   Anterior step down  2-4\" 1-1 x5 Able to progress to 4\" LSD   LSD 4\" 2 X10-8 Minor unilateral hip drop. Difficulty with ankle pain   Resisted side stepping Yellow  4 laps 10ft Above knee   TRX squats       Knee over toe squats at rack Thick purple 3 x15 Performed with thick purple band to support weight and upper body   Wall sits  25\" x5    Retro lunge off step 6\" box 2 x10             Modalities:    Education  4/17/25: Pt was thoroughly educated on this date regarding OA hyaluronic injection regarding healing time frames and purpose of injection. Also, education provided on patellar tendinitis and how injection will likely be diagnostic of whether pain is coming from the joint and/or the patellar tendon. Patient asked about how beneficial a recumbent bike would be to improve strength. So pt educated that a recumbent bike would improve muscular endurance vs. Strength. Benefits acknowledged and understand by patient regarding using a recumbent bike.     11/25/24: Patient was thoroughly educated on this date regarding rehabilitation goals, importance of PT sessions in improving overall strength and stability and how rehabilitation will facilitate improved outcomes. The patient was educated on and instructed in HEP as listed. The patient was given a detailed handout for exercises to initiate at home. Patient education regarding PT assessment and discussed possible courses

## 2025-05-07 ENCOUNTER — APPOINTMENT (OUTPATIENT)
Dept: PHYSICAL THERAPY | Age: 75
End: 2025-05-07
Payer: MEDICARE

## 2025-05-09 ENCOUNTER — HOSPITAL ENCOUNTER (OUTPATIENT)
Dept: PHYSICAL THERAPY | Age: 75
Setting detail: THERAPIES SERIES
Discharge: HOME | End: 2025-05-09
Payer: MEDICARE

## 2025-05-09 PROCEDURE — 97110 THERAPEUTIC EXERCISES: CPT

## 2025-05-09 PROCEDURE — 97140 MANUAL THERAPY 1/> REGIONS: CPT

## 2025-05-09 PROCEDURE — 97530 THERAPEUTIC ACTIVITIES: CPT

## 2025-05-09 NOTE — FLOWSHEET NOTE
[facility] - Outpatient Rehabilitation and Therapy [address], [place] [address] office: [phone] fax: [phone]    Physical Therapy: TREATMENT/PROGRESS NOTE   Patient: Zakiya Charlton (74 y.o. female)   Examination Date: 2025   :  1950 MRN: [id number]   Visit #: 37   Insurance Allowable Auth Needed   BOMN []Yes    [x]No    Insurance: Payor: [facility] MEDICARE / Plan: [facility] MEDICARE / Product Type: *No Product type* /   Insurance ID: [id number] - (Medicare Managed)  Secondary Insurance (if applicable):    Treatment Diagnosis:     ICD-10-CM    1. Atrophy of quadriceps femoris muscle  M62.559       2. Weakness of left quadriceps muscle  M62.81       3. Weakness of left hip  R29.898       4. Gait instability  R26.81          Medical Diagnosis:  Tear of medial meniscus of left knee, current, unspecified tear type, initial encounter [S83.242A]   Referring Physician: Ar Evans MD  PCP: Og Bower MD     Plan of care signed (Y/N): Y    Date of Patient follow up with Physician: PRN     Plan of Care Report: NO  POC update due: (10 visits /OR AUTH LIMITS, whichever is less)  25                                            Medical History:  Comorbidities:  Cancer/Tumor  Hypertension  Osteoporosis/Osteopenia  Depression  Relevant Medical History: Right knee ORIF ()                                         Precautions/ Contra-indications:           Latex allergy:  NO  Pacemaker:    NO  Contraindications for Manipulation: None and unhealthy/ multiple comorbidities   Date of Surgery: 24 - L knee Medial and Lateral partial Menisectomy    Red Flags:  None    Suicide Screening:   The patient did not verbalize a primary behavioral concern, suicidal ideation, suicidal intent, or demonstrate suicidal behaviors.    Preferred Language for Healthcare:   [x] English       [] other:    SUBJECTIVE EXAMINATION     Patient stated complaint: \"My knee feels

## 2025-05-15 ENCOUNTER — APPOINTMENT (OUTPATIENT)
Dept: PHYSICAL THERAPY | Age: 75
End: 2025-05-15
Payer: MEDICARE

## 2025-05-21 ENCOUNTER — HOSPITAL ENCOUNTER (OUTPATIENT)
Dept: PHYSICAL THERAPY | Age: 75
Setting detail: THERAPIES SERIES
Discharge: HOME | End: 2025-05-21
Payer: MEDICARE

## 2025-05-21 PROCEDURE — 97110 THERAPEUTIC EXERCISES: CPT

## 2025-05-21 PROCEDURE — 97530 THERAPEUTIC ACTIVITIES: CPT

## 2025-05-21 NOTE — FLOWSHEET NOTE
[facility] - Outpatient Rehabilitation and Therapy [address], [place] [address] office: [phone] fax: [phone]    Physical Therapy: TREATMENT/PROGRESS NOTE   Patient: Zakiya Charlton (74 y.o. female)   Examination Date: 2025   :  1950 MRN: [id number]   Visit #: 38   Insurance Allowable Auth Needed   BOMN []Yes    [x]No    Insurance: Payor: [facility] MEDICARE / [facility] [place] [facility] / Product Type: *No Product type* /   Insurance ID: [id number] - (Medicare Managed)  Secondary Insurance (if applicable):    Treatment Diagnosis:     ICD-10-CM    1. Atrophy of quadriceps femoris muscle  M62.559       2. Weakness of left quadriceps muscle  M62.81       3. Weakness of left hip  R29.898       4. Gait instability  R26.81          Medical Diagnosis:  Tear of medial meniscus of left knee, current, unspecified tear type, initial encounter [S83.242A]   Referring Physician: Ar Evans MD  PCP: Og Bower MD     Plan of care signed (Y/N): Y    Date of Patient follow up with Physician: PRN     Plan of Care Report: NO  POC update due: (10 visits /OR AUTH LIMITS, whichever is less)  25                                            Medical History:  Comorbidities:  Cancer/Tumor  Hypertension  Osteoporosis/Osteopenia  Depression  Relevant Medical History: Right knee ORIF ()                                         Precautions/ Contra-indications:           Latex allergy:  NO  Pacemaker:    NO  Contraindications for Manipulation: None and unhealthy/ multiple comorbidities   Date of Surgery: 24 - L knee Medial and Lateral partial Menisectomy    Red Flags:  None    Suicide Screening:   The patient did not verbalize a primary behavioral concern, suicidal ideation, suicidal intent, or demonstrate suicidal behaviors.    Preferred Language for Healthcare:   [x] English       [] other:    SUBJECTIVE EXAMINATION     Patient stated complaint: \"I'm actually

## 2025-05-22 ENCOUNTER — APPOINTMENT (OUTPATIENT)
Dept: PHYSICAL THERAPY | Age: 75
End: 2025-05-22
Payer: MEDICARE

## 2025-05-29 ENCOUNTER — HOSPITAL ENCOUNTER (OUTPATIENT)
Dept: PHYSICAL THERAPY | Age: 75
Setting detail: THERAPIES SERIES
Discharge: HOME | End: 2025-05-29
Payer: MEDICARE

## 2025-05-29 PROCEDURE — 97530 THERAPEUTIC ACTIVITIES: CPT

## 2025-05-29 NOTE — FLOWSHEET NOTE
[facility] - Outpatient Rehabilitation and Therapy [address], [place] [address] office: [phone] fax: [phone]    Physical Therapy: TREATMENT/PROGRESS NOTE   Patient: Zakiya Charlton (74 y.o. female)   Examination Date: 2025   :  1950 MRN: [id number]   Visit #: 39   Insurance Allowable Auth Needed   BOMN []Yes    [x]No    Insurance: Payor: [facility] / [facility] [place] [facility] / Product Type: *No Product type* /   Insurance ID: [id number] - (Medicare Managed)  Secondary Insurance (if applicable):    Treatment Diagnosis:     ICD-10-CM    1. Atrophy of quadriceps femoris muscle  M62.559       2. Weakness of left quadriceps muscle  M62.81       3. Weakness of left hip  R29.898       4. Gait instability  R26.81          Medical Diagnosis:  Tear of medial meniscus of left knee, current, unspecified tear type, initial encounter [S83.242A]   Referring Physician: Ar Evans MD  PCP: Og Bower MD     Plan of care signed (Y/N): Y    Date of Patient follow up with Physician: PRN     Plan of Care Report: NO  POC update due: (10 visits /OR AUTH LIMITS, whichever is less)  25                                            Medical History:  Comorbidities:  Cancer/Tumor  Hypertension  Osteoporosis/Osteopenia  Depression  Relevant Medical History: Right knee ORIF ()                                         Precautions/ Contra-indications:           Latex allergy:  NO  Pacemaker:    NO  Contraindications for Manipulation: None and unhealthy/ multiple comorbidities   Date of Surgery: 24 - L knee Medial and Lateral partial Menisectomy    Red Flags:  None    Suicide Screening:   The patient did not verbalize a primary behavioral concern, suicidal ideation, suicidal intent, or demonstrate suicidal behaviors.    Preferred Language for Healthcare:   [x] English       [] other:    SUBJECTIVE EXAMINATION     Patient stated complaint: Patient reports

## 2025-07-23 ENCOUNTER — OFFICE VISIT (OUTPATIENT)
Dept: ORTHOPEDIC SURGERY | Age: 75
End: 2025-07-23

## 2025-07-23 VITALS — BODY MASS INDEX: 27.99 KG/M2 | WEIGHT: 168 LBS | HEIGHT: 65 IN

## 2025-07-23 DIAGNOSIS — M19.172 POST-TRAUMATIC OSTEOARTHRITIS OF LEFT ANKLE: ICD-10-CM

## 2025-07-23 DIAGNOSIS — M76.62 ACHILLES TENDINITIS, LEFT LEG: Primary | ICD-10-CM

## 2025-07-23 RX ORDER — METHYLPREDNISOLONE 4 MG/1
TABLET ORAL
Qty: 1 KIT | Refills: 0 | Status: SHIPPED | OUTPATIENT
Start: 2025-07-23 | End: 2025-07-29

## 2025-07-23 NOTE — PROGRESS NOTES
ORTHOPAEDIC OFFICE NOTE    Chief Complaint   Patient presents with    Ankle Pain     Left ankle       HPI  7/23/25  74 y.o. female seen for evaluation of left ankle pain:  H/o ORIF left lateral malleolus fracture in 1999, subsequent hardware removal due to nerve pain  Ever since then, she has had intermittent achiness across the anterior ankle as well as very occasional shooting pain through the ankle up the leg  However, she states her pain has changed/gotten worse over the last week or so  She is now having pain over the posterior aspect of the ankle, along the Achilles tendon  States this is always sore/\"feels like it is being squeezed\"  She also says that shooting pain she has had in the past has intensified and increased in frequency  Pain is worse with walking, has not noticed any other specific aggravating activities  She has taken Tylenol and ibuprofen occasionally in the past for her chronic ankle pain, but has not tried anything for her recent pain  Denies N/T  Patient is not diabetic and does not smoke        Allergies   Allergen Reactions    Erythromycin Nausea Only    Morphine Itching        Current Outpatient Medications   Medication Sig Dispense Refill    methylPREDNISolone (MEDROL, CELESTINO,) 4 MG tablet Pain, radicular 1 kit 0    aspirin 325 MG tablet Take 1 tablet by mouth daily 14 tablet 0    ondansetron (ZOFRAN) 4 MG tablet Take 1 tablet by mouth every 8 hours as needed for Nausea 21 tablet 0    valsartan (DIOVAN) 80 MG tablet Take 1 tablet by mouth daily      vitamin C (ASCORBIC ACID) 500 MG tablet Take 1 tablet by mouth daily      MULTIPLE VITAMIN PO Take by mouth      diclofenac sodium (VOLTAREN) 1 % GEL Apply topically 4 times daily as needed for Pain (apply to knee when hurting)      calcium carbonate 600 MG TABS tablet Take 2 tablets by mouth daily      escitalopram (LEXAPRO) 10 MG tablet Take 1 tablet by mouth at bedtime      estradiol (ESTRACE) 0.1 MG/GM vaginal cream Place 0.5 g vaginally

## (undated) DEVICE — SUTURE VCRL + SZ 0 L18IN ABSRB UD L36MM CT-1 1/2 CIR VCP840D

## (undated) DEVICE — DRILL BIT, AO, SCALED: Brand: VARIAX

## (undated) DEVICE — BANDAGE COMPR W6INXL12FT SMOOTH FOR LIMB EXSANG ESMARCH

## (undated) DEVICE — APPLICATOR MEDICATED 26 CC SOLUTION HI LT ORNG CHLORAPREP

## (undated) DEVICE — SYRINGE IRRIG 60ML SFT PLIABLE BLB EZ TO GRP 1 HND USE W/

## (undated) DEVICE — SUTURE VCRL + SZ 2-0 L18IN ABSRB UD CT1 L36MM 1/2 CIR VCP839D

## (undated) DEVICE — Z INACTIVE USE 2855096 SPONGE GZ W4XL4IN 8 PLY 100% COT

## (undated) DEVICE — HYPODERMIC SAFETY NEEDLE: Brand: MAGELLAN

## (undated) DEVICE — BAG DRAINAGE FOR SIEMANS DORNIER

## (undated) DEVICE — C-ARM: Brand: UNBRANDED

## (undated) DEVICE — BLANKET WRM W40.2XL55.9IN IORT LO BODY + MISTRAL AIR

## (undated) DEVICE — COTTON UNDERCAST PADDING,CRIMPED FINISH: Brand: WEBRIL

## (undated) DEVICE — SPONGE LAP W18XL18IN WHT COT 4 PLY FLD STRUNG RADPQ DISP ST

## (undated) DEVICE — 3M™ STERI-DRAPE™ ARTHROSCOPY SHEET WITH POUCH 1194: Brand: STERI-DRAPE™

## (undated) DEVICE — GLOVE SURG SZ 6 THK91MIL LTX FREE SYN POLYISOPRENE ANTI

## (undated) DEVICE — TOWEL,OR,DSP,ST,BLUE,STD,4/PK,20PK/CS: Brand: MEDLINE

## (undated) DEVICE — SUTURE MCRYL + SZ 4-0 L27IN ABSRB UD L19MM PS-2 3/8 CIR MCP426H

## (undated) DEVICE — SKIN MARKER REGULAR TIP WITH RULER CAP AND LABELS: Brand: DEVON

## (undated) DEVICE — BANDAGE COMPR W6INXL10YD ST M E WHITE/BEIGE

## (undated) DEVICE — PADDING CAST W6INXL4YD ST COT RAYON MICROPLEATED HIGHLY

## (undated) DEVICE — SOLUTION IRRIG 3000ML STRL H2O USP UROMATIC PLAS CONT

## (undated) DEVICE — ZIMMER® STERILE DISPOSABLE TOURNIQUET CUFF WITH PLC, DUAL PORT, SINGLE BLADDER, 30 IN. (76 CM)

## (undated) DEVICE — YANKAUER,BULB TIP,W/O VENT,RIGID,STERILE: Brand: MEDLINE

## (undated) DEVICE — ELECTRODE PT RET AD L9FT HI MOIST COND ADH HYDRGEL CORDED

## (undated) DEVICE — PACK,CYSTOSCOPY,PK III,AURORA: Brand: MEDLINE

## (undated) DEVICE — CORD BOVIE VALLEY LAB

## (undated) DEVICE — KNEE ARTHROSCOPY: Brand: MEDLINE INDUSTRIES, INC.

## (undated) DEVICE — GLOVE SURG SZ 75 L12IN FNGR THK79MIL GRN LTX FREE

## (undated) DEVICE — COVER,TABLE,77X90,STERILE: Brand: MEDLINE

## (undated) DEVICE — ZIMMER® STERILE DISPOSABLE TOURNIQUET CUFF WITH PLC, SINGLE PORT, SINGLE BLADDER, 24 IN. (61 CM)

## (undated) DEVICE — SUTURE MONOCRYL + SZ 4-0 L27IN ABSRB UD L19MM PS-2 3/8 CIR MCP426H

## (undated) DEVICE — 4.5 MM FULL RADIUS ELITE STRAIGHT                                    DISPOSABLE BLADES, MAROON,PACKAGED 6                                    PER BOX, STERILE

## (undated) DEVICE — T-DRAPE,EXTREMITY,STERILE: Brand: MEDLINE

## (undated) DEVICE — GLOVE ORANGE PI 7 1/2   MSG9075

## (undated) DEVICE — ZIMMER® STERILE DISPOSABLE TOURNIQUET CUFF WITH PLC, DUAL PORT, SINGLE BLADDER, 18 IN. (46 CM)

## (undated) DEVICE — GOWN SIRUS NONREIN XL W/TWL: Brand: MEDLINE INDUSTRIES, INC.

## (undated) DEVICE — 3M™ COBAN™ NL STERILE NON-LATEX SELF-ADHERENT WRAP, 2086S, 6 IN X 5 YD (15 CM X 4,5 M), 12 ROLLS/CASE: Brand: 3M™ COBAN™

## (undated) DEVICE — PACK PROCEDURE SURG EXTREMITY MFFOP CUST

## (undated) DEVICE — MASC TURNOVER KIT: Brand: MEDLINE INDUSTRIES, INC.

## (undated) DEVICE — MASTISOL ADHESIVE LIQ 2/3ML

## (undated) DEVICE — Z INACTIVE USE 2660664 SOLUTION IRRIG 3000ML 0.9% SOD CHL USP UROMATIC PLAS CONT

## (undated) DEVICE — KIRSCHNER WIRE
Type: IMPLANTABLE DEVICE | Site: WRIST | Status: NON-FUNCTIONAL
Brand: VARIAX
Removed: 2023-01-19

## (undated) DEVICE — DRAPE HND W114XL142IN BLU POLYPR W O PCH FEN CRD AND TB HLDR

## (undated) DEVICE — Y-TYPE TUR/BLADDER IRRIGATION SET, REGULATING CLAMP

## (undated) DEVICE — SYRINGE MED 10ML LUERLOCK TIP W/O SFTY DISP

## (undated) DEVICE — GLOVE SURG SZ 75 CRM LTX FREE POLYISOPRENE POLYMER BEAD ANTI

## (undated) DEVICE — GLOVE ORTHO 8   MSG9480

## (undated) DEVICE — DEVON TUBE HOLDER REMOVABLE TOUCH FASTEN STRAP: Brand: DEVON

## (undated) DEVICE — LIGHT HANDLE: Brand: DEVON

## (undated) DEVICE — C-ARMOR C-ARM EQUIPMENT COVERS CLEAR STERILE UNIVERSAL FIT 12 PER CASE: Brand: C-ARMOR

## (undated) DEVICE — STOCKINETTE,IMPERVIOUS,12X48,STERILE: Brand: MEDLINE

## (undated) DEVICE — SHEET,DRAPE,53X77,STERILE: Brand: MEDLINE

## (undated) DEVICE — TUR/ENDOSCOPIC CABLE, 10' (3.05 M): Brand: CONMED

## (undated) DEVICE — NEEDLE HYPO 22GA L1 1/2IN PIVOTING SHLD FOR LUERLOCK SYR

## (undated) DEVICE — SNARE ENDOSCP L240CM SHTH DIA24MM LOOP W10MM POLYP RND REINF

## (undated) DEVICE — PADDING CAST W4INXL4YD ST COT RAYON MICROPLEATED HIGHLY

## (undated) DEVICE — GLOVE,SURG,SENSICARE SLT,LF,PF,8: Brand: MEDLINE

## (undated) DEVICE — DYONICS 25 PATIENT TUBE SET MUST                                    BE USED WITH 7211007, 12 PER BOX

## (undated) DEVICE — STERILE SURGICAL BLADE SIZE 15: Brand: CARDINAL HEALTH

## (undated) DEVICE — SOLUTION SCRB 4OZ 10% POVIDONE IOD ANTIMIC BTL

## (undated) DEVICE — DRESSING,GAUZE,XEROFORM,CURAD,1"X8",ST: Brand: CURAD

## (undated) DEVICE — BNDG,ELSTC,MATRIX,STRL,6"X5YD,LF,HOOK&LP: Brand: MEDLINE

## (undated) DEVICE — SOLUTION IV IRRIG WATER 1000ML POUR BRL 2F7114

## (undated) DEVICE — GOWN SIRUS NONREIN LG W/TWL: Brand: MEDLINE INDUSTRIES, INC.

## (undated) DEVICE — MERCY HEALTH WEST TURNOVER: Brand: MEDLINE INDUSTRIES, INC.

## (undated) DEVICE — ZIMMER® STERILE DISPOSABLE TOURNIQUET CUFF WITH PLC, DUAL PORT, SINGLE BLADDER, 34 IN. (86 CM)

## (undated) DEVICE — STRIP,CLOSURE,WOUND,MEDI-STRIP,1/2X4: Brand: MEDLINE

## (undated) DEVICE — Z DISCONTINUED BY MEDLINE USE 2711682 TRAY SKIN PREP DRY W/ PREM GLV

## (undated) DEVICE — SOLUTION IRRIG 3000ML 0.9% SOD CHL USP UROMATIC PLAS CONT

## (undated) DEVICE — Device

## (undated) DEVICE — GLOVE ORANGE PI 8   MSG9080

## (undated) DEVICE — GLOVE SURG SZ 65 THK91MIL LTX FREE SYN POLYISOPRENE

## (undated) DEVICE — SYRINGE 60ML BULB IRRIG ST LF

## (undated) DEVICE — MEDICINE CUP, GRADUATED, STER: Brand: MEDLINE

## (undated) DEVICE — BANDAGE TRANSPARENT LIQ 2/3 CC MASTISOL

## (undated) DEVICE — SOLUTION IRRIG 500ML 0.9% SOD CHL USP POUR PLAS BTL

## (undated) DEVICE — DRAPE,U/ SHT,SPLIT,PLAS,STERIL: Brand: MEDLINE

## (undated) DEVICE — BANDAGE COMPR W4INXL12FT E DISP ESMARCH EVEN

## (undated) DEVICE — BANDAGE ELASTIC 5YDX4IN ST COMPRSS LF NON ADH

## (undated) DEVICE — REPLAY HEMOSTASIS CLIP, 16MM SPAN: Brand: REPLAY

## (undated) DEVICE — 3M™ STERI-DRAPE™ U-DRAPE 1015: Brand: STERI-DRAPE™

## (undated) DEVICE — SUTURE VCRL + SZ 3-0 L18IN ABSRB UD SH 1/2 CIR TAPERCUT NDL VCP864D

## (undated) DEVICE — 3M™ STERI-STRIP™ REINFORCED ADHESIVE SKIN CLOSURES, R1547, 1/2 IN X 4 IN (12 MM X 100 MM), 6 STRIPS/ENVELOPE: Brand: 3M™ STERI-STRIP™

## (undated) DEVICE — COUNTER NDL FOAM MAG DBL COUNT

## (undated) DEVICE — DRILL, AO, SCALED: Brand: VARIAX

## (undated) DEVICE — DALE FOLEY CATHETER HOLDER, LEGBAND, FITS UP TO 30": Brand: DALE FOLEY CATHETER HOLDER

## (undated) DEVICE — GLOVE SURG SZ 65 L12IN THK75MIL DK GRN LTX FREE

## (undated) DEVICE — STERILE SURGICAL LUBRICANT, METAL TUBE: Brand: SURGILUBE

## (undated) DEVICE — GLOVE SURG SZ 65 L12IN FNGR THK79MIL GRN LTX FREE

## (undated) DEVICE — INTENDED FOR TISSUE SEPARATION, AND OTHER PROCEDURES THAT REQUIRE A SHARP SURGICAL BLADE TO PUNCTURE OR CUT.: Brand: BARD-PARKER ® STAINLESS STEEL BLADES

## (undated) DEVICE — UNTHREADED GUIDE WIRE: Brand: FIXOS

## (undated) DEVICE — GLOVE SURG SZ 6 L12IN THK75MIL DK GRN LTX FREE